# Patient Record
Sex: FEMALE | Race: BLACK OR AFRICAN AMERICAN | Employment: OTHER
[De-identification: names, ages, dates, MRNs, and addresses within clinical notes are randomized per-mention and may not be internally consistent; named-entity substitution may affect disease eponyms.]

---

## 2017-01-09 RX ORDER — AMLODIPINE BESYLATE 10 MG/1
10 TABLET ORAL DAILY
Qty: 30 TABLET | Refills: 3 | OUTPATIENT
Start: 2017-01-09

## 2017-01-17 ENCOUNTER — TELEPHONE (OUTPATIENT)
Dept: PRIMARY CARE CLINIC | Facility: CLINIC | Age: 67
End: 2017-01-17

## 2017-01-17 ENCOUNTER — OFFICE VISIT (OUTPATIENT)
Dept: PRIMARY CARE CLINIC | Facility: CLINIC | Age: 67
End: 2017-01-17

## 2017-01-17 VITALS
SYSTOLIC BLOOD PRESSURE: 126 MMHG | HEART RATE: 64 BPM | DIASTOLIC BLOOD PRESSURE: 79 MMHG | BODY MASS INDEX: 31.72 KG/M2 | TEMPERATURE: 98.3 F | RESPIRATION RATE: 20 BRPM | WEIGHT: 173.4 LBS

## 2017-01-17 DIAGNOSIS — E78.2 MIXED HYPERLIPIDEMIA: ICD-10-CM

## 2017-01-17 DIAGNOSIS — E87.6 HYPOKALEMIA: ICD-10-CM

## 2017-01-17 DIAGNOSIS — L91.0 KELOID SCAR: ICD-10-CM

## 2017-01-17 DIAGNOSIS — E11.9 TYPE 2 DIABETES MELLITUS WITHOUT COMPLICATION, WITHOUT LONG-TERM CURRENT USE OF INSULIN (HCC): Primary | ICD-10-CM

## 2017-01-17 DIAGNOSIS — Z12.11 COLON CANCER SCREENING: Primary | ICD-10-CM

## 2017-01-17 DIAGNOSIS — M85.80 OSTEOPENIA DETERMINED BY X-RAY: ICD-10-CM

## 2017-01-17 DIAGNOSIS — Z23 NEED FOR SHINGLES VACCINE: ICD-10-CM

## 2017-01-17 DIAGNOSIS — Z12.39 BREAST CANCER SCREENING: ICD-10-CM

## 2017-01-17 DIAGNOSIS — I10 ESSENTIAL HYPERTENSION: ICD-10-CM

## 2017-01-17 DIAGNOSIS — Z12.11 COLON CANCER SCREENING: ICD-10-CM

## 2017-01-17 PROCEDURE — 99213 OFFICE O/P EST LOW 20 MIN: CPT | Performed by: NURSE PRACTITIONER

## 2017-01-17 PROCEDURE — G8427 DOCREV CUR MEDS BY ELIG CLIN: HCPCS | Performed by: NURSE PRACTITIONER

## 2017-01-17 PROCEDURE — G8484 FLU IMMUNIZE NO ADMIN: HCPCS | Performed by: NURSE PRACTITIONER

## 2017-01-17 PROCEDURE — 3014F SCREEN MAMMO DOC REV: CPT | Performed by: NURSE PRACTITIONER

## 2017-01-17 PROCEDURE — G8399 PT W/DXA RESULTS DOCUMENT: HCPCS | Performed by: NURSE PRACTITIONER

## 2017-01-17 PROCEDURE — 3017F COLORECTAL CA SCREEN DOC REV: CPT | Performed by: NURSE PRACTITIONER

## 2017-01-17 PROCEDURE — 1036F TOBACCO NON-USER: CPT | Performed by: NURSE PRACTITIONER

## 2017-01-17 PROCEDURE — 3044F HG A1C LEVEL LT 7.0%: CPT | Performed by: NURSE PRACTITIONER

## 2017-01-17 PROCEDURE — 1090F PRES/ABSN URINE INCON ASSESS: CPT | Performed by: NURSE PRACTITIONER

## 2017-01-17 PROCEDURE — G8419 CALC BMI OUT NRM PARAM NOF/U: HCPCS | Performed by: NURSE PRACTITIONER

## 2017-01-17 PROCEDURE — 1123F ACP DISCUSS/DSCN MKR DOCD: CPT | Performed by: NURSE PRACTITIONER

## 2017-01-17 PROCEDURE — 4040F PNEUMOC VAC/ADMIN/RCVD: CPT | Performed by: NURSE PRACTITIONER

## 2017-01-17 RX ORDER — LOVASTATIN 10 MG/1
10 TABLET ORAL NIGHTLY
Qty: 30 TABLET | Refills: 3 | Status: SHIPPED | OUTPATIENT
Start: 2017-01-17 | End: 2017-06-07 | Stop reason: SDUPTHER

## 2017-01-17 RX ORDER — AMLODIPINE BESYLATE 10 MG/1
10 TABLET ORAL DAILY
Qty: 30 TABLET | Refills: 3 | Status: SHIPPED | OUTPATIENT
Start: 2017-01-17 | End: 2017-06-02 | Stop reason: SDUPTHER

## 2017-01-17 RX ORDER — ASPIRIN 81 MG/1
81 TABLET ORAL DAILY
Qty: 30 TABLET | Refills: 3 | Status: SHIPPED | OUTPATIENT
Start: 2017-01-17 | End: 2017-06-07 | Stop reason: SDUPTHER

## 2017-01-17 RX ORDER — B-COMPLEX WITH VITAMIN C
1 TABLET ORAL 2 TIMES DAILY
Qty: 30 TABLET | Refills: 3 | Status: SHIPPED | OUTPATIENT
Start: 2017-01-17 | End: 2017-06-07 | Stop reason: SDUPTHER

## 2017-01-17 RX ORDER — LISINOPRIL 20 MG/1
20 TABLET ORAL DAILY
Qty: 30 TABLET | Refills: 3 | Status: SHIPPED | OUTPATIENT
Start: 2017-01-17 | End: 2017-06-07 | Stop reason: SDUPTHER

## 2017-01-17 RX ORDER — POTASSIUM CHLORIDE 750 MG/1
10 TABLET, EXTENDED RELEASE ORAL DAILY
Qty: 30 TABLET | Refills: 3 | Status: SHIPPED | OUTPATIENT
Start: 2017-01-17 | End: 2017-06-07 | Stop reason: SDUPTHER

## 2017-01-17 ASSESSMENT — ENCOUNTER SYMPTOMS
EYES NEGATIVE: 1
RESPIRATORY NEGATIVE: 1
WHEEZING: 0
CHEST TIGHTNESS: 0
COLOR CHANGE: 1
GASTROINTESTINAL NEGATIVE: 1
VISUAL CHANGE: 0
BLURRED VISION: 0
SHORTNESS OF BREATH: 0

## 2017-01-18 ENCOUNTER — TELEPHONE (OUTPATIENT)
Dept: PRIMARY CARE CLINIC | Facility: CLINIC | Age: 67
End: 2017-01-18

## 2017-02-01 ENCOUNTER — TELEPHONE (OUTPATIENT)
Dept: PRIMARY CARE CLINIC | Facility: CLINIC | Age: 67
End: 2017-02-01

## 2017-02-02 ENCOUNTER — OFFICE VISIT (OUTPATIENT)
Dept: PRIMARY CARE CLINIC | Facility: CLINIC | Age: 67
End: 2017-02-02

## 2017-02-02 VITALS
WEIGHT: 178.9 LBS | DIASTOLIC BLOOD PRESSURE: 80 MMHG | HEART RATE: 89 BPM | SYSTOLIC BLOOD PRESSURE: 151 MMHG | TEMPERATURE: 98.3 F | BODY MASS INDEX: 32.72 KG/M2 | RESPIRATION RATE: 20 BRPM

## 2017-02-02 DIAGNOSIS — M51.36 DEGENERATIVE DISC DISEASE, LUMBAR: Primary | ICD-10-CM

## 2017-02-02 DIAGNOSIS — M54.50 ACUTE MIDLINE LOW BACK PAIN WITHOUT SCIATICA: ICD-10-CM

## 2017-02-02 PROCEDURE — G8484 FLU IMMUNIZE NO ADMIN: HCPCS | Performed by: NURSE PRACTITIONER

## 2017-02-02 PROCEDURE — 1123F ACP DISCUSS/DSCN MKR DOCD: CPT | Performed by: NURSE PRACTITIONER

## 2017-02-02 PROCEDURE — 3017F COLORECTAL CA SCREEN DOC REV: CPT | Performed by: NURSE PRACTITIONER

## 2017-02-02 PROCEDURE — G8399 PT W/DXA RESULTS DOCUMENT: HCPCS | Performed by: NURSE PRACTITIONER

## 2017-02-02 PROCEDURE — 99213 OFFICE O/P EST LOW 20 MIN: CPT | Performed by: NURSE PRACTITIONER

## 2017-02-02 PROCEDURE — 4040F PNEUMOC VAC/ADMIN/RCVD: CPT | Performed by: NURSE PRACTITIONER

## 2017-02-02 PROCEDURE — G8427 DOCREV CUR MEDS BY ELIG CLIN: HCPCS | Performed by: NURSE PRACTITIONER

## 2017-02-02 PROCEDURE — G8419 CALC BMI OUT NRM PARAM NOF/U: HCPCS | Performed by: NURSE PRACTITIONER

## 2017-02-02 PROCEDURE — 3014F SCREEN MAMMO DOC REV: CPT | Performed by: NURSE PRACTITIONER

## 2017-02-02 PROCEDURE — 1090F PRES/ABSN URINE INCON ASSESS: CPT | Performed by: NURSE PRACTITIONER

## 2017-02-02 PROCEDURE — 1036F TOBACCO NON-USER: CPT | Performed by: NURSE PRACTITIONER

## 2017-02-02 ASSESSMENT — ENCOUNTER SYMPTOMS
BACK PAIN: 1
EYES NEGATIVE: 1
RESPIRATORY NEGATIVE: 1
GASTROINTESTINAL NEGATIVE: 1

## 2017-02-28 ENCOUNTER — INITIAL CONSULT (OUTPATIENT)
Dept: NEUROSURGERY | Facility: CLINIC | Age: 67
End: 2017-02-28

## 2017-02-28 VITALS
WEIGHT: 170 LBS | DIASTOLIC BLOOD PRESSURE: 84 MMHG | SYSTOLIC BLOOD PRESSURE: 156 MMHG | BODY MASS INDEX: 28.32 KG/M2 | HEIGHT: 65 IN | HEART RATE: 72 BPM

## 2017-02-28 DIAGNOSIS — M51.9 LUMBAR DISC DISEASE: Primary | ICD-10-CM

## 2017-02-28 PROCEDURE — 3014F SCREEN MAMMO DOC REV: CPT | Performed by: NEUROLOGICAL SURGERY

## 2017-02-28 PROCEDURE — 1036F TOBACCO NON-USER: CPT | Performed by: NEUROLOGICAL SURGERY

## 2017-02-28 PROCEDURE — G8420 CALC BMI NORM PARAMETERS: HCPCS | Performed by: NEUROLOGICAL SURGERY

## 2017-02-28 PROCEDURE — 3017F COLORECTAL CA SCREEN DOC REV: CPT | Performed by: NEUROLOGICAL SURGERY

## 2017-02-28 PROCEDURE — 1090F PRES/ABSN URINE INCON ASSESS: CPT | Performed by: NEUROLOGICAL SURGERY

## 2017-02-28 PROCEDURE — 4040F PNEUMOC VAC/ADMIN/RCVD: CPT | Performed by: NEUROLOGICAL SURGERY

## 2017-02-28 PROCEDURE — G8427 DOCREV CUR MEDS BY ELIG CLIN: HCPCS | Performed by: NEUROLOGICAL SURGERY

## 2017-02-28 PROCEDURE — 99203 OFFICE O/P NEW LOW 30 MIN: CPT | Performed by: NEUROLOGICAL SURGERY

## 2017-02-28 PROCEDURE — G8484 FLU IMMUNIZE NO ADMIN: HCPCS | Performed by: NEUROLOGICAL SURGERY

## 2017-03-27 ENCOUNTER — ANESTHESIA EVENT (OUTPATIENT)
Dept: OPERATING ROOM | Age: 67
End: 2017-03-27
Payer: MEDICARE

## 2017-03-27 ENCOUNTER — HOSPITAL ENCOUNTER (OUTPATIENT)
Age: 67
Setting detail: OUTPATIENT SURGERY
Discharge: HOME | End: 2017-03-27
Attending: INTERNAL MEDICINE | Admitting: INTERNAL MEDICINE
Payer: MEDICARE

## 2017-03-27 ENCOUNTER — ANESTHESIA (OUTPATIENT)
Dept: OPERATING ROOM | Age: 67
End: 2017-03-27
Payer: MEDICARE

## 2017-03-27 VITALS
TEMPERATURE: 98.4 F | RESPIRATION RATE: 16 BRPM | DIASTOLIC BLOOD PRESSURE: 73 MMHG | SYSTOLIC BLOOD PRESSURE: 161 MMHG | BODY MASS INDEX: 32.82 KG/M2 | HEART RATE: 85 BPM | HEIGHT: 65 IN | WEIGHT: 197 LBS | OXYGEN SATURATION: 97 %

## 2017-03-27 PROCEDURE — 2580000003 HC RX 258: Performed by: INTERNAL MEDICINE

## 2017-03-27 RX ORDER — SODIUM CHLORIDE, SODIUM LACTATE, POTASSIUM CHLORIDE, CALCIUM CHLORIDE 600; 310; 30; 20 MG/100ML; MG/100ML; MG/100ML; MG/100ML
INJECTION, SOLUTION INTRAVENOUS CONTINUOUS
Status: DISCONTINUED | OUTPATIENT
Start: 2017-03-27 | End: 2017-03-27 | Stop reason: HOSPADM

## 2017-03-27 RX ADMIN — SODIUM CHLORIDE, POTASSIUM CHLORIDE, SODIUM LACTATE AND CALCIUM CHLORIDE: 600; 310; 30; 20 INJECTION, SOLUTION INTRAVENOUS at 11:21

## 2017-03-27 ASSESSMENT — PAIN - FUNCTIONAL ASSESSMENT: PAIN_FUNCTIONAL_ASSESSMENT: 0-10

## 2017-03-27 ASSESSMENT — PAIN DESCRIPTION - DESCRIPTORS: DESCRIPTORS: ACHING

## 2017-05-08 ENCOUNTER — OFFICE VISIT (OUTPATIENT)
Dept: PRIMARY CARE CLINIC | Age: 67
End: 2017-05-08
Payer: MEDICARE

## 2017-05-08 VITALS
WEIGHT: 172.6 LBS | BODY MASS INDEX: 28.76 KG/M2 | DIASTOLIC BLOOD PRESSURE: 65 MMHG | RESPIRATION RATE: 20 BRPM | SYSTOLIC BLOOD PRESSURE: 141 MMHG | HEIGHT: 65 IN | HEART RATE: 71 BPM | OXYGEN SATURATION: 97 % | TEMPERATURE: 97.7 F

## 2017-05-08 DIAGNOSIS — J01.40 ACUTE NON-RECURRENT PANSINUSITIS: Primary | ICD-10-CM

## 2017-05-08 DIAGNOSIS — J20.9 ACUTE BRONCHITIS, UNSPECIFIED ORGANISM: ICD-10-CM

## 2017-05-08 PROCEDURE — 1090F PRES/ABSN URINE INCON ASSESS: CPT | Performed by: NURSE PRACTITIONER

## 2017-05-08 PROCEDURE — 99213 OFFICE O/P EST LOW 20 MIN: CPT | Performed by: NURSE PRACTITIONER

## 2017-05-08 PROCEDURE — 3014F SCREEN MAMMO DOC REV: CPT | Performed by: NURSE PRACTITIONER

## 2017-05-08 PROCEDURE — 1123F ACP DISCUSS/DSCN MKR DOCD: CPT | Performed by: NURSE PRACTITIONER

## 2017-05-08 PROCEDURE — 1036F TOBACCO NON-USER: CPT | Performed by: NURSE PRACTITIONER

## 2017-05-08 PROCEDURE — G8420 CALC BMI NORM PARAMETERS: HCPCS | Performed by: NURSE PRACTITIONER

## 2017-05-08 PROCEDURE — G8399 PT W/DXA RESULTS DOCUMENT: HCPCS | Performed by: NURSE PRACTITIONER

## 2017-05-08 PROCEDURE — 4040F PNEUMOC VAC/ADMIN/RCVD: CPT | Performed by: NURSE PRACTITIONER

## 2017-05-08 PROCEDURE — 3017F COLORECTAL CA SCREEN DOC REV: CPT | Performed by: NURSE PRACTITIONER

## 2017-05-08 PROCEDURE — G8427 DOCREV CUR MEDS BY ELIG CLIN: HCPCS | Performed by: NURSE PRACTITIONER

## 2017-05-08 RX ORDER — LEVOFLOXACIN 500 MG/1
500 TABLET, FILM COATED ORAL DAILY
Qty: 10 TABLET | Refills: 0 | Status: SHIPPED | OUTPATIENT
Start: 2017-05-08 | End: 2017-05-18

## 2017-05-08 RX ORDER — BENZONATATE 200 MG/1
200 CAPSULE ORAL 3 TIMES DAILY PRN
Qty: 20 CAPSULE | Refills: 0 | Status: SHIPPED | OUTPATIENT
Start: 2017-05-08 | End: 2017-05-15

## 2017-05-08 RX ORDER — PREDNISONE 20 MG/1
40 TABLET ORAL DAILY
Qty: 10 TABLET | Refills: 0 | Status: SHIPPED | OUTPATIENT
Start: 2017-05-08 | End: 2017-05-13

## 2017-05-08 ASSESSMENT — ENCOUNTER SYMPTOMS
TROUBLE SWALLOWING: 0
NAUSEA: 0
SHORTNESS OF BREATH: 1
VOMITING: 0
COUGH: 1
ABDOMINAL PAIN: 0
SORE THROAT: 0
RHINORRHEA: 1

## 2017-06-02 DIAGNOSIS — I10 ESSENTIAL HYPERTENSION: ICD-10-CM

## 2017-06-06 RX ORDER — AMLODIPINE BESYLATE 10 MG/1
TABLET ORAL
Qty: 30 TABLET | Refills: 0 | Status: SHIPPED | OUTPATIENT
Start: 2017-06-06 | End: 2017-06-07 | Stop reason: SDUPTHER

## 2017-06-07 ENCOUNTER — OFFICE VISIT (OUTPATIENT)
Dept: PRIMARY CARE CLINIC | Age: 67
End: 2017-06-07
Payer: MEDICARE

## 2017-06-07 VITALS
TEMPERATURE: 98.2 F | WEIGHT: 170.1 LBS | RESPIRATION RATE: 20 BRPM | DIASTOLIC BLOOD PRESSURE: 77 MMHG | HEART RATE: 67 BPM | BODY MASS INDEX: 28.31 KG/M2 | SYSTOLIC BLOOD PRESSURE: 134 MMHG

## 2017-06-07 DIAGNOSIS — E78.49 OTHER HYPERLIPIDEMIA: ICD-10-CM

## 2017-06-07 DIAGNOSIS — E11.9 TYPE 2 DIABETES MELLITUS WITHOUT COMPLICATION, WITHOUT LONG-TERM CURRENT USE OF INSULIN (HCC): ICD-10-CM

## 2017-06-07 DIAGNOSIS — I10 ESSENTIAL HYPERTENSION: ICD-10-CM

## 2017-06-07 DIAGNOSIS — Z23 NEED FOR PNEUMOCOCCAL VACCINE: Primary | ICD-10-CM

## 2017-06-07 DIAGNOSIS — M85.80 OSTEOPENIA DETERMINED BY X-RAY: ICD-10-CM

## 2017-06-07 DIAGNOSIS — M54.50 CHRONIC MIDLINE LOW BACK PAIN WITHOUT SCIATICA: ICD-10-CM

## 2017-06-07 DIAGNOSIS — E87.6 HYPOKALEMIA: ICD-10-CM

## 2017-06-07 DIAGNOSIS — G89.29 CHRONIC MIDLINE LOW BACK PAIN WITHOUT SCIATICA: ICD-10-CM

## 2017-06-07 DIAGNOSIS — E78.2 MIXED HYPERLIPIDEMIA: ICD-10-CM

## 2017-06-07 PROCEDURE — 3044F HG A1C LEVEL LT 7.0%: CPT | Performed by: NURSE PRACTITIONER

## 2017-06-07 PROCEDURE — 99214 OFFICE O/P EST MOD 30 MIN: CPT | Performed by: NURSE PRACTITIONER

## 2017-06-07 PROCEDURE — G8420 CALC BMI NORM PARAMETERS: HCPCS | Performed by: NURSE PRACTITIONER

## 2017-06-07 PROCEDURE — G0009 ADMIN PNEUMOCOCCAL VACCINE: HCPCS | Performed by: NURSE PRACTITIONER

## 2017-06-07 PROCEDURE — 90732 PPSV23 VACC 2 YRS+ SUBQ/IM: CPT | Performed by: NURSE PRACTITIONER

## 2017-06-07 PROCEDURE — 1123F ACP DISCUSS/DSCN MKR DOCD: CPT | Performed by: NURSE PRACTITIONER

## 2017-06-07 PROCEDURE — 3017F COLORECTAL CA SCREEN DOC REV: CPT | Performed by: NURSE PRACTITIONER

## 2017-06-07 PROCEDURE — G8399 PT W/DXA RESULTS DOCUMENT: HCPCS | Performed by: NURSE PRACTITIONER

## 2017-06-07 PROCEDURE — 4040F PNEUMOC VAC/ADMIN/RCVD: CPT | Performed by: NURSE PRACTITIONER

## 2017-06-07 PROCEDURE — 3014F SCREEN MAMMO DOC REV: CPT | Performed by: NURSE PRACTITIONER

## 2017-06-07 PROCEDURE — G8427 DOCREV CUR MEDS BY ELIG CLIN: HCPCS | Performed by: NURSE PRACTITIONER

## 2017-06-07 PROCEDURE — 1090F PRES/ABSN URINE INCON ASSESS: CPT | Performed by: NURSE PRACTITIONER

## 2017-06-07 RX ORDER — ALBUTEROL SULFATE 90 UG/1
2 AEROSOL, METERED RESPIRATORY (INHALATION) EVERY 6 HOURS PRN
Qty: 1 INHALER | Refills: 3 | Status: SHIPPED | OUTPATIENT
Start: 2017-06-07 | End: 2018-03-06 | Stop reason: SDUPTHER

## 2017-06-07 RX ORDER — LISINOPRIL 20 MG/1
20 TABLET ORAL DAILY
Qty: 30 TABLET | Refills: 3 | Status: SHIPPED | OUTPATIENT
Start: 2017-06-07 | End: 2018-01-04 | Stop reason: SDUPTHER

## 2017-06-07 RX ORDER — B-COMPLEX WITH VITAMIN C
1 TABLET ORAL 2 TIMES DAILY
Qty: 30 TABLET | Refills: 3 | Status: SHIPPED | OUTPATIENT
Start: 2017-06-07 | End: 2018-03-06 | Stop reason: SDUPTHER

## 2017-06-07 RX ORDER — ASPIRIN 81 MG/1
81 TABLET ORAL DAILY
Qty: 30 TABLET | Refills: 3 | Status: SHIPPED | OUTPATIENT
Start: 2017-06-07 | End: 2018-03-06 | Stop reason: SDUPTHER

## 2017-06-07 RX ORDER — AMLODIPINE BESYLATE 10 MG/1
10 TABLET ORAL DAILY
Qty: 30 TABLET | Refills: 3 | Status: SHIPPED | OUTPATIENT
Start: 2017-06-07 | End: 2017-10-26 | Stop reason: SDUPTHER

## 2017-06-07 RX ORDER — LOVASTATIN 10 MG/1
10 TABLET ORAL NIGHTLY
Qty: 30 TABLET | Refills: 3 | Status: SHIPPED | OUTPATIENT
Start: 2017-06-07 | End: 2018-03-06 | Stop reason: SDUPTHER

## 2017-06-07 RX ORDER — POTASSIUM CHLORIDE 750 MG/1
10 TABLET, EXTENDED RELEASE ORAL DAILY
Qty: 30 TABLET | Refills: 3 | Status: SHIPPED | OUTPATIENT
Start: 2017-06-07 | End: 2018-03-06 | Stop reason: SDUPTHER

## 2017-06-07 ASSESSMENT — ENCOUNTER SYMPTOMS
GASTROINTESTINAL NEGATIVE: 1
WHEEZING: 0
VISUAL CHANGE: 0
SHORTNESS OF BREATH: 0
NAUSEA: 0
BLURRED VISION: 0
BACK PAIN: 1
COUGH: 0
RESPIRATORY NEGATIVE: 1
ABDOMINAL PAIN: 0
CHEST TIGHTNESS: 0
TROUBLE SWALLOWING: 0
EYES NEGATIVE: 1
CONSTIPATION: 0
DIARRHEA: 0

## 2017-06-08 ENCOUNTER — HOSPITAL ENCOUNTER (OUTPATIENT)
Age: 67
Discharge: HOME | End: 2017-06-08
Payer: MEDICARE

## 2017-06-08 ENCOUNTER — TELEPHONE (OUTPATIENT)
Dept: PRIMARY CARE CLINIC | Age: 67
End: 2017-06-08

## 2017-06-08 DIAGNOSIS — E11.9 TYPE 2 DIABETES MELLITUS WITHOUT COMPLICATION, WITHOUT LONG-TERM CURRENT USE OF INSULIN (HCC): ICD-10-CM

## 2017-06-08 DIAGNOSIS — I10 ESSENTIAL HYPERTENSION: ICD-10-CM

## 2017-06-08 DIAGNOSIS — E78.49 OTHER HYPERLIPIDEMIA: ICD-10-CM

## 2017-06-08 LAB
-: ABNORMAL
ABSOLUTE EOS #: 0.1 K/UL (ref 0–0.4)
ABSOLUTE LYMPH #: 2.3 K/UL (ref 1–4.8)
ABSOLUTE MONO #: 0.4 K/UL (ref 0.2–0.8)
ALBUMIN SERPL-MCNC: 3.7 G/DL (ref 3.5–5.2)
ALBUMIN/GLOBULIN RATIO: 1.1 (ref 1–2.5)
ALP BLD-CCNC: 121 U/L (ref 35–104)
ALT SERPL-CCNC: <5 U/L (ref 5–33)
AMORPHOUS: ABNORMAL
ANION GAP SERPL CALCULATED.3IONS-SCNC: 10 MMOL/L (ref 9–17)
AST SERPL-CCNC: 9 U/L
BACTERIA: ABNORMAL
BASOPHILS # BLD: 1 %
BASOPHILS ABSOLUTE: 0.1 K/UL (ref 0–0.2)
BILIRUB SERPL-MCNC: 0.35 MG/DL (ref 0.3–1.2)
BILIRUBIN URINE: NEGATIVE
BUN BLDV-MCNC: 7 MG/DL (ref 8–23)
BUN/CREAT BLD: 8 (ref 9–20)
CALCIUM SERPL-MCNC: 9.2 MG/DL (ref 8.6–10.4)
CASTS UA: ABNORMAL /LPF
CHLORIDE BLD-SCNC: 105 MMOL/L (ref 98–107)
CHOLESTEROL/HDL RATIO: 2.9
CHOLESTEROL: 185 MG/DL
CO2: 28 MMOL/L (ref 20–31)
COLOR: YELLOW
COMMENT UA: ABNORMAL
CREAT SERPL-MCNC: 0.9 MG/DL (ref 0.5–0.9)
CREATININE URINE: 56 MG/DL (ref 28–217)
CRYSTALS, UA: ABNORMAL /HPF
DIFFERENTIAL TYPE: NORMAL
EOSINOPHILS RELATIVE PERCENT: 1 %
EPITHELIAL CELLS UA: ABNORMAL /HPF (ref 0–25)
ESTIMATED AVERAGE GLUCOSE: 108 MG/DL
GFR AFRICAN AMERICAN: >60 ML/MIN
GFR NON-AFRICAN AMERICAN: >60 ML/MIN
GFR SERPL CREATININE-BSD FRML MDRD: ABNORMAL ML/MIN/{1.73_M2}
GFR SERPL CREATININE-BSD FRML MDRD: ABNORMAL ML/MIN/{1.73_M2}
GLUCOSE BLD-MCNC: 92 MG/DL (ref 70–99)
GLUCOSE URINE: NEGATIVE
HBA1C MFR BLD: 5.4 % (ref 4.8–5.9)
HCT VFR BLD CALC: 37.2 % (ref 36–46)
HDLC SERPL-MCNC: 64 MG/DL
HEMOGLOBIN: 12 G/DL (ref 12–16)
KETONES, URINE: NEGATIVE
LDL CHOLESTEROL: 106 MG/DL (ref 0–130)
LEUKOCYTE ESTERASE, URINE: ABNORMAL
LYMPHOCYTES # BLD: 39 %
MCH RBC QN AUTO: 29 PG (ref 26–34)
MCHC RBC AUTO-ENTMCNC: 32.2 G/DL (ref 31–37)
MCV RBC AUTO: 90.3 FL (ref 80–100)
MICROALBUMIN/CREAT 24H UR: 71 MG/L
MICROALBUMIN/CREAT UR-RTO: 127 MCG/MG CREAT
MONOCYTES # BLD: 6 %
MUCUS: ABNORMAL
NITRITE, URINE: NEGATIVE
OTHER OBSERVATIONS UA: ABNORMAL
PDW BLD-RTO: 12.9 % (ref 12.1–15.2)
PH UA: 7 (ref 5–9)
PLATELET # BLD: 299 K/UL (ref 140–450)
PLATELET ESTIMATE: NORMAL
PMV BLD AUTO: NORMAL FL (ref 6–12)
POTASSIUM SERPL-SCNC: 4.2 MMOL/L (ref 3.7–5.3)
PROTEIN UA: NEGATIVE
RBC # BLD: 4.12 M/UL (ref 4–5.2)
RBC # BLD: NORMAL 10*6/UL
RBC UA: ABNORMAL /HPF (ref 0–2)
RENAL EPITHELIAL, UA: ABNORMAL /HPF
SEG NEUTROPHILS: 53 %
SEGMENTED NEUTROPHILS ABSOLUTE COUNT: 3 K/UL (ref 1.8–7.7)
SODIUM BLD-SCNC: 143 MMOL/L (ref 135–144)
SPECIFIC GRAVITY UA: <1.005 (ref 1.01–1.02)
TOTAL PROTEIN: 7.1 G/DL (ref 6.4–8.3)
TRICHOMONAS: ABNORMAL
TRIGL SERPL-MCNC: 77 MG/DL
TSH SERPL DL<=0.05 MIU/L-ACNC: 2.27 MIU/L (ref 0.3–5)
TURBIDITY: CLEAR
URINE HGB: NEGATIVE
UROBILINOGEN, URINE: NORMAL
VLDLC SERPL CALC-MCNC: NORMAL MG/DL (ref 1–30)
WBC # BLD: 5.9 K/UL (ref 3.5–11)
WBC # BLD: NORMAL 10*3/UL
WBC UA: ABNORMAL /HPF (ref 0–5)
YEAST: ABNORMAL

## 2017-06-08 PROCEDURE — 83036 HEMOGLOBIN GLYCOSYLATED A1C: CPT

## 2017-06-08 PROCEDURE — 82570 ASSAY OF URINE CREATININE: CPT

## 2017-06-08 PROCEDURE — 82043 UR ALBUMIN QUANTITATIVE: CPT

## 2017-06-08 PROCEDURE — 36415 COLL VENOUS BLD VENIPUNCTURE: CPT

## 2017-06-08 PROCEDURE — 85025 COMPLETE CBC W/AUTO DIFF WBC: CPT

## 2017-06-08 PROCEDURE — 80053 COMPREHEN METABOLIC PANEL: CPT

## 2017-06-08 PROCEDURE — 81001 URINALYSIS AUTO W/SCOPE: CPT

## 2017-06-08 PROCEDURE — 80061 LIPID PANEL: CPT

## 2017-06-08 PROCEDURE — 84443 ASSAY THYROID STIM HORMONE: CPT

## 2017-06-15 DIAGNOSIS — Z12.31 VISIT FOR SCREENING MAMMOGRAM: ICD-10-CM

## 2017-06-15 DIAGNOSIS — E11.9 TYPE 2 DIABETES MELLITUS WITHOUT COMPLICATION, UNSPECIFIED LONG TERM INSULIN USE STATUS: Primary | ICD-10-CM

## 2017-06-19 ENCOUNTER — HOSPITAL ENCOUNTER (EMERGENCY)
Age: 67
Discharge: HOME | End: 2017-06-19
Attending: EMERGENCY MEDICINE
Payer: MEDICARE

## 2017-06-19 ENCOUNTER — APPOINTMENT (OUTPATIENT)
Dept: CT IMAGING | Age: 67
End: 2017-06-19
Payer: MEDICARE

## 2017-06-19 VITALS
BODY MASS INDEX: 28.49 KG/M2 | OXYGEN SATURATION: 92 % | RESPIRATION RATE: 18 BRPM | HEIGHT: 65 IN | HEART RATE: 81 BPM | SYSTOLIC BLOOD PRESSURE: 126 MMHG | WEIGHT: 171 LBS | DIASTOLIC BLOOD PRESSURE: 85 MMHG | TEMPERATURE: 97.9 F

## 2017-06-19 DIAGNOSIS — R10.9 ABDOMINAL WALL PAIN: Primary | ICD-10-CM

## 2017-06-19 LAB
-: ABNORMAL
ABSOLUTE EOS #: 0.1 K/UL (ref 0–0.4)
ABSOLUTE LYMPH #: 2.8 K/UL (ref 1–4.8)
ABSOLUTE MONO #: 0.3 K/UL (ref 0.2–0.8)
AMORPHOUS: ABNORMAL
ANION GAP SERPL CALCULATED.3IONS-SCNC: 14 MMOL/L (ref 9–17)
BACTERIA: ABNORMAL
BASOPHILS # BLD: 1 %
BASOPHILS ABSOLUTE: 0.1 K/UL (ref 0–0.2)
BILIRUBIN URINE: NEGATIVE
BUN BLDV-MCNC: 9 MG/DL (ref 8–23)
BUN/CREAT BLD: 12 (ref 9–20)
CALCIUM SERPL-MCNC: 9.7 MG/DL (ref 8.6–10.4)
CASTS UA: ABNORMAL /LPF
CHLORIDE BLD-SCNC: 102 MMOL/L (ref 98–107)
CO2: 27 MMOL/L (ref 20–31)
COLOR: YELLOW
COMMENT UA: ABNORMAL
CREAT SERPL-MCNC: 0.75 MG/DL (ref 0.5–0.9)
CRYSTALS, UA: ABNORMAL /HPF
DIFFERENTIAL TYPE: NORMAL
EOSINOPHILS RELATIVE PERCENT: 1 %
EPITHELIAL CELLS UA: ABNORMAL /HPF (ref 0–25)
GFR AFRICAN AMERICAN: >60 ML/MIN
GFR NON-AFRICAN AMERICAN: >60 ML/MIN
GFR SERPL CREATININE-BSD FRML MDRD: ABNORMAL ML/MIN/{1.73_M2}
GFR SERPL CREATININE-BSD FRML MDRD: ABNORMAL ML/MIN/{1.73_M2}
GLUCOSE BLD-MCNC: 127 MG/DL (ref 70–99)
GLUCOSE URINE: NEGATIVE
HCT VFR BLD CALC: 40.2 % (ref 36–46)
HEMOGLOBIN: 13 G/DL (ref 12–16)
KETONES, URINE: NEGATIVE
LEUKOCYTE ESTERASE, URINE: ABNORMAL
LYMPHOCYTES # BLD: 27 %
MCH RBC QN AUTO: 29 PG (ref 26–34)
MCHC RBC AUTO-ENTMCNC: 32.3 G/DL (ref 31–37)
MCV RBC AUTO: 90 FL (ref 80–100)
MONOCYTES # BLD: 3 %
MUCUS: ABNORMAL
NITRITE, URINE: NEGATIVE
OTHER OBSERVATIONS UA: ABNORMAL
PDW BLD-RTO: 13.2 % (ref 12.1–15.2)
PH UA: 7 (ref 5–9)
PLATELET # BLD: 387 K/UL (ref 140–450)
PLATELET ESTIMATE: NORMAL
PMV BLD AUTO: NORMAL FL (ref 6–12)
POTASSIUM SERPL-SCNC: 3.4 MMOL/L (ref 3.7–5.3)
PROTEIN UA: NEGATIVE
RBC # BLD: 4.47 M/UL (ref 4–5.2)
RBC # BLD: NORMAL 10*6/UL
RBC UA: ABNORMAL /HPF (ref 0–2)
RENAL EPITHELIAL, UA: ABNORMAL /HPF
SEG NEUTROPHILS: 68 %
SEGMENTED NEUTROPHILS ABSOLUTE COUNT: 6.9 K/UL (ref 1.8–7.7)
SODIUM BLD-SCNC: 143 MMOL/L (ref 135–144)
SPECIFIC GRAVITY UA: <1.005 (ref 1.01–1.02)
TRICHOMONAS: ABNORMAL
TURBIDITY: CLEAR
URINE HGB: NEGATIVE
UROBILINOGEN, URINE: NORMAL
WBC # BLD: 10.2 K/UL (ref 3.5–11)
WBC # BLD: NORMAL 10*3/UL
WBC UA: ABNORMAL /HPF (ref 0–5)
YEAST: ABNORMAL

## 2017-06-19 PROCEDURE — 36415 COLL VENOUS BLD VENIPUNCTURE: CPT

## 2017-06-19 PROCEDURE — 80048 BASIC METABOLIC PNL TOTAL CA: CPT

## 2017-06-19 PROCEDURE — 96375 TX/PRO/DX INJ NEW DRUG ADDON: CPT

## 2017-06-19 PROCEDURE — 81001 URINALYSIS AUTO W/SCOPE: CPT

## 2017-06-19 PROCEDURE — 99284 EMERGENCY DEPT VISIT MOD MDM: CPT

## 2017-06-19 PROCEDURE — 85025 COMPLETE CBC W/AUTO DIFF WBC: CPT

## 2017-06-19 PROCEDURE — 6360000002 HC RX W HCPCS: Performed by: EMERGENCY MEDICINE

## 2017-06-19 PROCEDURE — 2580000003 HC RX 258: Performed by: EMERGENCY MEDICINE

## 2017-06-19 PROCEDURE — 74176 CT ABD & PELVIS W/O CONTRAST: CPT

## 2017-06-19 PROCEDURE — 96374 THER/PROPH/DIAG INJ IV PUSH: CPT

## 2017-06-19 RX ORDER — ONDANSETRON 2 MG/ML
4 INJECTION INTRAMUSCULAR; INTRAVENOUS ONCE
Status: COMPLETED | OUTPATIENT
Start: 2017-06-19 | End: 2017-06-19

## 2017-06-19 RX ORDER — TRAMADOL HYDROCHLORIDE 50 MG/1
50 TABLET ORAL EVERY 6 HOURS PRN
Qty: 12 TABLET | Refills: 0 | Status: SHIPPED | OUTPATIENT
Start: 2017-06-19 | End: 2017-06-29

## 2017-06-19 RX ORDER — HYDROMORPHONE HCL 110MG/55ML
2 PATIENT CONTROLLED ANALGESIA SYRINGE INTRAVENOUS ONCE
Status: COMPLETED | OUTPATIENT
Start: 2017-06-19 | End: 2017-06-19

## 2017-06-19 RX ADMIN — ONDANSETRON 4 MG: 2 INJECTION INTRAMUSCULAR; INTRAVENOUS at 06:46

## 2017-06-19 RX ADMIN — SODIUM CHLORIDE 500 ML: 9 INJECTION, SOLUTION INTRAVENOUS at 06:39

## 2017-06-19 RX ADMIN — HYDROMORPHONE HYDROCHLORIDE 2 MG: 2 INJECTION, SOLUTION INTRAMUSCULAR; INTRAVENOUS; SUBCUTANEOUS at 06:46

## 2017-06-19 ASSESSMENT — PAIN SCALES - GENERAL
PAINLEVEL_OUTOF10: 10
PAINLEVEL_OUTOF10: 10
PAINLEVEL_OUTOF10: 1

## 2017-06-19 ASSESSMENT — PAIN DESCRIPTION - ORIENTATION: ORIENTATION: RIGHT;LEFT;LOWER

## 2017-06-19 ASSESSMENT — PAIN DESCRIPTION - DESCRIPTORS: DESCRIPTORS: ACHING

## 2017-06-19 ASSESSMENT — PAIN DESCRIPTION - PAIN TYPE: TYPE: ACUTE PAIN;CHRONIC PAIN

## 2017-06-19 ASSESSMENT — PAIN DESCRIPTION - LOCATION: LOCATION: BACK;ABDOMEN

## 2017-06-20 ENCOUNTER — CARE COORDINATION (OUTPATIENT)
Dept: PRIMARY CARE CLINIC | Age: 67
End: 2017-06-20

## 2017-06-21 ENCOUNTER — OFFICE VISIT (OUTPATIENT)
Dept: PRIMARY CARE CLINIC | Age: 67
End: 2017-06-21
Payer: MEDICARE

## 2017-06-21 VITALS
TEMPERATURE: 97.8 F | DIASTOLIC BLOOD PRESSURE: 74 MMHG | WEIGHT: 171.7 LBS | BODY MASS INDEX: 28.57 KG/M2 | RESPIRATION RATE: 20 BRPM | SYSTOLIC BLOOD PRESSURE: 131 MMHG | HEART RATE: 87 BPM

## 2017-06-21 DIAGNOSIS — R10.9 ABDOMINAL WALL PAIN: Primary | ICD-10-CM

## 2017-06-21 PROCEDURE — 1036F TOBACCO NON-USER: CPT | Performed by: NURSE PRACTITIONER

## 2017-06-21 PROCEDURE — 3017F COLORECTAL CA SCREEN DOC REV: CPT | Performed by: NURSE PRACTITIONER

## 2017-06-21 PROCEDURE — 99213 OFFICE O/P EST LOW 20 MIN: CPT | Performed by: NURSE PRACTITIONER

## 2017-06-21 PROCEDURE — 3014F SCREEN MAMMO DOC REV: CPT | Performed by: NURSE PRACTITIONER

## 2017-06-21 PROCEDURE — 1090F PRES/ABSN URINE INCON ASSESS: CPT | Performed by: NURSE PRACTITIONER

## 2017-06-21 PROCEDURE — G8427 DOCREV CUR MEDS BY ELIG CLIN: HCPCS | Performed by: NURSE PRACTITIONER

## 2017-06-21 PROCEDURE — 1123F ACP DISCUSS/DSCN MKR DOCD: CPT | Performed by: NURSE PRACTITIONER

## 2017-06-21 PROCEDURE — 4040F PNEUMOC VAC/ADMIN/RCVD: CPT | Performed by: NURSE PRACTITIONER

## 2017-06-21 PROCEDURE — G8419 CALC BMI OUT NRM PARAM NOF/U: HCPCS | Performed by: NURSE PRACTITIONER

## 2017-06-21 PROCEDURE — G8399 PT W/DXA RESULTS DOCUMENT: HCPCS | Performed by: NURSE PRACTITIONER

## 2017-06-21 ASSESSMENT — ENCOUNTER SYMPTOMS
BACK PAIN: 1
CONSTIPATION: 0
TROUBLE SWALLOWING: 0
BLOOD IN STOOL: 0
WHEEZING: 0
ANAL BLEEDING: 0
VOMITING: 0
RECTAL PAIN: 0
CHEST TIGHTNESS: 0
NAUSEA: 0
ABDOMINAL DISTENTION: 0
RESPIRATORY NEGATIVE: 1
ABDOMINAL PAIN: 1
DIARRHEA: 0
SHORTNESS OF BREATH: 0
EYES NEGATIVE: 1

## 2017-06-21 ASSESSMENT — PATIENT HEALTH QUESTIONNAIRE - PHQ9
2. FEELING DOWN, DEPRESSED OR HOPELESS: 0
SUM OF ALL RESPONSES TO PHQ9 QUESTIONS 1 & 2: 0
1. LITTLE INTEREST OR PLEASURE IN DOING THINGS: 0
SUM OF ALL RESPONSES TO PHQ QUESTIONS 1-9: 0

## 2017-07-18 ENCOUNTER — HOSPITAL ENCOUNTER (OUTPATIENT)
Dept: WOMENS IMAGING | Age: 67
Discharge: HOME | End: 2017-07-18
Payer: MEDICARE

## 2017-07-18 ENCOUNTER — TELEPHONE (OUTPATIENT)
Dept: PRIMARY CARE CLINIC | Age: 67
End: 2017-07-18

## 2017-07-18 DIAGNOSIS — Z12.31 VISIT FOR SCREENING MAMMOGRAM: ICD-10-CM

## 2017-07-18 PROCEDURE — G0202 SCR MAMMO BI INCL CAD: HCPCS

## 2017-10-26 DIAGNOSIS — I10 ESSENTIAL HYPERTENSION: ICD-10-CM

## 2017-10-26 RX ORDER — AMLODIPINE BESYLATE 10 MG/1
TABLET ORAL
Qty: 30 TABLET | Refills: 3 | Status: SHIPPED | OUTPATIENT
Start: 2017-10-26 | End: 2018-03-06 | Stop reason: SDUPTHER

## 2017-10-26 NOTE — TELEPHONE ENCOUNTER
Next Visit Date:  No future appointments. Health Maintenance   Topic Date Due    Hepatitis C screen  1950    DTaP/Tdap/Td vaccine (1 - Tdap) 01/09/1969    Zostavax vaccine  01/09/2010    Flu vaccine (1) 09/01/2017    Diabetic foot exam  06/07/2018    Diabetic hemoglobin A1C test  06/08/2018    Diabetic microalbuminuria test  06/08/2018    Lipid screen  06/08/2018    Diabetic retinal exam  06/15/2018    Breast cancer screen  07/18/2019    Colon cancer screen colonoscopy  03/23/2025    DEXA (modify frequency per FRAX score)  Completed    Pneumococcal low/med risk  Completed       Hemoglobin A1C (%)   Date Value   06/08/2017 5.4   10/12/2016 5.3   02/08/2016 6.2 (H)             ( goal A1C is < 7)   Microalb/Crt.  Ratio (mcg/mg creat)   Date Value   06/08/2017 127 (H)     LDL Cholesterol (mg/dL)   Date Value   06/08/2017 106       (goal LDL is <100)   AST (U/L)   Date Value   06/08/2017 9     ALT (U/L)   Date Value   06/08/2017 <5 (L)     BUN (mg/dL)   Date Value   06/19/2017 9     BP Readings from Last 3 Encounters:   06/21/17 131/74   06/19/17 126/85   06/07/17 134/77          (goal 120/80)    All Future Testing planned in [facility]  Lab Frequency Next Occurrence   Hemoglobin A1C Once 07/23/2017   CBC Auto Differential Once 04/17/2017   Comprehensive Metabolic Panel Once 77/61/6214   Urinalysis Once 04/17/2017   Microalbumin, Ur Once 04/17/2017   Lipid Panel Once 07/23/2017   VICENTE Digital Screen Bilateral Once 01/17/2018   COLONOSCOPY W/ OR W/O BIOPSY Once 01/18/2017   Lipid Panel Once 09/20/2017   Comprehensive Metabolic Panel Once 30/55/9542   CBC Auto Differential Once 09/20/2017   Urinalysis Once 09/20/2017   Microalbumin, Ur Once 09/20/2017   Hemoglobin A1C Once 09/20/2017               Patient Active Problem List:     Colon cancer screening     Hypokalemia     Type 2 diabetes mellitus without complication (HCC)     Essential hypertension     Hyperlipidemia     Bilateral leg edema     Non compliance w medication regimen     Gastroesophageal reflux disease     Mild intermittent asthma without complication     Postmenopausal     Osteopenia determined by x-ray     Chronic midline low back pain without sciatica

## 2017-12-12 RX ORDER — FLUTICASONE PROPIONATE 110 UG/1
1 AEROSOL, METERED RESPIRATORY (INHALATION) 2 TIMES DAILY
Qty: 1 INHALER | Refills: 3 | Status: SHIPPED | OUTPATIENT
Start: 2017-12-12 | End: 2018-03-06 | Stop reason: SDUPTHER

## 2017-12-12 RX ORDER — FLUTICASONE PROPIONATE 50 MCG
SPRAY, SUSPENSION (ML) NASAL
Qty: 1 BOTTLE | Refills: 5 | Status: SHIPPED | OUTPATIENT
Start: 2017-12-12 | End: 2018-07-31 | Stop reason: SDUPTHER

## 2017-12-12 NOTE — TELEPHONE ENCOUNTER
Health Maintenance   Topic Date Due    Hepatitis C screen  1950    DTaP/Tdap/Td vaccine (1 - Tdap) 01/09/1969    Zostavax vaccine  01/09/2010    Diabetic foot exam  06/07/2018    Diabetic hemoglobin A1C test  06/08/2018    Diabetic microalbuminuria test  06/08/2018    Lipid screen  06/08/2018    Diabetic retinal exam  06/15/2018    Breast cancer screen  07/18/2019    Colon cancer screen colonoscopy  03/23/2025    DEXA (modify frequency per FRAX score)  Completed    Flu vaccine  Completed    Pneumococcal low/med risk  Completed             (applicable per patient's age: Cancer Screenings, Depression Screening, Fall Risk Screening, Immunizations)    Hemoglobin A1C (%)   Date Value   06/08/2017 5.4   10/12/2016 5.3   02/08/2016 6.2 (H)     Microalb/Crt. Ratio (mcg/mg creat)   Date Value   06/08/2017 127 (H)     LDL Cholesterol (mg/dL)   Date Value   06/08/2017 106     AST (U/L)   Date Value   06/08/2017 9     ALT (U/L)   Date Value   06/08/2017 <5 (L)     BUN (mg/dL)   Date Value   06/19/2017 9      (goal A1C is < 7)   (goal LDL is <100) need 30-50% reduction from baseline     BP Readings from Last 3 Encounters:   06/21/17 131/74   06/19/17 126/85   06/07/17 134/77    (goal /80)      All Future Testing planned in [facility]:  Lab Frequency Next Occurrence   Hemoglobin A1C Once 07/23/2017   CBC Auto Differential Once 04/17/2017   Comprehensive Metabolic Panel Once 30/72/4433   Urinalysis Once 04/17/2017   Microalbumin, Ur Once 04/17/2017   Lipid Panel Once 07/23/2017   VICENTE Digital Screen Bilateral Once 01/17/2018   COLONOSCOPY W/ OR W/O BIOPSY Once 01/18/2017   Lipid Panel Once 09/20/2017   Comprehensive Metabolic Panel Once 89/47/9166   CBC Auto Differential Once 09/20/2017   Urinalysis Once 09/20/2017   Microalbumin, Ur Once 09/20/2017   Hemoglobin A1C Once 09/20/2017       Next Visit Date:  No future appointments.          Patient Active Problem List:     Colon cancer screening     Hypokalemia Type 2 diabetes mellitus without complication (HCC)     Essential hypertension     Hyperlipidemia     Bilateral leg edema     Non compliance w medication regimen     Gastroesophageal reflux disease     Mild intermittent asthma without complication     Postmenopausal     Osteopenia determined by x-ray     Chronic midline low back pain without sciatica

## 2018-01-04 DIAGNOSIS — I10 ESSENTIAL HYPERTENSION: ICD-10-CM

## 2018-01-04 RX ORDER — LISINOPRIL 20 MG/1
20 TABLET ORAL DAILY
Qty: 90 TABLET | Refills: 0 | Status: SHIPPED | OUTPATIENT
Start: 2018-01-04 | End: 2018-03-06 | Stop reason: SDUPTHER

## 2018-01-04 NOTE — TELEPHONE ENCOUNTER
diabetes mellitus without complication (HCC)     Essential hypertension     Hyperlipidemia     Bilateral leg edema     Non compliance w medication regimen     Gastroesophageal reflux disease     Mild intermittent asthma without complication     Postmenopausal     Osteopenia determined by x-ray     Chronic midline low back pain without sciatica

## 2018-02-28 DIAGNOSIS — Z12.11 COLON CANCER SCREENING: Primary | ICD-10-CM

## 2018-03-06 ENCOUNTER — HOSPITAL ENCOUNTER (OUTPATIENT)
Dept: GENERAL RADIOLOGY | Age: 68
Discharge: HOME | End: 2018-03-08
Payer: MEDICARE

## 2018-03-06 ENCOUNTER — HOSPITAL ENCOUNTER (OUTPATIENT)
Dept: VASCULAR LAB | Age: 68
Discharge: HOME | End: 2018-03-08
Payer: MEDICARE

## 2018-03-06 ENCOUNTER — OFFICE VISIT (OUTPATIENT)
Dept: PRIMARY CARE CLINIC | Age: 68
End: 2018-03-06
Payer: MEDICARE

## 2018-03-06 ENCOUNTER — TELEPHONE (OUTPATIENT)
Dept: PRIMARY CARE CLINIC | Age: 68
End: 2018-03-06

## 2018-03-06 ENCOUNTER — HOSPITAL ENCOUNTER (OUTPATIENT)
Age: 68
Discharge: HOME | End: 2018-03-08
Payer: MEDICARE

## 2018-03-06 VITALS — TEMPERATURE: 98.6 F | DIASTOLIC BLOOD PRESSURE: 64 MMHG | HEART RATE: 77 BPM | SYSTOLIC BLOOD PRESSURE: 149 MMHG

## 2018-03-06 DIAGNOSIS — M25.572 ACUTE LEFT ANKLE PAIN: ICD-10-CM

## 2018-03-06 DIAGNOSIS — I10 ESSENTIAL HYPERTENSION: Primary | ICD-10-CM

## 2018-03-06 DIAGNOSIS — E87.6 HYPOKALEMIA: ICD-10-CM

## 2018-03-06 DIAGNOSIS — M79.605 LEFT LEG PAIN: ICD-10-CM

## 2018-03-06 DIAGNOSIS — E11.9 TYPE 2 DIABETES MELLITUS WITHOUT COMPLICATION, WITHOUT LONG-TERM CURRENT USE OF INSULIN (HCC): ICD-10-CM

## 2018-03-06 DIAGNOSIS — M77.32 CALCANEAL SPUR OF LEFT FOOT: Primary | ICD-10-CM

## 2018-03-06 DIAGNOSIS — E78.2 MIXED HYPERLIPIDEMIA: ICD-10-CM

## 2018-03-06 DIAGNOSIS — Z91.14 NON COMPLIANCE W MEDICATION REGIMEN: ICD-10-CM

## 2018-03-06 PROCEDURE — 1036F TOBACCO NON-USER: CPT | Performed by: NURSE PRACTITIONER

## 2018-03-06 PROCEDURE — G8399 PT W/DXA RESULTS DOCUMENT: HCPCS | Performed by: NURSE PRACTITIONER

## 2018-03-06 PROCEDURE — 3017F COLORECTAL CA SCREEN DOC REV: CPT | Performed by: NURSE PRACTITIONER

## 2018-03-06 PROCEDURE — 1123F ACP DISCUSS/DSCN MKR DOCD: CPT | Performed by: NURSE PRACTITIONER

## 2018-03-06 PROCEDURE — 1090F PRES/ABSN URINE INCON ASSESS: CPT | Performed by: NURSE PRACTITIONER

## 2018-03-06 PROCEDURE — 99214 OFFICE O/P EST MOD 30 MIN: CPT | Performed by: NURSE PRACTITIONER

## 2018-03-06 PROCEDURE — 93971 EXTREMITY STUDY: CPT

## 2018-03-06 PROCEDURE — 3014F SCREEN MAMMO DOC REV: CPT | Performed by: NURSE PRACTITIONER

## 2018-03-06 PROCEDURE — G8484 FLU IMMUNIZE NO ADMIN: HCPCS | Performed by: NURSE PRACTITIONER

## 2018-03-06 PROCEDURE — G8421 BMI NOT CALCULATED: HCPCS | Performed by: NURSE PRACTITIONER

## 2018-03-06 PROCEDURE — 4040F PNEUMOC VAC/ADMIN/RCVD: CPT | Performed by: NURSE PRACTITIONER

## 2018-03-06 PROCEDURE — G8427 DOCREV CUR MEDS BY ELIG CLIN: HCPCS | Performed by: NURSE PRACTITIONER

## 2018-03-06 PROCEDURE — 73610 X-RAY EXAM OF ANKLE: CPT

## 2018-03-06 PROCEDURE — 3046F HEMOGLOBIN A1C LEVEL >9.0%: CPT | Performed by: NURSE PRACTITIONER

## 2018-03-06 RX ORDER — ASPIRIN 81 MG/1
81 TABLET ORAL DAILY
Qty: 30 TABLET | Refills: 3 | Status: SHIPPED | OUTPATIENT
Start: 2018-03-06 | End: 2018-06-07 | Stop reason: SDUPTHER

## 2018-03-06 RX ORDER — FLUTICASONE PROPIONATE 110 UG/1
1 AEROSOL, METERED RESPIRATORY (INHALATION) 2 TIMES DAILY
Qty: 1 INHALER | Refills: 3 | Status: SHIPPED | OUTPATIENT
Start: 2018-03-06 | End: 2018-06-07 | Stop reason: SDUPTHER

## 2018-03-06 RX ORDER — LOVASTATIN 10 MG/1
10 TABLET ORAL NIGHTLY
Qty: 30 TABLET | Refills: 3 | Status: SHIPPED | OUTPATIENT
Start: 2018-03-06 | End: 2018-03-07 | Stop reason: DRUGHIGH

## 2018-03-06 RX ORDER — B-COMPLEX WITH VITAMIN C
1 TABLET ORAL 2 TIMES DAILY
Qty: 30 TABLET | Refills: 3 | Status: SHIPPED | OUTPATIENT
Start: 2018-03-06 | End: 2018-06-07 | Stop reason: SDUPTHER

## 2018-03-06 RX ORDER — ALBUTEROL SULFATE 90 UG/1
2 AEROSOL, METERED RESPIRATORY (INHALATION) EVERY 6 HOURS PRN
Qty: 1 INHALER | Refills: 3 | Status: SHIPPED | OUTPATIENT
Start: 2018-03-06 | End: 2018-06-07 | Stop reason: SDUPTHER

## 2018-03-06 RX ORDER — POTASSIUM CHLORIDE 750 MG/1
10 TABLET, EXTENDED RELEASE ORAL DAILY
Qty: 30 TABLET | Refills: 3 | Status: SHIPPED | OUTPATIENT
Start: 2018-03-06 | End: 2018-06-07 | Stop reason: SDUPTHER

## 2018-03-06 RX ORDER — AMLODIPINE BESYLATE 10 MG/1
TABLET ORAL
Qty: 30 TABLET | Refills: 3 | Status: SHIPPED | OUTPATIENT
Start: 2018-03-06 | End: 2018-06-07 | Stop reason: SDUPTHER

## 2018-03-06 RX ORDER — LISINOPRIL 20 MG/1
20 TABLET ORAL DAILY
Qty: 90 TABLET | Refills: 0 | Status: SHIPPED | OUTPATIENT
Start: 2018-03-06 | End: 2018-06-07 | Stop reason: SDUPTHER

## 2018-03-06 ASSESSMENT — ENCOUNTER SYMPTOMS
BLURRED VISION: 0
TROUBLE SWALLOWING: 0
EYES NEGATIVE: 1
COUGH: 0
SHORTNESS OF BREATH: 0
CHEST TIGHTNESS: 0
GASTROINTESTINAL NEGATIVE: 1
RESPIRATORY NEGATIVE: 1

## 2018-03-06 NOTE — TELEPHONE ENCOUNTER
----- Message from Cherrie Abernathy NP sent at 3/6/2018  5:11 PM EST -----  Results of x-ray indicate no fracture however it does show bulky calcaneal spurring. An order has been placed to podiatry. In the meantime take dose appropriate NSAID (Motrin) for discomfort. Please relate to patient/parent. Thank you.    Jf Roberson

## 2018-03-06 NOTE — PROGRESS NOTES
never participates in exercise. There is no change in her home blood glucose trend. (States blood sugar this am 122) An ACE inhibitor/angiotensin II receptor blocker is being taken. She does not see a podiatrist.Eye exam is not current. Ankle Pain    Incident onset: 2 weeks. The incident occurred at home. There was no injury mechanism. Pain location: c/o pain outer aspect of ankle left leg and heel area. The pain is at a severity of 10/10. The pain has been constant since onset. Associated symptoms comments: Trouble hurting. She reports no foreign bodies present. She has tried ice for the symptoms. The treatment provided no relief. Past Medical History:     Past Medical History:   Diagnosis Date    Arthritis     Asthma     Chronic bronchitis ([address]     Diabetes mellitus ([address]     GERD (gastroesophageal reflux disease)     H/O echocardiogram 3/9/16    EF >60%. LV wall thickness is mildly increased. Mild mitral and tricuspid regurgitation. Borderline pulmonary hypertension estimated right ventricular sysolic pressure of 54LBED. Mild (grade l) diastolic dysfunction.  History of PFTs 3/10/16     Suboptimal effort. Restrictive in nature. clionical correlations is advised.  History of stress test 16    Abnoraml. Moderate perfusion defect of mild to moderate intensity in the anterolateral and anteroapical regions during stress imaging. Intermediate risk for CAD.     Hyperlipidemia     Hypertension       Reviewed all health maintenance requirements and ordered appropriate tests  Health Maintenance Due   Topic Date Due    Hepatitis C screen  1950    DTaP/Tdap/Td vaccine (1 - Tdap) 1969    Shingles Vaccine (1 of 2 - 2 Dose Series) 2000       Past Surgical History:     Past Surgical History:   Procedure Laterality Date    BREAST SURGERY      cyst removed    CARDIAC CATHETERIZATION       SECTION      CHOLECYSTECTOMY      COLONOSCOPY  3/23/15 present. No thyromegaly present. Cardiovascular: Normal rate, regular rhythm, normal heart sounds and intact distal pulses. Exam reveals no gallop and no friction rub. No murmur heard. Pulses:       Radial pulses are 2+ on the left side. Dorsalis pedis pulses are 2+ on the right side, and 2+ on the left side. Left ankle non pitting swelling up to calf    Pulmonary/Chest: Effort normal and breath sounds normal. No accessory muscle usage. No tachypnea. No respiratory distress. She has no decreased breath sounds. She has no wheezes. She has no rhonchi. She has no rales. No cough, no wheeze, no distress  Breath sounds are clear to auscultation over posterior bilateral fields. Chest expansion is symmetrical with non-restricted air movement. Abdominal: Soft. Normal appearance and bowel sounds are normal. She exhibits no distension and no mass. There is no tenderness. There is no rigidity. Musculoskeletal: She exhibits no edema. Left ankle: She exhibits decreased range of motion and swelling. She exhibits no ecchymosis and no laceration. Tenderness. Left ankle pain and swelling, left lower extremity pain. Left calf tenderness with mild swelling, no red streaks. Patient favors left ankle with ambulation    Lymphadenopathy:     She has no cervical adenopathy. Neurological: She is alert and oriented to person, place, and time. No cranial nerve deficit. She exhibits normal muscle tone. Coordination normal.   Skin: Skin is warm and dry. No rash noted. No erythema. No pallor. Psychiatric: She has a normal mood and affect. Her speech is normal and behavior is normal. Judgment and thought content normal.   Nursing note and vitals reviewed.       Data:     Lab Results   Component Value Date     06/19/2017    K 3.4 06/19/2017     06/19/2017    CO2 27 06/19/2017    BUN 9 06/19/2017    CREATININE 0.75 06/19/2017    GLUCOSE 127 06/19/2017    GLUCOSE 110 03/26/2012    PROT 7.1 prescribed medications. Barriers to medication compliance addressed. All patient questions answered. Pt voiced understanding. 5.  Reviewed prior labs and health maintenance  6. Continue current medications, diet and exercise. Completed Refills   She is asked to follow-up if symptoms persist or worsen. Return in about 3 months (around 6/6/2018) for HTN, DM.

## 2018-03-07 ENCOUNTER — TELEPHONE (OUTPATIENT)
Dept: PRIMARY CARE CLINIC | Age: 68
End: 2018-03-07

## 2018-03-07 ENCOUNTER — HOSPITAL ENCOUNTER (OUTPATIENT)
Age: 68
Discharge: HOME | End: 2018-03-07
Payer: MEDICARE

## 2018-03-07 DIAGNOSIS — E78.2 MIXED HYPERLIPIDEMIA: ICD-10-CM

## 2018-03-07 DIAGNOSIS — D50.9 IRON DEFICIENCY ANEMIA, UNSPECIFIED IRON DEFICIENCY ANEMIA TYPE: ICD-10-CM

## 2018-03-07 DIAGNOSIS — N39.0 URINARY TRACT INFECTION WITHOUT HEMATURIA, SITE UNSPECIFIED: ICD-10-CM

## 2018-03-07 DIAGNOSIS — E11.9 TYPE 2 DIABETES MELLITUS WITHOUT COMPLICATION, WITHOUT LONG-TERM CURRENT USE OF INSULIN (HCC): ICD-10-CM

## 2018-03-07 DIAGNOSIS — E78.49 OTHER HYPERLIPIDEMIA: Primary | ICD-10-CM

## 2018-03-07 LAB
-: ABNORMAL
ABSOLUTE EOS #: 0.12 K/UL (ref 0–0.44)
ABSOLUTE IMMATURE GRANULOCYTE: <0.03 K/UL (ref 0–0.3)
ABSOLUTE LYMPH #: 2.65 K/UL (ref 1.1–3.7)
ABSOLUTE MONO #: 0.41 K/UL (ref 0.1–1.2)
ALBUMIN SERPL-MCNC: 3.6 G/DL (ref 3.5–5.2)
ALBUMIN/GLOBULIN RATIO: 1 (ref 1–2.5)
ALP BLD-CCNC: 115 U/L (ref 35–104)
ALT SERPL-CCNC: <5 U/L (ref 5–33)
AMORPHOUS: ABNORMAL
ANION GAP SERPL CALCULATED.3IONS-SCNC: 10 MMOL/L (ref 9–17)
AST SERPL-CCNC: 9 U/L
BACTERIA: ABNORMAL
BASOPHILS # BLD: 1 % (ref 0–2)
BASOPHILS ABSOLUTE: 0.05 K/UL (ref 0–0.2)
BILIRUB SERPL-MCNC: 0.25 MG/DL (ref 0.3–1.2)
BILIRUBIN URINE: NEGATIVE
BUN BLDV-MCNC: 11 MG/DL (ref 8–23)
BUN/CREAT BLD: 13 (ref 9–20)
CALCIUM SERPL-MCNC: 9.6 MG/DL (ref 8.6–10.4)
CASTS UA: ABNORMAL /LPF
CHLORIDE BLD-SCNC: 100 MMOL/L (ref 98–107)
CHOLESTEROL/HDL RATIO: 3
CHOLESTEROL: 191 MG/DL
CO2: 28 MMOL/L (ref 20–31)
COLOR: YELLOW
COMMENT UA: ABNORMAL
CREAT SERPL-MCNC: 0.85 MG/DL (ref 0.5–0.9)
CREATININE URINE: 152.7 MG/DL (ref 28–217)
CRYSTALS, UA: ABNORMAL /HPF
DIFFERENTIAL TYPE: ABNORMAL
EOSINOPHILS RELATIVE PERCENT: 2 % (ref 1–4)
EPITHELIAL CELLS UA: ABNORMAL /HPF (ref 0–25)
ESTIMATED AVERAGE GLUCOSE: 108 MG/DL
GFR AFRICAN AMERICAN: >60 ML/MIN
GFR NON-AFRICAN AMERICAN: >60 ML/MIN
GFR SERPL CREATININE-BSD FRML MDRD: ABNORMAL ML/MIN/{1.73_M2}
GFR SERPL CREATININE-BSD FRML MDRD: ABNORMAL ML/MIN/{1.73_M2}
GLUCOSE BLD-MCNC: 99 MG/DL (ref 70–99)
GLUCOSE URINE: NEGATIVE
HBA1C MFR BLD: 5.4 % (ref 4.8–5.9)
HCT VFR BLD CALC: 37.1 % (ref 36.3–47.1)
HDLC SERPL-MCNC: 63 MG/DL
HEMOGLOBIN: 11.7 G/DL (ref 11.9–15.1)
IMMATURE GRANULOCYTES: 0 %
KETONES, URINE: NEGATIVE
LDL CHOLESTEROL: 113 MG/DL (ref 0–130)
LEUKOCYTE ESTERASE, URINE: ABNORMAL
LYMPHOCYTES # BLD: 45 % (ref 24–43)
MCH RBC QN AUTO: 29.5 PG (ref 25.2–33.5)
MCHC RBC AUTO-ENTMCNC: 31.5 G/DL (ref 28.4–34.8)
MCV RBC AUTO: 93.5 FL (ref 82.6–102.9)
MICROALBUMIN/CREAT 24H UR: 78 MG/L
MICROALBUMIN/CREAT UR-RTO: 51 MCG/MG CREAT
MONOCYTES # BLD: 7 % (ref 3–12)
MUCUS: ABNORMAL
NITRITE, URINE: POSITIVE
NRBC AUTOMATED: 0 PER 100 WBC
OTHER OBSERVATIONS UA: ABNORMAL
PDW BLD-RTO: 13.1 % (ref 11.8–14.4)
PH UA: 6 (ref 5–9)
PLATELET # BLD: 397 K/UL (ref 138–453)
PLATELET ESTIMATE: ABNORMAL
PMV BLD AUTO: 9.3 FL (ref 8.1–13.5)
POTASSIUM SERPL-SCNC: 3.8 MMOL/L (ref 3.7–5.3)
PROTEIN UA: NEGATIVE
RBC # BLD: 3.97 M/UL (ref 3.95–5.11)
RBC # BLD: ABNORMAL 10*6/UL
RBC UA: ABNORMAL /HPF (ref 0–2)
RENAL EPITHELIAL, UA: ABNORMAL /HPF
SEG NEUTROPHILS: 45 % (ref 36–65)
SEGMENTED NEUTROPHILS ABSOLUTE COUNT: 2.69 K/UL (ref 1.5–8.1)
SODIUM BLD-SCNC: 138 MMOL/L (ref 135–144)
SPECIFIC GRAVITY UA: 1.01 (ref 1.01–1.02)
TOTAL PROTEIN: 7.3 G/DL (ref 6.4–8.3)
TRICHOMONAS: ABNORMAL
TRIGL SERPL-MCNC: 76 MG/DL
TURBIDITY: CLEAR
URINE HGB: NEGATIVE
UROBILINOGEN, URINE: NORMAL
VLDLC SERPL CALC-MCNC: NORMAL MG/DL (ref 1–30)
WBC # BLD: 5.9 K/UL (ref 3.5–11.3)
WBC # BLD: ABNORMAL 10*3/UL
WBC UA: ABNORMAL /HPF (ref 0–5)
YEAST: ABNORMAL

## 2018-03-07 PROCEDURE — 36415 COLL VENOUS BLD VENIPUNCTURE: CPT

## 2018-03-07 PROCEDURE — 83036 HEMOGLOBIN GLYCOSYLATED A1C: CPT

## 2018-03-07 PROCEDURE — 82570 ASSAY OF URINE CREATININE: CPT

## 2018-03-07 PROCEDURE — 85025 COMPLETE CBC W/AUTO DIFF WBC: CPT

## 2018-03-07 PROCEDURE — 80053 COMPREHEN METABOLIC PANEL: CPT

## 2018-03-07 PROCEDURE — 82043 UR ALBUMIN QUANTITATIVE: CPT

## 2018-03-07 PROCEDURE — 81001 URINALYSIS AUTO W/SCOPE: CPT

## 2018-03-07 PROCEDURE — 80061 LIPID PANEL: CPT

## 2018-03-07 RX ORDER — LOVASTATIN 20 MG/1
20 TABLET ORAL DAILY
Qty: 30 TABLET | Refills: 3 | Status: SHIPPED | OUTPATIENT
Start: 2018-03-07 | End: 2018-06-07 | Stop reason: SDUPTHER

## 2018-03-07 RX ORDER — SULFAMETHOXAZOLE AND TRIMETHOPRIM 800; 160 MG/1; MG/1
1 TABLET ORAL 2 TIMES DAILY
Qty: 14 TABLET | Refills: 0 | Status: SHIPPED | OUTPATIENT
Start: 2018-03-07 | End: 2018-03-14

## 2018-03-07 RX ORDER — M-VIT,TX,IRON,MINS/CALC/FOLIC 27MG-0.4MG
1 TABLET ORAL DAILY
Qty: 30 TABLET | Refills: 11 | Status: SHIPPED | OUTPATIENT
Start: 2018-03-07 | End: 2020-08-06 | Stop reason: SDUPTHER

## 2018-03-07 NOTE — TELEPHONE ENCOUNTER
----- Message from Cynthia Martin NP sent at 3/6/2018  5:11 PM EST -----  Results of x-ray indicate no fracture however it does show bulky calcaneal spurring. An order has been placed to podiatry. In the meantime take dose appropriate NSAID (Motrin) for discomfort. Please relate to patient/parent. Thank you.    Candis Rahman

## 2018-03-07 NOTE — TELEPHONE ENCOUNTER
Called and spoke with  and informed him of referral appt with Dr Екатерина Velasco for Tuesday March 13th @ 1:45pm. Informed of office location and phone number and told to call office if appt time does not work for her. Verbalizes understanding.

## 2018-03-08 ENCOUNTER — TELEPHONE (OUTPATIENT)
Dept: PRIMARY CARE CLINIC | Age: 68
End: 2018-03-08

## 2018-03-08 NOTE — TELEPHONE ENCOUNTER
Patients phone just rings unable to leave a message for pt to make an appt next week for recheck of her leg.

## 2018-03-16 DIAGNOSIS — Z12.39 SCREENING FOR BREAST CANCER: Primary | ICD-10-CM

## 2018-06-05 ENCOUNTER — TELEPHONE (OUTPATIENT)
Dept: PRIMARY CARE CLINIC | Age: 68
End: 2018-06-05

## 2018-06-05 ENCOUNTER — HOSPITAL ENCOUNTER (OUTPATIENT)
Age: 68
Discharge: HOME | End: 2018-06-05
Payer: MEDICARE

## 2018-06-05 DIAGNOSIS — N39.0 URINARY TRACT INFECTION WITH HEMATURIA, SITE UNSPECIFIED: Primary | ICD-10-CM

## 2018-06-05 DIAGNOSIS — N39.0 RECURRENT UTI: ICD-10-CM

## 2018-06-05 DIAGNOSIS — R31.9 URINARY TRACT INFECTION WITH HEMATURIA, SITE UNSPECIFIED: Primary | ICD-10-CM

## 2018-06-05 DIAGNOSIS — E11.9 TYPE 2 DIABETES MELLITUS WITHOUT COMPLICATION, WITHOUT LONG-TERM CURRENT USE OF INSULIN (HCC): ICD-10-CM

## 2018-06-05 LAB
-: ABNORMAL
ABSOLUTE EOS #: 0.14 K/UL (ref 0–0.44)
ABSOLUTE IMMATURE GRANULOCYTE: <0.03 K/UL (ref 0–0.3)
ABSOLUTE LYMPH #: 3.01 K/UL (ref 1.1–3.7)
ABSOLUTE MONO #: 0.5 K/UL (ref 0.1–1.2)
ALBUMIN SERPL-MCNC: 3.8 G/DL (ref 3.5–5.2)
ALBUMIN/GLOBULIN RATIO: 1.1 (ref 1–2.5)
ALP BLD-CCNC: 130 U/L (ref 35–104)
ALT SERPL-CCNC: 6 U/L (ref 5–33)
AMORPHOUS: ABNORMAL
ANION GAP SERPL CALCULATED.3IONS-SCNC: 14 MMOL/L
AST SERPL-CCNC: 9 U/L
BACTERIA: ABNORMAL
BASOPHILS # BLD: 1 % (ref 0–2)
BASOPHILS ABSOLUTE: 0.04 K/UL (ref 0–0.2)
BILIRUB SERPL-MCNC: 0.37 MG/DL (ref 0.3–1.2)
BILIRUBIN URINE: NEGATIVE
BUN BLDV-MCNC: 9 MG/DL (ref 8–23)
BUN/CREAT BLD: 11 (ref 9–20)
CALCIUM SERPL-MCNC: 9.5 MG/DL (ref 8.6–10.4)
CASTS UA: ABNORMAL /LPF
CHLORIDE BLD-SCNC: 105 MMOL/L (ref 98–107)
CO2: 26 MMOL/L (ref 20–31)
COLOR: YELLOW
COMMENT UA: ABNORMAL
CREAT SERPL-MCNC: 0.82 MG/DL (ref 0.5–0.9)
CRYSTALS, UA: ABNORMAL /HPF
DIFFERENTIAL TYPE: ABNORMAL
EOSINOPHILS RELATIVE PERCENT: 2 % (ref 1–4)
EPITHELIAL CELLS UA: ABNORMAL /HPF (ref 0–25)
ESTIMATED AVERAGE GLUCOSE: 120 MG/DL
GFR AFRICAN AMERICAN: >60 ML/MIN
GFR NON-AFRICAN AMERICAN: >60 ML/MIN
GFR SERPL CREATININE-BSD FRML MDRD: ABNORMAL ML/MIN/{1.73_M2}
GFR SERPL CREATININE-BSD FRML MDRD: ABNORMAL ML/MIN/{1.73_M2}
GLUCOSE BLD-MCNC: 86 MG/DL (ref 70–99)
GLUCOSE URINE: NEGATIVE
HBA1C MFR BLD: 5.8 % (ref 4.8–5.9)
HCT VFR BLD CALC: 36.7 % (ref 36.3–47.1)
HEMOGLOBIN: 12 G/DL (ref 11.9–15.1)
IMMATURE GRANULOCYTES: 0 %
KETONES, URINE: NEGATIVE
LEUKOCYTE ESTERASE, URINE: ABNORMAL
LYMPHOCYTES # BLD: 44 % (ref 24–43)
MCH RBC QN AUTO: 30 PG (ref 25.2–33.5)
MCHC RBC AUTO-ENTMCNC: 32.7 G/DL (ref 28.4–34.8)
MCV RBC AUTO: 91.8 FL (ref 82.6–102.9)
MONOCYTES # BLD: 8 % (ref 3–12)
MUCUS: ABNORMAL
NITRITE, URINE: POSITIVE
NRBC AUTOMATED: 0 PER 100 WBC
OTHER OBSERVATIONS UA: ABNORMAL
PDW BLD-RTO: 13 % (ref 11.8–14.4)
PH UA: 6.5 (ref 5–9)
PLATELET # BLD: 343 K/UL (ref 138–453)
PLATELET ESTIMATE: ABNORMAL
PMV BLD AUTO: 9.3 FL (ref 8.1–13.5)
POTASSIUM SERPL-SCNC: 3.8 MMOL/L (ref 3.7–5.3)
PROTEIN UA: NEGATIVE
RBC # BLD: 4 M/UL (ref 3.95–5.11)
RBC # BLD: ABNORMAL 10*6/UL
RBC UA: ABNORMAL /HPF (ref 0–2)
RENAL EPITHELIAL, UA: ABNORMAL /HPF
SEG NEUTROPHILS: 45 % (ref 36–65)
SEGMENTED NEUTROPHILS ABSOLUTE COUNT: 3 K/UL (ref 1.5–8.1)
SODIUM BLD-SCNC: 145 MMOL/L (ref 135–144)
SPECIFIC GRAVITY UA: 1.01 (ref 1.01–1.02)
TOTAL PROTEIN: 7.2 G/DL (ref 6.4–8.3)
TRICHOMONAS: ABNORMAL
TURBIDITY: ABNORMAL
URINE HGB: ABNORMAL
UROBILINOGEN, URINE: NORMAL
WBC # BLD: 6.7 K/UL (ref 3.5–11.3)
WBC # BLD: ABNORMAL 10*3/UL
WBC UA: ABNORMAL /HPF (ref 0–5)
YEAST: ABNORMAL

## 2018-06-05 PROCEDURE — 85025 COMPLETE CBC W/AUTO DIFF WBC: CPT

## 2018-06-05 PROCEDURE — 83036 HEMOGLOBIN GLYCOSYLATED A1C: CPT

## 2018-06-05 PROCEDURE — 36415 COLL VENOUS BLD VENIPUNCTURE: CPT

## 2018-06-05 PROCEDURE — 80053 COMPREHEN METABOLIC PANEL: CPT

## 2018-06-05 PROCEDURE — 81001 URINALYSIS AUTO W/SCOPE: CPT

## 2018-06-05 RX ORDER — CIPROFLOXACIN 500 MG/1
500 TABLET, FILM COATED ORAL 2 TIMES DAILY
Qty: 14 TABLET | Refills: 0 | Status: SHIPPED | OUTPATIENT
Start: 2018-06-05 | End: 2018-06-12

## 2018-06-07 ENCOUNTER — OFFICE VISIT (OUTPATIENT)
Dept: PRIMARY CARE CLINIC | Age: 68
End: 2018-06-07
Payer: MEDICARE

## 2018-06-07 VITALS
SYSTOLIC BLOOD PRESSURE: 136 MMHG | RESPIRATION RATE: 18 BRPM | HEIGHT: 65 IN | DIASTOLIC BLOOD PRESSURE: 75 MMHG | BODY MASS INDEX: 29.79 KG/M2 | WEIGHT: 178.8 LBS | HEART RATE: 72 BPM

## 2018-06-07 DIAGNOSIS — I10 ESSENTIAL HYPERTENSION: ICD-10-CM

## 2018-06-07 DIAGNOSIS — R40.0 DAYTIME SOMNOLENCE: ICD-10-CM

## 2018-06-07 DIAGNOSIS — Z12.39 BREAST CANCER SCREENING: ICD-10-CM

## 2018-06-07 DIAGNOSIS — R06.83 SNORES: ICD-10-CM

## 2018-06-07 DIAGNOSIS — J30.2 ACUTE SEASONAL ALLERGIC RHINITIS, UNSPECIFIED TRIGGER: ICD-10-CM

## 2018-06-07 DIAGNOSIS — E11.9 TYPE 2 DIABETES MELLITUS WITHOUT COMPLICATION, UNSPECIFIED LONG TERM INSULIN USE STATUS: Primary | ICD-10-CM

## 2018-06-07 DIAGNOSIS — E78.2 MIXED HYPERLIPIDEMIA: ICD-10-CM

## 2018-06-07 PROCEDURE — 99213 OFFICE O/P EST LOW 20 MIN: CPT | Performed by: NURSE PRACTITIONER

## 2018-06-07 PROCEDURE — G8399 PT W/DXA RESULTS DOCUMENT: HCPCS | Performed by: NURSE PRACTITIONER

## 2018-06-07 PROCEDURE — 2022F DILAT RTA XM EVC RTNOPTHY: CPT | Performed by: NURSE PRACTITIONER

## 2018-06-07 PROCEDURE — 3017F COLORECTAL CA SCREEN DOC REV: CPT | Performed by: NURSE PRACTITIONER

## 2018-06-07 PROCEDURE — 1036F TOBACCO NON-USER: CPT | Performed by: NURSE PRACTITIONER

## 2018-06-07 PROCEDURE — 4040F PNEUMOC VAC/ADMIN/RCVD: CPT | Performed by: NURSE PRACTITIONER

## 2018-06-07 PROCEDURE — 1090F PRES/ABSN URINE INCON ASSESS: CPT | Performed by: NURSE PRACTITIONER

## 2018-06-07 PROCEDURE — 1123F ACP DISCUSS/DSCN MKR DOCD: CPT | Performed by: NURSE PRACTITIONER

## 2018-06-07 PROCEDURE — 3044F HG A1C LEVEL LT 7.0%: CPT | Performed by: NURSE PRACTITIONER

## 2018-06-07 PROCEDURE — G8427 DOCREV CUR MEDS BY ELIG CLIN: HCPCS | Performed by: NURSE PRACTITIONER

## 2018-06-07 PROCEDURE — G8417 CALC BMI ABV UP PARAM F/U: HCPCS | Performed by: NURSE PRACTITIONER

## 2018-06-07 RX ORDER — B-COMPLEX WITH VITAMIN C
1 TABLET ORAL 2 TIMES DAILY
Qty: 30 TABLET | Refills: 3 | Status: SHIPPED | OUTPATIENT
Start: 2018-06-07 | End: 2018-10-15 | Stop reason: SDUPTHER

## 2018-06-07 RX ORDER — LOVASTATIN 20 MG/1
20 TABLET ORAL DAILY
Qty: 30 TABLET | Refills: 3 | Status: SHIPPED | OUTPATIENT
Start: 2018-06-07 | End: 2018-06-07 | Stop reason: ALTCHOICE

## 2018-06-07 RX ORDER — LISINOPRIL 20 MG/1
20 TABLET ORAL DAILY
Qty: 90 TABLET | Refills: 0 | Status: SHIPPED | OUTPATIENT
Start: 2018-06-07 | End: 2018-10-15 | Stop reason: SDUPTHER

## 2018-06-07 RX ORDER — POTASSIUM CHLORIDE 750 MG/1
10 TABLET, EXTENDED RELEASE ORAL DAILY
Qty: 30 TABLET | Refills: 3 | Status: SHIPPED | OUTPATIENT
Start: 2018-06-07 | End: 2018-10-15 | Stop reason: SDUPTHER

## 2018-06-07 RX ORDER — ALBUTEROL SULFATE 90 UG/1
2 AEROSOL, METERED RESPIRATORY (INHALATION) EVERY 6 HOURS PRN
Qty: 1 INHALER | Refills: 3 | Status: SHIPPED | OUTPATIENT
Start: 2018-06-07 | End: 2019-01-21

## 2018-06-07 RX ORDER — CETIRIZINE HYDROCHLORIDE 10 MG/1
10 TABLET ORAL DAILY
Qty: 30 TABLET | Refills: 3 | Status: SHIPPED | OUTPATIENT
Start: 2018-06-07 | End: 2020-12-16 | Stop reason: SDUPTHER

## 2018-06-07 RX ORDER — AMLODIPINE BESYLATE 10 MG/1
TABLET ORAL
Qty: 30 TABLET | Refills: 3 | Status: SHIPPED | OUTPATIENT
Start: 2018-06-07 | End: 2018-10-15 | Stop reason: SDUPTHER

## 2018-06-07 RX ORDER — ASPIRIN 81 MG/1
81 TABLET ORAL DAILY
Qty: 30 TABLET | Refills: 3 | Status: SHIPPED | OUTPATIENT
Start: 2018-06-07 | End: 2018-10-15 | Stop reason: SDUPTHER

## 2018-06-07 RX ORDER — FLUTICASONE PROPIONATE 110 UG/1
1 AEROSOL, METERED RESPIRATORY (INHALATION) 2 TIMES DAILY
Qty: 1 INHALER | Refills: 3 | Status: SHIPPED | OUTPATIENT
Start: 2018-06-07 | End: 2018-06-27

## 2018-06-07 RX ORDER — ATORVASTATIN CALCIUM 40 MG/1
40 TABLET, FILM COATED ORAL DAILY
Qty: 30 TABLET | Refills: 3 | Status: SHIPPED | OUTPATIENT
Start: 2018-06-07 | End: 2018-10-15 | Stop reason: SDUPTHER

## 2018-06-07 ASSESSMENT — ENCOUNTER SYMPTOMS
BLURRED VISION: 0
SHORTNESS OF BREATH: 0
CHEST TIGHTNESS: 0
SORE THROAT: 0
EYES NEGATIVE: 1
WHEEZING: 0
GASTROINTESTINAL NEGATIVE: 1
COUGH: 1
TROUBLE SWALLOWING: 0

## 2018-06-11 ENCOUNTER — TELEPHONE (OUTPATIENT)
Dept: PRIMARY CARE CLINIC | Age: 68
End: 2018-06-11

## 2018-06-14 DIAGNOSIS — G47.33 OSA (OBSTRUCTIVE SLEEP APNEA): Primary | ICD-10-CM

## 2018-06-27 ENCOUNTER — HOSPITAL ENCOUNTER (OUTPATIENT)
Age: 68
Setting detail: SPECIMEN
Discharge: HOME | End: 2018-06-27
Payer: MEDICARE

## 2018-06-27 ENCOUNTER — OFFICE VISIT (OUTPATIENT)
Dept: UROLOGY | Age: 68
End: 2018-06-27
Payer: MEDICARE

## 2018-06-27 VITALS
BODY MASS INDEX: 35.34 KG/M2 | WEIGHT: 180 LBS | HEIGHT: 60 IN | DIASTOLIC BLOOD PRESSURE: 70 MMHG | SYSTOLIC BLOOD PRESSURE: 140 MMHG

## 2018-06-27 DIAGNOSIS — N39.0 RECURRENT UTI: Primary | ICD-10-CM

## 2018-06-27 DIAGNOSIS — N39.0 RECURRENT UTI: ICD-10-CM

## 2018-06-27 LAB
-: ABNORMAL
AMORPHOUS: ABNORMAL
BACTERIA: ABNORMAL
BILIRUBIN URINE: NEGATIVE
CASTS UA: ABNORMAL /LPF
COLOR: YELLOW
COMMENT UA: ABNORMAL
CRYSTALS, UA: ABNORMAL /HPF
EPITHELIAL CELLS UA: ABNORMAL /HPF (ref 0–25)
GLUCOSE URINE: NEGATIVE
KETONES, URINE: NEGATIVE
LEUKOCYTE ESTERASE, URINE: ABNORMAL
MUCUS: ABNORMAL
NITRITE, URINE: NEGATIVE
OTHER OBSERVATIONS UA: ABNORMAL
PH UA: 6.5 (ref 5–9)
PROTEIN UA: NEGATIVE
RBC UA: ABNORMAL /HPF (ref 0–2)
RENAL EPITHELIAL, UA: ABNORMAL /HPF
SPECIFIC GRAVITY UA: 1.01 (ref 1.01–1.02)
TRICHOMONAS: ABNORMAL
TURBIDITY: CLEAR
URINE HGB: NEGATIVE
UROBILINOGEN, URINE: NORMAL
WBC UA: ABNORMAL /HPF (ref 0–5)
YEAST: ABNORMAL

## 2018-06-27 PROCEDURE — 99214 OFFICE O/P EST MOD 30 MIN: CPT | Performed by: NURSE PRACTITIONER

## 2018-06-27 PROCEDURE — G8399 PT W/DXA RESULTS DOCUMENT: HCPCS | Performed by: NURSE PRACTITIONER

## 2018-06-27 PROCEDURE — 1090F PRES/ABSN URINE INCON ASSESS: CPT | Performed by: NURSE PRACTITIONER

## 2018-06-27 PROCEDURE — 81001 URINALYSIS AUTO W/SCOPE: CPT

## 2018-06-27 PROCEDURE — 87086 URINE CULTURE/COLONY COUNT: CPT

## 2018-06-27 PROCEDURE — 1123F ACP DISCUSS/DSCN MKR DOCD: CPT | Performed by: NURSE PRACTITIONER

## 2018-06-27 PROCEDURE — 1036F TOBACCO NON-USER: CPT | Performed by: NURSE PRACTITIONER

## 2018-06-27 PROCEDURE — 4040F PNEUMOC VAC/ADMIN/RCVD: CPT | Performed by: NURSE PRACTITIONER

## 2018-06-27 PROCEDURE — 51798 US URINE CAPACITY MEASURE: CPT | Performed by: NURSE PRACTITIONER

## 2018-06-27 PROCEDURE — G8417 CALC BMI ABV UP PARAM F/U: HCPCS | Performed by: NURSE PRACTITIONER

## 2018-06-27 PROCEDURE — G8427 DOCREV CUR MEDS BY ELIG CLIN: HCPCS | Performed by: NURSE PRACTITIONER

## 2018-06-27 PROCEDURE — 3017F COLORECTAL CA SCREEN DOC REV: CPT | Performed by: NURSE PRACTITIONER

## 2018-06-27 ASSESSMENT — ENCOUNTER SYMPTOMS
EYE PAIN: 0
ABDOMINAL PAIN: 0
VOMITING: 0
SHORTNESS OF BREATH: 0
CONSTIPATION: 0
COLOR CHANGE: 0
NAUSEA: 0
WHEEZING: 0
COUGH: 0
EYE REDNESS: 0
BACK PAIN: 0

## 2018-06-28 LAB
CULTURE: NO GROWTH
Lab: NORMAL
SPECIMEN DESCRIPTION: NORMAL
STATUS: NORMAL

## 2018-07-30 ENCOUNTER — HOSPITAL ENCOUNTER (OUTPATIENT)
Dept: SLEEP CENTER | Age: 68
Discharge: HOME | End: 2018-07-30
Payer: MEDICARE

## 2018-07-30 DIAGNOSIS — G47.33 OSA (OBSTRUCTIVE SLEEP APNEA): ICD-10-CM

## 2018-07-30 PROCEDURE — 95810 POLYSOM 6/> YRS 4/> PARAM: CPT

## 2018-07-30 ASSESSMENT — SLEEP AND FATIGUE QUESTIONNAIRES
HOW LIKELY ARE YOU TO NOD OFF OR FALL ASLEEP IN A CAR, WHILE STOPPED FOR A FEW MINUTES IN TRAFFIC: 0
HOW LIKELY ARE YOU TO NOD OFF OR FALL ASLEEP WHILE SITTING QUIETLY AFTER LUNCH WITHOUT ALCOHOL: 0
HOW LIKELY ARE YOU TO NOD OFF OR FALL ASLEEP WHILE LYING DOWN TO REST IN THE AFTERNOON WHEN CIRCUMSTANCES PERMIT: 0
ESS TOTAL SCORE: 3
HOW LIKELY ARE YOU TO NOD OFF OR FALL ASLEEP WHILE SITTING AND READING: 0
HOW LIKELY ARE YOU TO NOD OFF OR FALL ASLEEP WHILE WATCHING TV: 3
HOW LIKELY ARE YOU TO NOD OFF OR FALL ASLEEP WHEN YOU ARE A PASSENGER IN A CAR FOR AN HOUR WITHOUT A BREAK: 0
HOW LIKELY ARE YOU TO NOD OFF OR FALL ASLEEP WHILE SITTING AND TALKING TO SOMEONE: 0
HOW LIKELY ARE YOU TO NOD OFF OR FALL ASLEEP WHILE SITTING INACTIVE IN A PUBLIC PLACE: 0

## 2018-08-01 RX ORDER — FLUTICASONE PROPIONATE 50 MCG
SPRAY, SUSPENSION (ML) NASAL
Qty: 1 BOTTLE | Refills: 5 | Status: SHIPPED | OUTPATIENT
Start: 2018-08-01 | End: 2019-01-13 | Stop reason: SDUPTHER

## 2018-08-10 NOTE — PROGRESS NOTES
[facility] [address] [place]                                    SLEEP STUDY    PATIENT NAME: Kendall Dougherty                      :        1950  MED REC NO:   [id number]                              ROOM:  ACCOUNT NO:   [de-identified]                           ADMIT DATE: 2018  PROVIDER:     [facility]    INTERPRETATION OF CLINICAL POLYSOMNOGRAPHY    DATE OF STUDY:  2018    REFERRING PHYSICIAN:  Eron Daily NP    CLINICAL IMPRESSION:  1. Obstructive sleep apnea syndrome. 2.  Hypertension. 3.  Diabetes. PROCEDURE STANDARD:  It was a 1-night study. PROCEDURE:  The sleep/wake evaluation consisted of an intensive intake  interview, one night of clinical polysomnography, a nocturnal respiratory  battery, left and right leg anterior tibialis surface electromyography. The standard montage for clinical polysomnography included the  electroencephalogram, the electro-oculogram, mentalis surface  electromyography, and modified Lead II cardiography. The respiratory  battery consisted of measurements of oral/nasal airflow by heat thermistor,  nasal pressure transducer, thoracic and abdominal effort by Respiratory  Inductance Plethysmography. Nocturnal oxyhemoglobin saturations were  obtained by finger oximetry. Polysomnography revealed that the patient spent 454 minutes in bed with a  total sleep time of 307 minutes. Sleep efficiency was reduced to 65%. Latency of sleep onset was prolonged to 18 minutes. There were 158 sleep  stage changes. There were 34 awakenings during the night. Sleep architecture was essentially normal with 17% stage I, 62% stage II,  7% delta, and 14% REM. Latency of various sleep stages were normal other than prolonged latency of  REM, which was 214 minutes. Polysomnography did not reveal any other abnormality.     Electrocardiogram did not reveal any arrhythmia. Respiratory evaluation revealed the patient had a total of 34 respiratory  events out of which 23 were complete, 11 were partial obstruction. The  apnea-hypopnea index was 7 per hour. The lowest oxygen saturation during  the night being 86%. Movement disorder evaluation did not reveal any periodic leg movements. IMPRESSION:  1. Obstructive sleep apnea syndrome. 2.  Hypertension. 3.  Diabetes. 4.  Esophageal reflux disease. RECOMMENDATIONS:  1. The patient has mild degree of sleep apnea syndrome. It is recommended  that the patient should return to the Sleep Center for treatment with nasal  CPAP or BiPAP thereby relieving the apnea should improve her daytime  symptoms and also protect the patient from the morbidity associated with  the apnea. 2.  The patient should be encouraged to lose weight. 3.  The patient should be instructed on proper sleep hygiene techniques. 4.  Treatment of apnea will improve the outcome of hypertension, diabetes,  and esophageal reflux disease. 5.  If the patient is overweight, she should be encouraged to lose weight.         Katty Brito    D:08/09/2018 14:04:28               T: 08/09/2018 20:18:24     [email or web address]  Job#: [id number]     Doc#: [id number]    CC:  Elam Bloch

## 2018-09-13 ENCOUNTER — HOSPITAL ENCOUNTER (OUTPATIENT)
Age: 68
Discharge: HOME | End: 2018-09-13
Payer: MEDICARE

## 2018-09-13 DIAGNOSIS — E78.2 MIXED HYPERLIPIDEMIA: ICD-10-CM

## 2018-09-13 DIAGNOSIS — E11.9 TYPE 2 DIABETES MELLITUS WITHOUT COMPLICATION (HCC): ICD-10-CM

## 2018-09-13 LAB
ABSOLUTE EOS #: 0.13 K/UL (ref 0–0.44)
ABSOLUTE IMMATURE GRANULOCYTE: <0.03 K/UL (ref 0–0.3)
ABSOLUTE LYMPH #: 2.8 K/UL (ref 1.1–3.7)
ABSOLUTE MONO #: 0.53 K/UL (ref 0.1–1.2)
ALBUMIN SERPL-MCNC: 3.6 G/DL (ref 3.5–5.2)
ALBUMIN/GLOBULIN RATIO: 1 (ref 1–2.5)
ALP BLD-CCNC: 129 U/L (ref 35–104)
ALT SERPL-CCNC: <5 U/L (ref 5–33)
ANION GAP SERPL CALCULATED.3IONS-SCNC: 9 MMOL/L (ref 9–17)
AST SERPL-CCNC: 9 U/L
BASOPHILS # BLD: 1 % (ref 0–2)
BASOPHILS ABSOLUTE: 0.03 K/UL (ref 0–0.2)
BILIRUB SERPL-MCNC: 0.39 MG/DL (ref 0.3–1.2)
BUN BLDV-MCNC: 5 MG/DL (ref 8–23)
BUN/CREAT BLD: 7 (ref 9–20)
CALCIUM SERPL-MCNC: 9 MG/DL (ref 8.6–10.4)
CHLORIDE BLD-SCNC: 105 MMOL/L (ref 98–107)
CHOLESTEROL/HDL RATIO: 3.1
CHOLESTEROL: 147 MG/DL
CO2: 30 MMOL/L (ref 20–31)
CREAT SERPL-MCNC: 0.73 MG/DL (ref 0.5–0.9)
DIFFERENTIAL TYPE: ABNORMAL
EOSINOPHILS RELATIVE PERCENT: 2 % (ref 1–4)
ESTIMATED AVERAGE GLUCOSE: 120 MG/DL
GFR AFRICAN AMERICAN: >60 ML/MIN
GFR NON-AFRICAN AMERICAN: >60 ML/MIN
GFR SERPL CREATININE-BSD FRML MDRD: ABNORMAL ML/MIN/{1.73_M2}
GFR SERPL CREATININE-BSD FRML MDRD: ABNORMAL ML/MIN/{1.73_M2}
GLUCOSE BLD-MCNC: 98 MG/DL (ref 70–99)
HBA1C MFR BLD: 5.8 % (ref 4.8–5.9)
HCT VFR BLD CALC: 37.1 % (ref 36.3–47.1)
HDLC SERPL-MCNC: 48 MG/DL
HEMOGLOBIN: 12 G/DL (ref 11.9–15.1)
IMMATURE GRANULOCYTES: 0 %
LDL CHOLESTEROL: 64 MG/DL (ref 0–130)
LYMPHOCYTES # BLD: 44 % (ref 24–43)
MCH RBC QN AUTO: 30.7 PG (ref 25.2–33.5)
MCHC RBC AUTO-ENTMCNC: 32.3 G/DL (ref 28.4–34.8)
MCV RBC AUTO: 94.9 FL (ref 82.6–102.9)
MONOCYTES # BLD: 8 % (ref 3–12)
NRBC AUTOMATED: 0 PER 100 WBC
PDW BLD-RTO: 13.1 % (ref 11.8–14.4)
PLATELET # BLD: 356 K/UL (ref 138–453)
PLATELET ESTIMATE: ABNORMAL
PMV BLD AUTO: 9.1 FL (ref 8.1–13.5)
POTASSIUM SERPL-SCNC: 3.9 MMOL/L (ref 3.7–5.3)
RBC # BLD: 3.91 M/UL (ref 3.95–5.11)
RBC # BLD: ABNORMAL 10*6/UL
SEG NEUTROPHILS: 45 % (ref 36–65)
SEGMENTED NEUTROPHILS ABSOLUTE COUNT: 2.93 K/UL (ref 1.5–8.1)
SODIUM BLD-SCNC: 144 MMOL/L (ref 135–144)
TOTAL PROTEIN: 7.1 G/DL (ref 6.4–8.3)
TRIGL SERPL-MCNC: 177 MG/DL
VLDLC SERPL CALC-MCNC: ABNORMAL MG/DL (ref 1–30)
WBC # BLD: 6.4 K/UL (ref 3.5–11.3)
WBC # BLD: ABNORMAL 10*3/UL

## 2018-09-13 PROCEDURE — 83036 HEMOGLOBIN GLYCOSYLATED A1C: CPT

## 2018-09-13 PROCEDURE — 80061 LIPID PANEL: CPT

## 2018-09-13 PROCEDURE — 36415 COLL VENOUS BLD VENIPUNCTURE: CPT

## 2018-09-13 PROCEDURE — 80053 COMPREHEN METABOLIC PANEL: CPT

## 2018-09-13 PROCEDURE — 85025 COMPLETE CBC W/AUTO DIFF WBC: CPT

## 2018-10-12 ENCOUNTER — HOSPITAL ENCOUNTER (EMERGENCY)
Age: 68
Discharge: HOME | End: 2018-10-12
Attending: EMERGENCY MEDICINE
Payer: MEDICARE

## 2018-10-12 ENCOUNTER — APPOINTMENT (OUTPATIENT)
Dept: GENERAL RADIOLOGY | Age: 68
End: 2018-10-12
Payer: MEDICARE

## 2018-10-12 VITALS
DIASTOLIC BLOOD PRESSURE: 88 MMHG | OXYGEN SATURATION: 96 % | SYSTOLIC BLOOD PRESSURE: 169 MMHG | HEART RATE: 98 BPM | TEMPERATURE: 98.2 F | RESPIRATION RATE: 16 BRPM

## 2018-10-12 DIAGNOSIS — M19.032 OSTEOARTHRITIS OF LEFT WRIST, UNSPECIFIED OSTEOARTHRITIS TYPE: Primary | ICD-10-CM

## 2018-10-12 LAB
ABSOLUTE EOS #: 0.13 K/UL (ref 0–0.44)
ABSOLUTE IMMATURE GRANULOCYTE: 0.04 K/UL (ref 0–0.3)
ABSOLUTE LYMPH #: 2.77 K/UL (ref 1.1–3.7)
ABSOLUTE MONO #: 0.64 K/UL (ref 0.1–1.2)
ANION GAP SERPL CALCULATED.3IONS-SCNC: 12 MMOL/L (ref 9–17)
BASOPHILS # BLD: 0 % (ref 0–2)
BASOPHILS ABSOLUTE: 0.04 K/UL (ref 0–0.2)
BUN BLDV-MCNC: 5 MG/DL (ref 8–23)
BUN/CREAT BLD: 6 (ref 9–20)
CALCIUM SERPL-MCNC: 8.8 MG/DL (ref 8.6–10.4)
CHLORIDE BLD-SCNC: 101 MMOL/L (ref 98–107)
CO2: 25 MMOL/L (ref 20–31)
CREAT SERPL-MCNC: 0.82 MG/DL (ref 0.5–0.9)
DIFFERENTIAL TYPE: ABNORMAL
EOSINOPHILS RELATIVE PERCENT: 1 % (ref 1–4)
GFR AFRICAN AMERICAN: >60 ML/MIN
GFR NON-AFRICAN AMERICAN: >60 ML/MIN
GFR SERPL CREATININE-BSD FRML MDRD: ABNORMAL ML/MIN/{1.73_M2}
GFR SERPL CREATININE-BSD FRML MDRD: ABNORMAL ML/MIN/{1.73_M2}
GLUCOSE BLD-MCNC: 132 MG/DL (ref 70–99)
HCT VFR BLD CALC: 36.9 % (ref 36.3–47.1)
HEMOGLOBIN: 12.3 G/DL (ref 11.9–15.1)
IMMATURE GRANULOCYTES: 0 %
LYMPHOCYTES # BLD: 31 % (ref 24–43)
MCH RBC QN AUTO: 31.3 PG (ref 25.2–33.5)
MCHC RBC AUTO-ENTMCNC: 33.3 G/DL (ref 28.4–34.8)
MCV RBC AUTO: 93.9 FL (ref 82.6–102.9)
MONOCYTES # BLD: 7 % (ref 3–12)
NRBC AUTOMATED: 0 PER 100 WBC
PDW BLD-RTO: 13.4 % (ref 11.8–14.4)
PLATELET # BLD: 389 K/UL (ref 138–453)
PLATELET ESTIMATE: ABNORMAL
PMV BLD AUTO: 8.8 FL (ref 8.1–13.5)
POTASSIUM SERPL-SCNC: 3.9 MMOL/L (ref 3.7–5.3)
RBC # BLD: 3.93 M/UL (ref 3.95–5.11)
RBC # BLD: ABNORMAL 10*6/UL
SEG NEUTROPHILS: 61 % (ref 36–65)
SEGMENTED NEUTROPHILS ABSOLUTE COUNT: 5.43 K/UL (ref 1.5–8.1)
SODIUM BLD-SCNC: 138 MMOL/L (ref 135–144)
URIC ACID: 4.2 MG/DL (ref 2.4–5.7)
WBC # BLD: 9.1 K/UL (ref 3.5–11.3)
WBC # BLD: ABNORMAL 10*3/UL

## 2018-10-12 PROCEDURE — 6370000000 HC RX 637 (ALT 250 FOR IP): Performed by: EMERGENCY MEDICINE

## 2018-10-12 PROCEDURE — 73110 X-RAY EXAM OF WRIST: CPT

## 2018-10-12 PROCEDURE — 80048 BASIC METABOLIC PNL TOTAL CA: CPT

## 2018-10-12 PROCEDURE — 84550 ASSAY OF BLOOD/URIC ACID: CPT

## 2018-10-12 PROCEDURE — 85025 COMPLETE CBC W/AUTO DIFF WBC: CPT

## 2018-10-12 PROCEDURE — 73130 X-RAY EXAM OF HAND: CPT

## 2018-10-12 PROCEDURE — 99283 EMERGENCY DEPT VISIT LOW MDM: CPT

## 2018-10-12 RX ORDER — HYDROCODONE BITARTRATE AND ACETAMINOPHEN 5; 325 MG/1; MG/1
1 TABLET ORAL 2 TIMES DAILY PRN
Qty: 10 TABLET | Refills: 0 | Status: SHIPPED | OUTPATIENT
Start: 2018-10-12 | End: 2018-10-17

## 2018-10-12 RX ORDER — IBUPROFEN 600 MG/1
600 TABLET ORAL 2 TIMES DAILY PRN
Qty: 15 TABLET | Refills: 0 | Status: SHIPPED | OUTPATIENT
Start: 2018-10-12 | End: 2018-10-15 | Stop reason: ALTCHOICE

## 2018-10-12 RX ORDER — HYDROCODONE BITARTRATE AND ACETAMINOPHEN 5; 325 MG/1; MG/1
1 TABLET ORAL ONCE
Status: COMPLETED | OUTPATIENT
Start: 2018-10-12 | End: 2018-10-12

## 2018-10-12 RX ORDER — IBUPROFEN 600 MG/1
600 TABLET ORAL ONCE
Status: COMPLETED | OUTPATIENT
Start: 2018-10-12 | End: 2018-10-12

## 2018-10-12 RX ADMIN — HYDROCODONE BITARTRATE AND ACETAMINOPHEN 1 TABLET: 5; 325 TABLET ORAL at 16:43

## 2018-10-12 RX ADMIN — IBUPROFEN 600 MG: 600 TABLET ORAL at 16:43

## 2018-10-12 ASSESSMENT — PAIN DESCRIPTION - LOCATION: LOCATION: ARM

## 2018-10-12 ASSESSMENT — PAIN SCALES - GENERAL: PAINLEVEL_OUTOF10: 10

## 2018-10-12 ASSESSMENT — PAIN DESCRIPTION - PAIN TYPE: TYPE: ACUTE PAIN

## 2018-10-12 ASSESSMENT — PAIN DESCRIPTION - ORIENTATION: ORIENTATION: LEFT

## 2018-10-15 ENCOUNTER — OFFICE VISIT (OUTPATIENT)
Dept: PRIMARY CARE CLINIC | Age: 68
End: 2018-10-15
Payer: MEDICARE

## 2018-10-15 VITALS
HEART RATE: 86 BPM | TEMPERATURE: 98.3 F | WEIGHT: 181 LBS | SYSTOLIC BLOOD PRESSURE: 124 MMHG | DIASTOLIC BLOOD PRESSURE: 75 MMHG | BODY MASS INDEX: 35.35 KG/M2 | RESPIRATION RATE: 20 BRPM

## 2018-10-15 DIAGNOSIS — Z23 NEED FOR SHINGLES VACCINE: ICD-10-CM

## 2018-10-15 DIAGNOSIS — E78.2 MIXED HYPERLIPIDEMIA: ICD-10-CM

## 2018-10-15 DIAGNOSIS — E11.9 TYPE 2 DIABETES MELLITUS WITHOUT COMPLICATION, WITHOUT LONG-TERM CURRENT USE OF INSULIN (HCC): ICD-10-CM

## 2018-10-15 DIAGNOSIS — Z23 NEED FOR INFLUENZA VACCINATION: ICD-10-CM

## 2018-10-15 DIAGNOSIS — I10 ESSENTIAL HYPERTENSION: Primary | ICD-10-CM

## 2018-10-15 DIAGNOSIS — Z12.39 BREAST CANCER SCREENING: ICD-10-CM

## 2018-10-15 PROCEDURE — G8482 FLU IMMUNIZE ORDER/ADMIN: HCPCS | Performed by: NURSE PRACTITIONER

## 2018-10-15 PROCEDURE — G8399 PT W/DXA RESULTS DOCUMENT: HCPCS | Performed by: NURSE PRACTITIONER

## 2018-10-15 PROCEDURE — 2022F DILAT RTA XM EVC RTNOPTHY: CPT | Performed by: NURSE PRACTITIONER

## 2018-10-15 PROCEDURE — 1090F PRES/ABSN URINE INCON ASSESS: CPT | Performed by: NURSE PRACTITIONER

## 2018-10-15 PROCEDURE — 4040F PNEUMOC VAC/ADMIN/RCVD: CPT | Performed by: NURSE PRACTITIONER

## 2018-10-15 PROCEDURE — 1036F TOBACCO NON-USER: CPT | Performed by: NURSE PRACTITIONER

## 2018-10-15 PROCEDURE — 99214 OFFICE O/P EST MOD 30 MIN: CPT | Performed by: NURSE PRACTITIONER

## 2018-10-15 PROCEDURE — G8417 CALC BMI ABV UP PARAM F/U: HCPCS | Performed by: NURSE PRACTITIONER

## 2018-10-15 PROCEDURE — 90686 IIV4 VACC NO PRSV 0.5 ML IM: CPT | Performed by: NURSE PRACTITIONER

## 2018-10-15 PROCEDURE — 1101F PT FALLS ASSESS-DOCD LE1/YR: CPT | Performed by: NURSE PRACTITIONER

## 2018-10-15 PROCEDURE — 3017F COLORECTAL CA SCREEN DOC REV: CPT | Performed by: NURSE PRACTITIONER

## 2018-10-15 PROCEDURE — 1123F ACP DISCUSS/DSCN MKR DOCD: CPT | Performed by: NURSE PRACTITIONER

## 2018-10-15 PROCEDURE — G0008 ADMIN INFLUENZA VIRUS VAC: HCPCS | Performed by: NURSE PRACTITIONER

## 2018-10-15 PROCEDURE — G8427 DOCREV CUR MEDS BY ELIG CLIN: HCPCS | Performed by: NURSE PRACTITIONER

## 2018-10-15 PROCEDURE — 3044F HG A1C LEVEL LT 7.0%: CPT | Performed by: NURSE PRACTITIONER

## 2018-10-15 RX ORDER — ATORVASTATIN CALCIUM 40 MG/1
40 TABLET, FILM COATED ORAL DAILY
Qty: 30 TABLET | Refills: 3 | Status: SHIPPED | OUTPATIENT
Start: 2018-10-15 | End: 2019-01-21 | Stop reason: SDUPTHER

## 2018-10-15 RX ORDER — B-COMPLEX WITH VITAMIN C
1 TABLET ORAL 2 TIMES DAILY
Qty: 30 TABLET | Refills: 3 | Status: SHIPPED | OUTPATIENT
Start: 2018-10-15 | End: 2019-01-21 | Stop reason: SDUPTHER

## 2018-10-15 RX ORDER — LISINOPRIL 20 MG/1
20 TABLET ORAL DAILY
Qty: 90 TABLET | Refills: 0 | Status: SHIPPED | OUTPATIENT
Start: 2018-10-15 | End: 2019-01-21 | Stop reason: SDUPTHER

## 2018-10-15 RX ORDER — AMLODIPINE BESYLATE 10 MG/1
TABLET ORAL
Qty: 30 TABLET | Refills: 3 | Status: SHIPPED | OUTPATIENT
Start: 2018-10-15 | End: 2019-01-21 | Stop reason: SDUPTHER

## 2018-10-15 RX ORDER — POTASSIUM CHLORIDE 750 MG/1
10 TABLET, EXTENDED RELEASE ORAL DAILY
Qty: 30 TABLET | Refills: 3 | Status: SHIPPED | OUTPATIENT
Start: 2018-10-15 | End: 2019-01-21 | Stop reason: SDUPTHER

## 2018-10-15 RX ORDER — LOVASTATIN 20 MG/1
TABLET ORAL
COMMUNITY
Start: 2018-09-24 | End: 2018-10-15

## 2018-10-15 RX ORDER — ASPIRIN 81 MG/1
81 TABLET ORAL DAILY
Qty: 30 TABLET | Refills: 3 | Status: SHIPPED | OUTPATIENT
Start: 2018-10-15 | End: 2019-01-21 | Stop reason: SDUPTHER

## 2018-10-15 ASSESSMENT — PATIENT HEALTH QUESTIONNAIRE - PHQ9
SUM OF ALL RESPONSES TO PHQ QUESTIONS 1-9: 0
1. LITTLE INTEREST OR PLEASURE IN DOING THINGS: 0
SUM OF ALL RESPONSES TO PHQ9 QUESTIONS 1 & 2: 0
2. FEELING DOWN, DEPRESSED OR HOPELESS: 0
SUM OF ALL RESPONSES TO PHQ QUESTIONS 1-9: 0

## 2018-10-15 ASSESSMENT — ENCOUNTER SYMPTOMS
EYES NEGATIVE: 1
GASTROINTESTINAL NEGATIVE: 1
TROUBLE SWALLOWING: 0
RESPIRATORY NEGATIVE: 1
BLURRED VISION: 0

## 2018-10-15 NOTE — PROGRESS NOTES
When asked about meal planning, she reported none. She never participates in exercise. There is no change in her home blood glucose trend. (States blood sugars have been running high  States yesterday was 137  ) An ACE inhibitor/angiotensin II receptor blocker is being taken. She does not see a podiatrist.Eye exam is not current. Hypertension   This is a chronic problem. The current episode started more than 1 year ago. The problem is controlled. Pertinent negatives include no blurred vision, chest pain, headaches or palpitations. Risk factors for coronary artery disease include diabetes mellitus, dyslipidemia, obesity and post-menopausal state. Past treatments include calcium channel blockers. There are no compliance problems. Past Medical History:     Past Medical History:   Diagnosis Date    Arthritis     Asthma     Chronic bronchitis ([address]     Diabetes mellitus ([address]     GERD (gastroesophageal reflux disease)     H/O echocardiogram 3/9/16    EF >60%. LV wall thickness is mildly increased. Mild mitral and tricuspid regurgitation. Borderline pulmonary hypertension estimated right ventricular sysolic pressure of 80KQRK. Mild (grade l) diastolic dysfunction.  History of PFTs 3/10/16     Suboptimal effort. Restrictive in nature. clionical correlations is advised.  History of stress test 16    Abnoraml. Moderate perfusion defect of mild to moderate intensity in the anterolateral and anteroapical regions during stress imaging. Intermediate risk for CAD.     Hyperlipidemia     Hypertension       Reviewed all health maintenance requirements and ordered appropriate tests  Health Maintenance Due   Topic Date Due    Shingles Vaccine (1 of 2 - 2 Dose Series) 2000       Past Surgical History:     Past Surgical History:   Procedure Laterality Date    BREAST SURGERY      cyst removed    CARDIAC CATHETERIZATION       SECTION      CHOLECYSTECTOMY      COLONOSCOPY  3/23/15 present 2+  Nails:  normal appearing nails bilaterally  Monofilament testing:  present  Hair:  {PRESENT/PARTIALLY   Date:     Lab Results   Component Value Date     10/12/2018    K 3.9 10/12/2018     10/12/2018    CO2 25 10/12/2018    BUN 5 10/12/2018    CREATININE 0.82 10/12/2018    GLUCOSE 132 10/12/2018    GLUCOSE 110 03/26/2012    PROT 7.1 09/13/2018    LABALBU 3.6 09/13/2018    BILITOT 0.39 09/13/2018    ALKPHOS 129 09/13/2018    AST 9 09/13/2018    ALT <5 09/13/2018     Lab Results   Component Value Date    WBC 9.1 10/12/2018    RBC 3.93 10/12/2018    RBC 3.85 03/26/2012    HGB 12.3 10/12/2018    HCT 36.9 10/12/2018    MCV 93.9 10/12/2018    MCH 31.3 10/12/2018    MCHC 33.3 10/12/2018    RDW 13.4 10/12/2018     10/12/2018     03/26/2012    MPV 8.8 10/12/2018     Lab Results   Component Value Date    TSH 2.27 06/08/2017     Lab Results   Component Value Date    CHOL 147 09/13/2018    HDL 48 09/13/2018    LABA1C 5.8 09/13/2018     Narrative   EXAMINATION:   3 XRAY VIEWS OF THE LEFT HAND; 3 XRAY VIEWS OF THE LEFT WRIST       10/12/2018 3:17 pm       COMPARISON:   None.       HISTORY:   ORDERING SYSTEM PROVIDED HISTORY: awoke with pain   TECHNOLOGIST PROVIDED HISTORY:   awoke with pain       FINDINGS:   Hand:       Degenerative changes of the DIP joints and thumb CMC joint are evident.  No   acute osseous abnormality.  Soft tissue swelling of the 2nd and 3rd digits.       Wrist:       There degenerative changes of the triscaphe and thumb CMC joint.  The distal   radius and ulna appear intact.  Mild dorsal soft tissue swelling is present.           Impression   1. No acute osseous abnormality. 2. Degenerative changes most significant within the thumb CMC joint.      Narrative   EXAMINATION:   3 XRAY VIEWS OF THE LEFT HAND; 3 XRAY VIEWS OF THE LEFT WRIST       10/12/2018 3:17 pm       COMPARISON:   None.       HISTORY:   ORDERING SYSTEM PROVIDED HISTORY: awoke with pain   TECHNOLOGIST

## 2018-11-28 ENCOUNTER — TELEPHONE (OUTPATIENT)
Dept: PRIMARY CARE CLINIC | Age: 68
End: 2018-11-28

## 2018-11-28 ENCOUNTER — HOSPITAL ENCOUNTER (OUTPATIENT)
Dept: WOMENS IMAGING | Age: 68
Discharge: HOME | End: 2018-11-30
Payer: MEDICARE

## 2018-11-28 DIAGNOSIS — Z12.39 BREAST CANCER SCREENING: ICD-10-CM

## 2018-11-28 PROCEDURE — 77067 SCR MAMMO BI INCL CAD: CPT

## 2018-11-28 NOTE — TELEPHONE ENCOUNTER
Attempted to call patient, a man answered and writer asked if Margo Cuello was available-she was not available so writer left name and number to have patient call the office back to let her know results.

## 2019-01-14 RX ORDER — FLUTICASONE PROPIONATE 50 MCG
SPRAY, SUSPENSION (ML) NASAL
Qty: 1 BOTTLE | Refills: 5 | Status: SHIPPED | OUTPATIENT
Start: 2019-01-14 | End: 2019-09-23 | Stop reason: SDUPTHER

## 2019-01-21 ENCOUNTER — HOSPITAL ENCOUNTER (OUTPATIENT)
Age: 69
Discharge: HOME | End: 2019-01-21
Payer: MEDICARE

## 2019-01-21 ENCOUNTER — OFFICE VISIT (OUTPATIENT)
Dept: PRIMARY CARE CLINIC | Age: 69
End: 2019-01-21
Payer: MEDICARE

## 2019-01-21 VITALS
BODY MASS INDEX: 35.26 KG/M2 | HEIGHT: 60 IN | HEART RATE: 75 BPM | TEMPERATURE: 97.9 F | RESPIRATION RATE: 16 BRPM | SYSTOLIC BLOOD PRESSURE: 143 MMHG | DIASTOLIC BLOOD PRESSURE: 75 MMHG | WEIGHT: 179.6 LBS

## 2019-01-21 DIAGNOSIS — E11.21 TYPE 2 DIABETES MELLITUS WITH DIABETIC NEPHROPATHY, WITHOUT LONG-TERM CURRENT USE OF INSULIN (HCC): Primary | ICD-10-CM

## 2019-01-21 DIAGNOSIS — E78.2 MIXED HYPERLIPIDEMIA: ICD-10-CM

## 2019-01-21 DIAGNOSIS — E11.9 TYPE 2 DIABETES MELLITUS WITHOUT COMPLICATION, WITHOUT LONG-TERM CURRENT USE OF INSULIN (HCC): ICD-10-CM

## 2019-01-21 DIAGNOSIS — I10 ESSENTIAL HYPERTENSION: ICD-10-CM

## 2019-01-21 DIAGNOSIS — S80.02XA CONTUSION OF LEFT KNEE, INITIAL ENCOUNTER: ICD-10-CM

## 2019-01-21 LAB
ABSOLUTE EOS #: 0.08 K/UL (ref 0–0.44)
ABSOLUTE IMMATURE GRANULOCYTE: 0.03 K/UL (ref 0–0.3)
ABSOLUTE LYMPH #: 3.67 K/UL (ref 1.1–3.7)
ABSOLUTE MONO #: 0.68 K/UL (ref 0.1–1.2)
ALBUMIN SERPL-MCNC: 3.7 G/DL (ref 3.5–5.2)
ALBUMIN/GLOBULIN RATIO: 1.2 (ref 1–2.5)
ALP BLD-CCNC: 120 U/L (ref 35–104)
ALT SERPL-CCNC: <5 U/L (ref 5–33)
ANION GAP SERPL CALCULATED.3IONS-SCNC: 11 MMOL/L (ref 9–17)
AST SERPL-CCNC: 7 U/L
BASOPHILS # BLD: 0 % (ref 0–2)
BASOPHILS ABSOLUTE: 0.03 K/UL (ref 0–0.2)
BILIRUB SERPL-MCNC: 0.2 MG/DL (ref 0.3–1.2)
BUN BLDV-MCNC: 10 MG/DL (ref 8–23)
BUN/CREAT BLD: 11 (ref 9–20)
CALCIUM SERPL-MCNC: 9.5 MG/DL (ref 8.6–10.4)
CHLORIDE BLD-SCNC: 100 MMOL/L (ref 98–107)
CO2: 28 MMOL/L (ref 20–31)
CREAT SERPL-MCNC: 0.9 MG/DL (ref 0.5–0.9)
DIFFERENTIAL TYPE: ABNORMAL
EOSINOPHILS RELATIVE PERCENT: 1 % (ref 1–4)
ESTIMATED AVERAGE GLUCOSE: 117 MG/DL
GFR AFRICAN AMERICAN: >60 ML/MIN
GFR NON-AFRICAN AMERICAN: >60 ML/MIN
GFR SERPL CREATININE-BSD FRML MDRD: ABNORMAL ML/MIN/{1.73_M2}
GFR SERPL CREATININE-BSD FRML MDRD: ABNORMAL ML/MIN/{1.73_M2}
GLUCOSE BLD-MCNC: 99 MG/DL (ref 70–99)
HBA1C MFR BLD: 5.7 % (ref 4.8–5.9)
HCT VFR BLD CALC: 35 % (ref 36.3–47.1)
HEMOGLOBIN: 11.4 G/DL (ref 11.9–15.1)
IMMATURE GRANULOCYTES: 0 %
LYMPHOCYTES # BLD: 44 % (ref 24–43)
MCH RBC QN AUTO: 30.4 PG (ref 25.2–33.5)
MCHC RBC AUTO-ENTMCNC: 32.6 G/DL (ref 28.4–34.8)
MCV RBC AUTO: 93.3 FL (ref 82.6–102.9)
MONOCYTES # BLD: 8 % (ref 3–12)
NRBC AUTOMATED: 0 PER 100 WBC
PDW BLD-RTO: 12.6 % (ref 11.8–14.4)
PLATELET # BLD: 382 K/UL (ref 138–453)
PLATELET ESTIMATE: ABNORMAL
PMV BLD AUTO: 9.3 FL (ref 8.1–13.5)
POTASSIUM SERPL-SCNC: 3.4 MMOL/L (ref 3.7–5.3)
RBC # BLD: 3.75 M/UL (ref 3.95–5.11)
RBC # BLD: ABNORMAL 10*6/UL
SEG NEUTROPHILS: 47 % (ref 36–65)
SEGMENTED NEUTROPHILS ABSOLUTE COUNT: 3.88 K/UL (ref 1.5–8.1)
SODIUM BLD-SCNC: 139 MMOL/L (ref 135–144)
TOTAL PROTEIN: 6.9 G/DL (ref 6.4–8.3)
WBC # BLD: 8.4 K/UL (ref 3.5–11.3)
WBC # BLD: ABNORMAL 10*3/UL

## 2019-01-21 PROCEDURE — 3044F HG A1C LEVEL LT 7.0%: CPT | Performed by: NURSE PRACTITIONER

## 2019-01-21 PROCEDURE — 1090F PRES/ABSN URINE INCON ASSESS: CPT | Performed by: NURSE PRACTITIONER

## 2019-01-21 PROCEDURE — 80053 COMPREHEN METABOLIC PANEL: CPT

## 2019-01-21 PROCEDURE — 1123F ACP DISCUSS/DSCN MKR DOCD: CPT | Performed by: NURSE PRACTITIONER

## 2019-01-21 PROCEDURE — G8427 DOCREV CUR MEDS BY ELIG CLIN: HCPCS | Performed by: NURSE PRACTITIONER

## 2019-01-21 PROCEDURE — 36415 COLL VENOUS BLD VENIPUNCTURE: CPT

## 2019-01-21 PROCEDURE — 4040F PNEUMOC VAC/ADMIN/RCVD: CPT | Performed by: NURSE PRACTITIONER

## 2019-01-21 PROCEDURE — G8399 PT W/DXA RESULTS DOCUMENT: HCPCS | Performed by: NURSE PRACTITIONER

## 2019-01-21 PROCEDURE — 3017F COLORECTAL CA SCREEN DOC REV: CPT | Performed by: NURSE PRACTITIONER

## 2019-01-21 PROCEDURE — 1036F TOBACCO NON-USER: CPT | Performed by: NURSE PRACTITIONER

## 2019-01-21 PROCEDURE — 85025 COMPLETE CBC W/AUTO DIFF WBC: CPT

## 2019-01-21 PROCEDURE — 1101F PT FALLS ASSESS-DOCD LE1/YR: CPT | Performed by: NURSE PRACTITIONER

## 2019-01-21 PROCEDURE — 83036 HEMOGLOBIN GLYCOSYLATED A1C: CPT

## 2019-01-21 PROCEDURE — 99214 OFFICE O/P EST MOD 30 MIN: CPT | Performed by: NURSE PRACTITIONER

## 2019-01-21 PROCEDURE — 2022F DILAT RTA XM EVC RTNOPTHY: CPT | Performed by: NURSE PRACTITIONER

## 2019-01-21 PROCEDURE — G8482 FLU IMMUNIZE ORDER/ADMIN: HCPCS | Performed by: NURSE PRACTITIONER

## 2019-01-21 PROCEDURE — G8417 CALC BMI ABV UP PARAM F/U: HCPCS | Performed by: NURSE PRACTITIONER

## 2019-01-21 RX ORDER — LISINOPRIL 20 MG/1
20 TABLET ORAL DAILY
Qty: 90 TABLET | Refills: 1 | Status: SHIPPED | OUTPATIENT
Start: 2019-01-21 | End: 2019-07-08 | Stop reason: SDUPTHER

## 2019-01-21 RX ORDER — AMLODIPINE BESYLATE 10 MG/1
TABLET ORAL
Qty: 90 TABLET | Refills: 1 | Status: SHIPPED | OUTPATIENT
Start: 2019-01-21 | End: 2019-07-08 | Stop reason: SDUPTHER

## 2019-01-21 RX ORDER — B-COMPLEX WITH VITAMIN C
1 TABLET ORAL 2 TIMES DAILY
Qty: 180 TABLET | Refills: 1 | Status: SHIPPED | OUTPATIENT
Start: 2019-01-21 | End: 2019-09-23 | Stop reason: SDUPTHER

## 2019-01-21 RX ORDER — POTASSIUM CHLORIDE 750 MG/1
10 TABLET, EXTENDED RELEASE ORAL DAILY
Qty: 90 TABLET | Refills: 1 | Status: SHIPPED | OUTPATIENT
Start: 2019-01-21 | End: 2019-09-23 | Stop reason: SDUPTHER

## 2019-01-21 RX ORDER — ATORVASTATIN CALCIUM 40 MG/1
40 TABLET, FILM COATED ORAL DAILY
Qty: 90 TABLET | Refills: 1 | Status: SHIPPED | OUTPATIENT
Start: 2019-01-21 | End: 2019-09-23 | Stop reason: SDUPTHER

## 2019-01-21 RX ORDER — ASPIRIN 81 MG/1
81 TABLET ORAL DAILY
Qty: 90 TABLET | Refills: 1 | Status: SHIPPED | OUTPATIENT
Start: 2019-01-21 | End: 2019-09-23 | Stop reason: SDUPTHER

## 2019-01-21 ASSESSMENT — ENCOUNTER SYMPTOMS
CONSTIPATION: 0
DIARRHEA: 0
RHINORRHEA: 0
ABDOMINAL PAIN: 0
SORE THROAT: 0
VOMITING: 0
WHEEZING: 0
BLURRED VISION: 0
SHORTNESS OF BREATH: 0
COUGH: 0
NAUSEA: 0

## 2019-01-31 ENCOUNTER — TELEPHONE (OUTPATIENT)
Dept: PRIMARY CARE CLINIC | Age: 69
End: 2019-01-31

## 2019-02-04 ENCOUNTER — HOSPITAL ENCOUNTER (OUTPATIENT)
Age: 69
Discharge: HOME | End: 2019-02-06
Payer: MEDICARE

## 2019-02-04 ENCOUNTER — TELEPHONE (OUTPATIENT)
Dept: PRIMARY CARE CLINIC | Age: 69
End: 2019-02-04

## 2019-02-04 ENCOUNTER — HOSPITAL ENCOUNTER (OUTPATIENT)
Dept: GENERAL RADIOLOGY | Age: 69
Discharge: HOME | End: 2019-02-06
Payer: MEDICARE

## 2019-02-04 DIAGNOSIS — S80.02XA CONTUSION OF LEFT KNEE, INITIAL ENCOUNTER: ICD-10-CM

## 2019-02-04 PROCEDURE — 73560 X-RAY EXAM OF KNEE 1 OR 2: CPT

## 2019-03-13 DIAGNOSIS — E11.9 TYPE 2 DIABETES MELLITUS WITHOUT COMPLICATION, WITHOUT LONG-TERM CURRENT USE OF INSULIN (HCC): ICD-10-CM

## 2019-04-09 ENCOUNTER — HOSPITAL ENCOUNTER (EMERGENCY)
Age: 69
Discharge: HOME | End: 2019-04-09
Attending: EMERGENCY MEDICINE
Payer: MEDICARE

## 2019-04-09 ENCOUNTER — APPOINTMENT (OUTPATIENT)
Dept: GENERAL RADIOLOGY | Age: 69
End: 2019-04-09
Payer: MEDICARE

## 2019-04-09 ENCOUNTER — TELEPHONE (OUTPATIENT)
Dept: PRIMARY CARE CLINIC | Age: 69
End: 2019-04-09

## 2019-04-09 VITALS
RESPIRATION RATE: 16 BRPM | HEART RATE: 84 BPM | BODY MASS INDEX: 33.59 KG/M2 | WEIGHT: 172 LBS | SYSTOLIC BLOOD PRESSURE: 143 MMHG | DIASTOLIC BLOOD PRESSURE: 78 MMHG | TEMPERATURE: 98.2 F | OXYGEN SATURATION: 94 %

## 2019-04-09 DIAGNOSIS — M25.462 KNEE EFFUSION, LEFT: ICD-10-CM

## 2019-04-09 DIAGNOSIS — M25.562 CHRONIC PAIN OF LEFT KNEE: Primary | ICD-10-CM

## 2019-04-09 DIAGNOSIS — G89.29 CHRONIC PAIN OF LEFT KNEE: Primary | ICD-10-CM

## 2019-04-09 PROCEDURE — 73562 X-RAY EXAM OF KNEE 3: CPT

## 2019-04-09 PROCEDURE — 99283 EMERGENCY DEPT VISIT LOW MDM: CPT

## 2019-04-09 PROCEDURE — 6370000000 HC RX 637 (ALT 250 FOR IP): Performed by: EMERGENCY MEDICINE

## 2019-04-09 RX ORDER — ACETAMINOPHEN 500 MG
1000 TABLET ORAL ONCE
Status: COMPLETED | OUTPATIENT
Start: 2019-04-09 | End: 2019-04-09

## 2019-04-09 RX ORDER — ACETAMINOPHEN 500 MG
500 TABLET ORAL EVERY 6 HOURS PRN
Qty: 30 TABLET | Refills: 0 | Status: SHIPPED | OUTPATIENT
Start: 2019-04-09 | End: 2019-09-23

## 2019-04-09 RX ADMIN — ACETAMINOPHEN 1000 MG: 500 TABLET, FILM COATED ORAL at 06:43

## 2019-04-09 ASSESSMENT — ENCOUNTER SYMPTOMS
SHORTNESS OF BREATH: 0
DIARRHEA: 0
WHEEZING: 0
CONSTIPATION: 0
VOMITING: 0
ABDOMINAL DISTENTION: 0
NAUSEA: 0
SORE THROAT: 0
COUGH: 0
RHINORRHEA: 0

## 2019-04-09 ASSESSMENT — PAIN DESCRIPTION - LOCATION: LOCATION: KNEE

## 2019-04-09 ASSESSMENT — PAIN DESCRIPTION - PAIN TYPE: TYPE: ACUTE PAIN

## 2019-04-09 ASSESSMENT — PAIN DESCRIPTION - DESCRIPTORS: DESCRIPTORS: ACHING

## 2019-04-09 ASSESSMENT — PAIN DESCRIPTION - ORIENTATION: ORIENTATION: LEFT

## 2019-04-09 ASSESSMENT — PAIN SCALES - GENERAL
PAINLEVEL_OUTOF10: 8
PAINLEVEL_OUTOF10: 1
PAINLEVEL_OUTOF10: 8

## 2019-04-09 ASSESSMENT — PAIN - FUNCTIONAL ASSESSMENT: PAIN_FUNCTIONAL_ASSESSMENT: 0-10

## 2019-04-09 NOTE — ED PROVIDER NOTES
[facility] [place] ED  Emergency Department        Pt Name: Jose Rod  MRN: [id number]  [place] 1950  Date of evaluation: 19    CHIEF COMPLAINT       Chief Complaint   Patient presents with    Knee Pain     left knee , ongoing for months. Pt states she has arthritis and has been in therapy for the pain       HISTORY OF PRESENT ILLNESS  (Location/Symptom, Timing/Onset, Context/Setting, Quality, Duration, ModifyingFactors, Severity.)      Jose Rod is a 71 y.o. female who presents with Left knee pain, patient has h/o chronic knee pain, and follows with [facility] who want to do a knee replacement, she does not take anything for pain control, and reports pain has been getting worse and then overnight she could not sleep. She denies any new trauma to the area, no redness, but there is swelling, she thinks it is her arthritis. No fevers or chills, pain is worse with bending and weight bearing. No numbness or tingling. PAST MEDICAL / SURGICAL / SOCIAL / FAMILY HISTORY      has a past medical history of Arthritis, Asthma, Chronic bronchitis ([address] Diabetes mellitus ([address] GERD (gastroesophageal reflux disease), H/O echocardiogram, History of PFTs, History of stress test, Hyperlipidemia, and Hypertension. has a past surgical history that includes Hysterectomy; Breast surgery; Foot surgery;  section; Cardiac catheterization (); Colonoscopy (3/23/15); and Cholecystectomy.        Social History     Socioeconomic History    Marital status:      Spouse name: Not on file    Number of children: Not on file    Years of education: Not on file    Highest education level: Not on file   Occupational History    Not on file   Social Needs    Financial resource strain: Not on file    Food insecurity:     Worry: Not on file     Inability: Not on file    Transportation needs:     Medical: Not on file     Non-medical: Not on file   Tobacco Use    Smoking status: Never Smoker    Smokeless tablet by mouth 2 times daily (with meals) 6/7/18  Yes TARA Correa CNP   cetirizine (ZYRTEC ALLERGY) 10 MG tablet Take 1 tablet by mouth daily 6/7/18  Yes TARA Rnagel CNP   blood glucose test strips (ONE TOUCH ULTRA TEST) strip USE 1 STRIP TO CHECK GLUCOSE DAILY 3/13/19   Piyush TARA Hernandez CNP   ONE TOUCH LANCETS MISC Lancets- One Touch Jose Cruz Lima for testing daily and as needed. DX: Diabetes type  E11.9 1/31/19 Jan TARA Hernandez CNP   fluticasone (FLONASE) 50 MCG/ACT nasal spray USE 2 SPRAY(S) IN EACH NOSTRIL ONCE DAILY 1/14/19   Elke Harvey MD   Multiple Vitamins-Minerals (THERAPEUTIC MULTIVITAMIN-MINERALS) tablet Take 1 tablet by mouth daily 3/7/18 3/7/19  TARA Correa CNP   Blood Glucose Monitoring Suppl GENNY 1 Units by Does not apply route 3 times daily What insurance will cover 10/11/16   TARA Correa CNP       REVIEW OF SYSTEMS    (2-9 systems for level 4, 10 or more for level 5)      Review of Systems   Constitutional: Negative for activity change, appetite change, fatigue and fever. HENT: Negative for congestion, rhinorrhea and sore throat. Respiratory: Negative for cough, shortness of breath and wheezing. Cardiovascular: Negative for chest pain, palpitations and leg swelling. Gastrointestinal: Negative for abdominal distention, constipation, diarrhea, nausea and vomiting. Genitourinary: Negative for decreased urine volume and dysuria. Musculoskeletal: Positive for arthralgias, gait problem, joint swelling and myalgias. Skin: Negative for rash. Neurological: Negative for dizziness, weakness, light-headedness, numbness and headaches. PHYSICAL EXAM   (up to 7 for level 4, 8 or more for level 5)     INITIAL VITALS:   BP (!) 156/83   Pulse 84   Temp 98.2 °F (36.8 °C) (Tympanic)   Resp 16   Wt 172 lb (78 kg)   SpO2 94%   BMI 33.59 kg/m²     Physical Exam   Constitutional: She is oriented to person, place, and time. acute fracture or dislocation. Large patellofemoral joint effusion. EMERGENCY DEPARTMENT COURSE:  Discuss finding of effusion, and arthritis. Tylenol for pain, ice, elevation and follow-up with orthopedic surgeon. FINAL IMPRESSION      1. Chronic pain of left knee    2.  Knee effusion, left          DISPOSITION / PLAN     DISPOSITION Decision To Discharge 04/09/2019 06:57:33 AM      PATIENT REFERRED TO:  Phuong Samaniego  [address] [place] [address]  [phone]  In 1 day        DISCHARGE MEDICATIONS:  New Prescriptions    No medications on file       Kelvin Marshall  7:09 AM    Attending Emergency Physician  [address] [place] [address] ED    (Please note that portions of this note were completed with a voice recognition program.  Effortswere made to edit the dictations but occasionally words are mis-transcribed.)              Jon Brunner DO  04/09/19 0710

## 2019-04-09 NOTE — TELEPHONE ENCOUNTER
[facility] Trans[facility] Initial Follow Up Call    Outreach made within 2 business days of discharge: Yes    Patient: Harsh Miranda Patient : 1950   MRN: [id number]  Reason for Admission: There are no discharge diagnoses documented for the most recent discharge. Discharge Date: 19       Spoke with: Paul Mariscal    Discharge department/facility: [facility] Patient Contact:  Was patient able to fill all prescriptions: Extra Strength Tylenol, yes filled and taking  Was patient instructed to bring all medications to the follow-up visit: n/a  Is patient taking all medications as directed in the discharge summary?  yes  Does patient understand their discharge instructions: yes, is calling today to make an appt @ [address] [place] [address]  Does patient have questions or concerns that need addressed prior to 7-14 day follow up office visit: no    Scheduled appointment with PCP within 7-14 days    Follow Up  Future Appointments   Date Time Provider Tara Mtz   2019  2:00 PM Jessica Bullock, [address] [facility]       Mireya Mayorga

## 2019-04-24 ENCOUNTER — TELEPHONE (OUTPATIENT)
Dept: PRIMARY CARE CLINIC | Age: 69
End: 2019-04-24

## 2019-04-24 NOTE — TELEPHONE ENCOUNTER
Called patient and informed her that Isreal Rocha would like her to have BP check due to previous elevated BP.  BP check scheduled for 4/25/19

## 2019-04-25 ENCOUNTER — NURSE ONLY (OUTPATIENT)
Dept: PRIMARY CARE CLINIC | Age: 69
End: 2019-04-25

## 2019-04-25 VITALS
HEART RATE: 74 BPM | BODY MASS INDEX: 35.04 KG/M2 | SYSTOLIC BLOOD PRESSURE: 131 MMHG | RESPIRATION RATE: 20 BRPM | TEMPERATURE: 98.1 F | DIASTOLIC BLOOD PRESSURE: 64 MMHG | WEIGHT: 179.4 LBS

## 2019-04-25 DIAGNOSIS — I10 ESSENTIAL HYPERTENSION: Primary | ICD-10-CM

## 2019-04-25 ASSESSMENT — PATIENT HEALTH QUESTIONNAIRE - PHQ9
2. FEELING DOWN, DEPRESSED OR HOPELESS: 0
1. LITTLE INTEREST OR PLEASURE IN DOING THINGS: 0
SUM OF ALL RESPONSES TO PHQ QUESTIONS 1-9: 0
SUM OF ALL RESPONSES TO PHQ9 QUESTIONS 1 & 2: 0
SUM OF ALL RESPONSES TO PHQ QUESTIONS 1-9: 0

## 2019-04-25 NOTE — PATIENT INSTRUCTIONS
coronary artery disease and heart attack, heart failure, stroke, kidney failure, and eye damage. How can you prevent high blood pressure? · Stay at a healthy weight. · Try to limit how much sodium you eat to less than 2,300 milligrams (mg) a day. If you limit your sodium to 1,500 mg a day, you can lower your blood pressure even more. ? Buy foods that are labeled \"unsalted,\" \"sodium-free,\" or \"low-sodium. \" Foods labeled \"reduced-sodium\" and \"light sodium\" may still have too much sodium. ? Flavor your food with garlic, lemon juice, onion, vinegar, herbs, and spices instead of salt. Do not use soy sauce, steak sauce, onion salt, garlic salt, mustard, or ketchup on your food. ? Use less salt (or none) when recipes call for it. You can often use half the salt a recipe calls for without losing flavor. · Be physically active. Get at least 30 minutes of exercise on most days of the week. Walking is a good choice. You also may want to do other activities, such as running, swimming, cycling, or playing tennis or team sports. · Limit alcohol to 2 drinks a day for men and 1 drink a day for women. · Eat plenty of fruits, vegetables, and low-fat dairy products. Eat less saturated and total fats. How is high blood pressure treated? · Your doctor will suggest making lifestyle changes. For example, your doctor may ask you to eat healthy foods, quit smoking, lose extra weight, and be more active. · If lifestyle changes don't help enough, your doctor may recommend that you take medicine. · When blood pressure is very high, medicines are needed to lower it. Follow-up care is a key part of your treatment and safety. Be sure to make and go to all appointments, and call your doctor if you are having problems. It's also a good idea to know your test results and keep a list of the medicines you take. Where can you learn more? Go to [email or web address].[email or web address] and sign in to your [employer] account.  Enter P501 in the Search Health Information box to learn more about \"Learning About High Blood Pressure. \"     If you do not have an account, please click on the \"Sign Up Now\" link. Current as of: July 22, 2018  Content Version: 11.9  © 4797-7776 [employer]. Care instructions adapted under license by [facility] ([facility]). If you have questions about a medical condition or this instruction, always ask your healthcare professional. [address] any warranty or liability for your use of this information.

## 2019-04-30 ENCOUNTER — APPOINTMENT (OUTPATIENT)
Dept: GENERAL RADIOLOGY | Age: 69
DRG: 287 | End: 2019-04-30
Payer: MEDICARE

## 2019-04-30 ENCOUNTER — TELEPHONE (OUTPATIENT)
Dept: PRIMARY CARE CLINIC | Age: 69
End: 2019-04-30

## 2019-04-30 ENCOUNTER — HOSPITAL ENCOUNTER (INPATIENT)
Age: 69
LOS: 1 days | Discharge: HOME | DRG: 287 | End: 2019-05-01
Attending: INTERNAL MEDICINE | Admitting: INTERNAL MEDICINE
Payer: MEDICARE

## 2019-04-30 DIAGNOSIS — R07.9 CHEST PAIN, UNSPECIFIED TYPE: Primary | ICD-10-CM

## 2019-04-30 DIAGNOSIS — N39.0 URINARY TRACT INFECTION WITHOUT HEMATURIA, SITE UNSPECIFIED: ICD-10-CM

## 2019-04-30 DIAGNOSIS — E83.42 HYPOMAGNESEMIA: ICD-10-CM

## 2019-04-30 DIAGNOSIS — E87.6 HYPOKALEMIA: ICD-10-CM

## 2019-04-30 PROBLEM — I24.9 ACS (ACUTE CORONARY SYNDROME) (HCC): Status: ACTIVE | Noted: 2019-04-30

## 2019-04-30 LAB
-: ABNORMAL
ALBUMIN SERPL-MCNC: 3.5 G/DL (ref 3.5–5.2)
ALBUMIN/GLOBULIN RATIO: 1 (ref 1–2.5)
ALP BLD-CCNC: 114 U/L (ref 35–104)
ALT SERPL-CCNC: <5 U/L (ref 5–33)
AMORPHOUS: ABNORMAL
ANION GAP SERPL CALCULATED.3IONS-SCNC: 10 MMOL/L (ref 9–17)
AST SERPL-CCNC: 8 U/L
BACTERIA: ABNORMAL
BILIRUB SERPL-MCNC: 0.31 MG/DL (ref 0.3–1.2)
BILIRUBIN URINE: NEGATIVE
BNP INTERPRETATION: NORMAL
BUN BLDV-MCNC: 7 MG/DL (ref 8–23)
BUN/CREAT BLD: 7 (ref 9–20)
CALCIUM SERPL-MCNC: 9.2 MG/DL (ref 8.6–10.4)
CASTS UA: ABNORMAL /LPF
CHLORIDE BLD-SCNC: 101 MMOL/L (ref 98–107)
CO2: 30 MMOL/L (ref 20–31)
COLOR: YELLOW
COMMENT UA: ABNORMAL
CREAT SERPL-MCNC: 0.97 MG/DL (ref 0.5–0.9)
CRYSTALS, UA: ABNORMAL /HPF
EKG ATRIAL RATE: 66 BPM
EKG ATRIAL RATE: 78 BPM
EKG P AXIS: 13 DEGREES
EKG P AXIS: 39 DEGREES
EKG P-R INTERVAL: 166 MS
EKG P-R INTERVAL: 182 MS
EKG Q-T INTERVAL: 410 MS
EKG Q-T INTERVAL: 412 MS
EKG QRS DURATION: 82 MS
EKG QRS DURATION: 84 MS
EKG QTC CALCULATION (BAZETT): 429 MS
EKG QTC CALCULATION (BAZETT): 451 MS
EKG R AXIS: 1 DEGREES
EKG R AXIS: 9 DEGREES
EKG T AXIS: 38 DEGREES
EKG T AXIS: 45 DEGREES
EKG VENTRICULAR RATE: 66 BPM
EKG VENTRICULAR RATE: 72 BPM
EPITHELIAL CELLS UA: ABNORMAL /HPF (ref 0–25)
ESTIMATED AVERAGE GLUCOSE: 128 MG/DL
GFR AFRICAN AMERICAN: >60 ML/MIN
GFR NON-AFRICAN AMERICAN: 57 ML/MIN
GFR SERPL CREATININE-BSD FRML MDRD: ABNORMAL ML/MIN/{1.73_M2}
GFR SERPL CREATININE-BSD FRML MDRD: ABNORMAL ML/MIN/{1.73_M2}
GLUCOSE BLD-MCNC: 117 MG/DL (ref 74–100)
GLUCOSE BLD-MCNC: 139 MG/DL (ref 70–99)
GLUCOSE BLD-MCNC: 92 MG/DL (ref 74–100)
GLUCOSE URINE: NEGATIVE
HBA1C MFR BLD: 6.1 % (ref 4.8–5.9)
HCT VFR BLD CALC: 34.8 % (ref 36.3–47.1)
HEMOGLOBIN: 11.3 G/DL (ref 11.9–15.1)
KETONES, URINE: NEGATIVE
LEUKOCYTE ESTERASE, URINE: ABNORMAL
MAGNESIUM: 1.3 MG/DL (ref 1.6–2.6)
MCH RBC QN AUTO: 29.4 PG (ref 25.2–33.5)
MCHC RBC AUTO-ENTMCNC: 32.5 G/DL (ref 28.4–34.8)
MCV RBC AUTO: 90.6 FL (ref 82.6–102.9)
MUCUS: ABNORMAL
NITRITE, URINE: NEGATIVE
NRBC AUTOMATED: 0 PER 100 WBC
OTHER OBSERVATIONS UA: ABNORMAL
PDW BLD-RTO: 13.6 % (ref 11.8–14.4)
PH UA: 7 (ref 5–9)
PLATELET # BLD: 341 K/UL (ref 138–453)
PMV BLD AUTO: 9.3 FL (ref 8.1–13.5)
POTASSIUM SERPL-SCNC: 3.2 MMOL/L (ref 3.7–5.3)
PRO-BNP: 64 PG/ML
PROTEIN UA: NEGATIVE
RBC # BLD: 3.84 M/UL (ref 3.95–5.11)
RBC UA: ABNORMAL /HPF (ref 0–2)
RENAL EPITHELIAL, UA: ABNORMAL /HPF
SODIUM BLD-SCNC: 141 MMOL/L (ref 135–144)
SPECIFIC GRAVITY UA: <1.005 (ref 1.01–1.02)
TOTAL PROTEIN: 6.9 G/DL (ref 6.4–8.3)
TRICHOMONAS: ABNORMAL
TROPONIN INTERP: NORMAL
TROPONIN T: <0.03 NG/ML
TROPONIN, HIGH SENSITIVITY: NORMAL NG/L (ref 0–14)
TURBIDITY: CLEAR
URINE HGB: NEGATIVE
UROBILINOGEN, URINE: NORMAL
WBC # BLD: 6.5 K/UL (ref 3.5–11.3)
WBC UA: ABNORMAL /HPF (ref 0–5)
YEAST: ABNORMAL

## 2019-04-30 PROCEDURE — 84484 ASSAY OF TROPONIN QUANT: CPT

## 2019-04-30 PROCEDURE — 87186 SC STD MICRODIL/AGAR DIL: CPT

## 2019-04-30 PROCEDURE — 2580000003 HC RX 258: Performed by: INTERNAL MEDICINE

## 2019-04-30 PROCEDURE — 96368 THER/DIAG CONCURRENT INF: CPT

## 2019-04-30 PROCEDURE — 6370000000 HC RX 637 (ALT 250 FOR IP): Performed by: INTERNAL MEDICINE

## 2019-04-30 PROCEDURE — 83735 ASSAY OF MAGNESIUM: CPT

## 2019-04-30 PROCEDURE — 93005 ELECTROCARDIOGRAM TRACING: CPT

## 2019-04-30 PROCEDURE — 71101 X-RAY EXAM UNILAT RIBS/CHEST: CPT

## 2019-04-30 PROCEDURE — 6360000002 HC RX W HCPCS: Performed by: PHYSICIAN ASSISTANT

## 2019-04-30 PROCEDURE — 85027 COMPLETE CBC AUTOMATED: CPT

## 2019-04-30 PROCEDURE — 99222 1ST HOSP IP/OBS MODERATE 55: CPT | Performed by: FAMILY MEDICINE

## 2019-04-30 PROCEDURE — 36415 COLL VENOUS BLD VENIPUNCTURE: CPT

## 2019-04-30 PROCEDURE — 99285 EMERGENCY DEPT VISIT HI MDM: CPT

## 2019-04-30 PROCEDURE — 96366 THER/PROPH/DIAG IV INF ADDON: CPT

## 2019-04-30 PROCEDURE — 80053 COMPREHEN METABOLIC PANEL: CPT

## 2019-04-30 PROCEDURE — 87086 URINE CULTURE/COLONY COUNT: CPT

## 2019-04-30 PROCEDURE — 6360000002 HC RX W HCPCS: Performed by: INTERNAL MEDICINE

## 2019-04-30 PROCEDURE — 83880 ASSAY OF NATRIURETIC PEPTIDE: CPT

## 2019-04-30 PROCEDURE — 6370000000 HC RX 637 (ALT 250 FOR IP): Performed by: PHYSICIAN ASSISTANT

## 2019-04-30 PROCEDURE — 1200000000 HC SEMI PRIVATE

## 2019-04-30 PROCEDURE — 96365 THER/PROPH/DIAG IV INF INIT: CPT

## 2019-04-30 PROCEDURE — 87077 CULTURE AEROBIC IDENTIFY: CPT

## 2019-04-30 PROCEDURE — 82947 ASSAY GLUCOSE BLOOD QUANT: CPT

## 2019-04-30 PROCEDURE — 83036 HEMOGLOBIN GLYCOSYLATED A1C: CPT

## 2019-04-30 PROCEDURE — 81001 URINALYSIS AUTO W/SCOPE: CPT

## 2019-04-30 PROCEDURE — 2580000003 HC RX 258: Performed by: PHYSICIAN ASSISTANT

## 2019-04-30 RX ORDER — POTASSIUM CHLORIDE 20 MEQ/1
40 TABLET, EXTENDED RELEASE ORAL ONCE
Status: COMPLETED | OUTPATIENT
Start: 2019-04-30 | End: 2019-04-30

## 2019-04-30 RX ORDER — SODIUM CHLORIDE 9 MG/ML
INJECTION, SOLUTION INTRAVENOUS CONTINUOUS
Status: DISCONTINUED | OUTPATIENT
Start: 2019-04-30 | End: 2019-05-01 | Stop reason: HOSPADM

## 2019-04-30 RX ORDER — ACETAMINOPHEN 500 MG
500 TABLET ORAL EVERY 6 HOURS PRN
Status: DISCONTINUED | OUTPATIENT
Start: 2019-04-30 | End: 2019-05-01 | Stop reason: HOSPADM

## 2019-04-30 RX ORDER — DEXTROSE MONOHYDRATE 50 MG/ML
100 INJECTION, SOLUTION INTRAVENOUS PRN
Status: DISCONTINUED | OUTPATIENT
Start: 2019-04-30 | End: 2019-05-01 | Stop reason: HOSPADM

## 2019-04-30 RX ORDER — ATORVASTATIN CALCIUM 40 MG/1
40 TABLET, FILM COATED ORAL NIGHTLY
Status: DISCONTINUED | OUTPATIENT
Start: 2019-04-30 | End: 2019-05-01 | Stop reason: HOSPADM

## 2019-04-30 RX ORDER — AMLODIPINE BESYLATE 10 MG/1
10 TABLET ORAL DAILY
Status: DISCONTINUED | OUTPATIENT
Start: 2019-05-01 | End: 2019-05-01 | Stop reason: HOSPADM

## 2019-04-30 RX ORDER — ASPIRIN 81 MG/1
81 TABLET, CHEWABLE ORAL DAILY
Status: DISCONTINUED | OUTPATIENT
Start: 2019-05-01 | End: 2019-05-01 | Stop reason: HOSPADM

## 2019-04-30 RX ORDER — MAGNESIUM SULFATE IN WATER 40 MG/ML
2 INJECTION, SOLUTION INTRAVENOUS ONCE
Status: COMPLETED | OUTPATIENT
Start: 2019-04-30 | End: 2019-04-30

## 2019-04-30 RX ORDER — LISINOPRIL 20 MG/1
20 TABLET ORAL DAILY
Status: DISCONTINUED | OUTPATIENT
Start: 2019-05-01 | End: 2019-05-01 | Stop reason: HOSPADM

## 2019-04-30 RX ORDER — ASPIRIN 81 MG/1
162 TABLET, CHEWABLE ORAL ONCE
Status: COMPLETED | OUTPATIENT
Start: 2019-04-30 | End: 2019-04-30

## 2019-04-30 RX ORDER — NITROGLYCERIN 0.4 MG/1
0.4 TABLET SUBLINGUAL EVERY 5 MIN PRN
Status: DISCONTINUED | OUTPATIENT
Start: 2019-04-30 | End: 2019-05-01 | Stop reason: HOSPADM

## 2019-04-30 RX ORDER — SODIUM CHLORIDE 0.9 % (FLUSH) 0.9 %
10 SYRINGE (ML) INJECTION EVERY 12 HOURS SCHEDULED
Status: DISCONTINUED | OUTPATIENT
Start: 2019-04-30 | End: 2019-05-01 | Stop reason: HOSPADM

## 2019-04-30 RX ORDER — POTASSIUM CHLORIDE 7.45 MG/ML
10 INJECTION INTRAVENOUS ONCE
Status: COMPLETED | OUTPATIENT
Start: 2019-04-30 | End: 2019-04-30

## 2019-04-30 RX ORDER — LISINOPRIL 5 MG/1
5 TABLET ORAL DAILY
Status: DISCONTINUED | OUTPATIENT
Start: 2019-04-30 | End: 2019-04-30

## 2019-04-30 RX ORDER — DEXTROSE MONOHYDRATE 25 G/50ML
12.5 INJECTION, SOLUTION INTRAVENOUS PRN
Status: DISCONTINUED | OUTPATIENT
Start: 2019-04-30 | End: 2019-05-01 | Stop reason: HOSPADM

## 2019-04-30 RX ORDER — ATORVASTATIN CALCIUM 40 MG/1
40 TABLET, FILM COATED ORAL NIGHTLY
Status: DISCONTINUED | OUTPATIENT
Start: 2019-04-30 | End: 2019-04-30 | Stop reason: SDUPTHER

## 2019-04-30 RX ORDER — ONDANSETRON 2 MG/ML
4 INJECTION INTRAMUSCULAR; INTRAVENOUS EVERY 6 HOURS PRN
Status: DISCONTINUED | OUTPATIENT
Start: 2019-04-30 | End: 2019-05-01 | Stop reason: HOSPADM

## 2019-04-30 RX ORDER — FLUTICASONE PROPIONATE 50 MCG
1 SPRAY, SUSPENSION (ML) NASAL DAILY PRN
Status: DISCONTINUED | OUTPATIENT
Start: 2019-04-30 | End: 2019-05-01 | Stop reason: HOSPADM

## 2019-04-30 RX ORDER — NICOTINE POLACRILEX 4 MG
15 LOZENGE BUCCAL PRN
Status: DISCONTINUED | OUTPATIENT
Start: 2019-04-30 | End: 2019-05-01 | Stop reason: HOSPADM

## 2019-04-30 RX ORDER — SODIUM CHLORIDE 0.9 % (FLUSH) 0.9 %
10 SYRINGE (ML) INJECTION PRN
Status: DISCONTINUED | OUTPATIENT
Start: 2019-04-30 | End: 2019-05-01 | Stop reason: HOSPADM

## 2019-04-30 RX ORDER — ASPIRIN 81 MG/1
81 TABLET ORAL DAILY
Status: DISCONTINUED | OUTPATIENT
Start: 2019-05-01 | End: 2019-04-30 | Stop reason: SDUPTHER

## 2019-04-30 RX ORDER — FAMOTIDINE 20 MG/1
20 TABLET, FILM COATED ORAL 2 TIMES DAILY
Status: DISCONTINUED | OUTPATIENT
Start: 2019-04-30 | End: 2019-05-01 | Stop reason: HOSPADM

## 2019-04-30 RX ORDER — POTASSIUM CHLORIDE 750 MG/1
10 TABLET, EXTENDED RELEASE ORAL 2 TIMES DAILY
Status: DISCONTINUED | OUTPATIENT
Start: 2019-04-30 | End: 2019-05-01 | Stop reason: HOSPADM

## 2019-04-30 RX ADMIN — CEFTRIAXONE 1 G: 1 INJECTION, POWDER, FOR SOLUTION INTRAMUSCULAR; INTRAVENOUS at 16:08

## 2019-04-30 RX ADMIN — ATORVASTATIN CALCIUM 40 MG: 40 TABLET, FILM COATED ORAL at 20:31

## 2019-04-30 RX ADMIN — POTASSIUM CHLORIDE 10 MEQ: 10 TABLET, EXTENDED RELEASE ORAL at 20:31

## 2019-04-30 RX ADMIN — ENOXAPARIN SODIUM 40 MG: 40 INJECTION SUBCUTANEOUS at 16:54

## 2019-04-30 RX ADMIN — NITROGLYCERIN 0.4 MG: 0.4 TABLET, ORALLY DISINTEGRATING SUBLINGUAL at 14:36

## 2019-04-30 RX ADMIN — SODIUM CHLORIDE: 9 INJECTION, SOLUTION INTRAVENOUS at 16:09

## 2019-04-30 RX ADMIN — METFORMIN HYDROCHLORIDE 500 MG: 500 TABLET ORAL at 16:54

## 2019-04-30 RX ADMIN — NITROGLYCERIN 0.4 MG: 0.4 TABLET, ORALLY DISINTEGRATING SUBLINGUAL at 14:29

## 2019-04-30 RX ADMIN — ASPIRIN 81 MG 162 MG: 81 TABLET ORAL at 12:32

## 2019-04-30 RX ADMIN — POTASSIUM CHLORIDE 40 MEQ: 20 TABLET, EXTENDED RELEASE ORAL at 12:32

## 2019-04-30 RX ADMIN — POTASSIUM CHLORIDE 10 MEQ: 10 INJECTION, SOLUTION INTRAVENOUS at 12:45

## 2019-04-30 RX ADMIN — FAMOTIDINE 20 MG: 20 TABLET ORAL at 20:31

## 2019-04-30 RX ADMIN — MAGNESIUM SULFATE HEPTAHYDRATE 2 G: 40 INJECTION, SOLUTION INTRAVENOUS at 12:32

## 2019-04-30 RX ADMIN — NITROGLYCERIN 0.4 MG: 0.4 TABLET, ORALLY DISINTEGRATING SUBLINGUAL at 14:41

## 2019-04-30 RX ADMIN — METOPROLOL TARTRATE 25 MG: 25 TABLET ORAL at 20:31

## 2019-04-30 ASSESSMENT — ENCOUNTER SYMPTOMS
ABDOMINAL PAIN: 0
CHEST TIGHTNESS: 0
EYE REDNESS: 0
VOMITING: 0
BACK PAIN: 0
BLOOD IN STOOL: 0
DIARRHEA: 0
COUGH: 1
RHINORRHEA: 0
CONSTIPATION: 0
SORE THROAT: 0
SHORTNESS OF BREATH: 0
NAUSEA: 0
WHEEZING: 0
EYE DISCHARGE: 0

## 2019-04-30 ASSESSMENT — PAIN DESCRIPTION - DESCRIPTORS: DESCRIPTORS: SHARP

## 2019-04-30 ASSESSMENT — PAIN SCALES - GENERAL
PAINLEVEL_OUTOF10: 6
PAINLEVEL_OUTOF10: 1
PAINLEVEL_OUTOF10: 0
PAINLEVEL_OUTOF10: 0

## 2019-04-30 ASSESSMENT — PAIN DESCRIPTION - ORIENTATION: ORIENTATION: LEFT

## 2019-04-30 ASSESSMENT — PAIN DESCRIPTION - DIRECTION: RADIATING_TOWARDS: LEFT UPPER BACK

## 2019-04-30 ASSESSMENT — PAIN DESCRIPTION - PAIN TYPE
TYPE: ACUTE PAIN
TYPE: ACUTE PAIN

## 2019-04-30 ASSESSMENT — PAIN DESCRIPTION - FREQUENCY: FREQUENCY: INTERMITTENT

## 2019-04-30 ASSESSMENT — PAIN DESCRIPTION - LOCATION: LOCATION: CHEST

## 2019-04-30 NOTE — ED NOTES
Per [employer] Magnesium Sulfate and Potassium Chloride are compatible-      Kee Jauregui RN  04/30/19 4661

## 2019-04-30 NOTE — ED NOTES
Pt complained of pain from the Potassium infusion--Infusion slowed to 50ml/hr--Ana PAC made aware     Blanca Medina, CHLOE  04/30/19 6013

## 2019-04-30 NOTE — TELEPHONE ENCOUNTER
pts  called and stated that this morning pt had left chest area pain under her arm and going to her back. Writer advised  that pt should go to the ER for evaluation if the chest pain is that severe.  verbalized understanding.

## 2019-04-30 NOTE — ED PROVIDER NOTES
per week: None     Minutes per session: None    Stress: None   Relationships    Social connections:     Talks on phone: None     Gets together: None     Attends Gnosticism service: None     Active member of club or organization: None     Attends meetings of clubs or organizations: None     Relationship status: None    Intimate partner violence:     Fear of current or ex partner: None     Emotionally abused: None     Physically abused: None     Forced sexual activity: None   Other Topics Concern    None   Social History Narrative    None       SCREENINGS    Anthony Coma Scale  Eye Opening: Spontaneous  Best Verbal Response: Oriented  Best Motor Response: Obeys commands  [place] Coma Scale Score: 15 @FLOW([id number])@      PHYSICAL EXAM    (up to 7 for level 4, 8 or more for level 5)     ED Triage Vitals   BP Temp Temp src Pulse Resp SpO2 Height Weight   -- -- -- -- -- -- -- --       Physical Exam   Constitutional: She is oriented to person, place, and time. She appears well-developed and well-nourished. Non-toxic appearance. She does not have a sickly appearance. She does not appear ill. No distress. She is not intubated. HENT:   Head: Normocephalic and atraumatic. Right Ear: External ear normal.   Left Ear: External ear normal.   Mouth/Throat: Oropharynx is clear and moist. No oropharyngeal exudate. Eyes: Pupils are equal, round, and reactive to light. Conjunctivae and EOM are normal. Right eye exhibits no discharge. Left eye exhibits no discharge. No scleral icterus. Neck: Normal range of motion. Neck supple. No tracheal deviation present. Cardiovascular: Normal rate, regular rhythm and intact distal pulses. Exam reveals no gallop and no friction rub. No murmur heard. Pulmonary/Chest: Effort normal and breath sounds normal. No accessory muscle usage or stridor. No apnea, no tachypnea and no bradypnea. She is not intubated. No respiratory distress. She has no decreased breath sounds.  She has no wheezes. She has no rhonchi. She has no rales. She exhibits tenderness. She exhibits no crepitus, no edema and no swelling. Abdominal: Soft. Bowel sounds are normal. She exhibits no distension and no mass. There is no tenderness. There is no rebound and no guarding. Musculoskeletal: Normal range of motion. She exhibits no edema, tenderness or deformity. Neurological: She is alert and oriented to person, place, and time. She has normal reflexes. She displays normal reflexes. No cranial nerve deficit. She exhibits normal muscle tone. Skin: Skin is warm and dry. No rash noted. She is not diaphoretic. No erythema. Psychiatric: She has a normal mood and affect. Her behavior is normal.   Nursing note and vitals reviewed. DIAGNOSTIC RESULTS     EKG: All EKG's are interpreted by the Emergency Department Physician who either signs orCo-signs this chart in the absence of a cardiologist.      RADIOLOGY:   Non-plainfilm images such as CT, Ultrasound and MRI are read by the radiologist. Plain radiographic images are visualized and preliminarily interpreted by the emergency physician with the below findings:      Interpretationper the Radiologist below, if available at the time of this note:    XR RIBS LEFT INCLUDE CHEST (MIN 3 VIEWS)   Final Result   No acute airspace disease or rib fracture identified.                ED BEDSIDE ULTRASOUND:   Performed by ED Physician - none    LABS:  Labs Reviewed   URINE RT REFLEX TO CULTURE - Abnormal; Notable for the following components:       Result Value    Specific Gravity, UA <1.005 (*)     Leukocyte Esterase, Urine MODERATE (*)     All other components within normal limits   CBC - Abnormal; Notable for the following components:    RBC 3.84 (*)     Hemoglobin 11.3 (*)     Hematocrit 34.8 (*)     All other components within normal limits   COMPREHENSIVE METABOLIC PANEL - Abnormal; Notable for the following components:    Glucose 139 (*)     BUN 7 (*)     CREATININE 0.97

## 2019-04-30 NOTE — CONSULTS
tablet 3    Blood Glucose Monitoring Suppl GENYN 1 Units by Does not apply route 3 times daily What insurance will cover 1 Device 0         nitroGLYCERIN (NITROSTAT) SL tablet 0.4 mg Q5 Min PRN   acetaminophen (TYLENOL) tablet 500 mg Q6H PRN   [START ON 5/1/2019] amLODIPine (NORVASC) tablet 10 mg Daily   atorvastatin (LIPITOR) tablet 40 mg Nightly   fluticasone (FLONASE) 50 MCG/ACT nasal spray 1 spray Daily PRN   [START ON 5/1/2019] lisinopril (PRINIVIL;ZESTRIL) tablet 20 mg Daily   metFORMIN (GLUCOPHAGE) tablet 500 mg BID WC   potassium chloride (KLOR-CON M) extended release tablet 10 mEq BID   0.9 % sodium chloride infusion Continuous   sodium chloride flush 0.9 % injection 10 mL 2 times per day   sodium chloride flush 0.9 % injection 10 mL PRN   magnesium hydroxide (MILK OF MAGNESIA) 400 MG/5ML suspension 30 mL Daily PRN   ondansetron (ZOFRAN) injection 4 mg Q6H PRN   famotidine (PEPCID) tablet 20 mg BID   [START ON 5/1/2019] aspirin chewable tablet 81 mg Daily   metoprolol tartrate (LOPRESSOR) tablet 25 mg BID   enoxaparin (LOVENOX) injection 40 mg Daily   glucose (GLUTOSE) 40 % oral gel 15 g PRN   dextrose 50 % solution 12.5 g PRN   glucagon (rDNA) injection 1 mg PRN   dextrose 5 % solution PRN   insulin lispro (HUMALOG) injection pen 0-12 Units TID WC   insulin lispro (HUMALOG) injection pen 0-6 Units Nightly       FAMILY HISTORY: family history includes Diabetes in her brother and sister; High Blood Pressure in her sister; Stroke in her brother, father, and sister. PHYSICAL EXAM:   BP (!) 166/85   Pulse 66   Temp 98 °F (36.7 °C) (Temporal)   Resp 20   Ht 5' 4\" (1.626 m)   Wt 168 lb 8 oz (76.4 kg)   SpO2 99%   BMI 28.92 kg/m²  Body mass index is 28.92 kg/m². Constitutional: She is oriented to person, place, and time. She appears well-developed and well-nourished. In no acute distress. HEENT: Normocephalic and atraumatic. No JVD present. Carotid bruit is not present.  No mass and no thyromegaly

## 2019-05-01 ENCOUNTER — APPOINTMENT (OUTPATIENT)
Dept: NON INVASIVE DIAGNOSTICS | Age: 69
DRG: 287 | End: 2019-05-01
Payer: MEDICARE

## 2019-05-01 VITALS
TEMPERATURE: 98.8 F | OXYGEN SATURATION: 98 % | HEIGHT: 64 IN | DIASTOLIC BLOOD PRESSURE: 75 MMHG | SYSTOLIC BLOOD PRESSURE: 143 MMHG | WEIGHT: 170.4 LBS | HEART RATE: 62 BPM | RESPIRATION RATE: 16 BRPM | BODY MASS INDEX: 29.09 KG/M2

## 2019-05-01 PROBLEM — N39.0 UTI (URINARY TRACT INFECTION): Status: ACTIVE | Noted: 2019-05-01

## 2019-05-01 LAB
ANION GAP SERPL CALCULATED.3IONS-SCNC: 7 MMOL/L (ref 9–17)
BUN BLDV-MCNC: 6 MG/DL (ref 8–23)
BUN/CREAT BLD: 8 (ref 9–20)
CALCIUM SERPL-MCNC: 8.5 MG/DL (ref 8.6–10.4)
CHLORIDE BLD-SCNC: 109 MMOL/L (ref 98–107)
CHOLESTEROL/HDL RATIO: 3
CHOLESTEROL: 152 MG/DL
CO2: 27 MMOL/L (ref 20–31)
CREAT SERPL-MCNC: 0.72 MG/DL (ref 0.5–0.9)
CULTURE: ABNORMAL
EKG ATRIAL RATE: 60 BPM
EKG P AXIS: 34 DEGREES
EKG P-R INTERVAL: 184 MS
EKG Q-T INTERVAL: 450 MS
EKG QRS DURATION: 86 MS
EKG QTC CALCULATION (BAZETT): 450 MS
EKG R AXIS: 12 DEGREES
EKG T AXIS: 56 DEGREES
EKG VENTRICULAR RATE: 60 BPM
GFR AFRICAN AMERICAN: >60 ML/MIN
GFR NON-AFRICAN AMERICAN: >60 ML/MIN
GFR SERPL CREATININE-BSD FRML MDRD: ABNORMAL ML/MIN/{1.73_M2}
GFR SERPL CREATININE-BSD FRML MDRD: ABNORMAL ML/MIN/{1.73_M2}
GLUCOSE BLD-MCNC: 103 MG/DL (ref 74–100)
GLUCOSE BLD-MCNC: 80 MG/DL (ref 74–100)
GLUCOSE BLD-MCNC: 97 MG/DL (ref 70–99)
HCT VFR BLD CALC: 33.5 % (ref 36.3–47.1)
HDLC SERPL-MCNC: 51 MG/DL
HEMOGLOBIN: 10.6 G/DL (ref 11.9–15.1)
LDL CHOLESTEROL: 81 MG/DL (ref 0–130)
Lab: ABNORMAL
MCH RBC QN AUTO: 29.1 PG (ref 25.2–33.5)
MCHC RBC AUTO-ENTMCNC: 31.6 G/DL (ref 28.4–34.8)
MCV RBC AUTO: 92 FL (ref 82.6–102.9)
NRBC AUTOMATED: 0 PER 100 WBC
PDW BLD-RTO: 14.1 % (ref 11.8–14.4)
PLATELET # BLD: 339 K/UL (ref 138–453)
PMV BLD AUTO: 9.3 FL (ref 8.1–13.5)
POTASSIUM SERPL-SCNC: 3.9 MMOL/L (ref 3.7–5.3)
RBC # BLD: 3.64 M/UL (ref 3.95–5.11)
SODIUM BLD-SCNC: 143 MMOL/L (ref 135–144)
SPECIMEN DESCRIPTION: ABNORMAL
TRIGL SERPL-MCNC: 102 MG/DL
VLDLC SERPL CALC-MCNC: NORMAL MG/DL (ref 1–30)
WBC # BLD: 5.5 K/UL (ref 3.5–11.3)

## 2019-05-01 PROCEDURE — C1894 INTRO/SHEATH, NON-LASER: HCPCS

## 2019-05-01 PROCEDURE — 2580000003 HC RX 258: Performed by: INTERNAL MEDICINE

## 2019-05-01 PROCEDURE — 93005 ELECTROCARDIOGRAM TRACING: CPT

## 2019-05-01 PROCEDURE — 80061 LIPID PANEL: CPT

## 2019-05-01 PROCEDURE — 6360000002 HC RX W HCPCS: Performed by: FAMILY MEDICINE

## 2019-05-01 PROCEDURE — 85027 COMPLETE CBC AUTOMATED: CPT

## 2019-05-01 PROCEDURE — 93017 CV STRESS TEST TRACING ONLY: CPT

## 2019-05-01 PROCEDURE — C1887 CATHETER, GUIDING: HCPCS

## 2019-05-01 PROCEDURE — 93459 L HRT ART/GRFT ANGIO: CPT | Performed by: FAMILY MEDICINE

## 2019-05-01 PROCEDURE — B2111ZZ FLUOROSCOPY OF MULTIPLE CORONARY ARTERIES USING LOW OSMOLAR CONTRAST: ICD-10-PCS | Performed by: FAMILY MEDICINE

## 2019-05-01 PROCEDURE — 36415 COLL VENOUS BLD VENIPUNCTURE: CPT

## 2019-05-01 PROCEDURE — 80048 BASIC METABOLIC PNL TOTAL CA: CPT

## 2019-05-01 PROCEDURE — 6370000000 HC RX 637 (ALT 250 FOR IP): Performed by: FAMILY MEDICINE

## 2019-05-01 PROCEDURE — 93458 L HRT ARTERY/VENTRICLE ANGIO: CPT | Performed by: FAMILY MEDICINE

## 2019-05-01 PROCEDURE — A9500 TC99M SESTAMIBI: HCPCS | Performed by: INTERNAL MEDICINE

## 2019-05-01 PROCEDURE — 3430000000 HC RX DIAGNOSTIC RADIOPHARMACEUTICAL: Performed by: INTERNAL MEDICINE

## 2019-05-01 PROCEDURE — 2709999900 HC NON-CHARGEABLE SUPPLY

## 2019-05-01 PROCEDURE — B2151ZZ FLUOROSCOPY OF LEFT HEART USING LOW OSMOLAR CONTRAST: ICD-10-PCS | Performed by: FAMILY MEDICINE

## 2019-05-01 PROCEDURE — 4A023N7 MEASUREMENT OF CARDIAC SAMPLING AND PRESSURE, LEFT HEART, PERCUTANEOUS APPROACH: ICD-10-PCS | Performed by: FAMILY MEDICINE

## 2019-05-01 PROCEDURE — C1769 GUIDE WIRE: HCPCS

## 2019-05-01 PROCEDURE — 82947 ASSAY GLUCOSE BLOOD QUANT: CPT

## 2019-05-01 PROCEDURE — 78452 HT MUSCLE IMAGE SPECT MULT: CPT

## 2019-05-01 RX ORDER — SULFAMETHOXAZOLE AND TRIMETHOPRIM 800; 160 MG/1; MG/1
1 TABLET ORAL EVERY 12 HOURS SCHEDULED
Status: DISCONTINUED | OUTPATIENT
Start: 2019-05-01 | End: 2019-05-01 | Stop reason: HOSPADM

## 2019-05-01 RX ORDER — METHYLPREDNISOLONE SODIUM SUCCINATE 125 MG/2ML
125 INJECTION, POWDER, LYOPHILIZED, FOR SOLUTION INTRAMUSCULAR; INTRAVENOUS ONCE
Status: COMPLETED | OUTPATIENT
Start: 2019-05-01 | End: 2019-05-01

## 2019-05-01 RX ORDER — DIPHENHYDRAMINE HCL 25 MG
50 CAPSULE ORAL ONCE
Status: COMPLETED | OUTPATIENT
Start: 2019-05-01 | End: 2019-05-01

## 2019-05-01 RX ORDER — PANTOPRAZOLE SODIUM 40 MG/1
40 TABLET, DELAYED RELEASE ORAL
Qty: 30 TABLET | Refills: 0 | Status: SHIPPED | OUTPATIENT
Start: 2019-05-01 | End: 2019-09-23 | Stop reason: ALTCHOICE

## 2019-05-01 RX ORDER — SULFAMETHOXAZOLE AND TRIMETHOPRIM 800; 160 MG/1; MG/1
1 TABLET ORAL EVERY 12 HOURS SCHEDULED
Qty: 14 TABLET | Refills: 0 | Status: SHIPPED | OUTPATIENT
Start: 2019-05-01 | End: 2019-05-08

## 2019-05-01 RX ORDER — FAMOTIDINE 20 MG/1
40 TABLET, FILM COATED ORAL ONCE
Status: COMPLETED | OUTPATIENT
Start: 2019-05-01 | End: 2019-05-01

## 2019-05-01 RX ORDER — BENZONATATE 100 MG/1
200 CAPSULE ORAL 3 TIMES DAILY PRN
Qty: 25 CAPSULE | Refills: 0 | Status: SHIPPED | OUTPATIENT
Start: 2019-05-01 | End: 2019-09-23 | Stop reason: ALTCHOICE

## 2019-05-01 RX ADMIN — DIPHENHYDRAMINE HYDROCHLORIDE 50 MG: 25 CAPSULE ORAL at 12:29

## 2019-05-01 RX ADMIN — REGADENOSON 0.4 MG: 0.08 INJECTION, SOLUTION INTRAVENOUS at 10:20

## 2019-05-01 RX ADMIN — FAMOTIDINE 40 MG: 20 TABLET ORAL at 12:29

## 2019-05-01 RX ADMIN — SODIUM CHLORIDE: 9 INJECTION, SOLUTION INTRAVENOUS at 02:00

## 2019-05-01 RX ADMIN — METHYLPREDNISOLONE SODIUM SUCCINATE 125 MG: 125 INJECTION, POWDER, FOR SOLUTION INTRAMUSCULAR; INTRAVENOUS at 12:29

## 2019-05-01 RX ADMIN — Medication 30 MILLICURIE: at 10:18

## 2019-05-01 RX ADMIN — Medication 10 MILLICURIE: at 08:38

## 2019-05-01 ASSESSMENT — PAIN SCALES - GENERAL
PAINLEVEL_OUTOF10: 0

## 2019-05-01 NOTE — DISCHARGE SUMMARY
Discharge Summary    Charity Payne  :  1950  MRN:  [id number]    Admit date:  2019      Discharge date: 2019     Admitting Physician:  Venkatesh Rios MD    Consultants:  Dr. Viviana Lizarraga, cardiology    Procedures: heart cath: Brionna Palacios Complications: none    Discharge Condition: stable    Hospital Course:   Charity Payne is a 71 y.o. female admitted with intermittent cough and left sided chest pain. She reported that it hurt her chest to cough and her chest was tender to the touch. She was referred to ER by her PCP. Patient has DM, HTN, GERD, HLP. She is not a smoker. NO FH of CAD, but multiple family members with CVAs. Troponins negative. Rib xray showed no rib fracture. She was admitted for possible ACS. She was seen by cardiology. Patient had an abnormal stress test. She underwent cardiac cath which showed: Mild to moderate single vessel disease involving the circumflex coronary artery. NO further intervention needed. Dr. Viviana Lizarraga feels that her symptoms are more likely related to GERD and he recommends a PPI. She also has a UTI and is growing lactose fermenting GNR. She will be discharged on Bactrim. Patient still has a cough. Will send script for tessalon pearls. She will f/u with cardiology as needed. Patient will be discharged to home. Dr. Bharathi Washburn agrees with plan. Urine Culture [[id number]] (Abnormal) Collected: 19 1320   Updated: 19 1229    Specimen Source: Urine     Specimen Description . URINE    Special Requests NOT REPORTED    Culture LACTOSE FERMENTING GRAM NEGATIVE RODS >[id number] CFU/MLAbnormal          Exam:  GEN:  alert and oriented to person, place and time, well-developed and well-nourished, in no acute distress  EYES: PERRL  NECK: normal  PULM: clear to auscultation bilaterally- no wheezes, rales or rhonchi, normal air movement, no respiratory distress  COR: regular rate & rhythm and no murmurs  ABD:  soft, non-tender, non-distended, normal bowel sounds, no masses or organomegaly  EXT:

## 2019-05-01 NOTE — PROGRESS NOTES
Dr. Dung Peterson at bedside.
Met with Patient and her  this a.m. to discuss discharge plan. Patient is a 71year old , black female, admitted with Acute Coronary Syndrome. Patient is alert and oriented, polite and cooperative with this assessment.  is at the bedside, participating in the conversation, as appropriate. Patient resides in [facility] with her . She uses no DME and no outside community resources or services at this time. Patient does not drive and  provides for all of her transportation needs. PCP is Vee Bullock CNP. Patient is not currently having difficulty affording her medications but both she and her  voice this as a problem that they have faced in the past.  Education provided re:  programs available through the hospital.  Encouraged them to contact  in the future if they are prescribed a medication that they cannot afford. Patient wishes to return home with her  upon discharge. Patient has no healthcare directives on file. Offered for Patient to complete documents and provided education re:  same. Patient states that she is \"not interested\" in completing at this time. No unmet discharge planning needs identified by Patient or . LSW to follow and monitor/assist as appropriate.     Jazzy Martino, [facility]  5/1/2019
 Cardiac cath showed: Mild to moderate single vessel disease involving the circumflex coronary   artery. Dr. Julisa Mcdaniel feels that her symptoms are more likely related to GERD and he recommends a PPI. Patient will be discharged to home. Dr. Chuck Gandhi agrees with plan.     Elsa David PA-C  5/1/2019, 1:40 PM
Problems:    * No resolved hospital problems.  *      All Problems:  Patient Active Problem List   Diagnosis    Hypokalemia    Type 2 diabetes mellitus without complication ([facility])    Essential hypertension    Hyperlipidemia    Bilateral leg edema    Non compliance w medication regimen    Gastroesophageal reflux disease    Mild intermittent asthma without complication    Postmenopausal    Osteopenia determined by x-ray    Chronic midline low back pain without sciatica    Calcaneal spur of left foot    Iron deficiency anemia    Recurrent UTI    ACS (acute coronary syndrome) ([facility])       PLAN:  · Stress test    HIGH RISK MEDS: none    Tyron Fofana M.D.
history  · Ideal Body Wt: 120 lb (54.4 kg), % Ideal Body 142%  · BMI Classification: BMI 25.0 - 29.9 Overweight    Lab Results   Component Value Date     05/01/2019    K 3.9 05/01/2019     (H) 05/01/2019    CO2 27 05/01/2019    BUN 6 (L) 05/01/2019    CREATININE 0.72 05/01/2019    GLUCOSE 97 05/01/2019    CALCIUM 8.5 (L) 05/01/2019    PROT 6.9 04/30/2019    LABALBU 3.5 04/30/2019    BILITOT 0.31 04/30/2019    ALKPHOS 114 (H) 04/30/2019    AST 8 04/30/2019    ALT <5 (L) 04/30/2019    LABGLOM >60 05/01/2019    GFRAA >60 05/01/2019    GLOB NOT REPORTED 07/17/2015     Lab Results   Component Value Date    LABA1C 6.1 (H) 04/30/2019     Lab Results   Component Value Date     04/30/2019     Nutrition Interventions:   Start oral diet(post heart cath)  Continued Inpatient Monitoring, Education Initiated, Coordination of Care    Nutrition Evaluation:   · Evaluation: Goals set   · Goals: PO>75% meals post heart cath     · Monitoring: Nutrition Progression, Weight, Pertinent Labs, Patient/Family Education, I&O    Electronically signed by Sarah Patiño RD, LD on 5/1/19 at 7:32 AM    Contact Number: [phone]
DS) 800-160 MG per tablet 1 tablet 2 times per day   nitroGLYCERIN (NITROSTAT) SL tablet 0.4 mg Q5 Min PRN   acetaminophen (TYLENOL) tablet 500 mg Q6H PRN   amLODIPine (NORVASC) tablet 10 mg Daily   atorvastatin (LIPITOR) tablet 40 mg Nightly   fluticasone (FLONASE) 50 MCG/ACT nasal spray 1 spray Daily PRN   lisinopril (PRINIVIL;ZESTRIL) tablet 20 mg Daily   metFORMIN (GLUCOPHAGE) tablet 500 mg BID WC   potassium chloride (KLOR-CON M) extended release tablet 10 mEq BID   0.9 % sodium chloride infusion Continuous   sodium chloride flush 0.9 % injection 10 mL 2 times per day   sodium chloride flush 0.9 % injection 10 mL PRN   magnesium hydroxide (MILK OF MAGNESIA) 400 MG/5ML suspension 30 mL Daily PRN   ondansetron (ZOFRAN) injection 4 mg Q6H PRN   famotidine (PEPCID) tablet 20 mg BID   aspirin chewable tablet 81 mg Daily   metoprolol tartrate (LOPRESSOR) tablet 25 mg BID   enoxaparin (LOVENOX) injection 40 mg Daily   glucose (GLUTOSE) 40 % oral gel 15 g PRN   dextrose 50 % solution 12.5 g PRN   glucagon (rDNA) injection 1 mg PRN   dextrose 5 % solution PRN   insulin lispro (HUMALOG) injection pen 0-12 Units TID WC   insulin lispro (HUMALOG) injection pen 0-6 Units Nightly       FAMILY HISTORY: family history includes Diabetes in her brother and sister; High Blood Pressure in her sister; Stroke in her brother, father, and sister. PHYSICAL EXAM:   BP (!) 141/72   Pulse 62   Temp 98.9 °F (37.2 °C) (Temporal)   Resp 16   Ht 5' 4\" (1.626 m)   Wt 170 lb 6.4 oz (77.3 kg)   SpO2 98%   BMI 29.25 kg/m²  Body mass index is 29.25 kg/m². Constitutional: She is oriented to person, place, and time. She appears well-developed and well-nourished. In no acute distress. HEENT: Normocephalic and atraumatic. No JVD present. Carotid bruit is not present. No mass and no thyromegaly present. No lymphadenopathy present. Neurological: She is alert and oriented to person, place, and time.  No evidence of gross cranial nerve

## 2019-05-01 NOTE — PROCEDURES
being on a calcium channel blocker. Mickey Decker    D: 05/01/2019 13:06:05       T: 05/01/2019 13:33:28     INGRID/LUCIAN_EDIT  Job#: [id number]     Doc#: Unknown    CC:  Rosa Gee.  Johnathon Alarcon

## 2019-05-01 NOTE — DISCHARGE SUMMARY
Jay Davies. Yanet Hamilton  Physician Assistant Attestation Note For Discharge 5/1/19    I personally evaluated and examined the patient face-to-face in conjunction with the PA and agree with the management and dispostition of the patient. Please see PA's discharge summary note for full details. My key findings are:     SUBJECTIVE:    Patient admitted for ACS. OBJECTIVE:    Vitals:   Temp: 98.8 °F (37.1 °C)  BP: (!) 148/73  Resp: 16  Pulse: 61  SpO2: 97 %    24HR INTAKE/OUTPUT:      Intake/Output Summary (Last 24 hours) at 5/1/2019 1515  Last data filed at 5/1/2019 1513  Gross per 24 hour   Intake 1535.1 ml   Output --   Net 1535.1 ml     -----------------------------------------------------------------  Exam:  GEN:    Awake, alert and oriented x 3. no acute distress  EYES:  EOMI, pupils equal   NECK: Supple. No lymphadenopathy. No carotid bruit  CVS:    RRR, no murmur, rub or gallop  PULM: CTA, no wheezes, rales or rhonchi  ABD:    Bowels sounds normal.  Abdomen is soft. No distention. No tenderness. EXT:   no edema bilaterally . No calf tenderness. NEURO: Motor and sensory are intact  SKIN:  No rashes. No skin lesions.       Diagnostic Data:    · All available data reviewed  Lab Results   Component Value Date    WBC 5.5 05/01/2019    HGB 10.6 (L) 05/01/2019    MCV 92.0 05/01/2019     05/01/2019      Lab Results   Component Value Date    GLUCOSE 97 05/01/2019    BUN 6 (L) 05/01/2019    CREATININE 0.72 05/01/2019     05/01/2019    K 3.9 05/01/2019    CALCIUM 8.5 (L) 05/01/2019     (H) 05/01/2019    CO2 27 05/01/2019       ASSESSMENT:    · Chest pain  · Possible GERD    PLAN:  · I agree with the plan as outlined in the PA's note        Francisco Mcdonnell M.D.  5/1/2019  3:15 PM

## 2019-05-02 ENCOUNTER — CARE COORDINATION (OUTPATIENT)
Dept: CASE MANAGEMENT | Age: 69
End: 2019-05-02

## 2019-05-02 NOTE — CARE COORDINATION
[facility] Transitions Initial Follow Up Call    Call within 2 business days of discharge: Yes    Patient: Bre Yu Patient : 1950   MRN: <[id number]>  Reason for Admission: CP  Discharge Date: 19 RARS: Readmission Risk Score: 19      Last Discharge [facility]       Complaint Diagnosis Description Type Department Provider    19 Chest Pain Chest pain, unspecified type . .. ED to Hosp-Admission (Discharged) (ADMITTED) Bunny Mares MD               Facility: formerly [facility] 24 Hour Call    Care Transitions Interventions       Attempted to contact Pt for initial transitions call. \"Not accepting calls at this time\" recording. Will continue to attempt to reach.     Bart Dietz RN BSN   Care Transitions Coordinator  [phone]     Follow Up  Future Appointments   Date Time Provider Tara Mtz   2019  2:00 PM Gay Bullock, APRN - CNP [facility]       Bart Dietz RN

## 2019-05-03 NOTE — CARE COORDINATION
[facility] Transitions Initial Follow Up Call    Call within 2 business days of discharge: Yes    Patient: Arnol Shaw Patient : 1950   MRN: [id number]  Reason for Admission: Chest pain  Discharge Date: 19 RARS: Readmission Risk Score: 19      Last Discharge [facility]       Complaint Diagnosis Description Type Department Provider    19 Chest Pain Chest pain, unspecified type . .. ED to Hosp-Admission (Discharged) ([place]) Makayla Farris MD           Spoke with: Second attempt to contact patient for transition call. Unable to reach, no answer, no VM unable to leave a message. Rec'd error message \"person you are calling is not accepting calls at this time\". Will attempt outreach at a later date.     Facility: [place]    Care Transitions 24 Hour Call    Care Transitions Interventions         Follow Up  Future Appointments   Date Time Provider Tara Mtz   2019  2:00 PM Becca Bullock APRN - CNP [facility]       Argenis Keene, RN BSN  Care Transitions Coordinator

## 2019-05-06 NOTE — CARE COORDINATION
[facility] Transitions Initial Follow Up Call    Call within 2 business days of discharge: Yes    Patient: Arnol Shaw Patient : 1950   MRN: [id number]  Reason for Admission: Chest Pain  Discharge Date: 19 RARS: Readmission Risk Score: 19      Last Discharge [facility]       Complaint Diagnosis Description Type Department Provider    19 Chest Pain Chest pain, unspecified type . .. ED to Hosp-Admission (Discharged) ([facility]) Makayla Farris MD           Spoke with: Third attempt to contact patient for transition call. Unable to reach, no answer, no voicemail to leave message. Recording error message \"person you are calling is not accepting calls at this time\".       Facility: [facility] Transitions 24 Hour Call    Care Transitions Interventions         Follow Up  Future Appointments   Date Time Provider Tara Mtz   2019  2:00 PM Becca Bullock, APRN - CNP [facility]       Jayne Conklin RN

## 2019-05-31 PROBLEM — N39.0 UTI (URINARY TRACT INFECTION): Status: RESOLVED | Noted: 2019-05-01 | Resolved: 2019-05-31

## 2019-07-08 DIAGNOSIS — E11.9 TYPE 2 DIABETES MELLITUS WITHOUT COMPLICATION (HCC): ICD-10-CM

## 2019-07-08 DIAGNOSIS — I10 ESSENTIAL HYPERTENSION: ICD-10-CM

## 2019-07-08 RX ORDER — LISINOPRIL 20 MG/1
20 TABLET ORAL DAILY
Qty: 90 TABLET | Refills: 3 | Status: SHIPPED | OUTPATIENT
Start: 2019-07-08 | End: 2019-11-18

## 2019-07-08 RX ORDER — AMLODIPINE BESYLATE 10 MG/1
TABLET ORAL
Qty: 90 TABLET | Refills: 3 | Status: SHIPPED | OUTPATIENT
Start: 2019-07-08 | End: 2020-05-08 | Stop reason: SDUPTHER

## 2019-07-15 ENCOUNTER — TELEPHONE (OUTPATIENT)
Dept: PRIMARY CARE CLINIC | Age: 69
End: 2019-07-15

## 2019-07-18 ENCOUNTER — TELEPHONE (OUTPATIENT)
Dept: PRIMARY CARE CLINIC | Age: 69
End: 2019-07-18

## 2019-07-29 ENCOUNTER — TELEPHONE (OUTPATIENT)
Dept: PRIMARY CARE CLINIC | Age: 69
End: 2019-07-29

## 2019-08-13 ENCOUNTER — TELEPHONE (OUTPATIENT)
Dept: PRIMARY CARE CLINIC | Age: 69
End: 2019-08-13

## 2019-09-04 ENCOUNTER — HOSPITAL ENCOUNTER (EMERGENCY)
Age: 69
Discharge: HOME | End: 2019-09-04
Attending: EMERGENCY MEDICINE
Payer: MEDICARE

## 2019-09-04 VITALS
HEART RATE: 77 BPM | OXYGEN SATURATION: 96 % | DIASTOLIC BLOOD PRESSURE: 82 MMHG | SYSTOLIC BLOOD PRESSURE: 149 MMHG | WEIGHT: 197 LBS | BODY MASS INDEX: 33.81 KG/M2 | RESPIRATION RATE: 16 BRPM | TEMPERATURE: 98 F

## 2019-09-04 DIAGNOSIS — M43.6 TORTICOLLIS: Primary | ICD-10-CM

## 2019-09-04 PROCEDURE — 99282 EMERGENCY DEPT VISIT SF MDM: CPT

## 2019-09-04 PROCEDURE — 6360000002 HC RX W HCPCS: Performed by: EMERGENCY MEDICINE

## 2019-09-04 PROCEDURE — 6370000000 HC RX 637 (ALT 250 FOR IP): Performed by: EMERGENCY MEDICINE

## 2019-09-04 PROCEDURE — 96372 THER/PROPH/DIAG INJ SC/IM: CPT

## 2019-09-04 RX ORDER — IBUPROFEN 600 MG/1
600 TABLET ORAL EVERY 8 HOURS PRN
Qty: 10 TABLET | Refills: 0 | Status: SHIPPED | OUTPATIENT
Start: 2019-09-04 | End: 2019-09-23 | Stop reason: ALTCHOICE

## 2019-09-04 RX ORDER — MORPHINE SULFATE 10 MG/ML
6 INJECTION, SOLUTION INTRAMUSCULAR; INTRAVENOUS ONCE
Status: COMPLETED | OUTPATIENT
Start: 2019-09-04 | End: 2019-09-04

## 2019-09-04 RX ORDER — DIAZEPAM 5 MG/1
5 TABLET ORAL ONCE
Status: COMPLETED | OUTPATIENT
Start: 2019-09-04 | End: 2019-09-04

## 2019-09-04 RX ORDER — KETOROLAC TROMETHAMINE 30 MG/ML
30 INJECTION, SOLUTION INTRAMUSCULAR; INTRAVENOUS ONCE
Status: COMPLETED | OUTPATIENT
Start: 2019-09-04 | End: 2019-09-04

## 2019-09-04 RX ORDER — HYDROCODONE BITARTRATE AND ACETAMINOPHEN 5; 325 MG/1; MG/1
1 TABLET ORAL EVERY 6 HOURS PRN
Qty: 20 TABLET | Refills: 0 | Status: SHIPPED | OUTPATIENT
Start: 2019-09-04 | End: 2019-09-09

## 2019-09-04 RX ORDER — ONDANSETRON 4 MG/1
4 TABLET, ORALLY DISINTEGRATING ORAL ONCE
Status: COMPLETED | OUTPATIENT
Start: 2019-09-04 | End: 2019-09-04

## 2019-09-04 RX ORDER — BACLOFEN 10 MG/1
TABLET ORAL
Qty: 30 TABLET | Refills: 0 | Status: SHIPPED | OUTPATIENT
Start: 2019-09-04 | End: 2020-07-27 | Stop reason: ALTCHOICE

## 2019-09-04 RX ADMIN — DIAZEPAM 5 MG: 5 TABLET ORAL at 06:53

## 2019-09-04 RX ADMIN — MORPHINE SULFATE 6 MG: 10 INJECTION INTRAVENOUS at 06:54

## 2019-09-04 RX ADMIN — ONDANSETRON 4 MG: 4 TABLET, ORALLY DISINTEGRATING ORAL at 06:54

## 2019-09-04 RX ADMIN — KETOROLAC TROMETHAMINE 30 MG: 30 INJECTION, SOLUTION INTRAMUSCULAR at 06:56

## 2019-09-04 ASSESSMENT — PAIN DESCRIPTION - LOCATION: LOCATION: NECK

## 2019-09-04 ASSESSMENT — PAIN DESCRIPTION - PAIN TYPE: TYPE: ACUTE PAIN

## 2019-09-04 ASSESSMENT — PAIN SCALES - GENERAL
PAINLEVEL_OUTOF10: 1
PAINLEVEL_OUTOF10: 8
PAINLEVEL_OUTOF10: 8

## 2019-09-04 NOTE — ED PROVIDER NOTES
HPI:  19,   Time: 6:35 AM         Eva Quinonez is a 71 y.o. female presenting to the ED for right-sided neck pain which gradually progressed after she woke up 2 days ago, beginning 2 days ago. The complaint has been constant, severe in severity, and worsened by changing position. Movement of neck or shoulder. No alleviating factors. No prior similar episodes and denies chest pain or shortness of breath and also denies fever or chills. Also denies numbness weakness or tingling of the arms or legs    ROS:   Pertinent positives and negatives are stated within HPI, all other systems reviewed and are negative.  --------------------------------------------- PAST HISTORY ---------------------------------------------  Past Medical History:  has a past medical history of Arthritis, Asthma, Chronic bronchitis ([facility] Diabetes mellitus ([facility] GERD (gastroesophageal reflux disease), H/O echocardiogram, History of PFTs, History of stress test, Hyperlipidemia, and Hypertension. Past Surgical History:  has a past surgical history that includes Hysterectomy; Breast surgery; Foot surgery;  section; Cardiac catheterization (); Colonoscopy (3/23/15); Cholecystectomy; and Cardiac catheterization ([facility], 2019). Social History:  reports that she has never smoked. She has never used smokeless tobacco. She reports that she does not drink alcohol or use drugs. Family History: family history includes Diabetes in her brother and sister; High Blood Pressure in her sister; Stroke in her brother, father, and sister. The patients home medications have been reviewed. Allergies: Iv dye [iodides]    -------------------------------------------------- RESULTS -------------------------------------------------  All laboratory and radiology results have been personally reviewed by myself   LABS:  No results found for this visit on 19.     RADIOLOGY:  Interpreted by Radiologist.  No orders to display ------------------------- NURSING NOTES AND VITALS REVIEWED ---------------------------   The nursing notes within the ED encounter and vital signs as below have been reviewed. BP (!) 184/91   Pulse 92   Temp 98 °F (36.7 °C) (Temporal)   Resp 16   Wt 197 lb (89.4 kg)   SpO2 96%   BMI 33.81 kg/m²   Oxygen Saturation Interpretation: Normal      ---------------------------------------------------PHYSICAL EXAM--------------------------------------      Constitutional/General: Alert and oriented x3, well appearing, non toxic in moderate distress secondary to pain  Head: NC/AT  Eyes: PERRL, EOMI  Mouth: Oropharynx clear, handling secretions, no trismus  Neck: Marketed tenderness to palpation over the right cervical paraspinal muscles and trapezius, no meningeal signs  Pulmonary: Lungs clear to auscultation bilaterally, no wheezes, rales, or rhonchi. Not in respiratory distress  Cardiovascular:  Regular rate and rhythm, no murmurs, gallops, or rubs. 2+ distal pulses  Abdomen: Soft, non tender, non distended,   Extremities: Moves all extremities x 4. Warm and well perfused  Skin: warm and dry without rash  Neurologic: GCS 15, cranial nerves II through XII and coordination grossly intact, motor sensation intact throughout upper and lower extremity and speech normal  Psych: Normal Affect      ------------------------------ ED COURSE/MEDICAL DECISION MAKING----------------------  Medications   ketorolac (TORADOL) injection 30 mg (has no administration in time range)   morphine injection 6 mg (has no administration in time range)   ondansetron (ZOFRAN-ODT) disintegrating tablet 4 mg (has no administration in time range)   diazepam (VALIUM) tablet 5 mg (has no administration in time range)         Medical Decision Making:    Suspected torticollis and will treat accordingly, meningitis unlikely because no fever chills or headache    Counseling:    The emergency provider has spoken with the patient and discussed

## 2019-09-16 ENCOUNTER — TELEPHONE (OUTPATIENT)
Dept: PRIMARY CARE CLINIC | Age: 69
End: 2019-09-16

## 2019-09-23 ENCOUNTER — OFFICE VISIT (OUTPATIENT)
Dept: PRIMARY CARE CLINIC | Age: 69
End: 2019-09-23
Payer: MEDICARE

## 2019-09-23 VITALS
RESPIRATION RATE: 16 BRPM | DIASTOLIC BLOOD PRESSURE: 88 MMHG | BODY MASS INDEX: 29.44 KG/M2 | SYSTOLIC BLOOD PRESSURE: 167 MMHG | HEIGHT: 65 IN | HEART RATE: 73 BPM | WEIGHT: 176.7 LBS | TEMPERATURE: 98 F

## 2019-09-23 DIAGNOSIS — I10 ESSENTIAL HYPERTENSION: ICD-10-CM

## 2019-09-23 DIAGNOSIS — E11.21 TYPE 2 DIABETES MELLITUS WITH DIABETIC NEPHROPATHY, WITHOUT LONG-TERM CURRENT USE OF INSULIN (HCC): Primary | ICD-10-CM

## 2019-09-23 DIAGNOSIS — E78.2 MIXED HYPERLIPIDEMIA: ICD-10-CM

## 2019-09-23 DIAGNOSIS — M17.0 BILATERAL PRIMARY OSTEOARTHRITIS OF KNEE: ICD-10-CM

## 2019-09-23 LAB — HBA1C MFR BLD: 5.8 %

## 2019-09-23 PROCEDURE — G8399 PT W/DXA RESULTS DOCUMENT: HCPCS | Performed by: NURSE PRACTITIONER

## 2019-09-23 PROCEDURE — 83036 HEMOGLOBIN GLYCOSYLATED A1C: CPT | Performed by: NURSE PRACTITIONER

## 2019-09-23 PROCEDURE — 3017F COLORECTAL CA SCREEN DOC REV: CPT | Performed by: NURSE PRACTITIONER

## 2019-09-23 PROCEDURE — 1036F TOBACCO NON-USER: CPT | Performed by: NURSE PRACTITIONER

## 2019-09-23 PROCEDURE — 1090F PRES/ABSN URINE INCON ASSESS: CPT | Performed by: NURSE PRACTITIONER

## 2019-09-23 PROCEDURE — 2022F DILAT RTA XM EVC RTNOPTHY: CPT | Performed by: NURSE PRACTITIONER

## 2019-09-23 PROCEDURE — 1123F ACP DISCUSS/DSCN MKR DOCD: CPT | Performed by: NURSE PRACTITIONER

## 2019-09-23 PROCEDURE — 4040F PNEUMOC VAC/ADMIN/RCVD: CPT | Performed by: NURSE PRACTITIONER

## 2019-09-23 PROCEDURE — G8417 CALC BMI ABV UP PARAM F/U: HCPCS | Performed by: NURSE PRACTITIONER

## 2019-09-23 PROCEDURE — G8427 DOCREV CUR MEDS BY ELIG CLIN: HCPCS | Performed by: NURSE PRACTITIONER

## 2019-09-23 PROCEDURE — 3044F HG A1C LEVEL LT 7.0%: CPT | Performed by: NURSE PRACTITIONER

## 2019-09-23 PROCEDURE — 99214 OFFICE O/P EST MOD 30 MIN: CPT | Performed by: NURSE PRACTITIONER

## 2019-09-23 PROCEDURE — G8598 ASA/ANTIPLAT THER USED: HCPCS | Performed by: NURSE PRACTITIONER

## 2019-09-23 RX ORDER — B-COMPLEX WITH VITAMIN C
1 TABLET ORAL 2 TIMES DAILY
Qty: 180 TABLET | Refills: 1 | Status: SHIPPED | OUTPATIENT
Start: 2019-09-23 | End: 2020-08-06 | Stop reason: SDUPTHER

## 2019-09-23 RX ORDER — ATORVASTATIN CALCIUM 40 MG/1
40 TABLET, FILM COATED ORAL DAILY
Qty: 90 TABLET | Refills: 1 | Status: SHIPPED | OUTPATIENT
Start: 2019-09-23 | End: 2020-08-06 | Stop reason: SDUPTHER

## 2019-09-23 RX ORDER — ASPIRIN 81 MG/1
81 TABLET ORAL DAILY
Qty: 90 TABLET | Refills: 1 | Status: SHIPPED | OUTPATIENT
Start: 2019-09-23 | End: 2020-10-28

## 2019-09-23 RX ORDER — MELOXICAM 15 MG/1
15 TABLET ORAL DAILY
Qty: 90 TABLET | Refills: 1 | Status: SHIPPED | OUTPATIENT
Start: 2019-09-23 | End: 2020-07-27

## 2019-09-23 RX ORDER — POTASSIUM CHLORIDE 750 MG/1
10 TABLET, EXTENDED RELEASE ORAL DAILY
Qty: 90 TABLET | Refills: 1 | Status: SHIPPED | OUTPATIENT
Start: 2019-09-23 | End: 2019-11-06 | Stop reason: SDUPTHER

## 2019-09-23 RX ORDER — FLUTICASONE PROPIONATE 50 MCG
SPRAY, SUSPENSION (ML) NASAL
Qty: 1 BOTTLE | Refills: 5 | Status: SHIPPED | OUTPATIENT
Start: 2019-09-23 | End: 2020-01-28

## 2019-09-23 ASSESSMENT — ENCOUNTER SYMPTOMS
SHORTNESS OF BREATH: 0
ABDOMINAL PAIN: 0
COUGH: 0
RHINORRHEA: 0
DIARRHEA: 0
NAUSEA: 0
WHEEZING: 0
CONSTIPATION: 0
BLURRED VISION: 0
VOMITING: 0
SORE THROAT: 0

## 2019-09-23 NOTE — PROGRESS NOTES
history, post-menopausal, stress and hypertension. Past Medical History:     Past Medical History:   Diagnosis Date    Arthritis     Asthma     [facility] ([facility]     Diabetes mellitus ([facility]     GERD (gastroesophageal reflux disease)     H/O echocardiogram 3/9/16    EF >60%. LV wall thickness is mildly increased. Mild mitral and tricuspid regurgitation. Borderline pulmonary hypertension estimated right ventricular sysolic pressure of 36VZHX. Mild (grade l) diastolic dysfunction.  History of PFTs 3/10/16     Suboptimal effort. Restrictive in nature. clionical correlations is advised.  History of stress test 16    Abnoraml. Moderate perfusion defect of mild to moderate intensity in the anterolateral and anteroapical regions during stress imaging. Intermediate risk for CAD.  Hyperlipidemia     Hypertension       Reviewed all health maintenance requirements and ordered appropriate tests  Health Maintenance Due   Topic Date Due    Diabetic microalbuminuria test  2019    Annual Wellness Visit (AWV)  2019       Past Surgical History:     Past Surgical History:   Procedure Laterality Date    BREAST SURGERY      cyst removed    CARDIAC CATHETERIZATION  2014    CARDIAC CATHETERIZATION Left 2019    [facility] Radial/[facility]/     SECTION      CHOLECYSTECTOMY      COLONOSCOPY  3/23/15    -diverticulosis,hemorrhoids    FOOT SURGERY      rt    HYSTERECTOMY          Medications:       Prior to Admission medications    Medication Sig Start Date End Date Taking?  Authorizing Provider   aspirin EC 81 MG EC tablet Take 1 tablet by mouth daily 19  Yes Yahaira Bullock APRN - CNP   atorvastatin (LIPITOR) 40 MG tablet Take 1 tablet by mouth daily 19  Yes Yahaira Bullock, APRN - CNP   Calcium Carbonate-Vitamin D (OYSTER SHELL CALCIUM/D) 500-200 MG-UNIT TABS Take 1 tablet by mouth 2 times daily 19  Yes Yahaira Bullock, APRN - SHELL

## 2019-09-24 ENCOUNTER — HOSPITAL ENCOUNTER (OUTPATIENT)
Age: 69
Discharge: HOME | End: 2019-09-24
Payer: MEDICARE

## 2019-09-24 ENCOUNTER — TELEPHONE (OUTPATIENT)
Dept: PRIMARY CARE CLINIC | Age: 69
End: 2019-09-24

## 2019-09-24 DIAGNOSIS — E11.21 TYPE 2 DIABETES MELLITUS WITH DIABETIC NEPHROPATHY, WITHOUT LONG-TERM CURRENT USE OF INSULIN (HCC): ICD-10-CM

## 2019-09-24 LAB
ABSOLUTE EOS #: 0.11 K/UL (ref 0–0.44)
ABSOLUTE IMMATURE GRANULOCYTE: <0.03 K/UL (ref 0–0.3)
ABSOLUTE LYMPH #: 2.89 K/UL (ref 1.1–3.7)
ABSOLUTE MONO #: 0.42 K/UL (ref 0.1–1.2)
ALBUMIN SERPL-MCNC: 3.9 G/DL (ref 3.5–5.2)
ALBUMIN/GLOBULIN RATIO: 1.1 (ref 1–2.5)
ALP BLD-CCNC: 124 U/L (ref 35–104)
ALT SERPL-CCNC: <5 U/L (ref 5–33)
ANION GAP SERPL CALCULATED.3IONS-SCNC: 14 MMOL/L (ref 9–17)
AST SERPL-CCNC: 8 U/L
BASOPHILS # BLD: 1 % (ref 0–2)
BASOPHILS ABSOLUTE: 0.04 K/UL (ref 0–0.2)
BILIRUB SERPL-MCNC: 0.38 MG/DL (ref 0.3–1.2)
BUN BLDV-MCNC: 8 MG/DL (ref 8–23)
BUN/CREAT BLD: 12 (ref 9–20)
CALCIUM SERPL-MCNC: 9.4 MG/DL (ref 8.6–10.4)
CHLORIDE BLD-SCNC: 103 MMOL/L (ref 98–107)
CHOLESTEROL/HDL RATIO: 3
CHOLESTEROL: 190 MG/DL
CO2: 28 MMOL/L (ref 20–31)
CREAT SERPL-MCNC: 0.68 MG/DL (ref 0.5–0.9)
CREATININE URINE: 59.6 MG/DL (ref 28–217)
DIFFERENTIAL TYPE: ABNORMAL
EOSINOPHILS RELATIVE PERCENT: 2 % (ref 1–4)
ESTIMATED AVERAGE GLUCOSE: 114 MG/DL
GFR AFRICAN AMERICAN: >60 ML/MIN
GFR NON-AFRICAN AMERICAN: >60 ML/MIN
GFR SERPL CREATININE-BSD FRML MDRD: ABNORMAL ML/MIN/{1.73_M2}
GFR SERPL CREATININE-BSD FRML MDRD: ABNORMAL ML/MIN/{1.73_M2}
GLUCOSE BLD-MCNC: 105 MG/DL (ref 70–99)
HBA1C MFR BLD: 5.6 % (ref 4.8–5.9)
HCT VFR BLD CALC: 36.5 % (ref 36.3–47.1)
HDLC SERPL-MCNC: 63 MG/DL
HEMOGLOBIN: 11.9 G/DL (ref 11.9–15.1)
IMMATURE GRANULOCYTES: 0 %
LDL CHOLESTEROL: 109 MG/DL (ref 0–130)
LYMPHOCYTES # BLD: 44 % (ref 24–43)
MCH RBC QN AUTO: 30.1 PG (ref 25.2–33.5)
MCHC RBC AUTO-ENTMCNC: 32.6 G/DL (ref 28.4–34.8)
MCV RBC AUTO: 92.4 FL (ref 82.6–102.9)
MICROALBUMIN/CREAT 24H UR: 102 MG/L
MICROALBUMIN/CREAT UR-RTO: 171 MCG/MG CREAT
MONOCYTES # BLD: 6 % (ref 3–12)
NRBC AUTOMATED: 0 PER 100 WBC
PDW BLD-RTO: 13.2 % (ref 11.8–14.4)
PLATELET # BLD: 372 K/UL (ref 138–453)
PLATELET ESTIMATE: ABNORMAL
PMV BLD AUTO: 9.1 FL (ref 8.1–13.5)
POTASSIUM SERPL-SCNC: 3.1 MMOL/L (ref 3.7–5.3)
RBC # BLD: 3.95 M/UL (ref 3.95–5.11)
RBC # BLD: ABNORMAL 10*6/UL
SEG NEUTROPHILS: 47 % (ref 36–65)
SEGMENTED NEUTROPHILS ABSOLUTE COUNT: 3.07 K/UL (ref 1.5–8.1)
SODIUM BLD-SCNC: 145 MMOL/L (ref 135–144)
TOTAL PROTEIN: 7.4 G/DL (ref 6.4–8.3)
TRIGL SERPL-MCNC: 91 MG/DL
VLDLC SERPL CALC-MCNC: NORMAL MG/DL (ref 1–30)
WBC # BLD: 6.6 K/UL (ref 3.5–11.3)
WBC # BLD: ABNORMAL 10*3/UL

## 2019-09-24 PROCEDURE — 36415 COLL VENOUS BLD VENIPUNCTURE: CPT

## 2019-09-24 PROCEDURE — 85025 COMPLETE CBC W/AUTO DIFF WBC: CPT

## 2019-09-24 PROCEDURE — 80053 COMPREHEN METABOLIC PANEL: CPT

## 2019-09-24 PROCEDURE — 83036 HEMOGLOBIN GLYCOSYLATED A1C: CPT

## 2019-09-24 PROCEDURE — 82043 UR ALBUMIN QUANTITATIVE: CPT

## 2019-09-24 PROCEDURE — 80061 LIPID PANEL: CPT

## 2019-09-24 PROCEDURE — 82570 ASSAY OF URINE CREATININE: CPT

## 2019-11-06 ENCOUNTER — OFFICE VISIT (OUTPATIENT)
Dept: PRIMARY CARE CLINIC | Age: 69
End: 2019-11-06
Payer: MEDICARE

## 2019-11-06 ENCOUNTER — TELEPHONE (OUTPATIENT)
Dept: PRIMARY CARE CLINIC | Age: 69
End: 2019-11-06

## 2019-11-06 VITALS
WEIGHT: 179.8 LBS | SYSTOLIC BLOOD PRESSURE: 158 MMHG | BODY MASS INDEX: 29.96 KG/M2 | HEIGHT: 65 IN | RESPIRATION RATE: 20 BRPM | HEART RATE: 75 BPM | TEMPERATURE: 97.7 F | DIASTOLIC BLOOD PRESSURE: 73 MMHG

## 2019-11-06 DIAGNOSIS — R94.31 ABNORMAL ECG: ICD-10-CM

## 2019-11-06 DIAGNOSIS — I10 ESSENTIAL HYPERTENSION: ICD-10-CM

## 2019-11-06 DIAGNOSIS — G89.29 CHRONIC PAIN OF RIGHT KNEE: Primary | ICD-10-CM

## 2019-11-06 DIAGNOSIS — Z01.818 PREOP EXAMINATION: ICD-10-CM

## 2019-11-06 DIAGNOSIS — M25.561 CHRONIC PAIN OF RIGHT KNEE: Primary | ICD-10-CM

## 2019-11-06 DIAGNOSIS — I51.7 LVH (LEFT VENTRICULAR HYPERTROPHY): Primary | ICD-10-CM

## 2019-11-06 DIAGNOSIS — N30.00 ACUTE CYSTITIS WITHOUT HEMATURIA: Primary | ICD-10-CM

## 2019-11-06 DIAGNOSIS — M17.11 PRIMARY OSTEOARTHRITIS OF RIGHT KNEE: ICD-10-CM

## 2019-11-06 PROCEDURE — G8484 FLU IMMUNIZE NO ADMIN: HCPCS | Performed by: NURSE PRACTITIONER

## 2019-11-06 PROCEDURE — G8427 DOCREV CUR MEDS BY ELIG CLIN: HCPCS | Performed by: NURSE PRACTITIONER

## 2019-11-06 PROCEDURE — 4040F PNEUMOC VAC/ADMIN/RCVD: CPT | Performed by: NURSE PRACTITIONER

## 2019-11-06 PROCEDURE — G8417 CALC BMI ABV UP PARAM F/U: HCPCS | Performed by: NURSE PRACTITIONER

## 2019-11-06 PROCEDURE — 1090F PRES/ABSN URINE INCON ASSESS: CPT | Performed by: NURSE PRACTITIONER

## 2019-11-06 PROCEDURE — 1123F ACP DISCUSS/DSCN MKR DOCD: CPT | Performed by: NURSE PRACTITIONER

## 2019-11-06 PROCEDURE — 1036F TOBACCO NON-USER: CPT | Performed by: NURSE PRACTITIONER

## 2019-11-06 PROCEDURE — 99214 OFFICE O/P EST MOD 30 MIN: CPT | Performed by: NURSE PRACTITIONER

## 2019-11-06 PROCEDURE — 3017F COLORECTAL CA SCREEN DOC REV: CPT | Performed by: NURSE PRACTITIONER

## 2019-11-06 PROCEDURE — G8598 ASA/ANTIPLAT THER USED: HCPCS | Performed by: NURSE PRACTITIONER

## 2019-11-06 PROCEDURE — G8399 PT W/DXA RESULTS DOCUMENT: HCPCS | Performed by: NURSE PRACTITIONER

## 2019-11-06 RX ORDER — POTASSIUM CHLORIDE 750 MG/1
20 TABLET, EXTENDED RELEASE ORAL 2 TIMES DAILY
Qty: 180 TABLET | Refills: 1 | Status: SHIPPED | OUTPATIENT
Start: 2019-11-06 | End: 2020-11-13 | Stop reason: DRUGHIGH

## 2019-11-06 RX ORDER — NITROFURANTOIN 25; 75 MG/1; MG/1
100 CAPSULE ORAL 2 TIMES DAILY
Qty: 14 CAPSULE | Refills: 0 | Status: SHIPPED | OUTPATIENT
Start: 2019-11-06 | End: 2019-11-13

## 2019-11-06 ASSESSMENT — ENCOUNTER SYMPTOMS
RHINORRHEA: 0
BLURRED VISION: 0
DIARRHEA: 0
SHORTNESS OF BREATH: 0
NAUSEA: 0
COUGH: 0
CONSTIPATION: 0
WHEEZING: 0
SORE THROAT: 0
ABDOMINAL PAIN: 0
ORTHOPNEA: 0
VOMITING: 0

## 2019-11-14 ENCOUNTER — HOSPITAL ENCOUNTER (OUTPATIENT)
Age: 69
Discharge: HOME | End: 2019-11-14
Payer: MEDICARE

## 2019-11-14 ENCOUNTER — TELEPHONE (OUTPATIENT)
Dept: PRIMARY CARE CLINIC | Age: 69
End: 2019-11-14

## 2019-11-14 DIAGNOSIS — I10 ESSENTIAL HYPERTENSION: ICD-10-CM

## 2019-11-14 LAB — POTASSIUM SERPL-SCNC: 4.8 MMOL/L (ref 3.7–5.3)

## 2019-11-14 PROCEDURE — 36415 COLL VENOUS BLD VENIPUNCTURE: CPT

## 2019-11-14 PROCEDURE — 84132 ASSAY OF SERUM POTASSIUM: CPT

## 2019-11-18 ENCOUNTER — OFFICE VISIT (OUTPATIENT)
Dept: CARDIOLOGY | Age: 69
End: 2019-11-18
Payer: MEDICARE

## 2019-11-18 VITALS
BODY MASS INDEX: 28.99 KG/M2 | RESPIRATION RATE: 16 BRPM | HEART RATE: 76 BPM | WEIGHT: 174 LBS | OXYGEN SATURATION: 96 % | DIASTOLIC BLOOD PRESSURE: 84 MMHG | SYSTOLIC BLOOD PRESSURE: 153 MMHG | HEIGHT: 65 IN

## 2019-11-18 DIAGNOSIS — I25.10 ASHD (ARTERIOSCLEROTIC HEART DISEASE): Primary | ICD-10-CM

## 2019-11-18 DIAGNOSIS — Z01.818 PREOPERATIVE CLEARANCE: ICD-10-CM

## 2019-11-18 DIAGNOSIS — I10 ESSENTIAL HYPERTENSION: ICD-10-CM

## 2019-11-18 PROCEDURE — 1090F PRES/ABSN URINE INCON ASSESS: CPT | Performed by: FAMILY MEDICINE

## 2019-11-18 PROCEDURE — G8598 ASA/ANTIPLAT THER USED: HCPCS | Performed by: FAMILY MEDICINE

## 2019-11-18 PROCEDURE — 99214 OFFICE O/P EST MOD 30 MIN: CPT | Performed by: FAMILY MEDICINE

## 2019-11-18 PROCEDURE — G8484 FLU IMMUNIZE NO ADMIN: HCPCS | Performed by: FAMILY MEDICINE

## 2019-11-18 PROCEDURE — 1123F ACP DISCUSS/DSCN MKR DOCD: CPT | Performed by: FAMILY MEDICINE

## 2019-11-18 PROCEDURE — G8417 CALC BMI ABV UP PARAM F/U: HCPCS | Performed by: FAMILY MEDICINE

## 2019-11-18 PROCEDURE — G8399 PT W/DXA RESULTS DOCUMENT: HCPCS | Performed by: FAMILY MEDICINE

## 2019-11-18 PROCEDURE — 4040F PNEUMOC VAC/ADMIN/RCVD: CPT | Performed by: FAMILY MEDICINE

## 2019-11-18 PROCEDURE — 1036F TOBACCO NON-USER: CPT | Performed by: FAMILY MEDICINE

## 2019-11-18 PROCEDURE — 3017F COLORECTAL CA SCREEN DOC REV: CPT | Performed by: FAMILY MEDICINE

## 2019-11-18 PROCEDURE — G8427 DOCREV CUR MEDS BY ELIG CLIN: HCPCS | Performed by: FAMILY MEDICINE

## 2019-11-18 RX ORDER — LATANOPROST 50 UG/ML
1 SOLUTION/ DROPS OPHTHALMIC NIGHTLY
COMMUNITY

## 2019-11-18 RX ORDER — LISINOPRIL 40 MG/1
40 TABLET ORAL DAILY
Qty: 30 TABLET | Refills: 11 | Status: SHIPPED | OUTPATIENT
Start: 2019-11-18 | End: 2020-01-09 | Stop reason: SINTOL

## 2019-11-19 ENCOUNTER — HOSPITAL ENCOUNTER (OUTPATIENT)
Dept: NON INVASIVE DIAGNOSTICS | Age: 69
Discharge: HOME | End: 2019-11-19
Payer: MEDICARE

## 2019-11-19 DIAGNOSIS — R94.31 ABNORMAL ECG: ICD-10-CM

## 2019-11-19 DIAGNOSIS — I51.7 LVH (LEFT VENTRICULAR HYPERTROPHY): ICD-10-CM

## 2019-11-19 LAB
LV EF: 60 %
LVEF MODALITY: NORMAL

## 2019-11-19 PROCEDURE — 93306 TTE W/DOPPLER COMPLETE: CPT

## 2019-12-26 ENCOUNTER — HOSPITAL ENCOUNTER (EMERGENCY)
Age: 69
Discharge: HOME | End: 2019-12-26
Attending: EMERGENCY MEDICINE
Payer: MEDICARE

## 2019-12-26 ENCOUNTER — APPOINTMENT (OUTPATIENT)
Dept: GENERAL RADIOLOGY | Age: 69
End: 2019-12-26
Payer: MEDICARE

## 2019-12-26 VITALS
HEART RATE: 86 BPM | TEMPERATURE: 98.6 F | DIASTOLIC BLOOD PRESSURE: 82 MMHG | BODY MASS INDEX: 29.62 KG/M2 | RESPIRATION RATE: 18 BRPM | OXYGEN SATURATION: 98 % | WEIGHT: 178 LBS | SYSTOLIC BLOOD PRESSURE: 163 MMHG

## 2019-12-26 DIAGNOSIS — B96.89 ACUTE BACTERIAL BRONCHITIS: Primary | ICD-10-CM

## 2019-12-26 DIAGNOSIS — J20.8 ACUTE BACTERIAL BRONCHITIS: Primary | ICD-10-CM

## 2019-12-26 PROCEDURE — 99284 EMERGENCY DEPT VISIT MOD MDM: CPT

## 2019-12-26 PROCEDURE — 6370000000 HC RX 637 (ALT 250 FOR IP): Performed by: EMERGENCY MEDICINE

## 2019-12-26 PROCEDURE — 71046 X-RAY EXAM CHEST 2 VIEWS: CPT

## 2019-12-26 RX ORDER — BENZONATATE 100 MG/1
100 CAPSULE ORAL ONCE
Status: COMPLETED | OUTPATIENT
Start: 2019-12-26 | End: 2019-12-26

## 2019-12-26 RX ORDER — BENZONATATE 100 MG/1
100 CAPSULE ORAL 3 TIMES DAILY PRN
Qty: 30 CAPSULE | Refills: 0 | Status: SHIPPED | OUTPATIENT
Start: 2019-12-26 | End: 2020-01-02

## 2019-12-26 RX ORDER — METHYLPREDNISOLONE 4 MG/1
TABLET ORAL
Qty: 1 KIT | Refills: 0 | Status: SHIPPED | OUTPATIENT
Start: 2019-12-26 | End: 2020-01-01

## 2019-12-26 RX ORDER — AZITHROMYCIN 250 MG/1
500 TABLET, FILM COATED ORAL ONCE
Status: COMPLETED | OUTPATIENT
Start: 2019-12-26 | End: 2019-12-26

## 2019-12-26 RX ORDER — AZITHROMYCIN 250 MG/1
TABLET, FILM COATED ORAL
Qty: 1 PACKET | Refills: 0 | Status: SHIPPED | OUTPATIENT
Start: 2019-12-26 | End: 2020-01-05

## 2019-12-26 RX ADMIN — BENZONATATE 100 MG: 100 CAPSULE ORAL at 07:04

## 2019-12-26 RX ADMIN — AZITHROMYCIN 500 MG: 250 TABLET, FILM COATED ORAL at 07:04

## 2019-12-26 ASSESSMENT — ENCOUNTER SYMPTOMS
RHINORRHEA: 0
SHORTNESS OF BREATH: 0
BACK PAIN: 1
WHEEZING: 0
NAUSEA: 0
COLOR CHANGE: 0
COUGH: 1
VOMITING: 0
ABDOMINAL PAIN: 0

## 2019-12-26 ASSESSMENT — PAIN SCALES - GENERAL: PAINLEVEL_OUTOF10: 5

## 2019-12-26 ASSESSMENT — PAIN DESCRIPTION - PAIN TYPE: TYPE: ACUTE PAIN

## 2019-12-26 ASSESSMENT — PAIN DESCRIPTION - LOCATION: LOCATION: BACK;CHEST

## 2020-01-09 ENCOUNTER — OFFICE VISIT (OUTPATIENT)
Dept: PRIMARY CARE CLINIC | Age: 70
End: 2020-01-09
Payer: MEDICARE

## 2020-01-09 VITALS
RESPIRATION RATE: 20 BRPM | TEMPERATURE: 97.5 F | HEART RATE: 90 BPM | HEIGHT: 64 IN | SYSTOLIC BLOOD PRESSURE: 172 MMHG | DIASTOLIC BLOOD PRESSURE: 83 MMHG | WEIGHT: 174.3 LBS | BODY MASS INDEX: 29.76 KG/M2

## 2020-01-09 PROCEDURE — 90686 IIV4 VACC NO PRSV 0.5 ML IM: CPT | Performed by: NURSE PRACTITIONER

## 2020-01-09 PROCEDURE — G0008 ADMIN INFLUENZA VIRUS VAC: HCPCS | Performed by: NURSE PRACTITIONER

## 2020-01-09 PROCEDURE — 1123F ACP DISCUSS/DSCN MKR DOCD: CPT | Performed by: NURSE PRACTITIONER

## 2020-01-09 PROCEDURE — G8399 PT W/DXA RESULTS DOCUMENT: HCPCS | Performed by: NURSE PRACTITIONER

## 2020-01-09 PROCEDURE — 3017F COLORECTAL CA SCREEN DOC REV: CPT | Performed by: NURSE PRACTITIONER

## 2020-01-09 PROCEDURE — 1036F TOBACCO NON-USER: CPT | Performed by: NURSE PRACTITIONER

## 2020-01-09 PROCEDURE — G8482 FLU IMMUNIZE ORDER/ADMIN: HCPCS | Performed by: NURSE PRACTITIONER

## 2020-01-09 PROCEDURE — 4040F PNEUMOC VAC/ADMIN/RCVD: CPT | Performed by: NURSE PRACTITIONER

## 2020-01-09 PROCEDURE — G8427 DOCREV CUR MEDS BY ELIG CLIN: HCPCS | Performed by: NURSE PRACTITIONER

## 2020-01-09 PROCEDURE — 99214 OFFICE O/P EST MOD 30 MIN: CPT | Performed by: NURSE PRACTITIONER

## 2020-01-09 PROCEDURE — 1090F PRES/ABSN URINE INCON ASSESS: CPT | Performed by: NURSE PRACTITIONER

## 2020-01-09 PROCEDURE — G8417 CALC BMI ABV UP PARAM F/U: HCPCS | Performed by: NURSE PRACTITIONER

## 2020-01-09 RX ORDER — VALSARTAN 320 MG/1
320 TABLET ORAL DAILY
Qty: 90 TABLET | Refills: 1 | Status: SHIPPED | OUTPATIENT
Start: 2020-01-09 | End: 2020-07-27 | Stop reason: SDUPTHER

## 2020-01-09 ASSESSMENT — ENCOUNTER SYMPTOMS
SHORTNESS OF BREATH: 0
HEMOPTYSIS: 0
COUGH: 1
RHINORRHEA: 0
WHEEZING: 0
SORE THROAT: 0
HEARTBURN: 0

## 2020-01-09 ASSESSMENT — PATIENT HEALTH QUESTIONNAIRE - PHQ9
1. LITTLE INTEREST OR PLEASURE IN DOING THINGS: 0
2. FEELING DOWN, DEPRESSED OR HOPELESS: 0
SUM OF ALL RESPONSES TO PHQ QUESTIONS 1-9: 0
SUM OF ALL RESPONSES TO PHQ9 QUESTIONS 1 & 2: 0
SUM OF ALL RESPONSES TO PHQ QUESTIONS 1-9: 0

## 2020-01-09 NOTE — PROGRESS NOTES
[facility] PHYSICIANS  [facility] PRIMARY CARE [place] [address] [address] [facility] [place]  Dept: [phone]  Dept Fax: [phone]    Stacie Dinh is a 79 y.o. female who presentsto the [facility] Care today for her medical conditions/complaints as noted below. Stacie Dinh is c/o of Cough (States \"I was at the hospital last month with bronchitis. \" C/o cough. )      HPI:     [place] is here today for a walk-in care visit. Cough   This is a new problem. The current episode started 1 to 4 weeks ago. The problem has been waxing and waning. Episode frequency: INTERMITTENTLY. The cough is non-productive. Pertinent negatives include no chest pain, chills, ear congestion, ear pain, fever, headaches, heartburn, hemoptysis, myalgias, nasal congestion, postnasal drip, rash, rhinorrhea, sore throat, shortness of breath, sweats, weight loss or wheezing. Exacerbated by: ACE. She has tried OTC cough suppressant for the symptoms. The treatment provided no relief. There is no history of asthma or COPD. Hypertension   This is a chronic problem. The current episode started more than 1 year ago. The problem is unchanged. The problem is controlled. Pertinent negatives include no chest pain, headaches, shortness of breath or sweats. There are no associated agents to hypertension. Risk factors for coronary artery disease include post-menopausal state, family history, dyslipidemia and stress. Past treatments include ACE inhibitors and calcium channel blockers. The current treatment provides moderate improvement. Compliance problems include exercise and diet. There is no history of kidney disease, CAD/MI or CVA. There is no history of chronic renal disease. Past Medical History:   Diagnosis Date    Arthritis     Asthma     Chronic bronchitis ([address]     Diabetes mellitus ([address]     GERD (gastroesophageal reflux disease)     H/O echocardiogram 3/9/16    EF >60%. LV wall thickness is mildly increased.  Mild mitral and tricuspid regurgitation. Borderline pulmonary hypertension estimated right ventricular sysolic pressure of 61SKPN. Mild (grade l) diastolic dysfunction.  History of PFTs 3/10/16     Suboptimal effort. Restrictive in nature. clionical correlations is advised.  History of stress test 2/18/16    Abnoraml. Moderate perfusion defect of mild to moderate intensity in the anterolateral and anteroapical regions during stress imaging. Intermediate risk for CAD.  Hyperlipidemia     Hypertension         Current Outpatient Medications   Medication Sig Dispense Refill    valsartan (DIOVAN) 320 MG tablet Take 1 tablet by mouth daily 90 tablet 1    latanoprost (XALATAN) 0.005 % ophthalmic solution 1 drop nightly      potassium chloride (KLOR-CON M) 10 MEQ extended release tablet Take 2 tablets by mouth 2 times daily 180 tablet 1    aspirin EC 81 MG EC tablet Take 1 tablet by mouth daily 90 tablet 1    atorvastatin (LIPITOR) 40 MG tablet Take 1 tablet by mouth daily 90 tablet 1    Calcium Carbonate-Vitamin D (OYSTER SHELL CALCIUM/D) 500-200 MG-UNIT TABS Take 1 tablet by mouth 2 times daily 180 tablet 1    baclofen (LIORESAL) 10 MG tablet 2 tablets 3 times a day as needed for spasm 30 tablet 0    metFORMIN (GLUCOPHAGE) 500 MG tablet Take 1 tablet by mouth 2 times daily (with meals) 180 tablet 3    amLODIPine (NORVASC) 10 MG tablet TAKE ONE TABLET BY MOUTH ONCE DAILY 90 tablet 3    blood glucose test strips (ONE TOUCH ULTRA TEST) strip USE 1 STRIP TO CHECK GLUCOSE DAILY 100 strip 3    ONE TOUCH LANCETS MISC Lancets- One Touch Delica for testing daily and as needed.  DX: Diabetes type  E11.9 100 each 3    Blood Glucose Monitoring Suppl GENNY 1 Units by Does not apply route 3 times daily What insurance will cover 1 Device 0    fluticasone (FLONASE) 50 MCG/ACT nasal spray USE 2 SPRAY(S) IN EACH NOSTRIL ONCE DAILY (Patient not taking: Reported on 11/6/2019) 1 Bottle 5    meloxicam (MOBIC) 15 MG tablet Take 1 tablet by mouth daily (Patient not taking: Reported on 1/9/2020) 90 tablet 1    cetirizine (ZYRTEC ALLERGY) 10 MG tablet Take 1 tablet by mouth daily (Patient not taking: Reported on 1/9/2020) 30 tablet 3    Multiple Vitamins-Minerals (THERAPEUTIC MULTIVITAMIN-MINERALS) tablet Take 1 tablet by mouth daily 30 tablet 11     No current facility-administered medications for this visit. Allergies   Allergen Reactions    Iv Dye [Iodides] Nausea And Vomiting       Subjective:      Review of Systems   Constitutional: Negative for chills, fever and weight loss. HENT: Negative for ear pain, postnasal drip, rhinorrhea and sore throat. Respiratory: Positive for cough. Negative for hemoptysis, shortness of breath and wheezing. Cardiovascular: Negative for chest pain. Gastrointestinal: Negative for heartburn. Musculoskeletal: Negative for myalgias. Skin: Negative for rash. Neurological: Negative for headaches. Objective:     Physical Exam  Vitals signs and nursing note reviewed. HENT:      Nose: No congestion or rhinorrhea. Mouth/Throat:      Mouth: Mucous membranes are moist.      Pharynx: No posterior oropharyngeal erythema. Eyes:      General: No scleral icterus. Conjunctiva/sclera: Conjunctivae normal.   Neck:      Musculoskeletal: Normal range of motion and neck supple. Cardiovascular:      Rate and Rhythm: Normal rate and regular rhythm. Heart sounds: No murmur. Pulmonary:      Effort: Pulmonary effort is normal.      Breath sounds: Normal breath sounds. No wheezing or rhonchi. Abdominal:      General: Bowel sounds are normal. There is no distension. Palpations: Abdomen is soft. Tenderness: There is no tenderness. Musculoskeletal:      Right lower leg: No edema. Left lower leg: No edema. Skin:     General: Skin is warm and dry. Findings: No rash. Neurological:      Mental Status: She is oriented to person, place, and time.    Psychiatric: Mood and Affect: Mood normal.         Behavior: Behavior normal.       BP (!) 172/83 (Site: Right Upper Arm, Position: Sitting, Cuff Size: Large Adult)   Pulse 90   Temp 97.5 °F (36.4 °C) (Temporal)   Resp 20   Ht 5' 4\" (1.626 m)   Wt 174 lb 4.8 oz (79.1 kg)   BMI 29.92 kg/m²     Assessment:      Diagnosis Orders   1. Cough due to ACE inhibitor     2. Essential hypertension  valsartan (DIOVAN) 320 MG tablet   3. Need for influenza vaccination  INFLUENZA, QUADV, 3 YRS AND OLDER, IM PF, PREFILL SYR OR SDV, 0.5ML (AFLURIA QUADV, PF)   4. Encounter for screening mammogram for breast cancer  VICENTE DIGITAL SCREEN W CAD BILATERAL       Plan:       I believe the cough is likely due to her recent start of lisinopril. We will stop this and start valsartan. She will call us with progress. She will return in 2 weeks for blood pressure check. Discussed exam, POCT findings, plan of care (including prescriptive and supportive as listed below) and follow-up atlength with  Patient. Reviewed all prescribed and recommended medications, administration and side effects. Encouraged to return to [facility] for noimprovement and or worsening of symptoms. To ER or call 911 if any difficulty breathing, shortness of breath, inability to swallow, hives or temp greater than 103 degrees. Questions answered. They verbalized understandingand agreement. Return if symptoms worsen or fail to improve. Orders Placed This Encounter   Medications    valsartan (DIOVAN) 320 MG tablet     Sig: Take 1 tablet by mouth daily     Dispense:  90 tablet     Refill:  1          All patient questions answered. Pt voiced understanding.       Electronically signed by TARA Dominique CNP on 1/9/2020 at 12:21 PM

## 2020-01-09 NOTE — PATIENT INSTRUCTIONS
SURVEY:    You may be receiving a survey from [employer] regarding your visit today. Please complete the survey to enable us to provide the highest quality of care to you and your family. If you cannot score us a very good on any question, please call the office to discuss how we could have made your experience a very good one. Thank you. Patient Education        Cough: Care Instructions  Your Care Instructions    A cough is your body's response to something that bothers your throat or airways. Many things can cause a cough. You might cough because of a cold or the flu, bronchitis, or asthma. Smoking, postnasal drip, allergies, and stomach acid that backs up into your throat also can cause coughs. A cough is a symptom, not a disease. Most coughs stop when the cause, such as a cold, goes away. You can take a few steps at home to cough less and feel better. Follow-up care is a key part of your treatment and safety. Be sure to make and go to all appointments, and call your doctor if you are having problems. It's also a good idea to know your test results and keep a list of the medicines you take. How can you care for yourself at home? · Drink lots of water and other fluids. This helps thin the mucus and soothes a dry or sore throat. Honey or lemon juice in hot water or tea may ease a dry cough. · Take cough medicine as directed by your doctor. · Prop up your head on pillows to help you breathe and ease a dry cough. · Try cough drops to soothe a dry or sore throat. Cough drops don't stop a cough. Medicine-flavored cough drops are no better than candy-flavored drops or hard candy. · Do not smoke. Avoid secondhand smoke. If you need help quitting, talk to your doctor about stop-smoking programs and medicines. These can increase your chances of quitting for good. When should you call for help? Call 911 anytime you think you may need emergency care.  For example, call if:    · You have severe trouble breathing.    Call your doctor now or seek immediate medical care if:    · You cough up blood.     · You have new or worse trouble breathing.     · You have a new or higher fever.     · You have a new rash.    Watch closely for changes in your health, and be sure to contact your doctor if:    · You cough more deeply or more often, especially if you notice more mucus or a change in the color of your mucus.     · You have new symptoms, such as a sore throat, an earache, or sinus pain.     · You do not get better as expected. Where can you learn more? Go to [email or web address] and sign in to your [employer] account. Enter D279 in the [employer] box to learn more about \"Cough: Care Instructions. \"     If you do not have an account, please click on the \"Sign Up Now\" link. Current as of: June 9, 2019  Content Version: 12.3  © 1351-0739 [employer]. Care instructions adapted under license by [facility] [employer] [facility] ([facility]). If you have questions about a medical condition or this instruction, always ask your healthcare professional. [place] [address] any warranty or liability for your use of this information. Patient Education        Influenza (Flu) Vaccine (Inactivated or Recombinant): What You Need to Know  Why get vaccinated? Influenza vaccine can prevent influenza (flu). Flu is a contagious disease that spreads around the [place] every year, usually between October and May. Anyone can get the flu, but it is more dangerous for some people. Infants and young children, people 72years of age and older, pregnant women, and people with certain health conditions or a weakened immune system are at greatest risk of flu complications. Pneumonia, bronchitis, sinus infections and ear infections are examples of flu-related complications. If you have a medical condition, such as heart disease, cancer or diabetes, flu can make it worse.   Flu can cause fever and chills, sore throat, muscle aches, fatigue, cough, headache, and runny or stuffy nose. Some people may have vomiting and diarrhea, though this is more common in children than adults. Each year, thousands of people in the [facility] die from flu, and many more are hospitalized. Flu vaccine prevents millions of illnesses and flu-related visits to the doctor each year. Influenza vaccine  CDC recommends everyone 10months of age and older get vaccinated every flu season. Children 6 months through 6years of age may need 2 doses during a single flu season. Everyone else needs only 1 dose each flu season. It takes about 2 weeks for protection to develop after vaccination. There are many flu viruses, and they are always changing. Each year a new flu vaccine is made to protect against three or four viruses that are likely to cause disease in the upcoming flu season. Even when the vaccine doesn't exactly match these viruses, it may still provide some protection. Influenza vaccine does not cause flu. Influenza vaccine may be given at the same time as other vaccines. Talk with your health care provider  Tell your vaccine provider if the person getting the vaccine:  · Has had an allergic reaction after a previous dose of influenza vaccine, or has any severe, life-threatening allergies. · Has ever had Guillain-Barré Syndrome (also called GBS). In some cases, your health care provider may decide to postpone influenza vaccination to a future visit. People with minor illnesses, such as a cold, may be vaccinated. People who are moderately or severely ill should usually wait until they recover before getting influenza vaccine. Your health care provider can give you more information. Risks of a vaccine reaction  · Soreness, redness, and swelling where shot is given, fever, muscle aches, and headache can happen after influenza vaccine.   · There may be a very small increased risk of Guillain-Barré Syndrome (GBS) after inactivated

## 2020-01-28 ENCOUNTER — NURSE ONLY (OUTPATIENT)
Dept: PRIMARY CARE CLINIC | Age: 70
End: 2020-01-28

## 2020-01-28 VITALS
HEART RATE: 83 BPM | DIASTOLIC BLOOD PRESSURE: 70 MMHG | WEIGHT: 178.6 LBS | RESPIRATION RATE: 18 BRPM | HEIGHT: 64 IN | TEMPERATURE: 98.4 F | SYSTOLIC BLOOD PRESSURE: 145 MMHG | BODY MASS INDEX: 30.49 KG/M2

## 2020-01-28 NOTE — PATIENT INSTRUCTIONS
SURVEY:    You may be receiving a survey from [employer] regarding your visit today. Please complete the survey to enable us to provide the highest quality of care to you and your family. If you cannot score us a very good on any question, please call the office to discuss how we could have made your experience a very good one. Thank you.

## 2020-02-11 ENCOUNTER — NURSE ONLY (OUTPATIENT)
Dept: PRIMARY CARE CLINIC | Age: 70
End: 2020-02-11

## 2020-02-11 VITALS
WEIGHT: 179.3 LBS | HEIGHT: 64 IN | TEMPERATURE: 97.2 F | BODY MASS INDEX: 30.61 KG/M2 | HEART RATE: 78 BPM | DIASTOLIC BLOOD PRESSURE: 72 MMHG | RESPIRATION RATE: 18 BRPM | SYSTOLIC BLOOD PRESSURE: 138 MMHG

## 2020-02-26 ENCOUNTER — TELEPHONE (OUTPATIENT)
Dept: PRIMARY CARE CLINIC | Age: 70
End: 2020-02-26

## 2020-02-26 ENCOUNTER — HOSPITAL ENCOUNTER (OUTPATIENT)
Dept: WOMENS IMAGING | Age: 70
Discharge: HOME | End: 2020-02-28
Payer: MEDICARE

## 2020-02-26 PROCEDURE — 77063 BREAST TOMOSYNTHESIS BI: CPT

## 2020-03-28 ENCOUNTER — APPOINTMENT (OUTPATIENT)
Dept: GENERAL RADIOLOGY | Age: 70
End: 2020-03-28
Payer: MEDICARE

## 2020-03-28 ENCOUNTER — HOSPITAL ENCOUNTER (EMERGENCY)
Age: 70
Discharge: HOME | End: 2020-03-28
Payer: MEDICARE

## 2020-03-28 VITALS
HEART RATE: 94 BPM | DIASTOLIC BLOOD PRESSURE: 78 MMHG | SYSTOLIC BLOOD PRESSURE: 137 MMHG | BODY MASS INDEX: 29.18 KG/M2 | TEMPERATURE: 98.4 F | WEIGHT: 170 LBS | OXYGEN SATURATION: 98 % | RESPIRATION RATE: 16 BRPM

## 2020-03-28 PROCEDURE — 99283 EMERGENCY DEPT VISIT LOW MDM: CPT

## 2020-03-28 PROCEDURE — 73562 X-RAY EXAM OF KNEE 3: CPT

## 2020-03-28 RX ORDER — NAPROXEN 375 MG/1
375 TABLET ORAL 2 TIMES DAILY PRN
Qty: 14 TABLET | Refills: 5 | Status: SHIPPED | OUTPATIENT
Start: 2020-03-28 | End: 2020-07-27

## 2020-03-28 ASSESSMENT — PAIN DESCRIPTION - ORIENTATION: ORIENTATION: RIGHT

## 2020-03-28 ASSESSMENT — PAIN SCALES - GENERAL: PAINLEVEL_OUTOF10: 10

## 2020-03-28 ASSESSMENT — PAIN DESCRIPTION - PROGRESSION: CLINICAL_PROGRESSION: GRADUALLY WORSENING

## 2020-03-28 ASSESSMENT — PAIN DESCRIPTION - LOCATION: LOCATION: KNEE

## 2020-03-28 ASSESSMENT — PAIN DESCRIPTION - ONSET: ONSET: GRADUAL

## 2020-03-28 ASSESSMENT — PAIN DESCRIPTION - PAIN TYPE: TYPE: ACUTE PAIN

## 2020-03-28 ASSESSMENT — PAIN DESCRIPTION - DESCRIPTORS: DESCRIPTORS: ACHING

## 2020-03-28 ASSESSMENT — PAIN DESCRIPTION - FREQUENCY: FREQUENCY: CONTINUOUS

## 2020-03-28 NOTE — ED PROVIDER NOTES
[facility] ED  EMERGENCY DEPARTMENT ENCOUNTER      Pt Name: Isi Whelan  MRN: [id number]  [place] 1950  Date of evaluation: 3/28/2020  Provider: Teri Mari II, PA-C    CHIEF COMPLAINT       Chief Complaint   Patient presents with    Knee Pain     pt states she is having pain i nher right knee, that is worse today       HISTORY OF PRESENT ILLNESS    Isi Whelan is a 79 y.o. female who presents to the emergency department from home plaints pain in her right knee is been ongoing but is worse today states she can walk without pain. She was on NSAIDs but she is out of them. She was supposed to have surgery last year on her knee but it got canceled and she is not seeing the orthopedist since. Denies any known injury denies calf pain chest pain or shortness of breath. Triage notes and Nursing notes were reviewed by myself. Any discrepancies are addressed above. PAST MEDICAL HISTORY     Past Medical History:   Diagnosis Date    Arthritis     Asthma     Chronic bronchitis ([address]     Diabetes mellitus ([address]     GERD (gastroesophageal reflux disease)     H/O echocardiogram 3/9/16    EF >60%. LV wall thickness is mildly increased. Mild mitral and tricuspid regurgitation. Borderline pulmonary hypertension estimated right ventricular sysolic pressure of 10FRZJ. Mild (grade l) diastolic dysfunction.  History of PFTs 3/10/16     Suboptimal effort. Restrictive in nature. clionical correlations is advised.  History of stress test 16    Abnoraml. Moderate perfusion defect of mild to moderate intensity in the anterolateral and anteroapical regions during stress imaging. Intermediate risk for CAD.     Hyperlipidemia     Hypertension        SURGICAL HISTORY       Past Surgical History:   Procedure Laterality Date    BREAST SURGERY      cyst removed    CARDIAC CATHETERIZATION  2014    CARDIAC CATHETERIZATION Left 2019    [facility]/     Height Weight   137/78 98.4 °F (36.9 °C) Tympanic 94 16 98 % -- 170 lb (77.1 kg)       Physical Exam  Active and oriented ×3. Nontoxic. No acute distress. Well-hydrated. Head is atraumatic, facies symmetrical.  Respirations nonlabored. Skin free of any obvious rashes or lesions. Extremities without edema. Tenderness throughout the right knee no palpable effusion no erythema noted range of motion with mild crepitus appears intact with no laxity noted. Distal neurovascular responses intact. Posterior calf nontender. Good affect. Pleasant patient. DIAGNOSTIC RESULTS       RADIOLOGY: (none if blank)   Interpretation per the Radiologistbelow, if available at the time of this note:    XR KNEE RIGHT (3 VIEWS)   Final Result        EMERGENCY DEPARTMENT COURSE andMedical Decision Making:     Vitals:    Vitals:    03/28/20 1057   BP: 137/78   Pulse: 94   Resp: 16   Temp: 98.4 °F (36.9 °C)   TempSrc: Tympanic   SpO2: 98%   Weight: 170 lb (77.1 kg)       MDM/   Will treat with NSAIDs recommend follow-up with orthopedist  Strict return precautions and follow up instructions were discussed with the patient with which the patient agrees    ED Medications administered this visit:  Medications - No data to display    CONSULTS: (None if blank)  None    Procedures: (None if blank)       CLINICAL       1.  Chronic pain of right knee          DISPOSITION/PLAN   DISPOSITION Decision To Discharge 03/28/2020 11:41:46 AM      PATIENT REFERRED TO:  Patricia Reddy MD  [address] [place] [address] [address] [place] [address],[address]  [phone]    In 1 week        DISCHARGE MEDICATIONS:  Discharge Medication List as of 3/28/2020 11:43 AM      START taking these medications    Details   naproxen (NAPROSYN) 375 MG tablet Take 1 tablet by mouth 2 times daily as needed for Pain, Disp-14 tablet, R-5Print                    (Please note that portions of this note were completed with a voice recognition program.  Efforts were made to edit the dictations but

## 2020-03-30 ENCOUNTER — TELEPHONE (OUTPATIENT)
Dept: PRIMARY CARE CLINIC | Age: 70
End: 2020-03-30

## 2020-03-30 NOTE — TELEPHONE ENCOUNTER
Called patient and reminded her to get her labs done that Shabnam Mira had ordered in September. States will try to get done next week.

## 2020-03-31 ENCOUNTER — TELEPHONE (OUTPATIENT)
Dept: PRIMARY CARE CLINIC | Age: 70
End: 2020-03-31

## 2020-03-31 ENCOUNTER — HOSPITAL ENCOUNTER (OUTPATIENT)
Age: 70
Discharge: HOME | End: 2020-03-31
Payer: MEDICARE

## 2020-03-31 LAB
ABSOLUTE EOS #: 0.16 K/UL (ref 0–0.44)
ABSOLUTE IMMATURE GRANULOCYTE: <0.03 K/UL (ref 0–0.3)
ABSOLUTE LYMPH #: 2.69 K/UL (ref 1.1–3.7)
ABSOLUTE MONO #: 0.59 K/UL (ref 0.1–1.2)
ALT SERPL-CCNC: 9 U/L (ref 5–33)
ANION GAP SERPL CALCULATED.3IONS-SCNC: 13 MMOL/L (ref 9–17)
AST SERPL-CCNC: 14 U/L
BASOPHILS # BLD: 1 % (ref 0–2)
BASOPHILS ABSOLUTE: 0.05 K/UL (ref 0–0.2)
BUN BLDV-MCNC: 9 MG/DL (ref 8–23)
BUN/CREAT BLD: 9 (ref 9–20)
CALCIUM SERPL-MCNC: 9 MG/DL (ref 8.6–10.4)
CHLORIDE BLD-SCNC: 99 MMOL/L (ref 98–107)
CO2: 26 MMOL/L (ref 20–31)
CREAT SERPL-MCNC: 0.97 MG/DL (ref 0.5–0.9)
CREATININE URINE: 255.6 MG/DL (ref 28–217)
DIFFERENTIAL TYPE: ABNORMAL
EOSINOPHILS RELATIVE PERCENT: 2 % (ref 1–4)
ESTIMATED AVERAGE GLUCOSE: 120 MG/DL
FERRITIN: 72 UG/L (ref 13–150)
GFR AFRICAN AMERICAN: >60 ML/MIN
GFR NON-AFRICAN AMERICAN: 57 ML/MIN
GFR SERPL CREATININE-BSD FRML MDRD: ABNORMAL ML/MIN/{1.73_M2}
GFR SERPL CREATININE-BSD FRML MDRD: ABNORMAL ML/MIN/{1.73_M2}
GLUCOSE BLD-MCNC: 114 MG/DL (ref 70–99)
HBA1C MFR BLD: 5.8 % (ref 4.8–5.9)
HCT VFR BLD CALC: 35.6 % (ref 36.3–47.1)
HEMOGLOBIN: 11.5 G/DL (ref 11.9–15.1)
IMMATURE GRANULOCYTES: 0 %
IRON SATURATION: 13 % (ref 20–55)
IRON: 34 UG/DL (ref 37–145)
LYMPHOCYTES # BLD: 39 % (ref 24–43)
MCH RBC QN AUTO: 31.2 PG (ref 25.2–33.5)
MCHC RBC AUTO-ENTMCNC: 32.3 G/DL (ref 28.4–34.8)
MCV RBC AUTO: 96.5 FL (ref 82.6–102.9)
MICROALBUMIN/CREAT 24H UR: 245 MG/L
MICROALBUMIN/CREAT UR-RTO: 96 MCG/MG CREAT
MONOCYTES # BLD: 9 % (ref 3–12)
NRBC AUTOMATED: 0 PER 100 WBC
PDW BLD-RTO: 13.4 % (ref 11.8–14.4)
PLATELET # BLD: 391 K/UL (ref 138–453)
PLATELET ESTIMATE: ABNORMAL
PMV BLD AUTO: 9 FL (ref 8.1–13.5)
POTASSIUM SERPL-SCNC: 3.9 MMOL/L (ref 3.7–5.3)
RBC # BLD: 3.69 M/UL (ref 3.95–5.11)
RBC # BLD: ABNORMAL 10*6/UL
SEG NEUTROPHILS: 49 % (ref 36–65)
SEGMENTED NEUTROPHILS ABSOLUTE COUNT: 3.37 K/UL (ref 1.5–8.1)
SODIUM BLD-SCNC: 138 MMOL/L (ref 135–144)
TOTAL IRON BINDING CAPACITY: 272 UG/DL (ref 250–450)
UNSATURATED IRON BINDING CAPACITY: 238 UG/DL (ref 112–347)
VITAMIN B-12: 157 PG/ML (ref 232–1245)
WBC # BLD: 6.9 K/UL (ref 3.5–11.3)
WBC # BLD: ABNORMAL 10*3/UL

## 2020-03-31 PROCEDURE — 83540 ASSAY OF IRON: CPT

## 2020-03-31 PROCEDURE — 83036 HEMOGLOBIN GLYCOSYLATED A1C: CPT

## 2020-03-31 PROCEDURE — 36415 COLL VENOUS BLD VENIPUNCTURE: CPT

## 2020-03-31 PROCEDURE — 83550 IRON BINDING TEST: CPT

## 2020-03-31 PROCEDURE — 82043 UR ALBUMIN QUANTITATIVE: CPT

## 2020-03-31 PROCEDURE — 82728 ASSAY OF FERRITIN: CPT

## 2020-03-31 PROCEDURE — 85025 COMPLETE CBC W/AUTO DIFF WBC: CPT

## 2020-03-31 PROCEDURE — 82570 ASSAY OF URINE CREATININE: CPT

## 2020-03-31 PROCEDURE — 82607 VITAMIN B-12: CPT

## 2020-03-31 PROCEDURE — 84460 ALANINE AMINO (ALT) (SGPT): CPT

## 2020-03-31 PROCEDURE — 80048 BASIC METABOLIC PNL TOTAL CA: CPT

## 2020-03-31 PROCEDURE — 84450 TRANSFERASE (AST) (SGOT): CPT

## 2020-03-31 NOTE — TELEPHONE ENCOUNTER
Called lab to see if they had enough blood for iron testing that Spence Fleischer had ordered and they have enough blood. Called patient and informed her that Spence Fleischer reviewed her labs and that her hemoglobin was a little low and that he ordered iron testing and the lab had enough blood she didn't need to be redrawn and we will call her with results. Verbalizes understanding.

## 2020-04-02 RX ORDER — ASCORBIC ACID 500 MG
500 TABLET ORAL 2 TIMES DAILY
Qty: 180 TABLET | Refills: 0 | Status: SHIPPED | OUTPATIENT
Start: 2020-04-02 | End: 2020-08-06 | Stop reason: SDUPTHER

## 2020-04-02 RX ORDER — LANOLIN ALCOHOL/MO/W.PET/CERES
325 CREAM (GRAM) TOPICAL 2 TIMES DAILY
Qty: 180 TABLET | Refills: 0 | Status: SHIPPED | OUTPATIENT
Start: 2020-04-02 | End: 2020-08-06 | Stop reason: SDUPTHER

## 2020-04-08 ENCOUNTER — TELEPHONE (OUTPATIENT)
Dept: PRIMARY CARE CLINIC | Age: 70
End: 2020-04-08

## 2020-04-30 ENCOUNTER — TELEPHONE (OUTPATIENT)
Dept: PRIMARY CARE CLINIC | Age: 70
End: 2020-04-30

## 2020-05-06 ENCOUNTER — HOSPITAL ENCOUNTER (OUTPATIENT)
Age: 70
Discharge: HOME | End: 2020-05-06
Payer: MEDICARE

## 2020-05-06 LAB
ABSOLUTE EOS #: 0.15 K/UL (ref 0–0.44)
ABSOLUTE IMMATURE GRANULOCYTE: <0.03 K/UL (ref 0–0.3)
ABSOLUTE LYMPH #: 2.5 K/UL (ref 1.1–3.7)
ABSOLUTE MONO #: 0.5 K/UL (ref 0.1–1.2)
BASOPHILS # BLD: 1 % (ref 0–2)
BASOPHILS ABSOLUTE: 0.04 K/UL (ref 0–0.2)
DIFFERENTIAL TYPE: ABNORMAL
EOSINOPHILS RELATIVE PERCENT: 3 % (ref 1–4)
FOLATE: 4.9 NG/ML
HCT VFR BLD CALC: 35.9 % (ref 36.3–47.1)
HEMOGLOBIN: 11.5 G/DL (ref 11.9–15.1)
IMMATURE GRANULOCYTES: 0 %
IRON SATURATION: 27 % (ref 20–55)
IRON: 71 UG/DL (ref 37–145)
LYMPHOCYTES # BLD: 42 % (ref 24–43)
MCH RBC QN AUTO: 30.9 PG (ref 25.2–33.5)
MCHC RBC AUTO-ENTMCNC: 32 G/DL (ref 28.4–34.8)
MCV RBC AUTO: 96.5 FL (ref 82.6–102.9)
MONOCYTES # BLD: 8 % (ref 3–12)
NRBC AUTOMATED: 0 PER 100 WBC
PDW BLD-RTO: 13.9 % (ref 11.8–14.4)
PLATELET # BLD: 371 K/UL (ref 138–453)
PLATELET ESTIMATE: ABNORMAL
PMV BLD AUTO: 9.4 FL (ref 8.1–13.5)
RBC # BLD: 3.72 M/UL (ref 3.95–5.11)
RBC # BLD: ABNORMAL 10*6/UL
SEG NEUTROPHILS: 46 % (ref 36–65)
SEGMENTED NEUTROPHILS ABSOLUTE COUNT: 2.72 K/UL (ref 1.5–8.1)
TOTAL IRON BINDING CAPACITY: 266 UG/DL (ref 250–450)
UNSATURATED IRON BINDING CAPACITY: 195 UG/DL (ref 112–347)
WBC # BLD: 5.9 K/UL (ref 3.5–11.3)
WBC # BLD: ABNORMAL 10*3/UL

## 2020-05-06 PROCEDURE — 82746 ASSAY OF FOLIC ACID SERUM: CPT

## 2020-05-06 PROCEDURE — 83540 ASSAY OF IRON: CPT

## 2020-05-06 PROCEDURE — 36415 COLL VENOUS BLD VENIPUNCTURE: CPT

## 2020-05-06 PROCEDURE — 83550 IRON BINDING TEST: CPT

## 2020-05-06 PROCEDURE — 85025 COMPLETE CBC W/AUTO DIFF WBC: CPT

## 2020-05-08 ENCOUNTER — TELEPHONE (OUTPATIENT)
Dept: PRIMARY CARE CLINIC | Age: 70
End: 2020-05-08

## 2020-05-08 RX ORDER — AMLODIPINE BESYLATE 10 MG/1
TABLET ORAL
Qty: 90 TABLET | Refills: 3 | Status: SHIPPED | OUTPATIENT
Start: 2020-05-08 | End: 2020-07-27 | Stop reason: ALTCHOICE

## 2020-05-11 NOTE — TELEPHONE ENCOUNTER
Called patient and informed her that her Amlodipine was sent to [facility]. Verbalizes understanding.
baseline     BP Readings from Last 3 Encounters:   03/28/20 137/78   02/11/20 138/72   01/28/20 (!) 145/70    (goal /80)      All Future Testing planned in [facility]:  Lab Frequency Next Occurrence       Next Visit Date:  Future Appointments   Date Time Provider Tara Mtz   11/18/2020  2:20 PM Corie Burdick MD [facility]            Patient Active Problem List:     Hypokalemia     Type 2 diabetes mellitus without complication ([address]     Essential hypertension     Hyperlipidemia     Bilateral leg edema     Non compliance w medication regimen     Gastroesophageal reflux disease     Mild intermittent asthma without complication     Postmenopausal     Osteopenia determined by x-ray     Chronic midline low back pain without sciatica     Calcaneal spur of left foot     Iron deficiency anemia     Recurrent UTI     ACS (acute coronary syndrome) (HCC)     Abnormal cardiovascular stress test

## 2020-07-22 NOTE — TELEPHONE ENCOUNTER
Health Maintenance   Topic Date Due    Diabetic foot exam  10/15/2019    Shingles Vaccine (1 of 2) 09/23/2020 (Originally 1/9/2000)    Diabetic retinal exam  01/28/2021 (Originally 6/15/2018)    DTaP/Tdap/Td vaccine (1 - Tdap) 01/28/2021 (Originally 1/9/1969)    Hepatitis C screen  01/28/2021 (Originally 1950)    Flu vaccine (1) 09/01/2020    Lipid screen  09/24/2020    A1C test (Diabetic or Prediabetic)  03/31/2021    Diabetic microalbuminuria test  03/31/2021    Potassium monitoring  03/31/2021    Creatinine monitoring  03/31/2021    Breast cancer screen  02/26/2022    Colon cancer screen colonoscopy  03/23/2025    DEXA (modify frequency per FRAX score)  Completed    Pneumococcal 65+ years Vaccine  Completed    Hepatitis A vaccine  Aged Out    Hib vaccine  Aged Out    Meningococcal (ACWY) vaccine  Aged Out             (applicable per patient's age: Cancer Screenings, Depression Screening, Fall Risk Screening, Immunizations)    Hemoglobin A1C (%)   Date Value   03/31/2020 5.8   09/24/2019 5.6   09/23/2019 5.8     Microalb/Crt.  Ratio (mcg/mg creat)   Date Value   03/31/2020 96 (H)     LDL Cholesterol (mg/dL)   Date Value   09/24/2019 109     AST (U/L)   Date Value   03/31/2020 14     ALT (U/L)   Date Value   03/31/2020 9     BUN (mg/dL)   Date Value   03/31/2020 9      (goal A1C is < 7)   (goal LDL is <100) need 30-50% reduction from baseline     BP Readings from Last 3 Encounters:   03/28/20 137/78   02/11/20 138/72   01/28/20 (!) 145/70    (goal /80)      All Future Testing planned in [facility]:  Lab Frequency Next Occurrence       Next Visit Date:  Future Appointments   Date Time Provider Tara Mtz   7/27/2020  2:40 PM TARA Felder - CNP [facility]   11/18/2020  2:20 PM Jhon Hurley MD [facility]            Patient Active Problem List:     Hypokalemia     Type 2 diabetes mellitus without complication ([address]     Essential hypertension     Hyperlipidemia Bilateral leg edema     Non compliance w medication regimen     Gastroesophageal reflux disease     Mild intermittent asthma without complication     Postmenopausal     Osteopenia determined by x-ray     Chronic midline low back pain without sciatica     Calcaneal spur of left foot     Iron deficiency anemia     Recurrent UTI     ACS (acute coronary syndrome) (HCC)     Abnormal cardiovascular stress test

## 2020-07-27 ENCOUNTER — OFFICE VISIT (OUTPATIENT)
Dept: PRIMARY CARE CLINIC | Age: 70
End: 2020-07-27
Payer: MEDICARE

## 2020-07-27 VITALS
WEIGHT: 180.3 LBS | SYSTOLIC BLOOD PRESSURE: 134 MMHG | DIASTOLIC BLOOD PRESSURE: 56 MMHG | RESPIRATION RATE: 18 BRPM | BODY MASS INDEX: 30.78 KG/M2 | HEIGHT: 64 IN | TEMPERATURE: 97.6 F | HEART RATE: 86 BPM

## 2020-07-27 PROCEDURE — 2022F DILAT RTA XM EVC RTNOPTHY: CPT | Performed by: NURSE PRACTITIONER

## 2020-07-27 PROCEDURE — 1090F PRES/ABSN URINE INCON ASSESS: CPT | Performed by: NURSE PRACTITIONER

## 2020-07-27 PROCEDURE — 1123F ACP DISCUSS/DSCN MKR DOCD: CPT | Performed by: NURSE PRACTITIONER

## 2020-07-27 PROCEDURE — 3017F COLORECTAL CA SCREEN DOC REV: CPT | Performed by: NURSE PRACTITIONER

## 2020-07-27 PROCEDURE — 1036F TOBACCO NON-USER: CPT | Performed by: NURSE PRACTITIONER

## 2020-07-27 PROCEDURE — 4040F PNEUMOC VAC/ADMIN/RCVD: CPT | Performed by: NURSE PRACTITIONER

## 2020-07-27 PROCEDURE — 3044F HG A1C LEVEL LT 7.0%: CPT | Performed by: NURSE PRACTITIONER

## 2020-07-27 PROCEDURE — G8399 PT W/DXA RESULTS DOCUMENT: HCPCS | Performed by: NURSE PRACTITIONER

## 2020-07-27 PROCEDURE — G8417 CALC BMI ABV UP PARAM F/U: HCPCS | Performed by: NURSE PRACTITIONER

## 2020-07-27 PROCEDURE — 99214 OFFICE O/P EST MOD 30 MIN: CPT | Performed by: NURSE PRACTITIONER

## 2020-07-27 PROCEDURE — G8427 DOCREV CUR MEDS BY ELIG CLIN: HCPCS | Performed by: NURSE PRACTITIONER

## 2020-07-27 RX ORDER — VALSARTAN 320 MG/1
320 TABLET ORAL DAILY
Qty: 90 TABLET | Refills: 1 | Status: SHIPPED | OUTPATIENT
Start: 2020-07-27 | End: 2020-10-26 | Stop reason: SDUPTHER

## 2020-07-27 RX ORDER — VERAPAMIL HYDROCHLORIDE 240 MG/1
240 TABLET, FILM COATED, EXTENDED RELEASE ORAL DAILY
Qty: 90 TABLET | Refills: 1 | Status: SHIPPED | OUTPATIENT
Start: 2020-07-27 | End: 2020-10-21

## 2020-07-27 ASSESSMENT — ENCOUNTER SYMPTOMS
COUGH: 0
CHEST TIGHTNESS: 0
BLURRED VISION: 0
DIFFICULTY BREATHING: 0
NAUSEA: 0
HEMOPTYSIS: 0
RHINORRHEA: 0
SORE THROAT: 0
HOARSE VOICE: 0
WHEEZING: 0
DIARRHEA: 0
SPUTUM PRODUCTION: 0
VOMITING: 0
SHORTNESS OF BREATH: 0
ABDOMINAL PAIN: 0
CONSTIPATION: 0
FREQUENT THROAT CLEARING: 0

## 2020-07-27 NOTE — PATIENT INSTRUCTIONS
Pediatric Well Child Exam: 12 Months of age    Chief Complaint   Patient presents with   • Wellness     12 month       SUBJECTIVE:  Sabina Gonzalez is a 12 month old female who presents for a well child exam.  Patient presents with Mother.    Preferred method of results communication:   Cell Phone:   Telephone Information:   Mobile [phone]     Okay to leave a message containing results? Yes    CONCERNS RAISED TODAY: none    She did have cyst on her eyelid removed recently.  Mom reports that went well and she had no concerns.  They have a follow-up visit tomorrow.    SLEEP PATTERN:  Hours / Night: 6-8hrs/night.  NAPS: Hours / Day: 30-1hour/daily.    Discussed that this is certainly on the lower end for amount of sleep at her age.  She is doing well.  Wakes happy.  No behavior issue    DIET/GI:  APPETITE: Good.  FORMULA: N/A  0   ounces/day.   MILK: whole milk 2 cups/day.  WATER SUPPLY AT HOME: [facility].  STOOLS: 3 / day    /HOMECARE:   :  []  YES     [x]  NO   none     FAMILY/HOME ENVIRONMENT:  Changes in home/work environment: No  Parents working outside the home: mother and father  Toxic Exposure:  Tobacco Use: Not Asked         DEVELOPMENT:  [x]  YES    []  NO     []  UNKNOWN    Pulls to stand?  [x]  YES    []  NO     []  UNKNOWN    Walks holding on to furniture/pushing toy?  [x]  YES    []  NO     []  UNKNOWN    Takes independent steps?  [x]  YES    []  NO     []  UNKNOWN    Accurate pincer grasp?  [x]  YES    []  NO     []  UNKNOWN    Finger feeds part of meal?  [x]  YES    []  NO     []  UNKNOWN    Uses vocalization to get attentions?  [x]  YES    []  NO     []  UNKNOWN    Shows interest in things shown by parents?  [x]  YES    []  NO     []  UNKNOWN    Repetitive consonant sounds with emotion?  [x]  YES    []  NO     []  UNKNOWN    Tries to babble back with you?  [x]  YES    []  NO     []  UNKNOWN    Plays games like \"peek-a-nevarez/pat-a-cake\"?    OBJECTIVE:  PAST HISTORIES:  Allergies,  Medications, Medical history, Surgical history, Family history reviewed and updated.    IMMUNIZATION HISTORY:  IMMUNIZATION STATUS: Not up to date.  Needed immunizations include: 1 year old vaccines and still need records of previous  IMMUNIZATION REACTIONS: None  VARICELLA STATUS: confirmed by vaccine administration    RECENT HEALTH EVENTS:  Illnesses: None.  Hospitalizations: None.  Injuries or Accidents: None.    REVIEW OF SYSTEMS:    All systems reviewed and negative except as documented in \"Concerns raised today\".    PHYSICAL EXAM:      Visit Vitals  Pulse 140   Temp 97.6 °F (36.4 °C) (Temporal)   Ht 31.5\" (80 cm)   Wt 11.1 kg   HC 48.3 cm (19\")   SpO2 96%   BMI 17.36 kg/m²       Physical Exam   Constitutional: She appears well-developed and well-nourished. She is active.   HENT:   Head: Normocephalic.   Right Ear: Tympanic membrane, external ear, pinna and canal normal.   Left Ear: Tympanic membrane, external ear, pinna and canal normal.   Nose: Nose normal.   Mouth/Throat: Mucous membranes are moist. Dentition is normal. Oropharynx is clear.   Eyes: Red reflex is present bilaterally. Visual tracking is normal. Conjunctivae are normal.   Neck: Neck supple. No neck adenopathy.   Cardiovascular: Normal rate and regular rhythm.   No murmur heard.  Pulmonary/Chest: Effort normal and breath sounds normal. No respiratory distress. She has no decreased breath sounds. She has no wheezes. She exhibits no deformity.   Abdominal: Soft. She exhibits no distension. There is no tenderness. Musculoskeletal: Normal range of motion.     Neurological: She is alert. She exhibits normal muscle tone. Coordination and gait normal.   Skin: Skin is warm. No rash noted.          Assessment:  12 month old female well child.    Plan:  1. All parental concerns and questions discussed.  2. Anticipatory guidance provided, handout(s) given   Anticipatory Guidance discussed and age appropriate handout given.  - Engage in interactive play  -  stroke. But taking aspirin isn't right for everyone, because it can cause serious bleeding. · See your doctor regularly. You may need to see the doctor more often at first or until your blood pressure comes down. · If you are taking blood pressure medicine, talk to your doctor before you take decongestants or anti-inflammatory medicine, such as ibuprofen. Some of these medicines can raise blood pressure. · Learn how to check your blood pressure at home. Lifestyle changes  · Stay at a healthy weight. This is especially important if you put on weight around the waist. Losing even 10 pounds can help you lower your blood pressure. · If your doctor recommends it, get more exercise. Walking is a good choice. Bit by bit, increase the amount you walk every day. Try for at least 30 minutes on most days of the week. You also may want to swim, bike, or do other activities. · Avoid or limit alcohol. Talk to your doctor about whether you can drink any alcohol. · Try to limit how much sodium you eat to less than 2,300 milligrams (mg) a day. Your doctor may ask you to try to eat less than 1,500 mg a day. · Eat plenty of fruits (such as bananas and oranges), vegetables, legumes, whole grains, and low-fat dairy products. · Lower the amount of saturated fat in your diet. Saturated fat is found in animal products such as milk, cheese, and meat. Limiting these foods may help you lose weight and also lower your risk for heart disease. · Do not smoke. Smoking increases your risk for heart attack and stroke. If you need help quitting, talk to your doctor about stop-smoking programs and medicines. These can increase your chances of quitting for good. When should you call for help? Call  911 anytime you think you may need emergency care. This may mean having symptoms that suggest that your blood pressure is causing a serious heart or blood vessel problem. Your blood pressure may be over 180/120.   For example, call 911 Avoid screen time  - Daily routine  - Use positive discipline as well as time-outs and distractions; praise for good behaviors  - Most children nap once a day, continue bedtime routine  - Feeding and nutrition (encourage self feeding, offer variety, 3 meals and up to 2 snacks per day)  - Dental home  - Safety (car seat, baby proofing for increase in mobility and activity, lock cleaning products, poison control number, risk of drowning and water safety)  - Sun exposure and sunscreen    3. Immunizations per orders.  Risks, benefits, and side effects discussed. Vaccine Information Statement for Immunizations given.  4. Orders for immunizations given today per the recommended schedule.    Follow up: Return in about 3 months (around 5/25/2020) for Well Child Exam.             Health. If you have questions about a medical condition or this instruction, always ask your healthcare professional. [place] [address] any warranty or liability for your use of this information.

## 2020-07-27 NOTE — PROGRESS NOTES
Name: Steve Short  : 1950         Chief Complaint:     Chief Complaint   Patient presents with    Hypertension     \"My blood pressure has been running high at home 190/90's. \"     Leg Swelling    Diabetes       History of Present Illness:      Steve Short is a 79 y.o.  female who presents with Hypertension (\"My blood pressure has been running high at home 190/90's. \" ); Leg Swelling; and Diabetes      Annaleepavna Bowen is here today for a routine office visit. Lower leg edema- intermittent, patient does state that has been worse the last few weeks. Patient does admit right leg worse than left. Patient states she does have some discomfort when standing for long periods. She does have leg aches at night. Hypertension   This is a chronic problem. The current episode started more than 1 year ago. The problem is unchanged. The problem is controlled. Associated symptoms include peripheral edema. Pertinent negatives include no anxiety, blurred vision, chest pain, headaches, neck pain, palpitations, shortness of breath or sweats. Risk factors for coronary artery disease include obesity, diabetes mellitus and post-menopausal state. Past treatments include ACE inhibitors and calcium channel blockers. The current treatment provides moderate improvement. Compliance problems include exercise. Hypertensive end-organ damage includes kidney disease. There is no history of CAD/MI, CVA or heart failure. Identifiable causes of hypertension include chronic renal disease. Diabetes   She presents for her follow-up diabetic visit. She has type 2 diabetes mellitus. Her disease course has been stable. There are no hypoglycemic associated symptoms. Pertinent negatives for hypoglycemia include no dizziness, headaches or sweats. There are no diabetic associated symptoms. Pertinent negatives for diabetes include no blurred vision, no chest pain, no fatigue, no polydipsia, no polyphagia and no polyuria.  There are no hypoglycemic complications. Diabetic complications include nephropathy (IMPROVED). Pertinent negatives for diabetic complications include no CVA, heart disease or peripheral neuropathy. Risk factors for coronary artery disease include diabetes mellitus, hypertension, post-menopausal and sedentary lifestyle. Current diabetic treatment includes oral agent (monotherapy). She is compliant with treatment all of the time. Her weight is stable. She is following a high fat/cholesterol and generally healthy diet. Meal planning includes avoidance of concentrated sweets. She has not had a previous visit with a dietitian. She rarely participates in exercise. There is no change in her home blood glucose trend. An ACE inhibitor/angiotensin II receptor blocker is being taken. She does not see a podiatrist.Eye exam is not current. Asthma   There is no chest tightness, cough, difficulty breathing, frequent throat clearing, hemoptysis, hoarse voice, shortness of breath, sputum production or wheezing. This is a chronic problem. The current episode started more than 1 year ago. The problem occurs intermittently. The problem has been unchanged. Pertinent negatives include no chest pain, fever, headaches, rhinorrhea, sore throat or sweats. Her symptoms are aggravated by URI, strenuous activity, exposure to smoke, exposure to fumes, climbing stairs and change in weather. Her symptoms are alleviated by beta-agonist and rest. She reports significant improvement on treatment. Her symptoms are not alleviated by rest. Her past medical history is significant for asthma and COPD. There is no history of bronchiectasis, bronchitis, emphysema or pneumonia. Past Medical History:     Past Medical History:   Diagnosis Date    Arthritis     Asthma     [facility] [address]     Diabetes mellitus ([facility] [address]     GERD (gastroesophageal reflux disease)     H/O echocardiogram 3/9/16    EF >60%. LV wall thickness is mildly increased.  Mild mitral and tricuspid regurgitation. Borderline pulmonary hypertension estimated right ventricular sysolic pressure of 45DDYL. Mild (grade l) diastolic dysfunction.  History of PFTs 3/10/16     Suboptimal effort. Restrictive in nature. clionical correlations is advised.  History of stress test 16    Abnoraml. Moderate perfusion defect of mild to moderate intensity in the anterolateral and anteroapical regions during stress imaging. Intermediate risk for CAD.  Hyperlipidemia     Hypertension       Reviewed all health maintenance requirements and ordered appropriate tests  Health Maintenance Due   Topic Date Due    Diabetic foot exam  10/15/2019       Past Surgical History:     Past Surgical History:   Procedure Laterality Date    BREAST SURGERY      cyst removed    CARDIAC CATHETERIZATION      CARDIAC CATHETERIZATION Left 2019    [facility]     SECTION      CHOLECYSTECTOMY      COLONOSCOPY  3/23/15    -diverticulosis,hemorrhoids    FOOT SURGERY      rt    HYSTERECTOMY          Medications:       Prior to Admission medications    Medication Sig Start Date End Date Taking?  Authorizing Provider   valsartan (DIOVAN) 320 MG tablet Take 1 tablet by mouth daily 20  Yes TARA Matute CNP   verapamil (CALAN SR) 240 MG extended release tablet Take 1 tablet by mouth daily 20  Yes TARA Matute CNP   metFORMIN (GLUCOPHAGE) 500 MG tablet Take 1 tablet by mouth 2 times daily (with meals) 20  Yes TARA Matute CNP   ferrous sulfate (FE TABS) 325 (65 Fe) MG EC tablet Take 1 tablet by mouth 2 times daily 20  Yes TARA Matute CNP   ascorbic acid (VITAMIN C) 500 MG tablet Take 1 tablet by mouth 2 times daily With iron tablet 20  Yes TARA Matute CNP   latanoprost (XALATAN) 0.005 % ophthalmic solution 1 drop nightly   Yes Historical Provider, MD   aspirin EC 81 MG EC tablet Take 1 tablet by mouth daily cough, hemoptysis, sputum production, shortness of breath and wheezing. Cardiovascular: Positive for leg swelling. Negative for chest pain and palpitations. Gastrointestinal: Negative for abdominal pain, constipation, diarrhea, nausea and vomiting. Endocrine: Negative for polydipsia, polyphagia and polyuria. Genitourinary: Negative for difficulty urinating and dysuria. Musculoskeletal: Negative for gait problem, neck pain and neck stiffness. Skin: Negative for rash. Neurological: Negative for dizziness, syncope, light-headedness and headaches. Physical Exam:   Vitals:  BP (!) 134/56 (Site: Left Upper Arm, Position: Sitting, Cuff Size: Large Adult)   Pulse 86   Temp 97.6 °F (36.4 °C) (Temporal)   Resp 18   Ht 5' 4\" (1.626 m)   Wt 180 lb 4.8 oz (81.8 kg)   BMI 30.95 kg/m²     Physical Exam  Vitals signs and nursing note reviewed. Constitutional:       General: She is not in acute distress. Appearance: Normal appearance. She is well-developed. She is obese. She is not ill-appearing. HENT:      Mouth/Throat:      Mouth: Mucous membranes are moist.   Eyes:      General: No scleral icterus. Conjunctiva/sclera: Conjunctivae normal.   Neck:      Musculoskeletal: Normal range of motion and neck supple. Cardiovascular:      Rate and Rhythm: Normal rate and regular rhythm. Heart sounds: No murmur. Pulmonary:      Effort: Pulmonary effort is normal.      Breath sounds: Normal breath sounds. No wheezing. Abdominal:      General: Bowel sounds are normal. There is no distension. Palpations: Abdomen is soft. Tenderness: There is no abdominal tenderness. Musculoskeletal:      Right lower leg: Edema (1+ pitting) present. Left lower leg: Edema (Trace to 1+ pitting) present. Skin:     General: Skin is warm and dry. Findings: No rash. Neurological:      Mental Status: She is alert and oriented to person, place, and time.    Psychiatric:         Mood and Affect: Reviewed prior labs and health maintenance  6. Continue current medications, diet and exercise. Completed Refills   Requested Prescriptions     Signed Prescriptions Disp Refills    valsartan (DIOVAN) 320 MG tablet 90 tablet 1     Sig: Take 1 tablet by mouth daily    verapamil (CALAN SR) 240 MG extended release tablet 90 tablet 1     Sig: Take 1 tablet by mouth daily         Return in about 6 months (around 1/27/2021) for Check up.

## 2020-08-04 ENCOUNTER — TELEPHONE (OUTPATIENT)
Dept: PRIMARY CARE CLINIC | Age: 70
End: 2020-08-04

## 2020-08-05 ENCOUNTER — HOSPITAL ENCOUNTER (OUTPATIENT)
Age: 70
Discharge: HOME | End: 2020-08-05
Payer: MEDICARE

## 2020-08-05 ENCOUNTER — TELEPHONE (OUTPATIENT)
Dept: PRIMARY CARE CLINIC | Age: 70
End: 2020-08-05

## 2020-08-05 LAB
ALT SERPL-CCNC: 6 U/L (ref 5–33)
ANION GAP SERPL CALCULATED.3IONS-SCNC: 13 MMOL/L (ref 9–17)
AST SERPL-CCNC: 11 U/L
BUN BLDV-MCNC: 10 MG/DL (ref 8–23)
BUN/CREAT BLD: 11 (ref 9–20)
CALCIUM SERPL-MCNC: 8.9 MG/DL (ref 8.6–10.4)
CHLORIDE BLD-SCNC: 105 MMOL/L (ref 98–107)
CHOLESTEROL/HDL RATIO: 3.2
CHOLESTEROL: 174 MG/DL
CO2: 26 MMOL/L (ref 20–31)
CREAT SERPL-MCNC: 0.9 MG/DL (ref 0.5–0.9)
CREATININE URINE: 401.1 MG/DL (ref 28–217)
ESTIMATED AVERAGE GLUCOSE: 120 MG/DL
GFR AFRICAN AMERICAN: >60 ML/MIN
GFR NON-AFRICAN AMERICAN: >60 ML/MIN
GFR SERPL CREATININE-BSD FRML MDRD: ABNORMAL ML/MIN/{1.73_M2}
GFR SERPL CREATININE-BSD FRML MDRD: ABNORMAL ML/MIN/{1.73_M2}
GLUCOSE BLD-MCNC: 126 MG/DL (ref 70–99)
HBA1C MFR BLD: 5.8 % (ref 4.8–5.9)
HDLC SERPL-MCNC: 54 MG/DL
LDL CHOLESTEROL: 97 MG/DL (ref 0–130)
MICROALBUMIN/CREAT 24H UR: 121 MG/L
MICROALBUMIN/CREAT UR-RTO: 30 MCG/MG CREAT
POTASSIUM SERPL-SCNC: 3.1 MMOL/L (ref 3.7–5.3)
SODIUM BLD-SCNC: 144 MMOL/L (ref 135–144)
TRIGL SERPL-MCNC: 113 MG/DL
VLDLC SERPL CALC-MCNC: NORMAL MG/DL (ref 1–30)

## 2020-08-05 PROCEDURE — 84450 TRANSFERASE (AST) (SGOT): CPT

## 2020-08-05 PROCEDURE — 83036 HEMOGLOBIN GLYCOSYLATED A1C: CPT

## 2020-08-05 PROCEDURE — 82043 UR ALBUMIN QUANTITATIVE: CPT

## 2020-08-05 PROCEDURE — 84460 ALANINE AMINO (ALT) (SGPT): CPT

## 2020-08-05 PROCEDURE — 82570 ASSAY OF URINE CREATININE: CPT

## 2020-08-05 PROCEDURE — 80048 BASIC METABOLIC PNL TOTAL CA: CPT

## 2020-08-05 PROCEDURE — 36415 COLL VENOUS BLD VENIPUNCTURE: CPT

## 2020-08-05 PROCEDURE — 80061 LIPID PANEL: CPT

## 2020-08-05 NOTE — TELEPHONE ENCOUNTER
----- Message from TARA Sanchez CNP sent at 8/5/2020  4:32 PM EDT -----  Please let patient know that her potassium is low. Ask her if she is taking her potassium supplement. She also has protein in her urine can you please make sure that she is taking her valsartan.   Thank you

## 2020-08-05 NOTE — TELEPHONE ENCOUNTER
Phone call to patient regarding her potasium being low and if she is taking her potassium supplement. She was also informed that she  has protein in her urine. Writer asked patient if  she is taking her valsartan. Patient asked what color the pills were. Writer informed her that I did not know. Writer asked patient if she is taking her potassium twice a day and she states that she is and that she is taking all her meds as prescribed.

## 2020-08-06 RX ORDER — ATORVASTATIN CALCIUM 40 MG/1
40 TABLET, FILM COATED ORAL DAILY
Qty: 90 TABLET | Refills: 1 | Status: SHIPPED | OUTPATIENT
Start: 2020-08-06 | End: 2020-12-16 | Stop reason: SDUPTHER

## 2020-08-06 RX ORDER — LANOLIN ALCOHOL/MO/W.PET/CERES
325 CREAM (GRAM) TOPICAL 2 TIMES DAILY
Qty: 180 TABLET | Refills: 0 | Status: SHIPPED | OUTPATIENT
Start: 2020-08-06 | End: 2020-10-17 | Stop reason: ALTCHOICE

## 2020-08-06 RX ORDER — M-VIT,TX,IRON,MINS/CALC/FOLIC 27MG-0.4MG
1 TABLET ORAL DAILY
Qty: 30 TABLET | Refills: 11 | Status: SHIPPED | OUTPATIENT
Start: 2020-08-06 | End: 2022-10-27

## 2020-08-06 RX ORDER — ASCORBIC ACID 500 MG
500 TABLET ORAL 2 TIMES DAILY
Qty: 180 TABLET | Refills: 0 | Status: SHIPPED | OUTPATIENT
Start: 2020-08-06 | End: 2020-12-16 | Stop reason: SDUPTHER

## 2020-08-06 RX ORDER — B-COMPLEX WITH VITAMIN C
1 TABLET ORAL 2 TIMES DAILY
Qty: 180 TABLET | Refills: 1 | Status: SHIPPED | OUTPATIENT
Start: 2020-08-06 | End: 2020-12-16 | Stop reason: SDUPTHER

## 2020-08-06 NOTE — TELEPHONE ENCOUNTER
Patient instructed to bring all medications to the office today. Also informed of referral to nephrologist and that his office will call to schedule an appt. Patient verbalized understanding.

## 2020-08-06 NOTE — TELEPHONE ENCOUNTER
If it is possible can you have Haily bring the medications that she is taking to the office today.   Thank you

## 2020-08-07 ENCOUNTER — CARE COORDINATION (OUTPATIENT)
Dept: CARE COORDINATION | Age: 70
End: 2020-08-07

## 2020-08-07 SDOH — ECONOMIC STABILITY: FOOD INSECURITY: WITHIN THE PAST 12 MONTHS, THE FOOD YOU BOUGHT JUST DIDN'T LAST AND YOU DIDN'T HAVE MONEY TO GET MORE.: NEVER TRUE

## 2020-08-07 SDOH — ECONOMIC STABILITY: TRANSPORTATION INSECURITY
IN THE PAST 12 MONTHS, HAS THE LACK OF TRANSPORTATION KEPT YOU FROM MEDICAL APPOINTMENTS OR FROM GETTING MEDICATIONS?: NO

## 2020-08-07 SDOH — HEALTH STABILITY: MENTAL HEALTH
STRESS IS WHEN SOMEONE FEELS TENSE, NERVOUS, ANXIOUS, OR CAN'T SLEEP AT NIGHT BECAUSE THEIR MIND IS TROUBLED. HOW STRESSED ARE YOU?: NOT AT ALL

## 2020-08-07 SDOH — ECONOMIC STABILITY: INCOME INSECURITY: HOW HARD IS IT FOR YOU TO PAY FOR THE VERY BASICS LIKE FOOD, HOUSING, MEDICAL CARE, AND HEATING?: NOT HARD AT ALL

## 2020-08-07 SDOH — ECONOMIC STABILITY: FOOD INSECURITY: WITHIN THE PAST 12 MONTHS, YOU WORRIED THAT YOUR FOOD WOULD RUN OUT BEFORE YOU GOT MONEY TO BUY MORE.: NEVER TRUE

## 2020-08-07 SDOH — ECONOMIC STABILITY: TRANSPORTATION INSECURITY
IN THE PAST 12 MONTHS, HAS LACK OF TRANSPORTATION KEPT YOU FROM MEETINGS, WORK, OR FROM GETTING THINGS NEEDED FOR DAILY LIVING?: NO

## 2020-08-07 SDOH — HEALTH STABILITY: PHYSICAL HEALTH: ON AVERAGE, HOW MANY DAYS PER WEEK DO YOU ENGAGE IN MODERATE TO STRENUOUS EXERCISE (LIKE A BRISK WALK)?: 0 DAYS

## 2020-08-07 SDOH — HEALTH STABILITY: MENTAL HEALTH: HOW OFTEN DO YOU HAVE A DRINK CONTAINING ALCOHOL?: NEVER

## 2020-08-07 SDOH — HEALTH STABILITY: PHYSICAL HEALTH: ON AVERAGE, HOW MANY MINUTES DO YOU ENGAGE IN EXERCISE AT THIS LEVEL?: 0 MIN

## 2020-08-07 NOTE — CARE COORDINATION
Ambulatory Care Coordination Note  8/7/2020  CM Risk Score: 10  Charlson 10 Year Mortality Risk Score: 98%     ACC: Lina Scott, RN    Summary Note:   Date Care Coordination Episode Started: Today, 8/7/20     Reason For Call Today:   Assess and enroll into care coordination per PCP request.     -Spoke with Albert Love today   -Agreeable to future calls     Topics Reviewed Today:  1.) Medications  -Does the patient have all of their prescribed medications filled? Yes   -Is there any barriers to medication adherence? Yes- sometimes she forgets to take   -Is there any financial issues with affording any medications? No   -Took all medications in bottles to PCP office on 8/6 and reviewed with staff   -Sets up weekly med planner     2.) Transportation  -Does the patient drive? No  -How is patient transported to appointments?  drives to all appointments    -Any additional phone numbers for transportation in the area needed? No     3.) Living/Housing  -Does the patient live alone? No,  lives there  -What type of housing does the patient live in? Private home   -Does the patient feel safe in their home? Yes  -Who does the cooking, cleaning, housework? Patient and spouse   -Any steps going into the home? Yes, denies any issues. Has 3 bedroom home, bedrooms are upstairs. 4.) DME  -What DME does patient currently have? Glucometer, BP cuff     -Any additional DME orders needed?  No     5.) Blood Pressure  -Voices that she checks her blood pressure at home daily   -Today BP was 151/73  -Scheduled with MA for BP check at PCP office on 8/10/20   Reviewed fall precautions with patient:   1)Getting up slowly when changing positions  2)Keeping pathways clear  3)Keep house well lit  4)Ambulate with assistive device  5)Keep a phone/life alert with you when ambulating    6.) Nephrology Referral   -Nephrology referral was placed on 8/6/20  -Scheduled with Dr. Sheri Powell as new patient on 8/24/20   -Aware of this appointment       Reason Patient is in Care coordination:   Diabetes, Non complaint with medications       Chronic Conditions:   1.)Diabetes   -States she monitors her blood sugar morning fasting and then \"usually after lunch\"  -Voices that this morning her reading was 94 and after lunch was 107   -Denies having any low readings recently  -Reviewed with Haily goal of blood sugar being 130 and under prior to meals, and 180 and under 2 hours after meals.  -Reviewed with Kayla Escobedo what to do if having a low blood sugar (Blood sugar 70 and under)  What to do if you think you have low blood sugar:    Check:  blood sugar right away if you have any symptoms of low blood sugar. If you think your blood sugar is low but cannot check it at that time, treat anyway. Treat: by eating or drinking 15 grams of something high in sugar; such as      [x]     4 ounces (1/2 cup) of regular fruit juice (like orange, apple, or grape juice)     [x]     4 ounces (1/2 cup) of regular soda pop (not diet)     [x]     3 or 4 glucose tablets     [x]     5 to 6 hard candies that you can chew quickly (such as mints)  Wait: 15 minutes and then check your blood sugar again. If it is still low, eat or drink something high in sugar again. Once your blood sugar returns to normal, eat a meal or snack. This can help keep low blood sugar from coming back. -Voices she avoids concentrated sweets and pork. -Admits to drinking regular pop.  States \"I have a big cup that I fill up and drink throughout the day\" She was unsure how many ounces the cup is   -Does drink a lot of water as well throughout the day   -Voices she always has breakfast and eats 3 meals per day   -She and her  do the cooking   -Taking Metformin 500 mg two times daily   -Usually see eye doctor yearly- however her appointment in May was cancelled due to pandemic- she needs to call and reschedule eye appointment   -Does not follow with podiatry- will ask PCP for referral. Management      I will take my medication as directed. I will notify my provider of any problems with medications, like adverse effects or side effects. I will notify my provider/Care Coordinator if I am unable to afford my medications. I will notify my provider for advice before I stop taking any of my medication. Barriers: overwhelmed by complexity of regimen and lack of education  Plan for overcoming my barriers: working with this ambulatory care manager   -Will set up weekly med planner for organization of medications   -Will take medications as prescribed   -If needed, will set alarms or place sticky notes around the home to remind to take medications   Confidence: 8/10  Anticipated Goal Completion Date: 12/7/2020            Education Reviewed with patient today: See Education Module. Interventions completed today:  · Patient to reach out to care coordinator at [phone] or MD office with questions. Care Coordinator Plan of Care: This nurse CC will plan to send to PCP for plan of care approval. Will also ask for podiatry referral. Will plan to follow up with Brit Godinez in 1 week per protocol- continue CC Protocol and SDOH. Follow up getting Podiatry referral.        Ambulatory Care Coordination Assessment    Care Coordination Protocol  Program Enrollment:  Complex Care  Referral from Primary Care Provider:  Yes  Week 1 - Initial Assessment     Do you have all of your prescriptions and are they filled?:  Yes  Barriers to medication adherence:  Forgets to take  Are you able to afford your medications?:  Yes  How often do you have trouble taking your medications the way you have been told to take them?:  Sometimes I take them as prescribed. Do you have Home O2 Therapy?:  No      Ability to seek help/take action for Emergent Urgent situations i.e. fire, crime, inclement weather or health crisis. :  Independent  Ability to ambulate to restroom:  Independent  Ability handle personal hygeine needs (bathing/dressing/grooming): Independent  Ability to manage Medications:  Needs Assistance  Ability to prepare Food Preparation:  Needs Assistance  Ability to maintain home (clean home, laundry):  Needs Assistance  Ability to drive and/or has transportation:  Dependent  Ability to do shopping:  Needs Assistance  Ability to manage finances:  Needs Assistance  Is patient able to live independently?:  Yes     Current Housing:  Private Residence        Per the Fall Risk Screening, did the patient have 2 or more falls or 1 fall with injury in the past year?:  No     Frequent urination at night?:  No  Do you use rails/bars?:  Yes  Do you have a non-slip tub mat?:  Yes     Are you experiencing loss of meaning?:  No  Are you experiencing loss of hope and peace?:  No     Thinking about your patient's physical health needs, are there any symptoms or problems (risk indicators) you are unsure about that require further investigation?:  No identified areas of uncertainly or problems already being investigated   Are the patients physical health problems impacting on their mental well-being?:  No identified areas of concern   Are there any problems with your patients lifestyle behaviors (alcohol, drugs, diet, exercise) that are impacting on physical or mental well-being?:  No identified areas of concern   Do you have any other concerns about your patients mental well-being?  How would you rate their severity and impact on the patient?:  No identified areas of concern   How would you rate their home environment in terms of safety and stability (including domestic violence, insecure housing, neighbor harassment)?:  Safe, stable, but with some inconsistency   How do daily activities impact on the patient's well-being? (include current or anticipated unemployment, work, caregiving, access to transportation or other):  No identified problems or perceived positive benefits   How would you rate their social network (family, Carbonate-Vitamin D (OYSTER SHELL CALCIUM/D) 500-200 MG-UNIT TABS Take 1 tablet by mouth 2 times daily 8/6/20   TARA Matute CNP   Multiple Vitamins-Minerals (THERAPEUTIC MULTIVITAMIN-MINERALS) tablet Take 1 tablet by mouth daily 8/6/20 8/6/21  TARA Matute CNP   valsartan (DIOVAN) 320 MG tablet Take 1 tablet by mouth daily 7/27/20   TARA Matute CNP   verapamil (CALAN SR) 240 MG extended release tablet Take 1 tablet by mouth daily 7/27/20   TARA Matute CNP   metFORMIN (GLUCOPHAGE) 500 MG tablet Take 1 tablet by mouth 2 times daily (with meals) 7/22/20   TARA Matute CNP   latanoprost (XALATAN) 0.005 % ophthalmic solution 1 drop nightly    Historical Provider, MD   potassium chloride (KLOR-CON M) 10 MEQ extended release tablet Take 2 tablets by mouth 2 times daily  Patient not taking: Reported on 7/27/2020 11/6/19   TARA Matute CNP   aspirin EC 81 MG EC tablet Take 1 tablet by mouth daily 9/23/19   TARA Matute CNP   blood glucose test strips (ONE TOUCH ULTRA TEST) strip USE 1 STRIP TO CHECK GLUCOSE DAILY 3/13/19   TARA Matute CNP   ONE TOUCH LANCETS MISC Lancets- One Touch [place] for testing daily and as needed.  DX: Diabetes type  E11.9 1/31/19   TARA Matute CNP   cetirizine (ZYRTEC ALLERGY) 10 MG tablet Take 1 tablet by mouth daily 6/7/18   TARA Perera CNP   Blood Glucose Monitoring Suppl GENNY 1 Units by Does not apply route 3 times daily What insurance will cover 10/11/16   TARA Hernandez CNP

## 2020-08-13 ENCOUNTER — CARE COORDINATION (OUTPATIENT)
Dept: CARE COORDINATION | Age: 70
End: 2020-08-13

## 2020-08-13 NOTE — CARE COORDINATION
Ambulatory Care Coordination Note  8/13/2020  CM Risk Score: 10  Charlson 10 Year Mortality Risk Score: 98%     ACC: Oscar Wiggins RN    Summary Note:   Date Care Coordination Episode Started:   8/7/2020    Reason For Call Today:   Follow up on home blood pressure and blood sugar readings. Follow up BP check at PCP office. Topics discussed today:   1.) BP check   -Collette Helper was scheduled for BP check at PCP office on 8/10 and was a no show   -Collette Hancock voices she \"must have forgot about this\" but then also states she was told to come at the end of the month ? -ACM instructed she call PCP office and reschedule BP check- offered to call and Collette Hancock preferred ACM make appointment for her   -Collette Helper voices that her BP this morning was 153/73 today HR 83    Call To:  -Call placed to PCP office [phone]  -Spoke with [facility] for BP check scheduled for 8/18 at 11:20     Call To:  -Call placed back to [address] [place] [address] Way of appointment date and time for BP check   -Collette Helper states she will be there.  wrote down appointment information     Chronic Conditions:   1.)Diabetes  -States she has been checking morning fasting blood sugars   -Reports today readings was 107 this morning   -Has not yet called Podiatry to make appointment  -Has not called eye doctor to reschedule her appointment she was to have in March   -Voices she has taken all medication as prescribed   Lab Results   Component Value Date    LABA1C 5.8 08/05/2020     Lab Results   Component Value Date     08/05/2020   Assessment Completed:  Diabetes Assessment    Medic Alert ID:  No  Meal Planning:  Avoidance of concentrated sweets   How often do you test your blood sugar?:  Daily, Other (Comment: as needed)   Do you have barriers with adherence to non-pharmacologic self-management interventions?  (Nutrition/Exercise/Self-Monitoring):  Yes   Have you ever had to go to the ED for symptoms of low blood sugar?:  No       No patient-reported symptoms   Do you have hyperglycemia symptoms?:  No   Do you have hypoglycemia symptoms?:  No   Last Blood Sugar Value:  107   Blood Sugar Monitoring Regimen:  Morning Fasting   Blood Sugar Trends:  No Change          Any ED visits or Hospitalizations since last Call? · No      Medications Reviewed with Chelita Richey today:  · Chelita Richey verbalizes that she has all medications. · Cheilta Richey denies any questions. · Any cost issues with medications No      Zone Management tool reviewed today is applicable:   Yes, Diabetes       Reviewed social needs/Home Needs if any:   · Does patient have enough food? Yes  · Does patient have transportation to appointment/store/pharmacy, etc?Yes,  drives her   · Does patient need any help in the home? Denies   · If yes, what type of help? n/a         Reviewed Upcoming follow up appointments with Chelita Richey:      Future Appointments   Date Time Provider Tara Mtz   8/18/2020 11:20 AM TARA Medina - CNP [facility]   8/24/2020  2:00 PM John Gamboa MD [facility]iff None   11/18/2020  2:20 PM Suly Mello MD [facility]   1/27/2021  3:20 PM TARA Medina - CNP [facility]       Goals reviewed. Patient to continue to work to improve:   Goals      Medication Management      I will take my medication as directed. I will notify my provider of any problems with medications, like adverse effects or side effects. I will notify my provider/Care Coordinator if I am unable to afford my medications. I will notify my provider for advice before I stop taking any of my medication.     Barriers: overwhelmed by complexity of regimen and lack of education  Plan for overcoming my barriers: working with this ambulatory care manager   -Will set up weekly med planner for organization of medications   -Will take medications as prescribed   -If needed, will set alarms or place sticky notes around the home to remind to take medications   Confidence: 8/10  Anticipated Goal Completion Date: 12/7/2020            Education Reviewed with patient today: See Education Module. Interventions completed today:  · Patient to reach out to care coordinator at [phone] or MD office with questions. · Reminded Farhana Selin of walk in care clinic availability/hours; and when to utilize the facility if MD office not available. Care Coordinator Plan of Care: This nurse CC will plan to reach out to Farhana Smallwood again in 1 week per protocol. Follow up BP check at PCP office from 8/18. Follow up home BP and blood sugar readings. Discuss diet. Follow up on her rescheduling eye appointment and getting new patient appointment with a podiatrist.        Care Coordination Interventions    Program Enrollment:  Complex Care  Referral from Primary Care Provider:  Yes  Suggested Interventions and Community Resources  Diabetes Education:  Declined  Fall Risk Prevention:  Completed  Medi Set or Pill Pack:  Completed  Registered Dietician:  [id number]  Specialty Services Referral:  In Process (Comment: Nephrology (sent 8/6/2020), need Podiatry)  Transportation Support:  Completed  Zone Management Tools:  Completed (Comment: DM)         Prior to Admission medications    Medication Sig Start Date End Date Taking?  Authorizing Provider   ferrous sulfate (FE TABS) 325 (65 Fe) MG EC tablet Take 1 tablet by mouth 2 times daily 8/6/20   TARA Landin CNP   ascorbic acid (VITAMIN C) 500 MG tablet Take 1 tablet by mouth 2 times daily With iron tablet 8/6/20   TARA Landin - CNP   atorvastatin (LIPITOR) 40 MG tablet Take 1 tablet by mouth daily 8/6/20   TARA Landin - CNP   Calcium Carbonate-Vitamin D (OYSTER SHELL CALCIUM/D) 500-200 MG-UNIT TABS Take 1 tablet by mouth 2 times daily 8/6/20   TARA Landin - CNP   Multiple Vitamins-Minerals (THERAPEUTIC MULTIVITAMIN-MINERALS) tablet Take 1 tablet by mouth daily 8/6/20 8/6/21  TARA Landin CNP   valsartan (DIOVAN) 320 MG tablet Take 1 tablet by mouth daily 7/27/20   Thomas MaskTARA Patel CNP   verapamil (CALAN SR) 240 MG extended release tablet Take 1 tablet by mouth daily 7/27/20   Thomas Wakefield MightTARA CNP   metFORMIN (GLUCOPHAGE) 500 MG tablet Take 1 tablet by mouth 2 times daily (with meals) 7/22/20   TARA Darling CNP   latanoprost (XALATAN) 0.005 % ophthalmic solution 1 drop nightly    Historical Provider, MD   potassium chloride (KLOR-CON M) 10 MEQ extended release tablet Take 2 tablets by mouth 2 times daily  Patient not taking: Reported on 7/27/2020 11/6/19   TARA Darling CNP   aspirin EC 81 MG EC tablet Take 1 tablet by mouth daily 9/23/19   Thomas MaskTARA Patel CNP   blood glucose test strips (ONE TOUCH ULTRA TEST) strip USE 1 STRIP TO CHECK GLUCOSE DAILY 3/13/19   Thomas MaskTARA Patel CNP   ONE TOUCH LANCETS MISC Lancets- One Touch [place] for testing daily and as needed.  DX: Diabetes type  E11.9 1/31/19   TARA Darling CNP   cetirizine (ZYRTEC ALLERGY) 10 MG tablet Take 1 tablet by mouth daily 6/7/18   TARA Mao CNP   Blood Glucose Monitoring Suppl GENNY 1 Units by Does not apply route 3 times daily What insurance will cover 10/11/16   TARA Long CNP

## 2020-08-18 ENCOUNTER — NURSE ONLY (OUTPATIENT)
Dept: PRIMARY CARE CLINIC | Age: 70
End: 2020-08-18

## 2020-08-18 VITALS
WEIGHT: 175.5 LBS | RESPIRATION RATE: 18 BRPM | TEMPERATURE: 97.8 F | DIASTOLIC BLOOD PRESSURE: 74 MMHG | BODY MASS INDEX: 30.12 KG/M2 | HEART RATE: 70 BPM | SYSTOLIC BLOOD PRESSURE: 122 MMHG

## 2020-08-18 NOTE — PATIENT INSTRUCTIONS
SURVEY:    You may be receiving a survey from [employer] regarding your visit today. Please complete the survey to enable us to provide the highest quality of care to you and your family. If you cannot score us a very good on any question, please call the office to discuss how we could have made your experience a very good one. Thank you. Patient Education        High Blood Pressure: Care Instructions  Overview     It's normal for blood pressure to go up and down throughout the day. But if it stays up, you have high blood pressure. Another name for high blood pressure is hypertension. Despite what a lot of people think, high blood pressure usually doesn't cause headaches or make you feel dizzy or lightheaded. It usually has no symptoms. But it does increase your risk of stroke, heart attack, and other problems. You and your doctor will talk about your risks of these problems based on your blood pressure. Your doctor will give you a goal for your blood pressure. Your goal will be based on your health and your age. Lifestyle changes, such as eating healthy and being active, are always important to help lower blood pressure. You might also take medicine to reach your blood pressure goal.  Follow-up care is a key part of your treatment and safety. Be sure to make and go to all appointments, and call your doctor if you are having problems. It's also a good idea to know your test results and keep a list of the medicines you take. How can you care for yourself at home? Medical treatment  · If you stop taking your medicine, your blood pressure will go back up. You may take one or more types of medicine to lower your blood pressure. Be safe with medicines. Take your medicine exactly as prescribed. Call your doctor if you think you are having a problem with your medicine. · Talk to your doctor before you start taking aspirin every day.  Aspirin can help certain people lower their risk of a heart attack or stroke. But taking aspirin isn't right for everyone, because it can cause serious bleeding. · See your doctor regularly. You may need to see the doctor more often at first or until your blood pressure comes down. · If you are taking blood pressure medicine, talk to your doctor before you take decongestants or anti-inflammatory medicine, such as ibuprofen. Some of these medicines can raise blood pressure. · Learn how to check your blood pressure at home. Lifestyle changes  · Stay at a healthy weight. This is especially important if you put on weight around the waist. Losing even 10 pounds can help you lower your blood pressure. · If your doctor recommends it, get more exercise. Walking is a good choice. Bit by bit, increase the amount you walk every day. Try for at least 30 minutes on most days of the week. You also may want to swim, bike, or do other activities. · Avoid or limit alcohol. Talk to your doctor about whether you can drink any alcohol. · Try to limit how much sodium you eat to less than 2,300 milligrams (mg) a day. Your doctor may ask you to try to eat less than 1,500 mg a day. · Eat plenty of fruits (such as bananas and oranges), vegetables, legumes, whole grains, and low-fat dairy products. · Lower the amount of saturated fat in your diet. Saturated fat is found in animal products such as milk, cheese, and meat. Limiting these foods may help you lose weight and also lower your risk for heart disease. · Do not smoke. Smoking increases your risk for heart attack and stroke. If you need help quitting, talk to your doctor about stop-smoking programs and medicines. These can increase your chances of quitting for good. When should you call for help? Call  911 anytime you think you may need emergency care. This may mean having symptoms that suggest that your blood pressure is causing a serious heart or blood vessel problem. Your blood pressure may be over 180/120.   For example, call 911 if:  · You have symptoms of a heart attack. These may include:  ? Chest pain or pressure, or a strange feeling in the chest.  ? Sweating. ? Shortness of breath. ? Nausea or vomiting. ? Pain, pressure, or a strange feeling in the back, neck, jaw, or upper belly or in one or both shoulders or arms. ? Lightheadedness or sudden weakness. ? A fast or irregular heartbeat. · You have symptoms of a stroke. These may include:  ? Sudden numbness, tingling, weakness, or loss of movement in your face, arm, or leg, especially on only one side of your body. ? Sudden vision changes. ? Sudden trouble speaking. ? Sudden confusion or trouble understanding simple statements. ? Sudden problems with walking or balance. ? A sudden, severe headache that is different from past headaches. · You have severe back or belly pain. Do not wait until your blood pressure comes down on its own. Get help right away. Call your doctor now or seek immediate care if:  · Your blood pressure is much higher than normal (such as 180/120 or higher), but you don't have symptoms. · You think high blood pressure is causing symptoms, such as:  ? Severe headache.  ? Blurry vision. Watch closely for changes in your health, and be sure to contact your doctor if:  · Your blood pressure measures higher than your doctor recommends at least 2 times. That means the top number is higher or the bottom number is higher, or both. · You think you may be having side effects from your blood pressure medicine. Where can you learn more? Go to https://[facility]. org and sign in to your [employer] account. Enter E040 in the AchaLa box to learn more about \"High Blood Pressure: Care Instructions. \"     If you do not have an account, please click on the \"Sign Up Now\" link. Current as of: December 16, 2019               Content Version: 12.5  © 1730-7599 Healthwise, Incorporated.    Care instructions adapted under license by [facility]

## 2020-08-20 ENCOUNTER — HOSPITAL ENCOUNTER (OUTPATIENT)
Age: 70
Discharge: HOME | End: 2020-08-20
Payer: MEDICARE

## 2020-08-20 ENCOUNTER — TELEPHONE (OUTPATIENT)
Dept: PRIMARY CARE CLINIC | Age: 70
End: 2020-08-20

## 2020-08-20 LAB
ANION GAP SERPL CALCULATED.3IONS-SCNC: 10 MMOL/L (ref 9–17)
BUN BLDV-MCNC: 9 MG/DL (ref 8–23)
BUN/CREAT BLD: 13 (ref 9–20)
CALCIUM SERPL-MCNC: 9.2 MG/DL (ref 8.6–10.4)
CHLORIDE BLD-SCNC: 107 MMOL/L (ref 98–107)
CO2: 25 MMOL/L (ref 20–31)
CREAT SERPL-MCNC: 0.71 MG/DL (ref 0.5–0.9)
GFR AFRICAN AMERICAN: >60 ML/MIN
GFR NON-AFRICAN AMERICAN: >60 ML/MIN
GFR SERPL CREATININE-BSD FRML MDRD: NORMAL ML/MIN/{1.73_M2}
GFR SERPL CREATININE-BSD FRML MDRD: NORMAL ML/MIN/{1.73_M2}
GLUCOSE BLD-MCNC: 86 MG/DL (ref 70–99)
POTASSIUM SERPL-SCNC: 4 MMOL/L (ref 3.7–5.3)
SODIUM BLD-SCNC: 142 MMOL/L (ref 135–144)

## 2020-08-20 PROCEDURE — 80048 BASIC METABOLIC PNL TOTAL CA: CPT

## 2020-08-20 PROCEDURE — 36415 COLL VENOUS BLD VENIPUNCTURE: CPT

## 2020-08-20 NOTE — TELEPHONE ENCOUNTER
----- Message from TARA Ramirez CNP sent at 8/20/2020  9:24 AM EDT -----  Please notify patient that their lab results are normal.  Please have her continue her current medications.   Thank you

## 2020-08-24 PROBLEM — N18.2 CKD (CHRONIC KIDNEY DISEASE), STAGE II: Status: ACTIVE | Noted: 2020-08-24

## 2020-08-25 ENCOUNTER — HOSPITAL ENCOUNTER (OUTPATIENT)
Age: 70
Discharge: HOME | End: 2020-08-25
Payer: MEDICARE

## 2020-08-25 LAB
ANION GAP SERPL CALCULATED.3IONS-SCNC: 10 MMOL/L (ref 9–17)
BUN BLDV-MCNC: 6 MG/DL (ref 8–23)
BUN/CREAT BLD: 7 (ref 9–20)
CALCIUM SERPL-MCNC: 9.2 MG/DL (ref 8.6–10.4)
CHLORIDE BLD-SCNC: 104 MMOL/L (ref 98–107)
CO2: 26 MMOL/L (ref 20–31)
CREAT SERPL-MCNC: 0.89 MG/DL (ref 0.5–0.9)
CREATININE URINE: 310.8 MG/DL (ref 28–217)
GFR AFRICAN AMERICAN: >60 ML/MIN
GFR NON-AFRICAN AMERICAN: >60 ML/MIN
GFR SERPL CREATININE-BSD FRML MDRD: ABNORMAL ML/MIN/{1.73_M2}
GFR SERPL CREATININE-BSD FRML MDRD: ABNORMAL ML/MIN/{1.73_M2}
GLUCOSE BLD-MCNC: 147 MG/DL (ref 70–99)
POTASSIUM SERPL-SCNC: 3.6 MMOL/L (ref 3.7–5.3)
SODIUM BLD-SCNC: 140 MMOL/L (ref 135–144)
TOTAL PROTEIN, URINE: 40 MG/DL

## 2020-08-25 PROCEDURE — 82570 ASSAY OF URINE CREATININE: CPT

## 2020-08-25 PROCEDURE — 80048 BASIC METABOLIC PNL TOTAL CA: CPT

## 2020-08-25 PROCEDURE — 84156 ASSAY OF PROTEIN URINE: CPT

## 2020-08-25 PROCEDURE — 36415 COLL VENOUS BLD VENIPUNCTURE: CPT

## 2020-08-26 ENCOUNTER — NURSE ONLY (OUTPATIENT)
Dept: PRIMARY CARE CLINIC | Age: 70
End: 2020-08-26

## 2020-08-26 VITALS
TEMPERATURE: 97.4 F | BODY MASS INDEX: 29.19 KG/M2 | RESPIRATION RATE: 20 BRPM | HEART RATE: 68 BPM | SYSTOLIC BLOOD PRESSURE: 142 MMHG | WEIGHT: 175.4 LBS | DIASTOLIC BLOOD PRESSURE: 82 MMHG

## 2020-08-26 NOTE — PATIENT INSTRUCTIONS
SURVEY:    You may be receiving a survey from [employer] regarding your visit today. Please complete the survey to enable us to provide the highest quality of care to you and your family. If you cannot score us a very good on any question, please call the office to discuss how we could have made your experience a very good one. Thank you. Patient Education        Counting Carbohydrates: Care Instructions  Your Care Instructions     You don't have to eat special foods when you have diabetes. You just have to be careful to eat healthy foods. Carbohydrates (carbs) raise blood sugar higher and quicker than any other nutrient. Carbs are found in desserts, breads and cereals, and fruit. They're also in starchy vegetables. These include potatoes, corn, and grains such as rice and pasta. Carbs are also in milk and yogurt. The more carbs you eat at one time, the higher your blood sugar will rise. Spreading carbs all through the day helps keep your blood sugar levels within your target range. Counting carbs is one of the best ways to keep your blood sugar under control. If you use insulin, counting carbs helps you match the right amount of insulin to the number of grams of carbs in a meal. Then you can change your diet and insulin dose as needed. Testing your blood sugar several times a day can help you learn how carbs affect your blood sugar. A registered dietitian or certified diabetes educator can help you plan meals and snacks. Follow-up care is a key part of your treatment and safety. Be sure to make and go to all appointments, and call your doctor if you are having problems. It's also a good idea to know your test results and keep a list of the medicines you take. How can you care for yourself at home?   Know your daily amount of carbohydrates  Your daily amount depends on several things, such as your weight, how active you are, which diabetes medicines you take, and what your goals are for your blood sugar levels. A registered dietitian or certified diabetes educator can help you plan how many carbs to include in each meal and snack. For most adults, a guideline for the daily amount of carbs is:  · 45 to 60 grams at each meal. That's about the same as 3 to 4 carbohydrate servings. · 15 to 20 grams at each snack. That's about the same as 1 carbohydrate serving. Count carbs  Counting carbs lets you know how much rapid-acting insulin to take before you eat. If you use an insulin pump, you get a constant rate of insulin during the day. So the pump must be programmed at meals. This gives you extra insulin to cover the rise in blood sugar after meals. If you take insulin:  · Learn your own insulin-to-carb ratio. You and your diabetes health professional will figure out the ratio. You can do this by testing your blood sugar after meals. For example, you may need a certain amount of insulin for every 15 grams of carbs. · Add up the carb grams in a meal. Then you can figure out how many units of insulin to take based on your insulin-to-carb ratio. · Exercise lowers blood sugar. You can use less insulin than you would if you were not doing exercise. Keep in mind that timing matters. If you exercise within 1 hour after a meal, your body may need less insulin for that meal than it would if you exercised 3 hours after the meal. Test your blood sugar to find out how exercise affects your need for insulin. If you do or don't take insulin:  · Look at labels on packaged foods. This can tell you how many carbs are in a serving. You can also use guides from the American Diabetes Association. · Be aware of portions, or serving sizes. If a package has two servings and you eat the whole package, you need to double the number of grams of carbohydrate listed for one serving. · Protein, fat, and fiber do not raise blood sugar as much as carbs do.  If you eat a lot of these nutrients in a meal, your blood sugar will rise more slowly than it would otherwise. Eat from all food groups  · Eat at least three meals a day. · Plan meals to include food from all the food groups. The food groups include grains, fruits, dairy, proteins, and vegetables. · Talk to your dietitian or diabetes educator about ways to add limited amounts of sweets into your meal plan. · If you drink alcohol, talk to your doctor. It may not be recommended when you are taking certain diabetes medicines. Where can you learn more? Go to https://[email or web address] org and sign in to your [employer] account. Enter B577 in the [facility] box to learn more about \"Counting Carbohydrates: Care Instructions. \"     If you do not have an account, please click on the \"Sign Up Now\" link. Current as of: December 20, 2019               Content Version: 12.5  © 4553-9104 [employer]. Care instructions adapted under license by [facility] ([facility]). If you have questions about a medical condition or this instruction, always ask your healthcare professional. [place] [address] any warranty or liability for your use of this information. Patient Education        High Blood Pressure: Care Instructions  Overview     It's normal for blood pressure to go up and down throughout the day. But if it stays up, you have high blood pressure. Another name for high blood pressure is hypertension. Despite what a lot of people think, high blood pressure usually doesn't cause headaches or make you feel dizzy or lightheaded. It usually has no symptoms. But it does increase your risk of stroke, heart attack, and other problems. You and your doctor will talk about your risks of these problems based on your blood pressure. Your doctor will give you a goal for your blood pressure. Your goal will be based on your health and your age. Lifestyle changes, such as eating healthy and being active, are always important to help lower blood pressure.  You might also take medicine to reach your blood pressure goal.  Follow-up care is a key part of your treatment and safety. Be sure to make and go to all appointments, and call your doctor if you are having problems. It's also a good idea to know your test results and keep a list of the medicines you take. How can you care for yourself at home? Medical treatment  · If you stop taking your medicine, your blood pressure will go back up. You may take one or more types of medicine to lower your blood pressure. Be safe with medicines. Take your medicine exactly as prescribed. Call your doctor if you think you are having a problem with your medicine. · Talk to your doctor before you start taking aspirin every day. Aspirin can help certain people lower their risk of a heart attack or stroke. But taking aspirin isn't right for everyone, because it can cause serious bleeding. · See your doctor regularly. You may need to see the doctor more often at first or until your blood pressure comes down. · If you are taking blood pressure medicine, talk to your doctor before you take decongestants or anti-inflammatory medicine, such as ibuprofen. Some of these medicines can raise blood pressure. · Learn how to check your blood pressure at home. Lifestyle changes  · Stay at a healthy weight. This is especially important if you put on weight around the waist. Losing even 10 pounds can help you lower your blood pressure. · If your doctor recommends it, get more exercise. Walking is a good choice. Bit by bit, increase the amount you walk every day. Try for at least 30 minutes on most days of the week. You also may want to swim, bike, or do other activities. · Avoid or limit alcohol. Talk to your doctor about whether you can drink any alcohol. · Try to limit how much sodium you eat to less than 2,300 milligrams (mg) a day. Your doctor may ask you to try to eat less than 1,500 mg a day.   · Eat plenty of fruits (such as bananas and oranges), vegetables, legumes, whole grains, and low-fat dairy products. · Lower the amount of saturated fat in your diet. Saturated fat is found in animal products such as milk, cheese, and meat. Limiting these foods may help you lose weight and also lower your risk for heart disease. · Do not smoke. Smoking increases your risk for heart attack and stroke. If you need help quitting, talk to your doctor about stop-smoking programs and medicines. These can increase your chances of quitting for good. When should you call for help? Call  911 anytime you think you may need emergency care. This may mean having symptoms that suggest that your blood pressure is causing a serious heart or blood vessel problem. Your blood pressure may be over 180/120. For example, call 911 if:  · You have symptoms of a heart attack. These may include:  ? Chest pain or pressure, or a strange feeling in the chest.  ? Sweating. ? Shortness of breath. ? Nausea or vomiting. ? Pain, pressure, or a strange feeling in the back, neck, jaw, or upper belly or in one or both shoulders or arms. ? Lightheadedness or sudden weakness. ? A fast or irregular heartbeat. · You have symptoms of a stroke. These may include:  ? Sudden numbness, tingling, weakness, or loss of movement in your face, arm, or leg, especially on only one side of your body. ? Sudden vision changes. ? Sudden trouble speaking. ? Sudden confusion or trouble understanding simple statements. ? Sudden problems with walking or balance. ? A sudden, severe headache that is different from past headaches. · You have severe back or belly pain. Do not wait until your blood pressure comes down on its own. Get help right away. Call your doctor now or seek immediate care if:  · Your blood pressure is much higher than normal (such as 180/120 or higher), but you don't have symptoms.   · You think high blood pressure is causing symptoms, such as:  ? Severe headache.  ? Blurry vision. Watch closely for changes in your health, and be sure to contact your doctor if:  · Your blood pressure measures higher than your doctor recommends at least 2 times. That means the top number is higher or the bottom number is higher, or both. · You think you may be having side effects from your blood pressure medicine. Where can you learn more? Go to https://[email or web address] Spinepepiceweb.[employer]. org and sign in to your [employer] account. Enter U784 in the [employer] box to learn more about \"High Blood Pressure: Care Instructions. \"     If you do not have an account, please click on the \"Sign Up Now\" link. Current as of: December 16, 2019               Content Version: 12.5  © 3685-9985 [employer]. Care instructions adapted under license by [facility] ([facility] If you have questions about a medical condition or this instruction, always ask your healthcare professional. [address] any warranty or liability for your use of this information.

## 2020-09-02 ENCOUNTER — CARE COORDINATION (OUTPATIENT)
Dept: CARE COORDINATION | Age: 70
End: 2020-09-02

## 2020-09-02 NOTE — CARE COORDINATION
Ambulatory Care Coordination Note  9/2/2020  CM Risk Score: 10  Charlson 10 Year Mortality Risk Score: 100%     ACC: Villa Arriaga, RN    Summary Note:     Date Care Coordination Episode Started:August 2020    Reason for Call Today:     -Follow up Diabetes, Blood pressure, medications    Reason patient is in Care Coordination:     -PCP Referred, medication education, HTN, Diabetes    Topics Discussed Today:     1)blood pressures requested:  -States, they are varying quite a bit at home from 160/82, HR 84, to yesterday's blood pressure 132/66. 2)Recent office Blood pressure check 08/26/20:   /82   -Did discuss with patient having home care come into home to help monitor blood pressures as these seem to be varying quite a bit still; also to review medications. -patient denies any questions regarding medications, but there was some confusion regarding these a couple weeks ago. Would like home care to assess. -patient is now agreeable to home care referral.    Will pend on separate \"orders only encounter\" for pcp to review. Urmila Murillo, Nephrology appointment reviewed:   ASSESSMENT       Diagnosis Orders   1. CKD (chronic kidney disease), stage II  Basic Metabolic Panel     Creatinine, Random Urine     Protein, urine, random     US RENAL COMPLETE      2. Microalbuminuria  3. Type 2 diabetes  4. Essential hypertension  5. Obstructive sleep apnea     RECOMMENDATIONS      1. Continue current hypertensive regimen. Discussed with her to keep a log of blood pressure and call me with results. 2. CBC/BMP to be repeated again  3. Urine routine analysis/Random Urine protein/sodium/creatinine/albumin  4. USS Kidneys for size,echogenicity and exclude obstruction  5.  Emphasized the significance of diabetic and BP control to the individual.  6.  Review in 3 months  Reviewed with patient Dr. Corin Cam office visit notes:   -reviewed importance again of monitoring blood pressures at home to update  Completed  Home Health Services:  Completed (Comment: Refer to [facility] care to monitor BP, Blood sugars, and assess medications)  Medi Set or Pill Pack:  Completed  Registered Dietician:  Completed (Comment: pt now agreeable to dietician referral, refer to [facility] dietician)  Specialty Services Referral:  In Process (Comment: Nephrology (sent 8/6/2020), need Podiatry)  Transportation Support:  Completed  Zone Management Tools:  Completed (Comment: DM)     Education Reviewed Today: See Module for details. Goals Addressed                 This Visit's Progress       Care Coordination     Medication Management   Improving     I will take my medication as directed. I will notify my provider of any problems with medications, like adverse effects or side effects. I will notify my provider/Care Coordinator if I am unable to afford my medications. I will notify my provider for advice before I stop taking any of my medication. Barriers: overwhelmed by complexity of regimen and lack of education  Plan for overcoming my barriers: working with this ambulatory care manager   -Will set up weekly med planner for organization of medications   -Will take medications as prescribed   -If needed, will set alarms or place sticky notes around the home to remind to take medications   Confidence: 8/10  Anticipated Goal Completion Date: 12/7/2020            Prior to Admission medications    Medication Sig Start Date End Date Taking?  Authorizing Provider   ferrous sulfate (FE TABS) 325 (65 Fe) MG EC tablet Take 1 tablet by mouth 2 times daily 8/6/20   Frances Bullock APRN - CNP   ascorbic acid (VITAMIN C) 500 MG tablet Take 1 tablet by mouth 2 times daily With iron tablet 8/6/20   Frances Bullock APRN - CNP   atorvastatin (LIPITOR) 40 MG tablet Take 1 tablet by mouth daily 8/6/20   Frances Bullock, APRN - CNP   Calcium Carbonate-Vitamin D (OYSTER SHELL CALCIUM/D) 500-200 MG-UNIT TABS Take 1 tablet by mouth 2 times daily 8/6/20   Keisha Franklin W Might, APRN - CNP   Multiple Vitamins-Minerals (THERAPEUTIC MULTIVITAMIN-MINERALS) tablet Take 1 tablet by mouth daily 8/6/20 8/6/21  Maryjo Nicole Might, APRN - CNP   valsartan (DIOVAN) 320 MG tablet Take 1 tablet by mouth daily 7/27/20   Maryjo Nicole Might, TARA - CNP   verapamil (CALAN SR) 240 MG extended release tablet Take 1 tablet by mouth daily 7/27/20   Maryjo Nicole Might, TARA - CNP   metFORMIN (GLUCOPHAGE) 500 MG tablet Take 1 tablet by mouth 2 times daily (with meals) 7/22/20   Maryjo Nicole Might, TARA - CNP   latanoprost (XALATAN) 0.005 % ophthalmic solution Place 1 drop into both eyes nightly     Historical Provider, MD   potassium chloride (KLOR-CON M) 10 MEQ extended release tablet Take 2 tablets by mouth 2 times daily 11/6/19   Maryjo Bullock, TARA Lares CNP   aspirin EC 81 MG EC tablet Take 1 tablet by mouth daily 9/23/19   TARA Alonso CNP   blood glucose test strips (ONE TOUCH ULTRA TEST) strip USE 1 STRIP TO CHECK GLUCOSE DAILY  Patient not taking: Reported on 8/26/2020 3/13/19   TARA Alonso CNP   ONE TOUCH LANCETS MISC Lancets- One Touch Marty Kenny for testing daily and as needed.  DX: Diabetes type  E11.9 1/31/19   TARA Alonso CNP   cetirizine (ZYRTEC ALLERGY) 10 MG tablet Take 1 tablet by mouth daily  Patient not taking: Reported on 8/26/2020 6/7/18   TARA Randall CNP   Blood Glucose Monitoring Suppl GENNY 1 Units by Does not apply route 3 times daily What insurance will cover  Patient not taking: Reported on 8/26/2020 10/11/16   TARA Mayfield CNP       Future Appointments   Date Time Provider Tara Mtz   11/18/2020  2:20 PM Alexi Martínez MD [facility]   12/14/2020 12:00 PM Lorna Walker MD [facility] Tiff None   1/27/2021  3:20 PM TARA Alonso CNP Tiff Prim Ca [facility]

## 2020-09-03 ENCOUNTER — CARE COORDINATION (OUTPATIENT)
Dept: CARE COORDINATION | Age: 70
End: 2020-09-03

## 2020-09-03 NOTE — CARE COORDINATION
Registered Dietitian Initial Assessment for 62 [address]  September 3, 2020    Initial Referral Reason: Hypertension    Patient Care Team:  [facility], APRN - CNP as PCP - General (Family Nurse Practitioner)  [facility], APRN - CNP as PCP [facility]  Rubio Dinh RN as [facility] [place], RD, LD as Dietitian    Patient Active Problem List   Diagnosis    Hypokalemia    Type 2 diabetes mellitus with diabetic nephropathy, without long-term current use of insulin ([facility] [address]    Essential hypertension    Hyperlipidemia    Bilateral leg edema    Non compliance w medication regimen    Gastroesophageal reflux disease    Mild intermittent asthma without complication    Postmenopausal    Osteopenia determined by x-ray    Chronic midline low back pain without sciatica    Calcaneal spur of left foot    Iron deficiency anemia    Recurrent UTI    ACS (acute coronary syndrome) ([facility]    Abnormal cardiovascular stress test    CKD (chronic kidney disease), stage II       Current Outpatient Medications   Medication Sig Dispense Refill    ferrous sulfate (FE TABS) 325 (65 Fe) MG EC tablet Take 1 tablet by mouth 2 times daily 180 tablet 0    ascorbic acid (VITAMIN C) 500 MG tablet Take 1 tablet by mouth 2 times daily With iron tablet 180 tablet 0    atorvastatin (LIPITOR) 40 MG tablet Take 1 tablet by mouth daily 90 tablet 1    Calcium Carbonate-Vitamin D (OYSTER SHELL CALCIUM/D) 500-200 MG-UNIT TABS Take 1 tablet by mouth 2 times daily 180 tablet 1    Multiple Vitamins-Minerals (THERAPEUTIC MULTIVITAMIN-MINERALS) tablet Take 1 tablet by mouth daily 30 tablet 11    valsartan (DIOVAN) 320 MG tablet Take 1 tablet by mouth daily 90 tablet 1    verapamil (CALAN SR) 240 MG extended release tablet Take 1 tablet by mouth daily 90 tablet 1    metFORMIN (GLUCOPHAGE) 500 MG tablet Take 1 tablet by mouth 2 times daily (with meals) 180 tablet 3    latanoprost (XALATAN) 0.005 % ophthalmic solution Place 1 drop into both eyes nightly       potassium chloride (KLOR-CON M) 10 MEQ extended release tablet Take 2 tablets by mouth 2 times daily 180 tablet 1    aspirin EC 81 MG EC tablet Take 1 tablet by mouth daily 90 tablet 1    blood glucose test strips (ONE TOUCH ULTRA TEST) strip USE 1 STRIP TO CHECK GLUCOSE DAILY (Patient not taking: Reported on 8/26/2020) 100 strip 3    ONE TOUCH LANCETS MISC Lancets- One Touch Delica for testing daily and as needed. DX: Diabetes type  E11.9 100 each 3    cetirizine (ZYRTEC ALLERGY) 10 MG tablet Take 1 tablet by mouth daily (Patient not taking: Reported on 8/26/2020) 30 tablet 3    Blood Glucose Monitoring Suppl GENNY 1 Units by Does not apply route 3 times daily What insurance will cover (Patient not taking: Reported on 8/26/2020) 1 Device 0     No current facility-administered medications for this visit.           Visit for:  Obesity/Weight loss  Diabetes: X  Hypertension: X  Hyperlipidemia:  Other:     Anthropometric Measurements:  HT: 5'4\"  Weight: 175  IBW: 120 + or - 10%  BMI: 29    Biochemical Data, Medical Tests and Procedures:    Lab Results   Component Value Date    LABA1C 5.8 08/05/2020     Lab Results   Component Value Date     08/05/2020       Lab Results   Component Value Date    CHOL 174 08/05/2020    CHOL 190 09/24/2019    CHOL 152 05/01/2019     Lab Results   Component Value Date    TRIG 113 08/05/2020    TRIG 91 09/24/2019    TRIG 102 05/01/2019     Lab Results   Component Value Date    HDL 54 08/05/2020    HDL 63 09/24/2019    HDL 51 05/01/2019     Lab Results   Component Value Date    LDLCHOLESTEROL 97 08/05/2020    LDLCHOLESTEROL 109 09/24/2019    LDLCHOLESTEROL 81 05/01/2019     Lab Results   Component Value Date    VLDL NOT REPORTED 08/05/2020    VLDL NOT REPORTED 09/24/2019    VLDL NOT REPORTED 05/01/2019     Lab Results   Component Value Date    CHOLHDLRATIO 3.2 08/05/2020    CHOLHDLRATIO 3.0 09/24/2019    CHOLHDLRATIO 3.0 05/01/2019       Lab Results   Component Value Date    WBC 5.9 05/06/2020    HGB 11.5 (L) 05/06/2020    HCT 35.9 (L) 05/06/2020    MCV 96.5 05/06/2020     05/06/2020       Lab Results   Component Value Date    CREATININE 0.89 08/25/2020    BUN 6 (L) 08/25/2020     08/25/2020    K 3.6 (L) 08/25/2020     08/25/2020    CO2 26 08/25/2020         NUTRITION DIAGNOSIS    #1 Problem  Food and nutrition-related knowledge deficit       Etiology  related to hypertension       Signs/Symptoms  as evidenced by patient unaware of foods to avoid high in sodium    NUTRITION INTERVENTION  Nutrition Prescription: used IBW    diabetic diet providing 9936-5092 kcals/day     Estimated daily CHO Needs: 45 gms/meal, 15gm/snack  Protein needs: 50gms/d  Fluid needs: 1800cc/day      Patient Goals:  1. Discussed what foods contain carbohydrate, what a serving size is of carbs and how to measure servings out to limit carbohydrates at meals and snacks.  Goal is 45gms of carb/meal, 15gms/snack. Low carb snacking discussed- list of snacks will be mailed to patient's home. 2. Patient will eat 3 meals daily spaced every 4-5 hours. Discussed importance of not going too long between meals and having a snack if going too long. Goal is 3 meals daily spaced every 4-5 hours. 3. Discussed using plate method at meals and working on increasing non-starchy vegetables. We discussed what non-starchy vegetables are and encouraged patient to make 1/2 her plate non-starchy vegetables, 1/4 lean protein, 1/4 carbs at meals. Foods from each category reviewed along with serving size. 4. Patient will avoid/limit canned and prepackaged/processed foods.  Discussed seasonings and sauces to avoid high in sodium.  Goal is <2gms of sodium/day. 5. Encouraged patient to shop the outer rim of the grocery store where fresh food are found. Patient will be sent a list of high sodium foods to avoid.     Nutrition Intervention Need:  Initial/Brief:  Comprehensive Nutrition Education: X  Coordination of other care during nutrition care:    Monitoring and Evaluation:  Ability to plan meals/snacks:  Ability to select healthy foods/meals: X  Food and Nutrition Knowledge: X  Self Monitoring:  Physical Activity:  Energy intake:  Fluid/beverage intake:  Fat/chol intake:  Carb intake: X  Other: sodium intake:X    Plan:  1. Will continue to educate patient on reading food labels, portion control, plate method, carb counting, low carb snacking, importance of meal pattern, diabetic friendly meal ideas and low sodium guidelines. Patient will be provided/mailed nutrition information on all the above discussed. 2. Will follow up with patient in 2-3 weeks to review nutrition information and answer questions.     JENNIFER Womack

## 2020-09-10 ENCOUNTER — CARE COORDINATION (OUTPATIENT)
Dept: CARE COORDINATION | Age: 70
End: 2020-09-10

## 2020-09-14 ENCOUNTER — TELEPHONE (OUTPATIENT)
Dept: PRIMARY CARE CLINIC | Age: 70
End: 2020-09-14

## 2020-09-14 ENCOUNTER — CARE COORDINATION (OUTPATIENT)
Dept: CARE COORDINATION | Age: 70
End: 2020-09-14

## 2020-09-14 NOTE — CARE COORDINATION
Ambulatory Care Coordination Note  9/14/2020  CM Risk Score: 10  Charlson 10 Year Mortality Risk Score: 100%     ACC: Armando Reddy, RN    Summary Note:     Date Care Coordination Episode Started:August 2020    Reason for Call Today:     -follow up Blood pressure, Follow up Diabetes    Reason patient is in Care Coordination:     -Varying blood pressures, Diabetes  -Spoke to patient and  both on the phone:     Topics Discussed Today:     1)Elevated Blood pressure:   -Informs, \"my  has them wrote down, requested these:    Blood pressures reviewed with patient and spouse:   09//91, HR 68, pm 182/88, HR 66  09/05: am 172/88, HR 64, pm 131/74, HR 69  09/06: am 159/74, HR 83, pm 171/82  09/07: am 191/69, HR 79, pm 180/87, HR 68  09/08: pm 174/88, no HR  09/09: pm 176/86, no HR  09/10: am 227/102 (requested patient when blood pressure this high, needs to relax, feet flat on the floor, should sit and relax, and recheck blood pressure again in 15 minutes, if continues to be that high, should report to Emergency room) patient denies any symptoms when blood pressure this high  09/11: am 181/80 , pm 180/127  09/12: no readings  09/13 151/83, HR 73; pm 176/62  09/14: 173/89, HR 74  -Requested Heart Rates to be recorded with each blood pressure check.   -Will forward blood pressure update to PCP    Reviewed Blood Pressure Medications as follows;   1)Diovan and 2)Calan  -Patient denies any missed doses of these medications    Diabetes Follow up:   -No new concerns with Diabetes. -  Lab Results   Component Value Date    LABA1C 5.8 08/05/2020     Lab Results   Component Value Date     08/05/2020     -Patient continues to monitor:   Diet reviewed; [facility] [place] dietician working with patient for Diabetes and Low Sodium diet education:   -Reviewed Dietician recommendations:      Patient Goals:  1.  Discussed what foods contain carbohydrate, what a serving size is of carbs and how to measure servings out to limit carbohydrates at meals and snacks.  Goal is 45gms of carb/meal, 15gms/snack. Low carb snacking discussed- list of snacks will be mailed to patient's home. 2. Patient will eat 3 meals daily spaced every 4-5 hours. Discussed importance of not going too long between meals and having a snack if going too long. Goal is 3 meals daily spaced every 4-5 hours. 3. Discussed using plate method at meals and working on increasing non-starchy vegetables. We discussed what non-starchy vegetables are and encouraged patient to make 1/2 her plate non-starchy vegetables, 1/4 lean protein, 1/4 carbs at meals. Foods from each category reviewed along with serving size. 4. Patient will avoid/limit canned and prepackaged/processed foods.  Discussed seasonings and sauces to avoid high in sodium.  Goal is <2gms of sodium/day. 5. Encouraged patient to shop the outer rim of the grocery store where fresh food are found. Patient will be sent a list of high sodium foods to avoid.     Interventions completed today:  Requested PCP appointment to follow up on Blood pressures (continuing to vary greatly)  -forwarded update to PCP    Assessments completed today:     -  Diabetes Assessment    Medic Alert ID:  No  Meal Planning:  Avoidance of concentrated sweets   How often do you test your blood sugar?:  Daily, Other (Comment: as needed)   Do you have barriers with adherence to non-pharmacologic self-management interventions?  (Nutrition/Exercise/Self-Monitoring):  Yes   Have you ever had to go to the ED for symptoms of low blood sugar?:  No       No patient-reported symptoms   Do you have hyperglycemia symptoms?:  No   Do you have hypoglycemia symptoms?:  No   Blood Sugar Monitoring Regimen:  Before Meals   Blood Sugar Trends:  No Change       and   General Assessment    Do you have any symptoms that are causing concern?:  No           Care Coordinator plan of care:   -will await call back from PCP office to schedule patient follow up on blood pressure.  -Will then follow up next week;   -How are blood pressures?   -Follow up medications: Are you putting medications in medplanner?  -How are blood pressures? Heart rates?   -How are blood sugars? Following [facility] dietician recommendations? Care Coordination Interventions    Program Enrollment:  Complex Care  Referral from Primary Care Provider:  Yes  Suggested Interventions and Community Resources  Diabetes Education:  Declined  Fall Risk Prevention:  Completed  Home Health Services:  Completed (Comment: Refer to [facility] to monitor BP, Blood sugars, and assess medications)  Medi Set or Pill Pack:  Completed  Registered Dietician:  Completed (Comment: pt now agreeable to dietician referral, refer to [facility] dietician)  Specialty Services Referral:  In Process (Comment: Nephrology (sent 8/6/2020), need Podiatry)  Transportation Support:  Completed  Zone Management Tools:  Completed (Comment: DM)       Education Reviewed Today: See Module for details. Goals Addressed                 This Visit's Progress       Care Coordination     Medication Management   Improving     I will take my medication as directed. I will notify my provider of any problems with medications, like adverse effects or side effects. I will notify my provider/Care Coordinator if I am unable to afford my medications. I will notify my provider for advice before I stop taking any of my medication. Barriers: overwhelmed by complexity of regimen and lack of education  Plan for overcoming my barriers: working with this ambulatory care manager   -Will set up weekly med planner for organization of medications   -Will take medications as prescribed   -If needed, will set alarms or place sticky notes around the home to remind to take medications   Confidence: 8/10  Anticipated Goal Completion Date: 12/7/2020            Prior to Admission medications    Medication Sig Start Date End Date Taking?  Authorizing Provider   ferrous sulfate (FE TABS) 325 (65 Fe) MG EC tablet Take 1 tablet by mouth 2 times daily 8/6/20   Cheron Curd Might, APRN - CNP   ascorbic acid (VITAMIN C) 500 MG tablet Take 1 tablet by mouth 2 times daily With iron tablet 8/6/20   Cheron Curd Might, APRN - CNP   atorvastatin (LIPITOR) 40 MG tablet Take 1 tablet by mouth daily 8/6/20   Cheron Curd Might, APRN - CNP   Calcium Carbonate-Vitamin D (OYSTER SHELL CALCIUM/D) 500-200 MG-UNIT TABS Take 1 tablet by mouth 2 times daily 8/6/20   Cheron Curd Might, APRN - CNP   Multiple Vitamins-Minerals (THERAPEUTIC MULTIVITAMIN-MINERALS) tablet Take 1 tablet by mouth daily 8/6/20 8/6/21  Cheron Curd Might, TARA - CNP   valsartan (DIOVAN) 320 MG tablet Take 1 tablet by mouth daily 7/27/20   Cheron Curd Might, APRN - CNP   verapamil (CALAN SR) 240 MG extended release tablet Take 1 tablet by mouth daily 7/27/20   Cheron Curd Might, APRN - CNP   metFORMIN (GLUCOPHAGE) 500 MG tablet Take 1 tablet by mouth 2 times daily (with meals) 7/22/20   Cheron Curd Might, APRN - CNP   latanoprost (XALATAN) 0.005 % ophthalmic solution Place 1 drop into both eyes nightly     Historical Provider, MD   potassium chloride (KLOR-CON M) 10 MEQ extended release tablet Take 2 tablets by mouth 2 times daily 11/6/19   Cheron Curd Might, TARA - CNP   aspirin EC 81 MG EC tablet Take 1 tablet by mouth daily 9/23/19   Cheron Curd Might, TARA Lares CNP   blood glucose test strips (ONE TOUCH ULTRA TEST) strip USE 1 STRIP TO CHECK GLUCOSE DAILY  Patient not taking: Reported on 8/26/2020 3/13/19   Ryannon Tasha Might, APRN - CNP   ONE TOUCH LANCETS MISC Lancets- One Touch Janeane Nurse for testing daily and as needed.  DX: Diabetes type  E11.9 1/31/19 Cheron Curcameron Might, APRN - CNP   cetirizine (ZYRTEC ALLERGY) 10 MG tablet Take 1 tablet by mouth daily  Patient not taking: Reported on 8/26/2020 6/7/18   Reddy Nunnery Rosipko, APRN - CNP   Blood Glucose Monitoring Suppl GENNY 1 Units by Does not apply route 3 times daily What insurance will cover  Patient not taking: Reported on 8/26/2020 10/11/16   TARA Rodriguez CNP       Future Appointments   Date Time Provider Tara Mtz   11/18/2020  2:20 PM Roberta De León MD [facility]   12/14/2020 12:00 PM Amrita Davis MD [facility] Tiff None   1/27/2021  3:20 PM TARA Jim CNP Tiff Prim Ca [facility]

## 2020-09-14 NOTE — TELEPHONE ENCOUNTER
Blood pressures reviewed with patient and spouse:   09//91, HR 68, pm 182/88, HR 66  09/05: am 172/88, HR 64, pm 131/74, HR 69  09/06: am 159/74, HR 83, pm 171/82  09/07: am 191/69, HR 79, pm 180/87, HR 68  09/08: pm 174/88, no HR  09/09: pm 176/86, no HR  09/10: am 227/102 (requested patient when blood pressure this high, needs to relax, feet flat on the floor, should sit and relax, and recheck blood pressure again in 15 minutes, if continues to be that high, should report to Emergency room) patient denies any symptoms when blood pressure this high  09/11: am 181/80 , pm 180/127  09/12: no readings  09/13 151/83, HR 73; pm 176/62  09/14: 173/89, HR 74. [facility], requesting BP check, would you like regular appointment with patient.

## 2020-09-15 NOTE — CARE COORDINATION
Phone call to patient today to inform she is scheduled with Blane Bullock Thursday at 940am:   -patient verbalizes with good understanding.  -Requested patient to please bring blood pressure cuff with her to appointment. Future Appointments   Date Time Provider Tara Mtz   9/17/2020  9:40 AM TARA Perry CNPf Prim Ca [facility]   11/18/2020  2:20 PM Rich Short MD [facility]   12/14/2020 12:00 PM Malka Prasad MD [facility] Tiff None   1/27/2021  3:20 PM TARA Perry CNP Tiff Prim Ca [facility]     Care Coordination Plan of Care: This nurse Care Coordinator will follow up with patient next week  -How was PCP Appointment?   -How are blood pressures?   -Any medication Changes?

## 2020-09-17 ENCOUNTER — CARE COORDINATION (OUTPATIENT)
Dept: CARE COORDINATION | Age: 70
End: 2020-09-17

## 2020-09-17 ENCOUNTER — NURSE ONLY (OUTPATIENT)
Dept: PRIMARY CARE CLINIC | Age: 70
End: 2020-09-17

## 2020-09-17 VITALS
SYSTOLIC BLOOD PRESSURE: 164 MMHG | WEIGHT: 175.6 LBS | DIASTOLIC BLOOD PRESSURE: 73 MMHG | RESPIRATION RATE: 20 BRPM | HEIGHT: 65 IN | TEMPERATURE: 97.5 F | HEART RATE: 75 BPM | BODY MASS INDEX: 29.26 KG/M2

## 2020-09-17 RX ORDER — ATENOLOL 50 MG/1
50 TABLET ORAL DAILY
Qty: 90 TABLET | Refills: 0 | Status: SHIPPED | OUTPATIENT
Start: 2020-09-17 | End: 2020-10-17 | Stop reason: SDUPTHER

## 2020-09-17 NOTE — CARE COORDINATION
Nutrition Care Coordinator Follow-Up visit:    Food Recall: eating 2-3 meals/d    Activity Level:  Sedentary: X  Lightly Active: Moderately Active:  Very Active:    Adult BMI:  Underweight (below 18.5)  Normal Weight (18.5-24.9)  Overweight (25-29. 9) X  Obese (30-39. 9)  Morbidly Obese (>40)    Weight Change: no change    Plan:  Plan was established with patient:  Increase dietary fiber by consuming whole grains, fruits and vegetables: X  Limit dietary cholesterol to >200mg/day: Increase water intake:  Avoid added sugar: X  Avoid sweetened beverages: X  Choose lean meats:  <2gms of sodium/d: X      Monitoring: Will monitor weight:  Will monitor adherence to meal plan: X  Will monitor adherence to exercise plan: Will monitor HGA1c:    Handouts Provided :  Low Carb snacking: X  Carb counting /individual meal plan:  Portion Control: x  Food Labels: X  Physical Activity:  Low Fat/Cholesterol:  Hypo/Hyperglycemia:  Calorie Controlled Meal Plan:  Low sodium guidelines: X    Goals: Increase water consumption to 8oz. 6-8 times daily:  Manage blood sugars by consuming 3 meals spaced every 4-5 hours with 2-3 snacks daily: discussed  Increase fiber and decrease fat intake by consuming 1-2 fruit servings and 2-3 vegetable servings per day. Increase physical activity by:  Consume less than 2,000mg of sodium/day: discussed  Avoid consumption of sweetened beverages and added sugar by reading food labels: discussed  Monitor blood sugars by using meter to check blood glucose before morning meal and 2 hours after a meal daily: BS stable A1c- 5.8  Decrease risk of coronary heart disease by consuming fish that contains omega-3 fatty acids at least twice a week, avoiding partially hydrogenated oil/trans fats and limiting saturated fat intake by reading food labels: discussed    Patient goals set:  1.  Reviewed what foods contain carbohydrate, what a serving size is of carbs and how to measure servings out to limit carbohydrates at meals and snacks.  Goal is 45gms of carb/meal, 15gms/snack. Low carb snacking reviewed. 2. Reviewed importance of not going too long between meals and having a snack if going too long, patient admits she is not eating lunch- discussed at least having a snack. Goal is 3 meals daily spaced every 4-5 hours. 3. Reviewed using plate method at meals and working on increasing non-starchy vegetables. We discussed what non-starchy vegetables are and encouraged patient to make 1/2 her plate non-starchy vegetables, 1/4 lean protein, 1/4 carbs at meals. Foods from each category reviewed along with serving size. 4. Patient will avoid/limit canned and prepackaged/processed foods.  Reviewed seasonings and sauces to avoid high in sodium.  Goal is <2gms of sodium/day. 5. Reviewed shopping the outer rim of the grocery store where fresh food are found. Patient will be sent a list of high sodium foods to avoid. Patient relays doing ok, working on limiting her sodium intake. We discussed how to read food labels to identify high sodium foods. Patient had no questions at this time. Will follow up in 3-4 weeks to review and answer questions.        Heath Broussard

## 2020-09-19 ENCOUNTER — HOSPITAL ENCOUNTER (EMERGENCY)
Age: 70
Discharge: HOME | End: 2020-09-19
Payer: MEDICARE

## 2020-09-19 VITALS
HEART RATE: 78 BPM | HEIGHT: 65 IN | WEIGHT: 176 LBS | BODY MASS INDEX: 29.32 KG/M2 | SYSTOLIC BLOOD PRESSURE: 171 MMHG | OXYGEN SATURATION: 95 % | TEMPERATURE: 98.2 F | DIASTOLIC BLOOD PRESSURE: 68 MMHG | RESPIRATION RATE: 22 BRPM

## 2020-09-19 PROCEDURE — 99282 EMERGENCY DEPT VISIT SF MDM: CPT

## 2020-09-19 PROCEDURE — 2500000003 HC RX 250 WO HCPCS: Performed by: NURSE PRACTITIONER

## 2020-09-19 PROCEDURE — 96374 THER/PROPH/DIAG INJ IV PUSH: CPT

## 2020-09-19 RX ORDER — LABETALOL HYDROCHLORIDE 5 MG/ML
10 INJECTION, SOLUTION INTRAVENOUS ONCE
Status: COMPLETED | OUTPATIENT
Start: 2020-09-19 | End: 2020-09-19

## 2020-09-19 RX ADMIN — LABETALOL HYDROCHLORIDE 10 MG: 5 INJECTION INTRAVENOUS at 17:46

## 2020-09-19 NOTE — ED PROVIDER NOTES
[facility]  EMERGENCY DEPARTMENT ENCOUNTER      Pt Name: Smita Nielsen  MRN: [id number]  [place] 1950  Date of evaluation: 9/19/2020  Provider: TARA Moeller [id number]       Chief Complaint   Patient presents with    Hypertension     Pt concerned about her blood pressure. HISTORY OF PRESENT ILLNESS   (Location/Symptom, Timing/Onset, Context/Setting, Quality, Duration, Modifying Factors, Severity)  Note limiting factors. Smita Nielsen is a 79 y.o. female who presents to the emergency department for a complaint of high blood pressure. Patient denies any chest pain or shortness of breath or headaches. She denies any other complaints. She states she has been trying to get her blood pressure managed with her primary care provider and is on 2 blood pressure medications      Nursing Notes were reviewed. REVIEW OF SYSTEMS    (2-9 systems for level 4, 10 or more for level 5)   Constitutional: Negative for fever, chills  Skin: Negative for rash, itching  HENT: Negative for sore throat, rhinorrhea and sinus pain. Eyes: Negative for eye discharge, eye redness  Cardiovascular: Negative for chest pain, dyspnea on exertion  Respiratory: Negative for cough, shortness of breath, wheezing or stridor. Gastrointestinal: Negative for nausea, vomiting, abdominal pain, diarrhea  Genitourinary: Negative for bladder incontinence, dysuria, frequency. Musculoskeletal: Negative for myalgias, back pain, falls. Neurological: Negative for tingling, headaches. Except as noted above the remainder of the review of systems was reviewed and negative. PAST MEDICAL HISTORY     Past Medical History:   Diagnosis Date    Arthritis     Asthma     Chronic bronchitis ([address]     Diabetes mellitus ([address]     GERD (gastroesophageal reflux disease)     H/O echocardiogram 3/9/16    EF >60%. LV wall thickness is mildly increased. Mild mitral and tricuspid regurgitation.   Borderline pulmonary hypertension estimated right ventricular sysolic pressure of 61HJPW. Mild (grade l) diastolic dysfunction.  History of PFTs 3/10/16     Suboptimal effort. Restrictive in nature. clionical correlations is advised.  History of stress test 16    Abnoraml. Moderate perfusion defect of mild to moderate intensity in the anterolateral and anteroapical regions during stress imaging. Intermediate risk for CAD.     Hyperlipidemia     Hypertension          SURGICAL HISTORY       Past Surgical History:   Procedure Laterality Date    BREAST SURGERY      cyst removed    CARDIAC CATHETERIZATION      CARDIAC CATHETERIZATION Left 2019    Right Radial/[facility]/     SECTION      CHOLECYSTECTOMY      COLONOSCOPY  3/23/15    -diverticulosis,hemorrhoids    FOOT SURGERY      rt    HYSTERECTOMY           CURRENT MEDICATIONS       Current Discharge Medication List      CONTINUE these medications which have NOT CHANGED    Details   atenolol (TENORMIN) 50 MG tablet Take 1 tablet by mouth daily  Qty: 90 tablet, Refills: 0    Associated Diagnoses: Essential hypertension      ferrous sulfate (FE TABS) 325 (65 Fe) MG EC tablet Take 1 tablet by mouth 2 times daily  Qty: 180 tablet, Refills: 0    Associated Diagnoses: Iron deficiency anemia, unspecified iron deficiency anemia type      ascorbic acid (VITAMIN C) 500 MG tablet Take 1 tablet by mouth 2 times daily With iron tablet  Qty: 180 tablet, Refills: 0    Associated Diagnoses: Iron deficiency anemia, unspecified iron deficiency anemia type      atorvastatin (LIPITOR) 40 MG tablet Take 1 tablet by mouth daily  Qty: 90 tablet, Refills: 1    Associated Diagnoses: Mixed hyperlipidemia      Calcium Carbonate-Vitamin D (OYSTER SHELL CALCIUM/D) 500-200 MG-UNIT TABS Take 1 tablet by mouth 2 times daily  Qty: 180 tablet, Refills: 1      Multiple Vitamins-Minerals (THERAPEUTIC MULTIVITAMIN-MINERALS) tablet Take 1 tablet by mouth daily  Qty: 30 tablet, Refills: 11    Associated Diagnoses: Iron deficiency anemia, unspecified iron deficiency anemia type      valsartan (DIOVAN) 320 MG tablet Take 1 tablet by mouth daily  Qty: 90 tablet, Refills: 1    Associated Diagnoses: Essential hypertension      verapamil (CALAN SR) 240 MG extended release tablet Take 1 tablet by mouth daily  Qty: 90 tablet, Refills: 1    Associated Diagnoses: Essential hypertension      metFORMIN (GLUCOPHAGE) 500 MG tablet Take 1 tablet by mouth 2 times daily (with meals)  Qty: 180 tablet, Refills: 3    Associated Diagnoses: Type 2 diabetes mellitus without complication ([facility])      latanoprost (XALATAN) 0.005 % ophthalmic solution Place 1 drop into both eyes nightly       potassium chloride (KLOR-CON M) 10 MEQ extended release tablet Take 2 tablets by mouth 2 times daily  Qty: 180 tablet, Refills: 1    Associated Diagnoses: Essential hypertension      aspirin EC 81 MG EC tablet Take 1 tablet by mouth daily  Qty: 90 tablet, Refills: 1    Associated Diagnoses: Type 2 diabetes mellitus with diabetic nephropathy, without long-term current use of insulin ([facility])      blood glucose test strips (ONE TOUCH ULTRA TEST) strip USE 1 STRIP TO CHECK GLUCOSE DAILY  Qty: 100 strip, Refills: 3    Comments: Please consider 90 day supplies to promote better adherence  Associated Diagnoses: Type 2 diabetes mellitus without complication, without long-term current use of insulin ([facility])      ONE TOUCH LANCETS MISC Lancets- One Touch Delica for testing daily and as needed.  DX: Diabetes type  E11.9  Qty: 100 each, Refills: 3      cetirizine (ZYRTEC ALLERGY) 10 MG tablet Take 1 tablet by mouth daily  Qty: 30 tablet, Refills: 3    Associated Diagnoses: Acute seasonal allergic rhinitis, unspecified trigger      Blood Glucose Monitoring Suppl GENNY 1 Units by Does not apply route 3 times daily What insurance will cover  Qty: 1 Device, Refills: 0    Associated Diagnoses: Type 2 diabetes mellitus without complication, without long-term current use of insulin ([facility])             ALLERGIES     Iv dye [iodides]    FAMILY HISTORY       Family History   Problem Relation Age of Onset   Sophia Latin Stroke Father    Sophia Latin Stroke Sister     Diabetes Sister         x6 sisters    High Blood Pressure Sister     Stroke Brother     Diabetes Brother           SOCIAL HISTORY       Social History     Socioeconomic History    Marital status:      Spouse name: None    Number of children: None    Years of education: None    Highest education level: None   Occupational History    None   Social Needs    Financial resource strain: Not hard at all   [place] insecurity     Worry: Never true     Inability: Never true    Transportation needs     Medical: No     Non-medical: No   Tobacco Use    Smoking status: Never Smoker    Smokeless tobacco: Never Used   Substance and Sexual Activity    Alcohol use: No     Alcohol/week: 0.0 standard drinks     Frequency: Never     Binge frequency: Never    Drug use: No    Sexual activity: Yes     Partners: Male     Birth control/protection: Post-menopausal   Lifestyle    Physical activity     Days per week: 0 days     Minutes per session: 0 min    Stress: Not at all   Relationships    Social connections     Talks on phone: None     Gets together: None     Attends Denominational service: None     Active member of club or organization: None     Attends meetings of clubs or organizations: None     Relationship status: None    Intimate partner violence     Fear of current or ex partner: None     Emotionally abused: None     Physically abused: None     Forced sexual activity: None   Other Topics Concern    None   Social History Narrative    None       PHYSICAL EXAM    (up to 7 for level 4, 8 or more for level 5)     ED Triage Vitals   BP Temp Temp Source Pulse Resp SpO2 Height Weight   09/19/20 1722 09/19/20 1720 09/19/20 1720 09/19/20 1720 09/19/20 1722 09/19/20 1720 09/19/20 1720 09/19/20 1720   (!) 244/107 98.2 °F (36.8 °C) Oral 75 16 98 % 5' 5\" (1.651 m) 176 lb (79.8 kg)     Constitutional: Oriented and well-developed, well-nourished, and in no distress. Non-toxic appearance. Head: Normocephalic and atraumatic. Eyes: Extra ocular muscles intact. Pupils are equal, round, and reactive to light. No discharge. No scleral icterus. Neck: Normal range of motion and phonation normal. Neck supple. No JVD present. No spinous process tenderness and no muscular tenderness present. No cervical adenopathy. Cardiovascular: Regular rate and rhythm, normal heart sounds and intact distal pulses. No murmur heard. Pulmonary/Chest: Effort normal and breath sounds normal. No accessory muscle usage or stridor. Not tachypneic. No respiratory distress. No tenderness and no retraction. Abdominal: Soft and non-distended. Bowel sounds are normal. No masses or organomegaly. No significant tenderness. No rebound, no guarding and no CVA tenderness. No appreciable hernia. Musculoskeletal: Normal range of motion. No edema and no tenderness. Neurological: Alert and oriented. Skin: Skin is warm and dry. No rash noted. No cyanosis or erythema. No pallor. Nails show no clubbing. Psychiatric: Mood, memory, affect and judgment normal.  Exhibits ordered thought content. Affect appears normal.    DIAGNOSTIC RESULTS     EKG: All EKG's are interpreted by the Emergency Department Physician who either signs or Co-signs this chart in the absence of a cardiologist.      RADIOLOGY:   Non-plain film images such as CT, Ultrasound and MRI are read by the radiologist. Plain radiographic images are visualized and preliminarily interpreted by the emergency physician with the below findings:    Interpretation per the Radiologist below, if available at the time of this note:    No orders to display         ED BEDSIDE ULTRASOUND:   Performed by ED Physician - none    LABS:  No results found for this visit on 09/19/20.   No orders of the defined

## 2020-09-21 ENCOUNTER — CARE COORDINATION (OUTPATIENT)
Dept: CARE COORDINATION | Age: 70
End: 2020-09-21

## 2020-09-21 NOTE — CARE COORDINATION
Ambulatory Care Coordination  ED Follow up Call    Reason for ED visit:  HTN: Reviewed Blood pressures from ER:         Vitals 9/19/2020 9/19/2020 9/19/2020 9/19/2020 0/22/7702   SYSTOLIC 214 055 [phone]   DIASTOLIC 68 76 [phone]   Site        Position        Cuff Size        Pulse 78 60 61 67    Temp        Resp 22 14 12 15    SpO2 95 96 96 96 95   Weight        Height        BMI (wt*703/ht~2)        Pain Level          Status:     Improved--Blood pressure today, \"144/75, HR 68\"    ER follow up/Reviewed Discharge Instructons: FINAL IMPRESSION       1. Hypertension, unspecified type Stable          DISPOSITION/PLAN   DISPOSITION Decision To Discharge 09/19/2020 06:27:22 PM        PATIENT REFERRED TO:  TARA Blackmon CNP  [facility]  [phone]     Schedule an appointment as soon as possible for a visit in 2 days          DISCHARGE MEDICATIONS:    Did you call your PCP prior to going to the ED? No      Did you receive a discharge instructions from the Emergency Room? Yes  Review of Instructions:     Understands what to report/when to return?:  Yes   Understands discharge instructions?:  Yes   Following discharge instructions?:  Yes   If not why? n/a    Are there any new complaints of pain? No  New Pain Meds? No    Constipation prophylaxis needed? N/A    If you have a wound is the dressing clean, dry, and intact? N/A  Understands wound care regimen? N/A    Are there any other complaints/concerns that you wish to tell your provider? No, denies.      FU appts/Provider:    Future Appointments   Date Time Provider Tara Mtz   9/22/2020  9:00 AM TARA Garcia CNP [facility]   10/1/2020  2:40 PM TARA Garcia CNP [facility]   11/18/2020  2:20 PM Kellee Huntley MD [facility]   12/14/2020 12:00 PM Castro Pagan MD [facility] None   1/27/2021  3:20 PM TARA Garcia CNP [facility]     -Did Contact PCP office today, and request ED follow up for patient.  -Scheduled for 09/22/20  -patient's spouse, 'will take her to appointment. Requested patient to bring along blood pressure cuff so blood pressures can get monitored closely      New Medications?:   No      Medication Reconciliation by phone - Yes  Understands Medications? Yes  Taking Medications? Yes  Can you swallow your pills? Yes  Reviewed Blood Pressure Medications with patient:   1)Diovan: Daily  2)Atenolol: Dialy  3)Verapamil: Daily  Any further needs in the home i.e. Equipment? No    Link to services in community?:  N/A   Which services:  N/a; denies. Care Coordination Plan of Care: This nurse Care Coordinator will   -follow up later this week, follow up on blood pressures, medications, any new issues  -Follow pu Diabetes? These have been controlled (what are blood pressures?

## 2020-09-22 ENCOUNTER — OFFICE VISIT (OUTPATIENT)
Dept: PRIMARY CARE CLINIC | Age: 70
End: 2020-09-22
Payer: MEDICARE

## 2020-09-22 VITALS
BODY MASS INDEX: 29.06 KG/M2 | DIASTOLIC BLOOD PRESSURE: 61 MMHG | HEART RATE: 67 BPM | HEIGHT: 65 IN | SYSTOLIC BLOOD PRESSURE: 141 MMHG | RESPIRATION RATE: 16 BRPM | WEIGHT: 174.4 LBS | TEMPERATURE: 96.7 F

## 2020-09-22 PROCEDURE — 1123F ACP DISCUSS/DSCN MKR DOCD: CPT | Performed by: NURSE PRACTITIONER

## 2020-09-22 PROCEDURE — G8399 PT W/DXA RESULTS DOCUMENT: HCPCS | Performed by: NURSE PRACTITIONER

## 2020-09-22 PROCEDURE — 3017F COLORECTAL CA SCREEN DOC REV: CPT | Performed by: NURSE PRACTITIONER

## 2020-09-22 PROCEDURE — 4040F PNEUMOC VAC/ADMIN/RCVD: CPT | Performed by: NURSE PRACTITIONER

## 2020-09-22 PROCEDURE — G8427 DOCREV CUR MEDS BY ELIG CLIN: HCPCS | Performed by: NURSE PRACTITIONER

## 2020-09-22 PROCEDURE — 1090F PRES/ABSN URINE INCON ASSESS: CPT | Performed by: NURSE PRACTITIONER

## 2020-09-22 PROCEDURE — 99214 OFFICE O/P EST MOD 30 MIN: CPT | Performed by: NURSE PRACTITIONER

## 2020-09-22 PROCEDURE — 1036F TOBACCO NON-USER: CPT | Performed by: NURSE PRACTITIONER

## 2020-09-22 PROCEDURE — G8417 CALC BMI ABV UP PARAM F/U: HCPCS | Performed by: NURSE PRACTITIONER

## 2020-09-22 RX ORDER — DORZOLAMIDE HYDROCHLORIDE AND TIMOLOL MALEATE 20; 5 MG/ML; MG/ML
1 SOLUTION/ DROPS OPHTHALMIC 2 TIMES DAILY
COMMUNITY
Start: 2020-09-20

## 2020-09-22 ASSESSMENT — ENCOUNTER SYMPTOMS
DIARRHEA: 0
VOMITING: 0
BLURRED VISION: 0
WHEEZING: 0
CONSTIPATION: 0
RHINORRHEA: 0
COUGH: 0
NAUSEA: 0
SORE THROAT: 0
SHORTNESS OF BREATH: 0
ORTHOPNEA: 0
ABDOMINAL PAIN: 0

## 2020-09-22 NOTE — PATIENT INSTRUCTIONS
SURVEY:    You may be receiving a survey from [email or web address] regarding your visit today. Please complete the survey to enable us to provide the highest quality of care to you and your family. If you cannot score us a very good on any question, please call the office to discuss how we could have made your experience a very good one. Thank you. Patient Education        High Blood Pressure: Care Instructions  Overview     It's normal for blood pressure to go up and down throughout the day. But if it stays up, you have high blood pressure. Another name for high blood pressure is hypertension. Despite what a lot of people think, high blood pressure usually doesn't cause headaches or make you feel dizzy or lightheaded. It usually has no symptoms. But it does increase your risk of stroke, heart attack, and other problems. You and your doctor will talk about your risks of these problems based on your blood pressure. Your doctor will give you a goal for your blood pressure. Your goal will be based on your health and your age. Lifestyle changes, such as eating healthy and being active, are always important to help lower blood pressure. You might also take medicine to reach your blood pressure goal.  Follow-up care is a key part of your treatment and safety. Be sure to make and go to all appointments, and call your doctor if you are having problems. It's also a good idea to know your test results and keep a list of the medicines you take. How can you care for yourself at home? Medical treatment  · If you stop taking your medicine, your blood pressure will go back up. You may take one or more types of medicine to lower your blood pressure. Be safe with medicines. Take your medicine exactly as prescribed. Call your doctor if you think you are having a problem with your medicine. · Talk to your doctor before you start taking aspirin every day.  Aspirin can help certain people lower their risk of a heart attack or stroke. But taking aspirin isn't right for everyone, because it can cause serious bleeding. · See your doctor regularly. You may need to see the doctor more often at first or until your blood pressure comes down. · If you are taking blood pressure medicine, talk to your doctor before you take decongestants or anti-inflammatory medicine, such as ibuprofen. Some of these medicines can raise blood pressure. · Learn how to check your blood pressure at home. Lifestyle changes  · Stay at a healthy weight. This is especially important if you put on weight around the waist. Losing even 10 pounds can help you lower your blood pressure. · If your doctor recommends it, get more exercise. Walking is a good choice. Bit by bit, increase the amount you walk every day. Try for at least 30 minutes on most days of the week. You also may want to swim, bike, or do other activities. · Avoid or limit alcohol. Talk to your doctor about whether you can drink any alcohol. · Try to limit how much sodium you eat to less than 2,300 milligrams (mg) a day. Your doctor may ask you to try to eat less than 1,500 mg a day. · Eat plenty of fruits (such as bananas and oranges), vegetables, legumes, whole grains, and low-fat dairy products. · Lower the amount of saturated fat in your diet. Saturated fat is found in animal products such as milk, cheese, and meat. Limiting these foods may help you lose weight and also lower your risk for heart disease. · Do not smoke. Smoking increases your risk for heart attack and stroke. If you need help quitting, talk to your doctor about stop-smoking programs and medicines. These can increase your chances of quitting for good. When should you call for help? Call  911 anytime you think you may need emergency care. This may mean having symptoms that suggest that your blood pressure is causing a serious heart or blood vessel problem. Your blood pressure may be over 180/120.   For example, call 911 if:  · You have symptoms of a heart attack. These may include:  ? Chest pain or pressure, or a strange feeling in the chest.  ? Sweating. ? Shortness of breath. ? Nausea or vomiting. ? Pain, pressure, or a strange feeling in the back, neck, jaw, or upper belly or in one or both shoulders or arms. ? Lightheadedness or sudden weakness. ? A fast or irregular heartbeat. · You have symptoms of a stroke. These may include:  ? Sudden numbness, tingling, weakness, or loss of movement in your face, arm, or leg, especially on only one side of your body. ? Sudden vision changes. ? Sudden trouble speaking. ? Sudden confusion or trouble understanding simple statements. ? Sudden problems with walking or balance. ? A sudden, severe headache that is different from past headaches. · You have severe back or belly pain. Do not wait until your blood pressure comes down on its own. Get help right away. Call your doctor now or seek immediate care if:  · Your blood pressure is much higher than normal (such as 180/120 or higher), but you don't have symptoms. · You think high blood pressure is causing symptoms, such as:  ? Severe headache.  ? Blurry vision. Watch closely for changes in your health, and be sure to contact your doctor if:  · Your blood pressure measures higher than your doctor recommends at least 2 times. That means the top number is higher or the bottom number is higher, or both. · You think you may be having side effects from your blood pressure medicine. Where can you learn more? Go to [email or web address]://[email or web address]. org and sign in to your [employer] account. Enter J487 in the Simplebooklet box to learn more about \"High Blood Pressure: Care Instructions. \"     If you do not have an account, please click on the \"Sign Up Now\" link. Current as of: December 16, 2019               Content Version: 12.5  © 7348-4364 Healthwise, Incorporated.    Care instructions adapted under license by [facility]. If you have questions about a medical condition or this instruction, always ask your healthcare professional. [place] [address] any warranty or liability for your use of this information.

## 2020-09-22 NOTE — PROGRESS NOTES
Name: Jovita Saenz  : 1950         Chief Complaint:     Chief Complaint   Patient presents with    ED Follow-up     \"I went to the ER becasue my blood pressure was 243/192. \"     Hypertension       History of Present Illness:      Jovita Saenz is a 79 y.o.  female who presents with ED Follow-up (\"I went to the ER becasue my blood pressure was 243/192. \" ) and Hypertension      Gracia Mcginnis is here today for an ER follow-up visit. She reported to the emergency department after she took her blood pressure reading at home that was reported to be 243/192. Patient states she did not feel well at the time. She was given labetalol in the emergency department and her blood pressure did come down. See ER report for actual numbers. She is compliant with her medications although she has not picked up atenolol that was added last visit. Her blood pressure in the office today is 141/60. Hypertension   This is a chronic problem. The current episode started more than 1 year ago. The problem has been gradually improving since onset. The problem is controlled. Associated symptoms include peripheral edema (Intermittent). Pertinent negatives include no anxiety, blurred vision, chest pain, headaches, malaise/fatigue, neck pain, orthopnea, palpitations, PND, shortness of breath or sweats. There are no associated agents to hypertension. Risk factors for coronary artery disease include diabetes mellitus, dyslipidemia, family history, stress, sedentary lifestyle and post-menopausal state. Past treatments include angiotensin blockers, calcium channel blockers and beta blockers. The current treatment provides moderate improvement. Compliance problems include exercise. There is no history of kidney disease, CAD/MI, CVA or heart failure. There is no history of chronic renal disease.          Past Medical History:     Past Medical History:   Diagnosis Date    Arthritis     Asthma     [facility]itis [facility] [address]     Diabetes mellitus ([facility] [address]     GERD (gastroesophageal reflux disease)     H/O echocardiogram 3/9/16    EF >60%. LV wall thickness is mildly increased. Mild mitral and tricuspid regurgitation. Borderline pulmonary hypertension estimated right ventricular sysolic pressure of 31YQDY. Mild (grade l) diastolic dysfunction.  History of PFTs 3/10/16     Suboptimal effort. Restrictive in nature. clionical correlations is advised.  History of stress test 16    Abnoraml. Moderate perfusion defect of mild to moderate intensity in the anterolateral and anteroapical regions during stress imaging. Intermediate risk for CAD.  Hyperlipidemia     Hypertension       Reviewed all health maintenance requirements and ordered appropriate tests  Health Maintenance Due   Topic Date Due    Flu vaccine (1) 2020       Past Surgical History:     Past Surgical History:   Procedure Laterality Date    BREAST SURGERY      cyst removed    CARDIAC CATHETERIZATION      CARDIAC CATHETERIZATION Left 2019    Right Radial/[employer] [facility]/     SECTION      CHOLECYSTECTOMY      COLONOSCOPY  3/23/15    -diverticulosis,hemorrhoids    FOOT SURGERY      rt    HYSTERECTOMY          Medications:       Prior to Admission medications    Medication Sig Start Date End Date Taking?  Authorizing Provider   ferrous sulfate (FE TABS) 325 (65 Fe) MG EC tablet Take 1 tablet by mouth 2 times daily 20  Yes Tomma Arianna Might, APRN - CNP   ascorbic acid (VITAMIN C) 500 MG tablet Take 1 tablet by mouth 2 times daily With iron tablet 20  Yes Tomma Arianna Might, APRN - CNP   atorvastatin (LIPITOR) 40 MG tablet Take 1 tablet by mouth daily 20  Yes Tomma Arianna Might, APRN - CNP   Calcium Carbonate-Vitamin D (OYSTER SHELL CALCIUM/D) 500-200 MG-UNIT TABS Take 1 tablet by mouth 2 times daily 20  Yes Tomma Arianna Might, APRN - CNP   Multiple Vitamins-Minerals (THERAPEUTIC MULTIVITAMIN-MINERALS) tablet Take 1 tablet by mouth  Diabetes Brother        Review of Systems:     Positive and Negative as described in HPI    Review of Systems   Constitutional: Negative for chills, fatigue, fever and malaise/fatigue. HENT: Negative for congestion, rhinorrhea and sore throat. Eyes: Negative for blurred vision and visual disturbance. Respiratory: Negative for cough, shortness of breath and wheezing. Cardiovascular: Negative for chest pain, palpitations, orthopnea and PND. Gastrointestinal: Negative for abdominal pain, constipation, diarrhea, nausea and vomiting. Genitourinary: Negative for difficulty urinating and dysuria. Musculoskeletal: Negative for gait problem, neck pain and neck stiffness. Skin: Negative for rash. Neurological: Negative for dizziness, syncope, light-headedness and headaches. Physical Exam:   Vitals:  BP (!) 141/61 (Site: Right Upper Arm, Position: Sitting, Cuff Size: Medium Adult)   Pulse 67   Temp 96.7 °F (35.9 °C) (Temporal)   Resp 16   Ht 5' 5\" (1.651 m)   Wt 174 lb 6.4 oz (79.1 kg)   BMI 29.02 kg/m²     Physical Exam  Vitals signs and nursing note reviewed. Constitutional:       General: She is not in acute distress. Appearance: Normal appearance. She is well-developed. She is not ill-appearing. HENT:      Mouth/Throat:      Mouth: Mucous membranes are moist.   Eyes:      General: No scleral icterus. Conjunctiva/sclera: Conjunctivae normal.   Neck:      Musculoskeletal: Normal range of motion and neck supple. Cardiovascular:      Rate and Rhythm: Normal rate and regular rhythm. Heart sounds: No murmur. Pulmonary:      Effort: Pulmonary effort is normal.      Breath sounds: Normal breath sounds. No wheezing. Abdominal:      General: Bowel sounds are normal. There is no distension. Palpations: Abdomen is soft. Tenderness: There is no abdominal tenderness. Musculoskeletal:      Right lower leg: Edema (trace) present.       Left lower leg: Edema (trace) present. Skin:     General: Skin is warm and dry. Findings: No rash. Neurological:      Mental Status: She is alert and oriented to person, place, and time. Psychiatric:         Mood and Affect: Mood normal.         Behavior: Behavior normal.         Data:     Lab Results   Component Value Date     08/25/2020    K 3.6 08/25/2020     08/25/2020    CO2 26 08/25/2020    BUN 6 08/25/2020    CREATININE 0.89 08/25/2020    GLUCOSE 147 08/25/2020    GLUCOSE 110 03/26/2012    PROT 7.4 09/24/2019    LABALBU 3.9 09/24/2019    BILITOT 0.38 09/24/2019    ALKPHOS 124 09/24/2019    AST 11 08/05/2020    ALT 6 08/05/2020     Lab Results   Component Value Date    WBC 5.9 05/06/2020    RBC 3.72 05/06/2020    RBC 3.85 03/26/2012    HGB 11.5 05/06/2020    HCT 35.9 05/06/2020    MCV 96.5 05/06/2020    MCH 30.9 05/06/2020    MCHC 32.0 05/06/2020    RDW 13.9 05/06/2020     05/06/2020     03/26/2012    MPV 9.4 05/06/2020     Lab Results   Component Value Date    TSH 2.27 06/08/2017     Lab Results   Component Value Date    CHOL 174 08/05/2020    HDL 54 08/05/2020    LABA1C 5.8 08/05/2020       Assessment/Plan:      Diagnosis Orders   1. Essential hypertension       We will continue her current medications and she will  the atenolol which is ready at the pharmacy today. She will keep readings at home and call the office. If she has apparent readings I would like her to bring her machine into the office to be checked. 1.  Farhana Smallwood received counseling on the following healthy behaviors: nutrition, exercise and medication adherence  2. Patient given educational materials - see patient instructions  3. Was a self-tracking handout given in paper form or via [employer]? No  If yes, see orders or list here. 4.  Discussed use, benefit, and side effects of prescribed medications. Barriers to medication compliance addressed. All patient questions answered. Pt voiced understanding.    5.  Reviewed prior

## 2020-09-24 ENCOUNTER — OFFICE VISIT (OUTPATIENT)
Dept: PRIMARY CARE CLINIC | Age: 70
End: 2020-09-24

## 2020-09-24 ENCOUNTER — TELEPHONE (OUTPATIENT)
Dept: PRIMARY CARE CLINIC | Age: 70
End: 2020-09-24

## 2020-09-24 ENCOUNTER — CARE COORDINATION (OUTPATIENT)
Dept: CARE COORDINATION | Age: 70
End: 2020-09-24

## 2020-09-24 VITALS — DIASTOLIC BLOOD PRESSURE: 92 MMHG | SYSTOLIC BLOOD PRESSURE: 178 MMHG | HEART RATE: 64 BPM

## 2020-09-24 PROCEDURE — 2000F BLOOD PRESSURE MEASURE: CPT | Performed by: NURSE PRACTITIONER

## 2020-09-24 PROCEDURE — 99999 PR OFFICE/OUTPT VISIT,PROCEDURE ONLY: CPT | Performed by: NURSE PRACTITIONER

## 2020-09-24 RX ORDER — HYDRALAZINE HYDROCHLORIDE 25 MG/1
25 TABLET, FILM COATED ORAL 3 TIMES DAILY
Qty: 90 TABLET | Refills: 0 | Status: SHIPPED | OUTPATIENT
Start: 2020-09-24 | End: 2020-10-16 | Stop reason: ALTCHOICE

## 2020-09-24 NOTE — TELEPHONE ENCOUNTER
Attempted to notify patient regarding new blood pressure starting today. Voicemail not set up. Medication ordered:  Apresoline 25 mg 3 times day.

## 2020-09-24 NOTE — TELEPHONE ENCOUNTER
Cheyanne Guillaume called in for patient stating that her current bp is 200/93, HR 59. He also states earlier readings this mornin/80, 180/187, and 194/101. For those readings he was unable to give me HR and times of those. He states, \"Something needs to be figured out\". Patient was in office on  and has apt on 10/01.

## 2020-09-24 NOTE — CARE COORDINATION
-Pt states she is just leaving her appt with PCP for BP check. she states her BP is still elevated. She does not have home BP machine with her at the. She does not remember any of the BP readings right now. Pt states that her medication was not changed. Pt states she is taking atenolol 50 mg 1 tab daily. Pt states today she was dizzy this morning. Pt denies chest pain, and SOB. -Writer advised pt to continue taking her medications as prescribed and bring in her BP machine to her next appt on 10-.     -pt mention briefly that her diabetes is going good.  Today she is more concerned with dizziness and elevated BP.    -writer will update RN ACM       FU appts/Provider:    Future Appointments   Date Time Provider Tara Mtz   10/1/2020  2:40 PM TARA Ferrer CNP [facility]   11/18/2020  2:20 PM MD SUKUMAR RuizF CARD [facility]   12/14/2020 12:00 PM Elizabeth Sanchez MD [facility] Tiff None   1/27/2021  3:20 PM TARA Ferrer CNP Prim [facility]

## 2020-09-25 ENCOUNTER — APPOINTMENT (OUTPATIENT)
Dept: CT IMAGING | Age: 70
End: 2020-09-25
Payer: MEDICARE

## 2020-09-25 ENCOUNTER — TELEPHONE (OUTPATIENT)
Dept: PRIMARY CARE CLINIC | Age: 70
End: 2020-09-25

## 2020-09-25 ENCOUNTER — HOSPITAL ENCOUNTER (EMERGENCY)
Age: 70
Discharge: HOME | End: 2020-09-25
Attending: FAMILY MEDICINE
Payer: MEDICARE

## 2020-09-25 VITALS
OXYGEN SATURATION: 96 % | BODY MASS INDEX: 28.82 KG/M2 | HEIGHT: 65 IN | SYSTOLIC BLOOD PRESSURE: 192 MMHG | WEIGHT: 173 LBS | RESPIRATION RATE: 14 BRPM | HEART RATE: 76 BPM | TEMPERATURE: 97.4 F | DIASTOLIC BLOOD PRESSURE: 69 MMHG

## 2020-09-25 LAB
ABSOLUTE EOS #: 0.1 K/UL (ref 0–0.44)
ABSOLUTE IMMATURE GRANULOCYTE: <0.03 K/UL (ref 0–0.3)
ABSOLUTE LYMPH #: 3.32 K/UL (ref 1.1–3.7)
ABSOLUTE MONO #: 0.54 K/UL (ref 0.1–1.2)
ALBUMIN SERPL-MCNC: 3.5 G/DL (ref 3.5–5.2)
ALBUMIN/GLOBULIN RATIO: 1.1 (ref 1–2.5)
ALP BLD-CCNC: 125 U/L (ref 35–104)
ALT SERPL-CCNC: 8 U/L (ref 5–33)
ANION GAP SERPL CALCULATED.3IONS-SCNC: 9 MMOL/L (ref 9–17)
AST SERPL-CCNC: 12 U/L
BASOPHILS # BLD: 0 % (ref 0–2)
BASOPHILS ABSOLUTE: <0.03 K/UL (ref 0–0.2)
BILIRUB SERPL-MCNC: 0.23 MG/DL (ref 0.3–1.2)
BNP INTERPRETATION: ABNORMAL
BUN BLDV-MCNC: 7 MG/DL (ref 8–23)
BUN/CREAT BLD: 10 (ref 9–20)
CALCIUM SERPL-MCNC: 8.8 MG/DL (ref 8.6–10.4)
CHLORIDE BLD-SCNC: 105 MMOL/L (ref 98–107)
CO2: 26 MMOL/L (ref 20–31)
CREAT SERPL-MCNC: 0.71 MG/DL (ref 0.5–0.9)
DIFFERENTIAL TYPE: ABNORMAL
EKG ATRIAL RATE: 61 BPM
EKG P AXIS: 23 DEGREES
EKG P-R INTERVAL: 178 MS
EKG Q-T INTERVAL: 416 MS
EKG QRS DURATION: 82 MS
EKG QTC CALCULATION (BAZETT): 418 MS
EKG R AXIS: 10 DEGREES
EKG T AXIS: -29 DEGREES
EKG VENTRICULAR RATE: 61 BPM
EOSINOPHILS RELATIVE PERCENT: 2 % (ref 1–4)
GFR AFRICAN AMERICAN: >60 ML/MIN
GFR NON-AFRICAN AMERICAN: >60 ML/MIN
GFR SERPL CREATININE-BSD FRML MDRD: ABNORMAL ML/MIN/{1.73_M2}
GFR SERPL CREATININE-BSD FRML MDRD: ABNORMAL ML/MIN/{1.73_M2}
GLUCOSE BLD-MCNC: 107 MG/DL (ref 70–99)
HCT VFR BLD CALC: 36.1 % (ref 36.3–47.1)
HEMOGLOBIN: 11.6 G/DL (ref 11.9–15.1)
IMMATURE GRANULOCYTES: 0 %
LYMPHOCYTES # BLD: 49 % (ref 24–43)
MCH RBC QN AUTO: 31 PG (ref 25.2–33.5)
MCHC RBC AUTO-ENTMCNC: 32.1 G/DL (ref 28.4–34.8)
MCV RBC AUTO: 96.5 FL (ref 82.6–102.9)
MONOCYTES # BLD: 8 % (ref 3–12)
NRBC AUTOMATED: 0 PER 100 WBC
PDW BLD-RTO: 13 % (ref 11.8–14.4)
PLATELET # BLD: 332 K/UL (ref 138–453)
PLATELET ESTIMATE: ABNORMAL
PMV BLD AUTO: 9.3 FL (ref 8.1–13.5)
POTASSIUM SERPL-SCNC: 3.7 MMOL/L (ref 3.7–5.3)
PRO-BNP: 367 PG/ML
RBC # BLD: 3.74 M/UL (ref 3.95–5.11)
RBC # BLD: ABNORMAL 10*6/UL
SEG NEUTROPHILS: 41 % (ref 36–65)
SEGMENTED NEUTROPHILS ABSOLUTE COUNT: 2.75 K/UL (ref 1.5–8.1)
SODIUM BLD-SCNC: 140 MMOL/L (ref 135–144)
TOTAL PROTEIN: 6.7 G/DL (ref 6.4–8.3)
TROPONIN INTERP: NORMAL
TROPONIN T: NORMAL NG/ML
TROPONIN, HIGH SENSITIVITY: 8 NG/L (ref 0–14)
WBC # BLD: 6.7 K/UL (ref 3.5–11.3)
WBC # BLD: ABNORMAL 10*3/UL

## 2020-09-25 PROCEDURE — 93005 ELECTROCARDIOGRAM TRACING: CPT | Performed by: FAMILY MEDICINE

## 2020-09-25 PROCEDURE — 99284 EMERGENCY DEPT VISIT MOD MDM: CPT

## 2020-09-25 PROCEDURE — 84484 ASSAY OF TROPONIN QUANT: CPT

## 2020-09-25 PROCEDURE — 96374 THER/PROPH/DIAG INJ IV PUSH: CPT

## 2020-09-25 PROCEDURE — 85025 COMPLETE CBC W/AUTO DIFF WBC: CPT

## 2020-09-25 PROCEDURE — 83880 ASSAY OF NATRIURETIC PEPTIDE: CPT

## 2020-09-25 PROCEDURE — 80053 COMPREHEN METABOLIC PANEL: CPT

## 2020-09-25 PROCEDURE — 70450 CT HEAD/BRAIN W/O DYE: CPT

## 2020-09-25 PROCEDURE — 99283 EMERGENCY DEPT VISIT LOW MDM: CPT

## 2020-09-25 PROCEDURE — 93010 ELECTROCARDIOGRAM REPORT: CPT | Performed by: INTERNAL MEDICINE

## 2020-09-25 PROCEDURE — 6360000002 HC RX W HCPCS: Performed by: FAMILY MEDICINE

## 2020-09-25 PROCEDURE — 36415 COLL VENOUS BLD VENIPUNCTURE: CPT

## 2020-09-25 RX ORDER — HYDRALAZINE HYDROCHLORIDE 20 MG/ML
10 INJECTION INTRAMUSCULAR; INTRAVENOUS ONCE
Status: COMPLETED | OUTPATIENT
Start: 2020-09-25 | End: 2020-09-25

## 2020-09-25 RX ADMIN — HYDRALAZINE HYDROCHLORIDE 10 MG: 20 INJECTION INTRAMUSCULAR; INTRAVENOUS at 12:37

## 2020-09-25 ASSESSMENT — ENCOUNTER SYMPTOMS: RESPIRATORY NEGATIVE: 1

## 2020-09-25 NOTE — TELEPHONE ENCOUNTER
Patients  called and stated that her blood pressure today was 187/151 and then recently 220/100. Writer advised pt to go to the ER for evaluation.  verbalized understanding.

## 2020-09-25 NOTE — ED PROVIDER NOTES
Suboptimal effort. Restrictive in nature. clionical correlations is advised.  History of stress test 16    Abnoraml. Moderate perfusion defect of mild to moderate intensity in the anterolateral and anteroapical regions during stress imaging. Intermediate risk for CAD.     Hyperlipidemia     Hypertension          SURGICALHISTORY       Past Surgical History:   Procedure Laterality Date    BREAST SURGERY      cyst removed    CARDIAC CATHETERIZATION      CARDIAC CATHETERIZATION Left 2019    Right Radial/[facility]     SECTION      CHOLECYSTECTOMY      COLONOSCOPY  3/23/15    -diverticulosis,hemorrhoids    FOOT SURGERY      rt    HYSTERECTOMY           CURRENT MEDICATIONS       Previous Medications    ASCORBIC ACID (VITAMIN C) 500 MG TABLET    Take 1 tablet by mouth 2 times daily With iron tablet    ASPIRIN EC 81 MG EC TABLET    Take 1 tablet by mouth daily    ATENOLOL (TENORMIN) 50 MG TABLET    Take 1 tablet by mouth daily    ATORVASTATIN (LIPITOR) 40 MG TABLET    Take 1 tablet by mouth daily    BLOOD GLUCOSE MONITORING SUPPL GENNY    1 Units by Does not apply route 3 times daily What insurance will cover    BLOOD GLUCOSE TEST STRIPS (ONE TOUCH ULTRA TEST) STRIP    USE 1 STRIP TO CHECK GLUCOSE DAILY    CALCIUM CARBONATE-VITAMIN D (OYSTER SHELL CALCIUM/D) 500-200 MG-UNIT TABS    Take 1 tablet by mouth 2 times daily    CETIRIZINE (ZYRTEC ALLERGY) 10 MG TABLET    Take 1 tablet by mouth daily    DORZOLAMIDE-TIMOLOL (COSOPT) 22.3-6.8 MG/ML OPHTHALMIC SOLUTION        FERROUS SULFATE (FE TABS) 325 (65 FE) MG EC TABLET    Take 1 tablet by mouth 2 times daily    HYDRALAZINE (APRESOLINE) 25 MG TABLET    Take 1 tablet by mouth 3 times daily    LATANOPROST (XALATAN) 0.005 % OPHTHALMIC SOLUTION    Place 1 drop into both eyes nightly     METFORMIN (GLUCOPHAGE) 500 MG TABLET    Take 1 tablet by mouth 2 times daily (with meals)    MULTIPLE VITAMINS-MINERALS (THERAPEUTIC MULTIVITAMIN-MINERALS) TABLET    Take 1 tablet by mouth daily    ONE TOUCH LANCETS MISC    Lancets- One Touch Delica for testing daily and as needed.  DX: Diabetes type  E11.9    POTASSIUM CHLORIDE (KLOR-CON M) 10 MEQ EXTENDED RELEASE TABLET    Take 2 tablets by mouth 2 times daily    VALSARTAN (DIOVAN) 320 MG TABLET    Take 1 tablet by mouth daily    VERAPAMIL (CALAN SR) 240 MG EXTENDED RELEASE TABLET    Take 1 tablet by mouth daily            Iv dye [iodides]    FAMILY HISTORY       Family History   Problem Relation Age of Onset    Stroke Father    [facility] Stroke Sister     Diabetes Sister         x6 sisters    High Blood Pressure Sister     Stroke Brother     Diabetes Brother           SOCIAL HISTORY       Social History     Socioeconomic History    Marital status:      Spouse name: None    Number of children: None    Years of education: None    Highest education level: None   Occupational History    None   Social Needs    Financial resource strain: Not hard at all   [place] insecurity     Worry: Never true     Inability: Never true    Transportation needs     Medical: No     Non-medical: No   Tobacco Use    Smoking status: Never Smoker    Smokeless tobacco: Never Used   Substance and Sexual Activity    Alcohol use: No     Alcohol/week: 0.0 standard drinks     Frequency: Never     Binge frequency: Never    Drug use: No    Sexual activity: Yes     Partners: Male     Birth control/protection: Post-menopausal   Lifestyle    Physical activity     Days per week: 0 days     Minutes per session: 0 min    Stress: Not at all   Relationships    Social connections     Talks on phone: None     Gets together: None     Attends Advent service: None     Active member of club or organization: None     Attends meetings of clubs or organizations: None     Relationship status: None    Intimate partner violence     Fear of current or ex partner: None     Emotionally abused: None     Physically abused: None microvascular ischemic changes. ED BEDSIDE ULTRASOUND:   Performed by ED Physician - none    LABS:  Labs Reviewed   CBC WITH AUTO DIFFERENTIAL - Abnormal; Notable for the following components:       Result Value    RBC 3.74 (*)     Hemoglobin 11.6 (*)     Hematocrit 36.1 (*)     Lymphocytes 49 (*)     All other components within normal limits   COMPREHENSIVE METABOLIC PANEL - Abnormal; Notable for the following components:    Glucose 107 (*)     BUN 7 (*)     Alkaline Phosphatase 125 (*)     Total Bilirubin 0.23 (*)     All other components within normal limits   TROPONIN   BRAIN NATRIURETIC PEPTIDE       All other labs were within normal range ornot returned as of this dictation. EMERGENCY DEPARTMENT COURSE and DIFFERENTIAL DIAGNOSIS/MDM:   Vitals:    Vitals:    09/25/20 1235 09/25/20 1237 09/25/20 1302 09/25/20 1420   BP: (!) 220/61 (!) 220/61 (!) 177/55 (!) 159/72   Pulse: 58  70 77   Resp: 15  16 12   Temp:       TempSrc:       SpO2: 98%  92% 97%   Weight:       Height:               MDM  Number of Diagnoses or Management Options  Diagnosis management comments: Presents to the ER complaining of increased blood pressure and dizziness ongoing for a few days. Apparently was seen by her doctor and nephrologist yesterday and she was placed on hydralazine 25 mg p.o. 3 times daily. In our ER she felt a lot better after receiving hydralazine IV her blood pressure normalized. A long conversation with her about the need to give time hydralazine to work properly. Want her to follow-up with her PCP tomorrow release call and see if they want her hydralazine dosage increased to 50 tid. While in our department she walked by herself to the restroom without any problems. CRITICAL CARE TIME   Total CriticalCare time was  minutes, excluding separately reportable procedures.   There was a high probability of clinically significant/life threatening deterioration in the patient's condition which required my urgent intervention. CONSULTS:  None    PROCEDURES:  Unlessotherwise noted below, none     Procedures    FINAL IMPRESSION      1. Dizziness    2. Essential hypertension          DISPOSITION/PLAN   DISPOSITION        PATIENT REFERRED TO:  No follow-up provider specified.     DISCHARGE MEDICATIONS:  New Prescriptions    No medications on file              (Please note that portions of this note were completed with a voice recognition program.  Efforts were made to edit the dictations but occasionally words are mis-transcribed.)      Acosta Patiño MD (electronically signed)  Attending Emergency Physician          Acosta Patiño MD  09/26/20 0074

## 2020-09-29 ENCOUNTER — CARE COORDINATION (OUTPATIENT)
Dept: CARE COORDINATION | Age: 70
End: 2020-09-29

## 2020-09-29 NOTE — CARE COORDINATION
Ambulatory Care Coordination  ED Follow up Call    Reason for ED visit:  Dizziness  ED summary reviewed:      Procedures     FINAL IMPRESSION       1. Dizziness    2. Essential hypertension           DISPOSITION/PLAN   DISPOSITION          PATIENT REFERRED TO:  No follow-up provider specified.     DISCHARGE MEDICATIONS:      New Prescriptions     No medications on file     Reviewed ED instructions: Patient denies questions. Requested update on blood pressures:   Reports:   Today: 148/72, HR 64  Yesterday: 184/93, HR 76,   Sunday: 184/93, HR 69    This nurse Care manager requested ED follow up for patient from MD staff for this week: awaiting update/appointment.  -will have CCSS follow up later this week with patient regarding blood pressure/remind of appointment with Stanley Mckeon on 10/08/20    Status:     improved    Did you call your PCP prior to going to the ED? Yes  -Reviewed PCP office instructions, advising that patient should report to the Emergency Room on 09/25/20. Did you receive a discharge instructions from the Emergency Room? Yes  Review of Instructions:     Understands what to report/when to return?:  Yes   Understands discharge instructions?:  Yes   Following discharge instructions?:  Yes   If not why? n/a    Are there any new complaints of pain? No  New Pain Meds? N/A    Constipation prophylaxis needed? N/A    If you have a wound is the dressing clean, dry, and intact? N/A  Understands wound care regimen? N/A    Are there any other complaints/concerns that you wish to tell your provider?    No, denies    FU appts/Provider:    Future Appointments   Date Time Provider Tara Mtz   10/8/2020 10:00 AM TARA Moon - CNP [facility]   11/18/2020  2:20 PM Je Kang MD [facility]   12/14/2020 12:00 PM Concepcion Jackson MD [facility] None   1/27/2021  3:20 PM John Bullock APRN - CNP [facility]           New Medications?:   No      Medication Reconciliation by phone - Yes  Understands Medications? Yes  Taking Medications? Yes  Can you swallow your pills? Yes    Any further needs in the home i.e. Equipment? No    Link to services in community?:  N/A-denies needing help.  -Ordered home care, and patient, \"not homebound, so not admitted to home care services\"     Care Coordination Plan of Care: This nurse Care Coordinator will   -forward to PCP.    -requested ED follow up appointment for patient from MD staff   -MD will see patient as previously scheduled with recent medication changes.   -Patient confirms she is taking medications properly.   -will have CCSS call later this week to follow up

## 2020-10-02 ENCOUNTER — CARE COORDINATION (OUTPATIENT)
Dept: CARE COORDINATION | Age: 70
End: 2020-10-02

## 2020-10-02 NOTE — CARE COORDINATION
Phone call back to patient to review PCP Instructions:   -patient to continue to monitor and make sure to be taking Apresoline 3 times daily. Patient verbalizes understanding. REviewed all BP  Medications (Patient verifies that she is taking these correctly): 1)Apresoline: 25m 3 times daily  2)Diovan: 320mg 1 tablet by mouth daily  3)Atenolol: 50mg Tablet-1 Tablet daily  4)Verapamil: 240mg Tablet-    -Patient denies any chest pain or dizziness. Reviewed with patient Sodium intake:   -Educated patient on keeping salt intake low in her diet. -patient feels she does, and denies adding any additional salt to her food. Care Coordination Plan of Care:    This nurse Care Coordinator will plan to follow up with patient following PCP appointment next week      Future Appointments   Date Time Provider Tara Mtz   10/8/2020 10:00 AM TARA Mcnulty [facility]NP[facility]   11/18/2020  2:20 PM Won Arauz MD [facility]   12/14/2020 12:00 PM Abdulaziz Izquierdo MD [facility] None   1/27/2021  3:20 PM TARA Mcnulty CNP [facility]

## 2020-10-02 NOTE — CARE COORDINATION
-Writer spoke briefly to pt regarding her BP. She states her BP has been running high. She states that she does not have her BP log on her. Her family member is at a doctors appointment , pt will have her call back with BP readings. Pt did  Check her BP an pulse today and states her BP- 166/79,  and P- 74. Pt states she is taking all BP meds as prescribed and will have the family call back with her medication list.    -Writer suggest that pt calls RN ACM or PCP office directly to give BP results, if she is unable to reach writer. Pt is aware of upcoming appt.     FU appts/Provider:    Future Appointments   Date Time Provider Tara Mtz   10/8/2020 10:00 AM TARA Clifford CNP [facility]   11/18/2020  2:20 PM Iris Ham MD [facility]   12/14/2020 12:00 PM Brad Suarez MD [facility] None   1/27/2021  3:20 PM TARA Clifford CNP [facility]

## 2020-10-08 ENCOUNTER — NURSE ONLY (OUTPATIENT)
Dept: PRIMARY CARE CLINIC | Age: 70
End: 2020-10-08

## 2020-10-08 VITALS
SYSTOLIC BLOOD PRESSURE: 131 MMHG | HEART RATE: 67 BPM | DIASTOLIC BLOOD PRESSURE: 59 MMHG | HEIGHT: 65 IN | RESPIRATION RATE: 18 BRPM | TEMPERATURE: 97.5 F | WEIGHT: 175.8 LBS | BODY MASS INDEX: 29.29 KG/M2

## 2020-10-09 ENCOUNTER — CARE COORDINATION (OUTPATIENT)
Dept: CARE COORDINATION | Age: 70
End: 2020-10-09

## 2020-10-09 NOTE — CARE COORDINATION
-Pt states her BS are fine, but mentions her BS today was 172. Pt states she  is taking metformin 500 mg 2 times a day.    -pt states she does not have her BP log with her, and says her BP is always high. Writer reviewed her BP meds ,pt states she is taking these below as prescribed. -    1)Apresoline: 25m 3 times daily  2)Diovan: 320mg 1 tablet by mouth daily  3)Atenolol: 50mg Tablet-1 Tablet daily  4)Verapamil: 240mg Tablet    BP from nurse visit yesterday 10/8/2020 was 131/59.    -pt denies any dizziness, pain or other issues, she is only concerned about there BP.   -Writer advised pt to continue taking all meds as prescribed and continue checking her BP and blood sugars. Writer will update RN ACM.

## 2020-10-12 ENCOUNTER — CARE COORDINATION (OUTPATIENT)
Dept: CARE COORDINATION | Age: 70
End: 2020-10-12

## 2020-10-12 NOTE — CARE COORDINATION
Nutrition Care Coordinator Follow-Up visit:    Food Recall: eating 2-3 meals/d    Activity Level:  Sedentary:  Lightly Active: X  Moderately Active:  Very Active:    Adult BMI:  Underweight (below 18.5)  Normal Weight (18.5-24.9)  Overweight (25-29. 9) X  Obese (30-39. 9)  Morbidly Obese (>40)    Weight Change: no change    Plan:  Plan was established with patient:  Increase dietary fiber by consuming whole grains, fruits and vegetables: X  Limit dietary cholesterol to >200mg/day: Increase water intake:  Avoid added sugar: X  Avoid sweetened beverages: X  Choose lean meats: X      Monitoring: Will monitor weight:  Will monitor adherence to meal plan: X  Will monitor adherence to exercise plan: Will monitor HGA1c:     Handouts Provided :  Low Carb snacking:  Carb counting /individual meal plan:  Portion Control:  Food Labels:  Physical Activity:  Low Fat/Cholesterol:  Hypo/Hyperglycemia:  Calorie Controlled Meal Plan:  Low sodium guidelines: X    Goals: Increase water consumption to 8oz. 6-8 times daily:  Manage blood sugars by consuming 3 meals spaced every 4-5 hours with 2-3 snacks daily: reviewed  Increase fiber and decrease fat intake by consuming 1-2 fruit servings and 2-3 vegetable servings per day. Increase physical activity by:  Consume less than 2,000mg of sodium/day:reviewed  Avoid consumption of sweetened beverages and added sugar by reading food labels: reviewed  Monitor blood sugars by using meter to check blood glucose before morning meal and 2 hours after a meal daily:BS stable per patient all <170  Decrease risk of coronary heart disease by consuming fish that contains omega-3 fatty acids at least twice a week, avoiding partially hydrogenated oil/trans fats and limiting saturated fat intake by reading food labels:reviewed    Patient goals set:  1.   Reviewed what foods contain carbohydrate, what a serving size is of carbs and how to measure servings out to limit carbohydrates at meals and

## 2020-10-15 ENCOUNTER — CARE COORDINATION (OUTPATIENT)
Dept: CARE COORDINATION | Age: 70
End: 2020-10-15

## 2020-10-15 NOTE — CARE COORDINATION
8/6/20   TARA Rondon CNP   ascorbic acid (VITAMIN C) 500 MG tablet Take 1 tablet by mouth 2 times daily With iron tablet 8/6/20   TARA Rondon CNP   atorvastatin (LIPITOR) 40 MG tablet Take 1 tablet by mouth daily 8/6/20   TARA Rondon CNP   Calcium Carbonate-Vitamin D (OYSTER SHELL CALCIUM/D) 500-200 MG-UNIT TABS Take 1 tablet by mouth 2 times daily 8/6/20   TARA Rondon CNP   Multiple Vitamins-Minerals (THERAPEUTIC MULTIVITAMIN-MINERALS) tablet Take 1 tablet by mouth daily 8/6/20 8/6/21  TARA Rondon CNP   valsartan (DIOVAN) 320 MG tablet Take 1 tablet by mouth daily 7/27/20   TARA Rondon CNP   verapamil (CALAN SR) 240 MG extended release tablet Take 1 tablet by mouth daily 7/27/20   TARA Rnodon CNP   metFORMIN (GLUCOPHAGE) 500 MG tablet Take 1 tablet by mouth 2 times daily (with meals) 7/22/20   TARA Rondon CNP   latanoprost (XALATAN) 0.005 % ophthalmic solution Place 1 drop into both eyes nightly     Historical Provider, MD   potassium chloride (KLOR-CON M) 10 MEQ extended release tablet Take 2 tablets by mouth 2 times daily 11/6/19   TARA Rondon CNP   aspirin EC 81 MG EC tablet Take 1 tablet by mouth daily 9/23/19   TARA Rondon CNP   blood glucose test strips (ONE TOUCH ULTRA TEST) strip USE 1 STRIP TO CHECK GLUCOSE DAILY 3/13/19   TARA Rondon CNP   ONE TOUCH LANCETS MISC Lancets- One Touch Dana Shalom for testing daily and as needed.  DX: Diabetes type  E11.9 1/31/19   TARA Rondon CNP   cetirizine (ZYRTEC ALLERGY) 10 MG tablet Take 1 tablet by mouth daily 6/7/18   TARA De Leon CNP   Blood Glucose Monitoring Suppl GENNY 1 Units by Does not apply route 3 times daily What insurance will cover 10/11/16   [place] Flash, APRN - CNP       Future Appointments   Date Time Provider Tara Mtz   11/18/2020  2:20 PM Chelita Mccracken MD [facility]   12/14/2020 12:00 PM MD Delfin Cerna [facility] None   1/27/2021  3:20 PM Zainab Bullock, TARA - CNP [facility]

## 2020-10-16 ENCOUNTER — TELEPHONE (OUTPATIENT)
Dept: PRIMARY CARE CLINIC | Age: 70
End: 2020-10-16

## 2020-10-16 RX ORDER — HYDRALAZINE HYDROCHLORIDE 50 MG/1
50 TABLET, FILM COATED ORAL 3 TIMES DAILY
Qty: 90 TABLET | Refills: 3 | Status: SHIPPED | OUTPATIENT
Start: 2020-10-16 | End: 2020-12-16 | Stop reason: SDUPTHER

## 2020-10-16 NOTE — TELEPHONE ENCOUNTER
Called patient back to check on her. States she retook her BP and it was 195/95 Pulse 77. Asked patient if she has been taking her medications and she states she is taking all her medication. Junior Vaughn CNP informed and he states he will look over patients medications and call her.

## 2020-10-16 NOTE — TELEPHONE ENCOUNTER
Called and spoke with pt and pts  and informed them that Arnel Martin would like her to take an extra dose of Hydralazine 25mg now and then he called a new prescription for Hydralizine 50mg to the pharmacy and that she should take 50mg tonight and the tomorrow start taking 50 mg Hydralizine 3 times per day. Informed patient and  that after taking the extra dose of 25mg Hydralizine she should take her BP in 1-2 hours, resting and if the BP top number is still running 190-200 to go to the ER. If BP improves to keep record of BP's this weekend and to call office on Monday with the readings.  verbalizes understanding.      (Patient very unsure of medication- asked \"what color is that pill\")

## 2020-10-16 NOTE — TELEPHONE ENCOUNTER
Phone call from patient stating that her BP was 208/101. Asked patient what she had been doing and she stats she had just walked to the mailbox. Asked patient if she was having any vision problems or headaches, denies both. Asked patient to sit and relax for 5 minutes and retake BP and call office with results. Verbalizes understanding.

## 2020-10-17 ENCOUNTER — HOSPITAL ENCOUNTER (EMERGENCY)
Age: 70
Discharge: HOME | End: 2020-10-17
Attending: EMERGENCY MEDICINE
Payer: MEDICARE

## 2020-10-17 VITALS
HEART RATE: 85 BPM | RESPIRATION RATE: 13 BRPM | WEIGHT: 174 LBS | TEMPERATURE: 98.4 F | OXYGEN SATURATION: 97 % | SYSTOLIC BLOOD PRESSURE: 159 MMHG | BODY MASS INDEX: 28.96 KG/M2 | DIASTOLIC BLOOD PRESSURE: 68 MMHG

## 2020-10-17 LAB
ABSOLUTE EOS #: 0.08 K/UL (ref 0–0.44)
ABSOLUTE IMMATURE GRANULOCYTE: 0.03 K/UL (ref 0–0.3)
ABSOLUTE LYMPH #: 3.59 K/UL (ref 1.1–3.7)
ABSOLUTE MONO #: 0.64 K/UL (ref 0.1–1.2)
ANION GAP SERPL CALCULATED.3IONS-SCNC: 10 MMOL/L (ref 9–17)
BASOPHILS # BLD: 0 % (ref 0–2)
BASOPHILS ABSOLUTE: 0.03 K/UL (ref 0–0.2)
BUN BLDV-MCNC: 5 MG/DL (ref 8–23)
BUN/CREAT BLD: 6 (ref 9–20)
CALCIUM SERPL-MCNC: 9.3 MG/DL (ref 8.6–10.4)
CHLORIDE BLD-SCNC: 104 MMOL/L (ref 98–107)
CO2: 25 MMOL/L (ref 20–31)
CREAT SERPL-MCNC: 0.82 MG/DL (ref 0.5–0.9)
DIFFERENTIAL TYPE: ABNORMAL
EOSINOPHILS RELATIVE PERCENT: 1 % (ref 1–4)
GFR AFRICAN AMERICAN: >60 ML/MIN
GFR NON-AFRICAN AMERICAN: >60 ML/MIN
GFR SERPL CREATININE-BSD FRML MDRD: ABNORMAL ML/MIN/{1.73_M2}
GFR SERPL CREATININE-BSD FRML MDRD: ABNORMAL ML/MIN/{1.73_M2}
GLUCOSE BLD-MCNC: 123 MG/DL (ref 70–99)
HCT VFR BLD CALC: 37.3 % (ref 36.3–47.1)
HEMOGLOBIN: 11.7 G/DL (ref 11.9–15.1)
IMMATURE GRANULOCYTES: 0 %
LYMPHOCYTES # BLD: 43 % (ref 24–43)
MCH RBC QN AUTO: 29.9 PG (ref 25.2–33.5)
MCHC RBC AUTO-ENTMCNC: 31.4 G/DL (ref 28.4–34.8)
MCV RBC AUTO: 95.4 FL (ref 82.6–102.9)
MONOCYTES # BLD: 8 % (ref 3–12)
NRBC AUTOMATED: 0 PER 100 WBC
PDW BLD-RTO: 12.8 % (ref 11.8–14.4)
PLATELET # BLD: 360 K/UL (ref 138–453)
PLATELET ESTIMATE: ABNORMAL
PMV BLD AUTO: 9.1 FL (ref 8.1–13.5)
POTASSIUM SERPL-SCNC: 3.4 MMOL/L (ref 3.7–5.3)
RBC # BLD: 3.91 M/UL (ref 3.95–5.11)
RBC # BLD: ABNORMAL 10*6/UL
SEG NEUTROPHILS: 48 % (ref 36–65)
SEGMENTED NEUTROPHILS ABSOLUTE COUNT: 4.01 K/UL (ref 1.5–8.1)
SODIUM BLD-SCNC: 139 MMOL/L (ref 135–144)
TROPONIN INTERP: NORMAL
TROPONIN T: NORMAL NG/ML
TROPONIN, HIGH SENSITIVITY: 10 NG/L (ref 0–14)
WBC # BLD: 8.4 K/UL (ref 3.5–11.3)
WBC # BLD: ABNORMAL 10*3/UL

## 2020-10-17 PROCEDURE — 6360000002 HC RX W HCPCS: Performed by: EMERGENCY MEDICINE

## 2020-10-17 PROCEDURE — 93005 ELECTROCARDIOGRAM TRACING: CPT | Performed by: EMERGENCY MEDICINE

## 2020-10-17 PROCEDURE — 84484 ASSAY OF TROPONIN QUANT: CPT

## 2020-10-17 PROCEDURE — 96374 THER/PROPH/DIAG INJ IV PUSH: CPT

## 2020-10-17 PROCEDURE — 85025 COMPLETE CBC W/AUTO DIFF WBC: CPT

## 2020-10-17 PROCEDURE — 80048 BASIC METABOLIC PNL TOTAL CA: CPT

## 2020-10-17 PROCEDURE — 99282 EMERGENCY DEPT VISIT SF MDM: CPT

## 2020-10-17 RX ORDER — HYDRALAZINE HYDROCHLORIDE 20 MG/ML
10 INJECTION INTRAMUSCULAR; INTRAVENOUS EVERY 6 HOURS PRN
Status: DISCONTINUED | OUTPATIENT
Start: 2020-10-17 | End: 2020-10-17 | Stop reason: HOSPADM

## 2020-10-17 RX ORDER — HYDRALAZINE HYDROCHLORIDE 20 MG/ML
10 INJECTION INTRAMUSCULAR; INTRAVENOUS ONCE
Status: COMPLETED | OUTPATIENT
Start: 2020-10-17 | End: 2020-10-17

## 2020-10-17 RX ORDER — ATENOLOL 50 MG/1
100 TABLET ORAL DAILY
Qty: 90 TABLET | Refills: 0 | Status: SHIPPED | OUTPATIENT
Start: 2020-10-17 | End: 2020-12-16 | Stop reason: SDUPTHER

## 2020-10-17 RX ORDER — AMLODIPINE BESYLATE 10 MG/1
10 TABLET ORAL DAILY
COMMUNITY
End: 2020-10-26 | Stop reason: SDUPTHER

## 2020-10-17 RX ADMIN — HYDRALAZINE HYDROCHLORIDE 10 MG: 20 INJECTION INTRAMUSCULAR; INTRAVENOUS at 17:51

## 2020-10-17 RX ADMIN — HYDRALAZINE HYDROCHLORIDE 10 MG: 20 INJECTION INTRAMUSCULAR; INTRAVENOUS at 18:29

## 2020-10-17 ASSESSMENT — ENCOUNTER SYMPTOMS
SORE THROAT: 0
SINUS PRESSURE: 0
BACK PAIN: 0
CHEST TIGHTNESS: 0
TROUBLE SWALLOWING: 0
COLOR CHANGE: 0
EYE PAIN: 0
NAUSEA: 0
ABDOMINAL PAIN: 0
DIARRHEA: 0
SHORTNESS OF BREATH: 0
VOMITING: 0
CHOKING: 0
EYE REDNESS: 0

## 2020-10-17 NOTE — ED PROVIDER NOTES
[facility] ED  Emergency Department        Pt Name: Lisa Smith  MRN: [id number]  [place] 1950  Date of evaluation: 10/17/20    CHIEF COMPLAINT       Chief Complaint   Patient presents with    Hypertension     states her B/P medication was changed yesterday by Fernando Bullock. Took pressure at home and its elevated       HISTORY OF PRESENT ILLNESS  (Location/Symptom, Timing/Onset, Context/Setting, Quality, Duration, ModifyingFactors, Severity.)      Lisa Smith is a 79 y.o. female who presents with a chief complaint of elevated blood pressure. The patient states she saw her PCP yesterday and was started on seen 50 mg p.o. 3 times daily. She states every time she goes to her provider her blood pressure seems to be higher. She is on 4 drug therapy. She denies headache but says she knows that her blood pressure is up. She denies any chest pain or dyspnea or dyspnea on exertion. No arm, neck, jaw, shoulder, or back discomfort. Nothing makes her symptoms better or worse. No travel history outside [place] borders in the last 6 months. No history of travel outside the state of [place] or [address] [place] [address] in the last 6 months. No history of contact with any confirmed or suspected PUI cases of SARS-CoV-2. PAST MEDICAL / SURGICAL / SOCIAL / FAMILY HISTORY      has a past medical history of Arthritis, Asthma, Chronic bronchitis ([facility] [address] Diabetes mellitus ([facility] [address] GERD (gastroesophageal reflux disease), H/O echocardiogram, History of PFTs, History of stress test, Hyperlipidemia, and Hypertension. has a past surgical history that includes Hysterectomy; Breast surgery; Foot surgery;  section; Cardiac catheterization (); Colonoscopy (3/23/15); Cholecystectomy; and Cardiac catheterization (Left, 2019).        Social History     Socioeconomic History    Marital status:      Spouse name: Not on file    Number of children: Not on file    Years of education: Not on file    Highest education level: Not on file   Occupational History    Not on file   Social Needs    Financial resource strain: Not hard at all    Food insecurity     Worry: Never true     Inability: Never true   [employer] needs     Medical: No     Non-medical: No   Tobacco Use    Smoking status: Never Smoker    Smokeless tobacco: Never Used   Substance and Sexual Activity    Alcohol use: No     Alcohol/week: 0.0 standard drinks     Frequency: Never     Binge frequency: Never    Drug use: No    Sexual activity: Yes     Partners: Male     Birth control/protection: Post-menopausal   Lifestyle    Physical activity     Days per week: 0 days     Minutes per session: 0 min    Stress: Not at all   Relationships    Social connections     Talks on phone: Not on file     Gets together: Not on file     Attends Cheondoism service: Not on file     Active member of club or organization: Not on file     Attends meetings of clubs or organizations: Not on file     Relationship status: Not on file    Intimate partner violence     Fear of current or ex partner: Not on file     Emotionally abused: Not on file     Physically abused: Not on file     Forced sexual activity: Not on file   Other Topics Concern    Not on file   Social History Narrative    Not on file       Family History   Problem Relation Age of Onset    Stroke Father     Stroke Sister     Diabetes Sister         x6 sisters    High Blood Pressure Sister     Stroke Brother     Diabetes Brother        Allergies: Iv dye [iodides]    Home Medications:  Prior to Admission medications    Medication Sig Start Date End Date Taking?  Authorizing Provider   amLODIPine (NORVASC) 10 MG tablet Take 10 mg by mouth daily   Yes Historical Provider, MD   atenolol (TENORMIN) 50 MG tablet Take 2 tablets by mouth daily 10/17/20  Yes Harjinder Sterling III, MD   hydrALAZINE (APRESOLINE) 50 MG tablet Take 1 tablet by mouth 3 times daily 10/16/20  Yes Junior Vaughn, APRN - CNP dorzolamide-timolol (COSOPT) 22.3-6.8 MG/ML ophthalmic solution  9/20/20  Yes Historical Provider, MD   ascorbic acid (VITAMIN C) 500 MG tablet Take 1 tablet by mouth 2 times daily With iron tablet 8/6/20  Yes TARA Blas CNP   atorvastatin (LIPITOR) 40 MG tablet Take 1 tablet by mouth daily 8/6/20  Yes TARA Blas CNP   Multiple Vitamins-Minerals (THERAPEUTIC MULTIVITAMIN-MINERALS) tablet Take 1 tablet by mouth daily 8/6/20 8/6/21 Yes TARA Blas CNP   valsartan (DIOVAN) 320 MG tablet Take 1 tablet by mouth daily 7/27/20  Yes TARA Blas CNP   verapamil (CALAN SR) 240 MG extended release tablet Take 1 tablet by mouth daily 7/27/20  Yes TARA Blas CNP   metFORMIN (GLUCOPHAGE) 500 MG tablet Take 1 tablet by mouth 2 times daily (with meals) 7/22/20  Yes TARA Blas CNP   latanoprost (XALATAN) 0.005 % ophthalmic solution Place 1 drop into both eyes nightly    Yes Historical Provider, MD   potassium chloride (KLOR-CON M) 10 MEQ extended release tablet Take 2 tablets by mouth 2 times daily 11/6/19  Yes TARA Blas CNP   aspirin EC 81 MG EC tablet Take 1 tablet by mouth daily 9/23/19  Yes TARA Blas CNP   blood glucose test strips (ONE TOUCH ULTRA TEST) strip USE 1 STRIP TO CHECK GLUCOSE DAILY 3/13/19  Yes TARA Blas CNP   ONE TOUCH LANCETS [address] [place] [address] for testing daily and as needed.  DX: Diabetes type  E11.9 1/31/19  Yes TARA Blas CNP   Blood Glucose Monitoring Suppl GENNY 1 Units by Does not apply route 3 times daily What insurance will cover 10/11/16  Yes TARA Rangel CNP   Calcium Carbonate-Vitamin D (OYSTER SHELL CALCIUM/D) 500-200 MG-UNIT TABS Take 1 tablet by mouth 2 times daily 8/6/20   TARA Blas CNP   cetirizine (ZYRTEC ALLERGY) 10 MG tablet Take 1 tablet by mouth daily 6/7/18   TARA Hanson CNP     I reviewed the patient's past medical and surgical history gathered by the nurses as well as all medications and allergies. I reviewed the patient's family history gathered by the nurses. I also reviewed the social history gathered by the nurses for smoking, vaping, and alcohol use, as well as drugs of abuse. Nursing notes are reviewed by me and greatly appreciated. REVIEW OF SYSTEMS    (2-9 systems for level 4, 10 or more for level 5)      Review of Systems   Constitutional: Negative for appetite change, chills and fever. HENT: Negative for congestion, ear pain, sinus pressure, sore throat and trouble swallowing. Eyes: Negative for pain and redness. Respiratory: Negative for choking, chest tightness and shortness of breath. Cardiovascular: Negative for chest pain and palpitations. Gastrointestinal: Negative for abdominal pain, diarrhea, nausea and vomiting. Genitourinary: Negative for decreased urine volume, difficulty urinating, dyspareunia, frequency and urgency. Musculoskeletal: Negative for arthralgias, back pain, joint swelling and myalgias. Skin: Negative for color change, rash and wound. Neurological: Negative for dizziness, weakness, light-headedness and headaches. Hematological: Negative for adenopathy. Psychiatric/Behavioral: Negative for agitation, decreased concentration, dysphoric mood and hallucinations. All other systems reviewed and are negative. PHYSICAL EXAM   (up to 7 for level 4, 8 or more for level 5)     INITIAL VITALS:   BP (!) 176/82   Pulse 92   Temp 98.4 °F (36.9 °C) (Oral)   Resp 18   Wt 174 lb (78.9 kg)   SpO2 97%   BMI 28.96 kg/m²     Physical Exam  Vitals signs and nursing note reviewed. Constitutional:       General: She is in acute distress. Appearance: Normal appearance. She is normal weight. She is not ill-appearing or diaphoretic. HENT:      Head: Normocephalic and atraumatic.       Right Ear: External ear normal.      Left Ear: External ear normal.      Nose: Nose normal. No rhinorrhea. Mouth/Throat:      Mouth: Mucous membranes are moist.      Pharynx: No posterior oropharyngeal erythema. Eyes:      General: No scleral icterus. Extraocular Movements: Extraocular movements intact. Conjunctiva/sclera: Conjunctivae normal.      Pupils: Pupils are equal, round, and reactive to light. Cardiovascular:      Rate and Rhythm: Normal rate and regular rhythm. Pulses: Normal pulses. Heart sounds: Normal heart sounds. No murmur. Pulmonary:      Effort: Pulmonary effort is normal. No respiratory distress. Breath sounds: Normal breath sounds. Abdominal:      General: Bowel sounds are normal. There is no distension. Palpations: Abdomen is soft. Tenderness: There is no abdominal tenderness. There is no guarding or rebound. Musculoskeletal: Normal range of motion. General: No swelling, tenderness or signs of injury. Right lower leg: No edema. Left lower leg: No edema. Skin:     General: Skin is warm and dry. Coloration: Skin is not jaundiced. Findings: No erythema or rash. Neurological:      General: No focal deficit present. Mental Status: She is alert and oriented to person, place, and time. Psychiatric:         Mood and Affect: Mood normal.         Behavior: Behavior normal.         DIFFERENTIAL  DIAGNOSIS     Differential diagnosis includes but is not limited to: Musculoskeletal chest wall pain, Tietze's syndrome, aortic stenosis, mitral regurgitation, sequelae of rheumatic heart disease, other valvular heart disease, myocarditis, pericarditis, pulmonary embolism, pulmonary infarction, pneumonia, spontaneous pneumothorax, acute myocardial infarction, acute coronary syndrome, aortic dissection, esophagitis, gastroesophageal reflux disease, pleurisy, pleurodynia, pneumonia, Boerhaave's syndrome, bronchitis, arrhythmia, amyloidosis, SARS-CoV-2, among others.       PLAN (LABS / IMAGING / EKG):  Orders Placed This Encounter   Procedures    Basic Metabolic Panel    CBC auto differential    Troponin    EKG 12 Lead    Insert peripheral IV       MEDICATIONS ORDERED:  Orders Placed This Encounter   Medications    hydrALAZINE (APRESOLINE) injection 10 mg    hydrALAZINE (APRESOLINE) injection 10 mg    atenolol (TENORMIN) 50 MG tablet     Sig: Take 2 tablets by mouth daily     Dispense:  90 tablet     Refill:  0       DIAGNOSTIC RESULTS / EMERGENCY DEPARTMENT COURSE / MDM     LABS:  Results for orders placed or performed during the hospital encounter of 50/68/78   Basic Metabolic Panel   Result Value Ref Range    Glucose 123 (H) 70 - 99 mg/dL    BUN 5 (L) 8 - 23 mg/dL    CREATININE 0.82 0.50 - 0.90 mg/dL    Bun/Cre Ratio 6 (L) 9 - 20    Calcium 9.3 8.6 - 10.4 mg/dL    Sodium 139 135 - 144 mmol/L    Potassium 3.4 (L) 3.7 - 5.3 mmol/L    Chloride 104 98 - 107 mmol/L    CO2 25 20 - 31 mmol/L    Anion Gap 10 9 - 17 mmol/L    GFR Non-African American >60 >60 mL/min    GFR African American >60 >60 mL/min    GFR Comment          GFR Staging         CBC auto differential   Result Value Ref Range    WBC 8.4 3.5 - 11.3 k/uL    RBC 3.91 (L) 3.95 - 5.11 m/uL    Hemoglobin 11.7 (L) 11.9 - 15.1 g/dL    Hematocrit 37.3 36.3 - 47.1 %    MCV 95.4 82.6 - 102.9 fL    MCH 29.9 25.2 - 33.5 pg    MCHC 31.4 28.4 - 34.8 g/dL    RDW 12.8 11.8 - 14.4 %    Platelets 747 856 - 385 k/uL    MPV 9.1 8.1 - 13.5 fL    NRBC Automated 0.0 0.0 per 100 WBC    Differential Type NOT REPORTED     Seg Neutrophils 48 36 - 65 %    Lymphocytes 43 24 - 43 %    Monocytes 8 3 - 12 %    Eosinophils % 1 1 - 4 %    Basophils 0 0 - 2 %    Immature Granulocytes 0 0 %    Segs Absolute 4.01 1.50 - 8.10 k/uL    Absolute Lymph # 3.59 1.10 - 3.70 k/uL    Absolute Mono # 0.64 0.10 - 1.20 k/uL    Absolute Eos # 0.08 0.00 - 0.44 k/uL    Basophils Absolute 0.03 0.00 - 0.20 k/uL    Absolute Immature Granulocyte 0.03 0.00 - 0.30 k/uL    WBC Morphology NOT REPORTED     RBC Morphology NOT REPORTED     Platelet Estimate NOT REPORTED    Troponin   Result Value Ref Range    Troponin, High Sensitivity 10 0 - 14 ng/L    Troponin T NOT REPORTED <0.03 ng/mL    Troponin Interp NOT REPORTED    EKG 12 Lead   Result Value Ref Range    Ventricular Rate 84 BPM    Atrial Rate 84 BPM    P-R Interval 186 ms    QRS Duration 82 ms    Q-T Interval 396 ms    QTc Calculation (Bazett) 467 ms    P Axis 64 degrees    R Axis 1 degrees    T Axis 6 degrees       IMPRESSION:  1. Hypertensive urgency    2. Essential hypertension          RADIOLOGY:  N/A    EKG:  Electrocardiogram:  EKG # 1  Ordered, reviewed, and independently interpreted by the EP. Time Interpreted: 0990 hrs. EKG interpreted by me in the absence of the cardiologist contemporaneously with patient care shows normal sinus rhythm rate of 84 bpm, normal LA 0.19, normal QTC 0.47, normal axis +1. Frequent PVCs noted. No STEMI. No bundle branch block pattern. Nonspecific ST-T wave changes. POC ULTRASOUND:  N/A    EMERGENCY DEPARTMENT COURSE:    Time: 6:17 PM EDT  The patient was given hydralazine 10 mg IV with very little effect on blood pressure which is still 193/98. We will give a second dose of hydralazine and if needed, labetalol. Time: 6:42 PM EDT  The patient's blood pressure came down to 156/73 here with a second dose of hydralazine. She is to take her p.m. dose when she gets home orally and we will increase atenolol to 100 mg daily from 50 mg daily. Suitable for outpatient management and disposition home with close PCP follow-up as discussed. Discussed results and discharge plan with patient and/or family/friend. I emphasized the importance of follow up with their physician. Explained reasons to return to the ED. All questions and concerns were answered and addressed. The patient and/or family member/friend expressed understanding. PROCEDURES:  N/A    CONSULTS:  None    CRITICAL CARE:  N/A    FINAL IMPRESSION      1.  Hypertensive urgency    2.  Essential hypertension          DISPOSITION / PLAN     DISPOSITION        PATIENT REFERRED TO:  Lionel Bullock, TARA - [place] [id number]  [phone]    In 3 days        DISCHARGE MEDICATIONS:  Current Discharge Medication List          Jim Parker MD, FACEP  6:43 PM    Attending Emergency Physician  [address] [place] [address] ED    (Please note that portions of this note were completed with a voice recognition program.  Rico Darby made to edit the dictations but occasionally words are mis-transcribed.)             Jim Parker MD  10/17/20 2296

## 2020-10-18 LAB
EKG ATRIAL RATE: 84 BPM
EKG P AXIS: 64 DEGREES
EKG P-R INTERVAL: 186 MS
EKG Q-T INTERVAL: 396 MS
EKG QRS DURATION: 82 MS
EKG QTC CALCULATION (BAZETT): 467 MS
EKG R AXIS: 1 DEGREES
EKG T AXIS: 6 DEGREES
EKG VENTRICULAR RATE: 84 BPM

## 2020-10-18 PROCEDURE — 93010 ELECTROCARDIOGRAM REPORT: CPT | Performed by: INTERNAL MEDICINE

## 2020-10-19 ENCOUNTER — CARE COORDINATION (OUTPATIENT)
Dept: CARE COORDINATION | Age: 70
End: 2020-10-19

## 2020-10-19 NOTE — CARE COORDINATION
Ambulatory Care Coordination  ED Follow up Call    Reason for ED visit:  Hypertension    Questioned Blood presures: Today: 162/78, HR 96  Yesterday: 154/85, HR 93    -questioned patient for any symptoms, denies headaches, chest pain or dizziness. Reviewed blood pressure medicatons:   1)Hydralazine: 3 times a day  2)Diovan: 1 tablet daily  3)ATenolol: 50mg -2 tablets by mouth daily  4)Norvasc: 10mg tabet- 1 tablet daily  5)Verapamil: 1 tablet daily    -Offered Er follow up with Octaviano Salcedo Might:   -patient also follows with Cardiologist, Dr. Supriya Young.   -Requested patient to call dr. Kristy Bonilla office, and request follow up; patient was scheduled in November, but with fluctuating  Blood pressures; could benefit from sooner appointment.   -will notify Octaviano Bullock.   -If patient unable to be seen by cardiology this week; will see if Octaviano Salcedo Might would like to see patient again. ED summary reviewed:    IMPRESSION:  1. Hypertensive urgency    2. Essential hypertension            RADIOLOGY:  N/A     EKG:  Electrocardiogram:  EKG # 1  Ordered, reviewed, and independently interpreted by the EP. Time Interpreted: 2623 hrs. EKG interpreted by me in the absence of the cardiologist contemporaneously with patient care shows normal sinus rhythm rate of 84 bpm, normal NE 0.19, normal QTC 0.47, normal axis +1. Frequent PVCs noted. No STEMI. No bundle branch block pattern. Nonspecific ST-T wave changes.        POC ULTRASOUND:  N/A     EMERGENCY DEPARTMENT COURSE:     Time: 6:17 PM EDT  The patient was given hydralazine 10 mg IV with very little effect on blood pressure which is still 193/98. We will give a second dose of hydralazine and if needed, labetalol.     Time: 6:42 PM EDT  The patient's blood pressure came down to 156/73 here with a second dose of hydralazine.   She is to take her p.m. dose when she gets home orally and we will increase atenolol to 100 mg daily from 50 mg daily.     Suitable for outpatient management and disposition home with close PCP follow-up as discussed.     Discussed results and discharge plan with patient and/or family/friend. I emphasized the importance of follow up with their physician. Explained reasons to return to the ED. All questions and concerns were answered and addressed. The patient and/or family member/friend expressed understanding.       Status:     improved    Did you call your PCP prior to going to the ED? No      Did you receive a discharge instructions from the Emergency Room? Yes  Review of Instructions:     Understands what to report/when to return?:  Yes   Understands discharge instructions?:  Yes   Following discharge instructions?:  Yes   If not why? na    Are there any new complaints of pain? No  New Pain Meds? N/A    Constipation prophylaxis needed? N/A    If you have a wound is the dressing clean, dry, and intact? N/A  Understands wound care regimen? N/A    Are there any other complaints/concerns that you wish to tell your provider? no    FU appts/Provider:    Future Appointments   Date Time Provider Tara Mtz   11/18/2020  2:20 PM Chucky Bruce MD [facility]   12/14/2020 12:00 PM Thomas Nix MD [facility] None   1/27/2021  3:20 PM TARA Dobson - CNP [facility]           New Medications?:   No      Medication Reconciliation by phone - Yes  Understands Medications? Yes  Taking Medications? Yes  Can you swallow your pills? Yes    Any further needs in the home i.e. Equipment? Noo, denies    Link to services in community?:  No   Which services:  -did have home care go to home, and wanted nursing to assist in bp monitoring, medications; but unfortunately patient not homebound; and denied wanting home care    Care Coordination Plan of Care: This nurse Care Coordinator will forward update to Marianela Bullock today.   -Patient to call Cardiology office and schedule follow up. Will follow back up with patient later this week.

## 2020-10-21 ENCOUNTER — CARE COORDINATION (OUTPATIENT)
Dept: CARE COORDINATION | Age: 70
End: 2020-10-21

## 2020-10-21 ENCOUNTER — OFFICE VISIT (OUTPATIENT)
Dept: CARDIOLOGY | Age: 70
End: 2020-10-21
Payer: MEDICARE

## 2020-10-21 VITALS
OXYGEN SATURATION: 98 % | BODY MASS INDEX: 29.67 KG/M2 | HEIGHT: 64 IN | RESPIRATION RATE: 16 BRPM | HEART RATE: 84 BPM | SYSTOLIC BLOOD PRESSURE: 182 MMHG | WEIGHT: 173.8 LBS | DIASTOLIC BLOOD PRESSURE: 71 MMHG

## 2020-10-21 PROBLEM — I10 RESISTANT HYPERTENSION: Status: ACTIVE | Noted: 2020-10-21

## 2020-10-21 PROBLEM — I1A.0 RESISTANT HYPERTENSION: Status: ACTIVE | Noted: 2020-10-21

## 2020-10-21 PROCEDURE — 99214 OFFICE O/P EST MOD 30 MIN: CPT | Performed by: FAMILY MEDICINE

## 2020-10-21 PROCEDURE — 4040F PNEUMOC VAC/ADMIN/RCVD: CPT | Performed by: FAMILY MEDICINE

## 2020-10-21 PROCEDURE — G8484 FLU IMMUNIZE NO ADMIN: HCPCS | Performed by: FAMILY MEDICINE

## 2020-10-21 PROCEDURE — 3017F COLORECTAL CA SCREEN DOC REV: CPT | Performed by: FAMILY MEDICINE

## 2020-10-21 PROCEDURE — G8427 DOCREV CUR MEDS BY ELIG CLIN: HCPCS | Performed by: FAMILY MEDICINE

## 2020-10-21 PROCEDURE — 99211 OFF/OP EST MAY X REQ PHY/QHP: CPT | Performed by: FAMILY MEDICINE

## 2020-10-21 PROCEDURE — 1036F TOBACCO NON-USER: CPT | Performed by: FAMILY MEDICINE

## 2020-10-21 PROCEDURE — G8417 CALC BMI ABV UP PARAM F/U: HCPCS | Performed by: FAMILY MEDICINE

## 2020-10-21 PROCEDURE — 1090F PRES/ABSN URINE INCON ASSESS: CPT | Performed by: FAMILY MEDICINE

## 2020-10-21 PROCEDURE — 1123F ACP DISCUSS/DSCN MKR DOCD: CPT | Performed by: FAMILY MEDICINE

## 2020-10-21 PROCEDURE — G8399 PT W/DXA RESULTS DOCUMENT: HCPCS | Performed by: FAMILY MEDICINE

## 2020-10-21 RX ORDER — SPIRONOLACTONE 25 MG/1
25 TABLET ORAL DAILY
Qty: 90 TABLET | Refills: 3 | Status: SHIPPED | OUTPATIENT
Start: 2020-10-21 | End: 2020-12-16 | Stop reason: SDUPTHER

## 2020-10-21 NOTE — PATIENT INSTRUCTIONS
SURVEY:    You may be receiving a survey from [employer] regarding your visit today. Please complete the survey to enable us to provide the highest quality of care to you and your family. If you cannot score us a very good on any question, please call the office to discuss how we could have made your experience a very good one. Thank you.       Your Medical Assistant today was Chema Henson

## 2020-10-21 NOTE — PROGRESS NOTES
Zeke Brasher am scribing for and in the presence of Patricia Dong. Dorina Berry MD, MS, F.A.C.C..    Patient: Shaniqua Escobar  : 1950  Date of Visit: 2020    REASON FOR VISIT / CONSULTATION: Follow-up (Hx:CAD,HTN. ER on  for HTN/Dizziness. Issues with elevated BP. She has been feeling good. Denies: CP, SOB, Lightheaded/dizziness, Palpitations.)      History of Present Illness:        Dear Adilene Breen, APRN - CNP    I had the pleasure of seeing your patient Shaniqua Escobar as part of a pre-operative cardiac evaluation today. Heart catheterization done on 2019 that showed mild to moderate single vessel disease involving the circumflex coronary artery. Normal left ventricular end diastolic pressure. (LVEDP). Ms. Bunny Paul is here today for pretty severe and persistent high blood pressure issues. Her biggest complaint is she continues to having knee pain which she says that she thinks this has been making her blood pressure worse. She reports still having some heartburn indigestion when she eats she also drinks pop but says it is fairly mild. She denies having any current or recent chest pain, shortness of breath, lightheaded/dizziness or palpitations. She denies any increased lower extremity edema, abdominal pain, bleeding problems, problems with her medications or any other concerns at this time. PAST MEDICAL HISTORY:        Past Medical History:   Diagnosis Date    Arthritis     Asthma     Chronic bronchitis ([address]     Diabetes mellitus ([facility]     GERD (gastroesophageal reflux disease)     H/O echocardiogram 3/9/16    EF >60%. LV wall thickness is mildly increased. Mild mitral and tricuspid regurgitation. Borderline pulmonary hypertension estimated right ventricular sysolic pressure of [id number]. Mild (grade l) diastolic dysfunction.  History of PFTs 3/10/16     Suboptimal effort. Restrictive in nature. clionical correlations is advised.  History of stress test 16    Abnoraml. Moderate perfusion defect of mild to moderate intensity in the anterolateral and anteroapical regions during stress imaging. Intermediate risk for CAD.  Hyperlipidemia     Hypertension        CURRENT ALLERGIES: Iv dye [iodides] REVIEW OF SYSTEMS: 14 systems were reviewed. Pertinent positives and negatives as above, all else negative.      Past Surgical History:   Procedure Laterality Date    BREAST SURGERY      cyst removed    CARDIAC CATHETERIZATION      CARDIAC CATHETERIZATION Left 2019    Right [email or web address]/     SECTION      CHOLECYSTECTOMY      COLONOSCOPY  3/23/15    -diverticulosis,hemorrhoids    FOOT SURGERY      rt    HYSTERECTOMY      Social History:  Social History     Tobacco Use    Smoking status: Never Smoker    Smokeless tobacco: Never Used   Substance Use Topics    Alcohol use: No     Alcohol/week: 0.0 standard drinks     Frequency: Never     Binge frequency: Never    Drug use: No        CURRENT MEDICATIONS:        Outpatient Medications Marked as Taking for the 10/21/20 encounter (Office Visit) with Chris Au MD   Medication Sig Dispense Refill    amLODIPine (NORVASC) 10 MG tablet Take 10 mg by mouth daily      atenolol (TENORMIN) 50 MG tablet Take 2 tablets by mouth daily 90 tablet 0    hydrALAZINE (APRESOLINE) 50 MG tablet Take 1 tablet by mouth 3 times daily 90 tablet 3    dorzolamide-timolol (COSOPT) 22.3-6.8 MG/ML ophthalmic solution       ascorbic acid (VITAMIN C) 500 MG tablet Take 1 tablet by mouth 2 times daily With iron tablet 180 tablet 0    atorvastatin (LIPITOR) 40 MG tablet Take 1 tablet by mouth daily 90 tablet 1    Calcium Carbonate-Vitamin D (OYSTER SHELL CALCIUM/D) 500-200 MG-UNIT TABS Take 1 tablet by mouth 2 times daily 180 tablet 1    Multiple Vitamins-Minerals (THERAPEUTIC MULTIVITAMIN-MINERALS) tablet Take 1 tablet by mouth daily 30 tablet 11    valsartan (DIOVAN) 320 MG tablet Take 1 tablet by mouth liberty of ordering a TSH with reflex T4 I ordered a secondary Hypertension work up done before starting Aldactone. This includes  Aldosterone, Renin, Metnephrines plasma free. · Laboratory testing: Basic metabolic panel (BMP) for 5-7 days from now to assess his potassium and renal function. · Chronic pain/Leg  · Follow up with Florence Bullock and possible referral to orthopedics or Pain Management    I also told Ms. Israel Fernández to continue her other medications and follow up with you as previously scheduled. FOLLOW UP:   I told Ms. Israel Fernández to call my office if she had any problems, but otherwise told her to Return in about 3 weeks (around 11/11/2020). However, I would be happy to see her sooner should the need arise. Once again, thank you for allowing me to participate in this patients care. Please do not hesitate to contact me could I be of further assistance. Sincerely,  Yolis Medeiros. Rachell LAKE, MS, F.A.C.C. [facility] Cardiology Specialist     [facility] [employer], [address] [place] [address]  Phone: [phone], Fax: [phone]     I believe that the risk of significant morbidity and mortality related to the patient's current medical conditions are: Intermediate. The documentation recorded by the scribe, accurately and completely reflects the services I personally performed and the decisions made by me. Rohit Rios MD, MS, F.A.C.C.  October 21, 2020

## 2020-10-21 NOTE — CARE COORDINATION
Chart Reviewed:   -wanting to confirm that patient has been scheduled with [facility]ology. Future Appointments   Date Time Provider Tara Mtz   10/21/2020  4:00 PM Precious Avina MD TIFF CARD [facility]   12/14/2020 12:00 PM MD Luz Marina Leonardph Tiff None   1/27/2021  3:20 PM TARA Gaffney - CNP Tiff Prim Ca [facility]     -patient scheduled today. Care Coordination Plan of Care:    This nurse Care Coordinator will plan follow up to patient next week  -Assess changes with patient's medications following Cardiology appointment  -Will follow up on home blood pressures.   -Any new concerns/symptoms

## 2020-10-22 ENCOUNTER — HOSPITAL ENCOUNTER (OUTPATIENT)
Age: 70
Discharge: HOME | End: 2020-10-22
Payer: MEDICARE

## 2020-10-22 ENCOUNTER — TELEPHONE (OUTPATIENT)
Dept: CARDIOLOGY | Age: 70
End: 2020-10-22

## 2020-10-22 LAB
ANION GAP SERPL CALCULATED.3IONS-SCNC: 9 MMOL/L (ref 9–17)
BUN BLDV-MCNC: 7 MG/DL (ref 8–23)
BUN/CREAT BLD: 9 (ref 9–20)
CALCIUM SERPL-MCNC: 9.4 MG/DL (ref 8.6–10.4)
CHLORIDE BLD-SCNC: 104 MMOL/L (ref 98–107)
CO2: 28 MMOL/L (ref 20–31)
CREAT SERPL-MCNC: 0.74 MG/DL (ref 0.5–0.9)
GFR AFRICAN AMERICAN: >60 ML/MIN
GFR NON-AFRICAN AMERICAN: >60 ML/MIN
GFR SERPL CREATININE-BSD FRML MDRD: ABNORMAL ML/MIN/{1.73_M2}
GFR SERPL CREATININE-BSD FRML MDRD: ABNORMAL ML/MIN/{1.73_M2}
GLUCOSE BLD-MCNC: 90 MG/DL (ref 70–99)
POTASSIUM SERPL-SCNC: 3.8 MMOL/L (ref 3.7–5.3)
SODIUM BLD-SCNC: 141 MMOL/L (ref 135–144)
THYROXINE, FREE: 1.09 NG/DL (ref 0.93–1.7)
TSH SERPL DL<=0.05 MIU/L-ACNC: 2.25 MIU/L (ref 0.3–5)

## 2020-10-22 PROCEDURE — 82088 ASSAY OF ALDOSTERONE: CPT

## 2020-10-22 PROCEDURE — 84244 ASSAY OF RENIN: CPT

## 2020-10-22 PROCEDURE — 36415 COLL VENOUS BLD VENIPUNCTURE: CPT

## 2020-10-22 PROCEDURE — 84443 ASSAY THYROID STIM HORMONE: CPT

## 2020-10-22 PROCEDURE — 80048 BASIC METABOLIC PNL TOTAL CA: CPT

## 2020-10-22 PROCEDURE — 83835 ASSAY OF METANEPHRINES: CPT

## 2020-10-22 PROCEDURE — 84439 ASSAY OF FREE THYROXINE: CPT

## 2020-10-22 NOTE — TELEPHONE ENCOUNTER
----- Message from Darnell Shone, MD sent at 10/22/2020 12:38 PM EDT -----  Let Ms. Duran Toi know their test result was ok. Still waiting on some results but ok to start spironolactone. Thanks. Thanks.

## 2020-10-25 LAB
ALDOSTERONE COMMENT: 910
ALDOSTERONE: 14.2 NG/DL

## 2020-10-26 ENCOUNTER — APPOINTMENT (OUTPATIENT)
Dept: CT IMAGING | Age: 70
End: 2020-10-26
Payer: MEDICARE

## 2020-10-26 ENCOUNTER — HOSPITAL ENCOUNTER (EMERGENCY)
Age: 70
Discharge: HOME | End: 2020-10-26
Attending: FAMILY MEDICINE
Payer: MEDICARE

## 2020-10-26 VITALS
OXYGEN SATURATION: 96 % | HEIGHT: 65 IN | RESPIRATION RATE: 18 BRPM | SYSTOLIC BLOOD PRESSURE: 180 MMHG | DIASTOLIC BLOOD PRESSURE: 68 MMHG | BODY MASS INDEX: 28.32 KG/M2 | TEMPERATURE: 98.6 F | HEART RATE: 91 BPM | WEIGHT: 170 LBS

## 2020-10-26 LAB
-: ABNORMAL
ABSOLUTE EOS #: 0.07 K/UL (ref 0–0.44)
ABSOLUTE IMMATURE GRANULOCYTE: 0.06 K/UL (ref 0–0.3)
ABSOLUTE LYMPH #: 2.62 K/UL (ref 1.1–3.7)
ABSOLUTE MONO #: 0.73 K/UL (ref 0.1–1.2)
ALBUMIN SERPL-MCNC: 4 G/DL (ref 3.5–5.2)
ALBUMIN/GLOBULIN RATIO: 1.2 (ref 1–2.5)
ALP BLD-CCNC: 131 U/L (ref 35–104)
ALT SERPL-CCNC: 7 U/L (ref 5–33)
AMORPHOUS: ABNORMAL
ANION GAP SERPL CALCULATED.3IONS-SCNC: 14 MMOL/L (ref 9–17)
AST SERPL-CCNC: 12 U/L
BACTERIA: ABNORMAL
BASOPHILS # BLD: 0 % (ref 0–2)
BASOPHILS ABSOLUTE: 0.05 K/UL (ref 0–0.2)
BILIRUB SERPL-MCNC: 0.25 MG/DL (ref 0.3–1.2)
BILIRUBIN URINE: NEGATIVE
BUN BLDV-MCNC: 9 MG/DL (ref 8–23)
BUN/CREAT BLD: 10 (ref 9–20)
CALCIUM SERPL-MCNC: 9.2 MG/DL (ref 8.6–10.4)
CASTS UA: ABNORMAL /LPF
CHLORIDE BLD-SCNC: 103 MMOL/L (ref 98–107)
CO2: 25 MMOL/L (ref 20–31)
COLOR: YELLOW
COMMENT UA: ABNORMAL
CREAT SERPL-MCNC: 0.86 MG/DL (ref 0.5–0.9)
CRYSTALS, UA: ABNORMAL /HPF
DIFFERENTIAL TYPE: ABNORMAL
EOSINOPHILS RELATIVE PERCENT: 0 % (ref 1–4)
EPITHELIAL CELLS UA: ABNORMAL /HPF (ref 0–25)
GFR AFRICAN AMERICAN: >60 ML/MIN
GFR NON-AFRICAN AMERICAN: >60 ML/MIN
GFR SERPL CREATININE-BSD FRML MDRD: ABNORMAL ML/MIN/{1.73_M2}
GFR SERPL CREATININE-BSD FRML MDRD: ABNORMAL ML/MIN/{1.73_M2}
GLUCOSE BLD-MCNC: 146 MG/DL (ref 70–99)
GLUCOSE URINE: NEGATIVE
HCT VFR BLD CALC: 37.9 % (ref 36.3–47.1)
HEMOGLOBIN: 12.1 G/DL (ref 11.9–15.1)
IMMATURE GRANULOCYTES: 0 %
KETONES, URINE: NEGATIVE
LACTIC ACID: 1.6 MMOL/L (ref 0.5–2.2)
LEUKOCYTE ESTERASE, URINE: ABNORMAL
LIPASE: 28 U/L (ref 13–60)
LYMPHOCYTES # BLD: 17 % (ref 24–43)
MAGNESIUM: 1.4 MG/DL (ref 1.6–2.6)
MCH RBC QN AUTO: 30.5 PG (ref 25.2–33.5)
MCHC RBC AUTO-ENTMCNC: 31.9 G/DL (ref 28.4–34.8)
MCV RBC AUTO: 95.5 FL (ref 82.6–102.9)
METANEPH/PLASMA INTERP: ABNORMAL
METANEPHRINE: 0.28 NMOL/L (ref 0–0.49)
MONOCYTES # BLD: 5 % (ref 3–12)
MUCUS: ABNORMAL
NITRITE, URINE: NEGATIVE
NORMETANEPHRINE PLASMA: 1.15 NMOL/L (ref 0–0.89)
NRBC AUTOMATED: 0 PER 100 WBC
OTHER OBSERVATIONS UA: ABNORMAL
PDW BLD-RTO: 12.7 % (ref 11.8–14.4)
PH UA: 7.5 (ref 5–9)
PLATELET # BLD: 372 K/UL (ref 138–453)
PLATELET ESTIMATE: ABNORMAL
PMV BLD AUTO: 10 FL (ref 8.1–13.5)
POTASSIUM SERPL-SCNC: 3.4 MMOL/L (ref 3.7–5.3)
PROTEIN UA: NEGATIVE
RBC # BLD: 3.97 M/UL (ref 3.95–5.11)
RBC # BLD: ABNORMAL 10*6/UL
RBC UA: ABNORMAL /HPF (ref 0–2)
RENAL EPITHELIAL, UA: ABNORMAL /HPF
SEG NEUTROPHILS: 77 % (ref 36–65)
SEGMENTED NEUTROPHILS ABSOLUTE COUNT: 12.03 K/UL (ref 1.5–8.1)
SODIUM BLD-SCNC: 142 MMOL/L (ref 135–144)
SPECIFIC GRAVITY UA: 1.01 (ref 1.01–1.02)
TOTAL PROTEIN: 7.3 G/DL (ref 6.4–8.3)
TRICHOMONAS: ABNORMAL
TROPONIN INTERP: NORMAL
TROPONIN T: NORMAL NG/ML
TROPONIN, HIGH SENSITIVITY: 9 NG/L (ref 0–14)
TURBIDITY: CLEAR
URINE HGB: NEGATIVE
UROBILINOGEN, URINE: NORMAL
WBC # BLD: 15.6 K/UL (ref 3.5–11.3)
WBC # BLD: ABNORMAL 10*3/UL
WBC UA: ABNORMAL /HPF (ref 0–5)
YEAST: ABNORMAL

## 2020-10-26 PROCEDURE — 96374 THER/PROPH/DIAG INJ IV PUSH: CPT

## 2020-10-26 PROCEDURE — 6370000000 HC RX 637 (ALT 250 FOR IP): Performed by: EMERGENCY MEDICINE

## 2020-10-26 PROCEDURE — 84484 ASSAY OF TROPONIN QUANT: CPT

## 2020-10-26 PROCEDURE — 6360000002 HC RX W HCPCS: Performed by: NURSE PRACTITIONER

## 2020-10-26 PROCEDURE — 6360000004 HC RX CONTRAST MEDICATION: Performed by: NURSE PRACTITIONER

## 2020-10-26 PROCEDURE — 87077 CULTURE AEROBIC IDENTIFY: CPT

## 2020-10-26 PROCEDURE — 87186 SC STD MICRODIL/AGAR DIL: CPT

## 2020-10-26 PROCEDURE — 99284 EMERGENCY DEPT VISIT MOD MDM: CPT

## 2020-10-26 PROCEDURE — 74176 CT ABD & PELVIS W/O CONTRAST: CPT

## 2020-10-26 PROCEDURE — 80053 COMPREHEN METABOLIC PANEL: CPT

## 2020-10-26 PROCEDURE — 83735 ASSAY OF MAGNESIUM: CPT

## 2020-10-26 PROCEDURE — 81001 URINALYSIS AUTO W/SCOPE: CPT

## 2020-10-26 PROCEDURE — 36415 COLL VENOUS BLD VENIPUNCTURE: CPT

## 2020-10-26 PROCEDURE — 2580000003 HC RX 258: Performed by: NURSE PRACTITIONER

## 2020-10-26 PROCEDURE — 85025 COMPLETE CBC W/AUTO DIFF WBC: CPT

## 2020-10-26 PROCEDURE — 83690 ASSAY OF LIPASE: CPT

## 2020-10-26 PROCEDURE — 96375 TX/PRO/DX INJ NEW DRUG ADDON: CPT

## 2020-10-26 PROCEDURE — 87086 URINE CULTURE/COLONY COUNT: CPT

## 2020-10-26 PROCEDURE — 74177 CT ABD & PELVIS W/CONTRAST: CPT

## 2020-10-26 PROCEDURE — 83605 ASSAY OF LACTIC ACID: CPT

## 2020-10-26 RX ORDER — MORPHINE SULFATE 2 MG/ML
2 INJECTION, SOLUTION INTRAMUSCULAR; INTRAVENOUS ONCE
Status: COMPLETED | OUTPATIENT
Start: 2020-10-26 | End: 2020-10-26

## 2020-10-26 RX ORDER — ATENOLOL 25 MG/1
50 TABLET ORAL ONCE
Status: COMPLETED | OUTPATIENT
Start: 2020-10-26 | End: 2020-10-26

## 2020-10-26 RX ORDER — AMLODIPINE BESYLATE 5 MG/1
10 TABLET ORAL ONCE
Status: COMPLETED | OUTPATIENT
Start: 2020-10-26 | End: 2020-10-26

## 2020-10-26 RX ORDER — DIPHENHYDRAMINE HYDROCHLORIDE 50 MG/ML
25 INJECTION INTRAMUSCULAR; INTRAVENOUS ONCE
Status: COMPLETED | OUTPATIENT
Start: 2020-10-26 | End: 2020-10-26

## 2020-10-26 RX ORDER — ONDANSETRON 2 MG/ML
4 INJECTION INTRAMUSCULAR; INTRAVENOUS ONCE
Status: COMPLETED | OUTPATIENT
Start: 2020-10-26 | End: 2020-10-26

## 2020-10-26 RX ORDER — VALSARTAN 320 MG/1
320 TABLET ORAL DAILY
Qty: 30 TABLET | Refills: 0 | Status: SHIPPED | OUTPATIENT
Start: 2020-10-26 | End: 2020-10-28 | Stop reason: ALTCHOICE

## 2020-10-26 RX ORDER — ONDANSETRON 4 MG/1
4 TABLET, ORALLY DISINTEGRATING ORAL EVERY 8 HOURS PRN
Qty: 20 TABLET | Refills: 0 | Status: SHIPPED | OUTPATIENT
Start: 2020-10-26 | End: 2020-10-28 | Stop reason: ALTCHOICE

## 2020-10-26 RX ORDER — 0.9 % SODIUM CHLORIDE 0.9 %
1000 INTRAVENOUS SOLUTION INTRAVENOUS ONCE
Status: COMPLETED | OUTPATIENT
Start: 2020-10-26 | End: 2020-10-26

## 2020-10-26 RX ORDER — METHYLPREDNISOLONE SODIUM SUCCINATE 125 MG/2ML
125 INJECTION, POWDER, LYOPHILIZED, FOR SOLUTION INTRAMUSCULAR; INTRAVENOUS ONCE
Status: COMPLETED | OUTPATIENT
Start: 2020-10-26 | End: 2020-10-26

## 2020-10-26 RX ORDER — AMLODIPINE BESYLATE 10 MG/1
10 TABLET ORAL DAILY
Qty: 30 TABLET | Refills: 0 | Status: SHIPPED | OUTPATIENT
Start: 2020-10-26 | End: 2020-10-28 | Stop reason: SDUPTHER

## 2020-10-26 RX ADMIN — METHYLPREDNISOLONE SODIUM SUCCINATE 125 MG: 125 INJECTION, POWDER, FOR SOLUTION INTRAMUSCULAR; INTRAVENOUS at 20:01

## 2020-10-26 RX ADMIN — IOPAMIDOL 100 ML: 755 INJECTION, SOLUTION INTRAVENOUS at 20:27

## 2020-10-26 RX ADMIN — MORPHINE SULFATE 2 MG: 2 INJECTION, SOLUTION INTRAMUSCULAR; INTRAVENOUS at 19:27

## 2020-10-26 RX ADMIN — ATENOLOL 50 MG: 25 TABLET ORAL at 21:11

## 2020-10-26 RX ADMIN — ONDANSETRON 4 MG: 2 INJECTION INTRAMUSCULAR; INTRAVENOUS at 19:27

## 2020-10-26 RX ADMIN — SODIUM CHLORIDE 1000 ML: 9 INJECTION, SOLUTION INTRAVENOUS at 19:27

## 2020-10-26 RX ADMIN — DIPHENHYDRAMINE HYDROCHLORIDE 25 MG: 50 INJECTION, SOLUTION INTRAMUSCULAR; INTRAVENOUS at 20:02

## 2020-10-26 RX ADMIN — AMLODIPINE BESYLATE 10 MG: 5 TABLET ORAL at 21:11

## 2020-10-26 ASSESSMENT — PAIN DESCRIPTION - PROGRESSION: CLINICAL_PROGRESSION: RAPIDLY IMPROVING

## 2020-10-26 ASSESSMENT — PAIN SCALES - GENERAL
PAINLEVEL_OUTOF10: 10
PAINLEVEL_OUTOF10: 2
PAINLEVEL_OUTOF10: 10

## 2020-10-26 ASSESSMENT — PAIN DESCRIPTION - LOCATION
LOCATION: GENERALIZED
LOCATION: GENERALIZED

## 2020-10-26 ASSESSMENT — PAIN DESCRIPTION - DESCRIPTORS: DESCRIPTORS: ACHING

## 2020-10-26 ASSESSMENT — PAIN DESCRIPTION - PAIN TYPE: TYPE: ACUTE PAIN

## 2020-10-26 NOTE — ED PROVIDER NOTES
pulmonary hypertension estimated right ventricular sysolic pressure of [id number]. Mild (grade l) diastolic dysfunction.  History of PFTs 3/10/16     Suboptimal effort. Restrictive in nature. clionical correlations is advised.  History of stress test 16    Abnoraml. Moderate perfusion defect of mild to moderate intensity in the anterolateral and anteroapical regions during stress imaging. Intermediate risk for CAD.     Hyperlipidemia     Hypertension          SURGICAL HISTORY       Past Surgical History:   Procedure Laterality Date    BREAST SURGERY      cyst removed    CARDIAC CATHETERIZATION      CARDIAC CATHETERIZATION Left 2019    [facility] Radial/[facility]/     SECTION      CHOLECYSTECTOMY      COLONOSCOPY  3/23/15    -diverticulosis,hemorrhoids    FOOT SURGERY      rt    HYSTERECTOMY           CURRENT MEDICATIONS       Previous Medications    AMLODIPINE (NORVASC) 10 MG TABLET    Take 10 mg by mouth daily    ASCORBIC ACID (VITAMIN C) 500 MG TABLET    Take 1 tablet by mouth 2 times daily With iron tablet    ASPIRIN EC 81 MG EC TABLET    Take 1 tablet by mouth daily    ATENOLOL (TENORMIN) 50 MG TABLET    Take 2 tablets by mouth daily    ATORVASTATIN (LIPITOR) 40 MG TABLET    Take 1 tablet by mouth daily    BLOOD GLUCOSE MONITORING SUPPL GENNY    1 Units by Does not apply route 3 times daily What insurance will cover    BLOOD GLUCOSE TEST STRIPS (ONE TOUCH ULTRA TEST) STRIP    USE 1 STRIP TO CHECK GLUCOSE DAILY    CALCIUM CARBONATE-VITAMIN D (OYSTER SHELL CALCIUM/D) 500-200 MG-UNIT TABS    Take 1 tablet by mouth 2 times daily    CETIRIZINE (ZYRTEC ALLERGY) 10 MG TABLET    Take 1 tablet by mouth daily    DORZOLAMIDE-TIMOLOL (COSOPT) 22.3-6.8 MG/ML OPHTHALMIC SOLUTION        HYDRALAZINE (APRESOLINE) 50 MG TABLET    Take 1 tablet by mouth 3 times daily    LATANOPROST (XALATAN) 0.005 % OPHTHALMIC SOLUTION    Place 1 drop into both eyes nightly     METFORMIN (GLUCOPHAGE) 500 MG TABLET    Take 1 tablet by mouth 2 times daily (with meals)    MULTIPLE VITAMINS-MINERALS (THERAPEUTIC MULTIVITAMIN-MINERALS) TABLET    Take 1 tablet by mouth daily    ONE TOUCH LANCETS MISC    Lancets- One Touch Delica for testing daily and as needed.  DX: Diabetes type  E11.9    POTASSIUM CHLORIDE (KLOR-CON M) 10 MEQ EXTENDED RELEASE TABLET    Take 2 tablets by mouth 2 times daily    SPIRONOLACTONE (ALDACTONE) 25 MG TABLET    Take 1 tablet by mouth daily    VALSARTAN (DIOVAN) 320 MG TABLET    Take 1 tablet by mouth daily       ALLERGIES     Iv dye [iodides]    FAMILY HISTORY       Family History   Problem Relation Age of Onset    Stroke Father    [place] Favorite Stroke Sister     Diabetes Sister         x6 sisters    High Blood Pressure Sister     Stroke Brother     Diabetes Brother           SOCIAL HISTORY       Social History     Socioeconomic History    Marital status:      Spouse name: None    Number of children: None    Years of education: None    Highest education level: None   Occupational History    None   Social Needs    Financial resource strain: Not hard at all   Deshaun-Daniela insecurity     Worry: Never true     Inability: Never true    Transportation needs     Medical: No     Non-medical: No   Tobacco Use    Smoking status: Never Smoker    Smokeless tobacco: Never Used   Substance and Sexual Activity    Alcohol use: No     Alcohol/week: 0.0 standard drinks     Frequency: Never     Binge frequency: Never    Drug use: No    Sexual activity: Yes     Partners: Male     Birth control/protection: Post-menopausal   Lifestyle    Physical activity     Days per week: 0 days     Minutes per session: 0 min    Stress: Not at all   Relationships    Social connections     Talks on phone: None     Gets together: None     Attends Confucianism service: None     Active member of club or organization: None     Attends meetings of clubs or organizations: None     Relationship status: None    Intimate partner violence     Fear of current or ex partner: None     Emotionally abused: None     Physically abused: None     Forced sexual activity: None   Other Topics Concern    None   Social History Narrative    None       PHYSICAL EXAM    (up to 7 for level 4, 8 or more for level 5)     ED Triage Vitals [10/26/20 1712]   BP Temp Temp Source Pulse Resp SpO2 Height Weight   (!) 169/94 98.6 °F (37 °C) Oral 86 18 98 % 5' 5\" (1.651 m) 170 lb (77.1 kg)     Constitutional: Oriented and well-developed, well-nourished, and in no distress. Non-toxic appearance. Head: Normocephalic and atraumatic. Eyes: Extra ocular muscles intact. Pupils are equal, round, and reactive to light. No discharge. No scleral icterus. Neck: Normal range of motion and phonation normal. Neck supple. No JVD present. No spinous process tenderness and no muscular tenderness present. No cervical adenopathy. Cardiovascular: Regular rate and rhythm, normal heart sounds and intact distal pulses. No murmur heard. Pulmonary/Chest: Effort normal and breath sounds normal. No accessory muscle usage or stridor. Not tachypneic. No respiratory distress. No tenderness and no retraction. Abdominal: Soft and non-distended. Bowel sounds are normal. No masses or organomegaly. Tenderness in the abdomen with guarding. Musculoskeletal: Normal range of motion. No edema and no tenderness. Neurological: Alert and oriented. Skin: Skin is warm and dry. No rash noted. No cyanosis or erythema. No pallor. Nails show no clubbing.      DIAGNOSTIC RESULTS     EKG: All EKG's are interpreted by the Emergency Department Physician who either signs or Co-signs this chart in the absence of a cardiologist.      RADIOLOGY:   Non-plain film images such as CT, Ultrasound and MRI are read by the radiologist. Plain radiographic images are visualized and preliminarily interpreted by the emergency physician with the below findings:    Interpretation per the Radiologist Phosphatase 131 (H) 35 - 104 U/L    ALT 7 5 - 33 U/L    AST 12 <32 U/L    Total Bilirubin 0.25 (L) 0.3 - 1.2 mg/dL    Total Protein 7.3 6.4 - 8.3 g/dL    Alb 4.0 3.5 - 5.2 g/dL    Albumin/Globulin Ratio 1.2 1.0 - 2.5    GFR Non-African American >60 >60 mL/min    GFR African American >60 >60 mL/min    GFR Comment          GFR Staging         Lipase   Result Value Ref Range    Lipase 28 13 - 60 U/L   Troponin   Result Value Ref Range    Troponin, High Sensitivity 9 0 - 14 ng/L    Troponin T NOT REPORTED <0.03 ng/mL    Troponin Interp NOT REPORTED    Lactic Acid   Result Value Ref Range    Lactic Acid 1.6 0.5 - 2.2 mmol/L     Orders Placed This Encounter   Procedures    Culture, Urine    CT ABDOMEN PELVIS WO CONTRAST Additional Contrast? None    CT ABDOMEN PELVIS W IV CONTRAST Additional Contrast? None    CBC Auto Differential    Comprehensive Metabolic Panel w/ Reflex to MG    Lipase    Troponin    Urinalysis, reflex to microscopic    Lactic Acid    Magnesium       All other labs were within normal range or not returned as of this dictation. EMERGENCY DEPARTMENT COURSE and DIFFERENTIAL DIAGNOSIS/MDM:   Vitals:    Vitals:    10/26/20 1712   BP: (!) 169/94   Pulse: 86   Resp: 18   Temp: 98.6 °F (37 °C)   TempSrc: Oral   SpO2: 98%   Weight: 170 lb (77.1 kg)   Height: 5' 5\" (1.651 m)       MEDICAL DECISION MAKING:  The care of this patient was transferred to my attending, Dr. Sarabjit Darden, who will place the final disposition on this patient. The patient was evaluated during the global COVID-19 pandemic, and that diagnosis was considered upon their initial presentation. Their evaluation, treatment and testing was consistent with current guidelines for patients who present with complaints or symptoms that may be related to COVID-19. Full PPE including gloves, gown, N95 or P100 respirator, and eye protection was used during every encounter with this patient. FINAL IMPRESSION      1.  Abdominal pain,

## 2020-10-27 ENCOUNTER — CARE COORDINATION (OUTPATIENT)
Dept: CARE COORDINATION | Age: 70
End: 2020-10-27

## 2020-10-27 NOTE — CARE COORDINATION
Ambulatory Care Coordination  ED Follow up Call    Reason for ED visit:  Emesis, Abdominal pain. ED follow up Call today:   -Informs, \"that they couldn't find anything wrong\". Reviewed ED medications:   1)Zofran  2)Pepto Bismol     ED summary Reviewed:     CT Scan Completed:    1. No acute intra-abdominal abnormality. 2. Hypoattenuating hepatic lesions measuring up to 2.8 cm are new/increased in size in the interval.  Hepatic protocol MRI or CT is recommended for further characterization. followed up on the CT imaging, it showed enteritis, no hepatic abscess. I believe the leukocytosis secondary to enteritis. Otherwise normal lactic acid, no tachycardia, afebrile. Patient has been out of her blood pressure medications, will do refills for those. Will otherwise send her home with symptomatic treatment with Pepto-Bismol and Zofran.     1. Abdominal pain, unspecified abdominal location    2. Nausea and vomiting, intractability of vomiting not specified, unspecified vomiting type    3. Enteritis    4. Essential hypertension       DISPOSITION:  [x]? Discharge   []? Transfer -    []? Admission -     []? Against Medical Advice   []? Eloped   FOLLOW-UP: Regla Hernandez, APRN - [facility]  [phone]     Call in 1 day         DISCHARGE MEDICATIONS:     Discharge Medication List as of 10/26/2020  9:23 PM           START taking these medications     Details   bismuth subsalicylate (BISMATROL) 262 MG/15ML suspension Take 15 mLs by mouth every 6 hours as needed for Indigestion, Disp-118 mL,R-0Print       ondansetron (ZOFRAN ODT) 4 MG disintegrating tablet Take 1 tablet by mouth every 8 hours as needed for Nausea, Disp-20 tablet,R-0Print        Follow up Cardiology Appointment from 10/21/20:   ASSESSMENT:      1. Resistant hypertension    2. ASHD (arteriosclerotic heart disease)    3.  Essential hypertension       PLAN:         · Mild Coronary artery disease: Appears stable  · Antiplatelet Agent: Continue Aspirin 81 mg daily.   · Beta Blocker: Not indicated. Normal EF. · Anti-anginal medications: Continue amlodipine (Norvasc) 10 mg once daily. · Cholesterol Reduction Therapy: Continue Atorvastatin (Lipitor) 40 mg     · Additional counseling: I advised them to call our office or go to the emergency room if they developed worsening or persistent chest pain or increased shortness of breath as this could be life threatening.     · Resistant Hypertension, possibly secondary hypertension: Uncontrolled  · Beta Blocker: Not indicated at this time. · ACE Inibitor/ARB: Continue valsartan (Diovan) 320 mg daily. · Stop Verapamil. · Start Aldactone 25 mg daily   · Diuretics: Not indicated at this time. · Calcium Channel Blocker: Continue amlodipine (Norvasc) 10 mg once daily. · Additional Testing List: I took the liberty of ordering a TSH with reflex T4 I ordered a secondary Hypertension work up done before starting Aldactone. This includes  Aldosterone, Renin, Metnephrines plasma free. · Laboratory testing: Basic metabolic panel (BMP) for 5-7 days from now to assess his potassium and renal function.     · Chronic pain/Leg  ? Follow up with Mercedes Bullock and possible referral to orthopedics or Pain Management    Discussed [facility] Medications:   -per patient, Rupal Gomez is not sure on her medications\". -did, \"request to have her  call this nurse to review all Blood pressure medications. Follow up Pain:    -Reports, \"that she has arthritis really bad, and it has been bothering me\". -patient, Rosita Sequeira going to [facility] before to orthopedic doctor, she would like referred to local orthopedic doctor\". Status:     improved    Did you call your PCP prior to going to the ED? No      Did you receive a discharge instructions from the Emergency Room?  Yes  Review of Instructions:     Understands what to report/when to return?:  Yes   Understands discharge instructions?: Yes   Following discharge instructions?:  Yes   If not why? n/a    Are there any new complaints of pain? Yes knee/back pain  New Pain Meds? No    Constipation prophylaxis needed? N/A--Bowels are moving regularly    If you have a wound is the dressing clean, dry, and intact? N/A  Understands wound care regimen? N/A    Are there any other complaints/concerns that you wish to tell your provider? Denies, \"just pain\" What else can she take? FU appts/Provider:    Future Appointments   Date Time Provider Tara Mtz   11/4/2020  8:40 AM Won Arauz MD [facility] CARD [facility]   12/14/2020 12:00 PM Abdulaziz Izquierdo MD [facility] Tiff None   1/27/2021  3:20 PM Katharina Bullock APRN - CNP Tiff Prim Ca [facility]           New Medications?:   No      Medication Reconciliation by phone - requested spouse to call back, as he is the one administering medications to patient\". Can you swallow your pills? Yes  -discussed with patient referring her to     Any further needs in the home i.e. Equipment? No    Link to services in community?:  Yes   Which services:  HO[facility] Health: Have referred patient to [facility] care before, and patient did not want the services\". -Patient is now agreeable to the nurses coming in. Care Coordination Plan of Care: This nurse Care Coordinator will   -pend on separate orders only encounter for home health and for orthopedic referral for patient's knee.   -patient agreeable to both referrals.   -Also sent referral today to [facility] pharmacy staff to do a medication review of patient's medications, assess for compliance, and  Verify with pharmacy refill frequency.    Will await call back from spouse to review medications    -will pend home health referral on separate orders only encounter

## 2020-10-27 NOTE — PROGRESS NOTES
[address] Referral for Nursing to monitor Blood pressure, educate on medications, assist in administration, understanding of why she is taking what she is taking.   -Please have MD staff fax referral to [facility]. -ED follow up tomorrow, please make face to face for home care. Also pended External Referral to Dr. Nick Larose: could not find his direct name in referrals.     -Pended both referrals.

## 2020-10-27 NOTE — ED PROVIDER NOTES
[facility] [[email or web address]] ED  Emergency Department  Emergency Medicine Sign-out     Care of Brett Fallon was assumed from Tamara Lat and is being seen for Emesis (Onset today, 5 episodes. )  . The patient's initial evaluation and plan have been discussed with the prior provider who initially evaluated the patient.      EMERGENCY DEPARTMENT COURSE / MEDICAL DECISION MAKING:       MEDICATIONS GIVEN:  Orders Placed This Encounter   Medications    ondansetron (ZOFRAN) injection 4 mg    0.9 % sodium chloride bolus    morphine (PF) injection 2 mg    methylPREDNISolone sodium (SOLU-MEDROL) injection 125 mg    diphenhydrAMINE (BENADRYL) injection 25 mg    iopamidol (ISOVUE-370) 76 % injection 100 mL    amLODIPine (NORVASC) tablet 10 mg    atenolol (TENORMIN) tablet 50 mg    bismuth subsalicylate (BISMATROL) 262 MG/15ML suspension     Sig: Take 15 mLs by mouth every 6 hours as needed for Indigestion     Dispense:  118 mL     Refill:  0    ondansetron (ZOFRAN ODT) 4 MG disintegrating tablet     Sig: Take 1 tablet by mouth every 8 hours as needed for Nausea     Dispense:  20 tablet     Refill:  0    amLODIPine (NORVASC) 10 MG tablet     Sig: Take 1 tablet by mouth daily     Dispense:  30 tablet     Refill:  0    valsartan (DIOVAN) 320 MG tablet     Sig: Take 1 tablet by mouth daily     Dispense:  30 tablet     Refill:  0       LABS / RADIOLOGY:     Labs Reviewed   CBC WITH AUTO DIFFERENTIAL - Abnormal; Notable for the following components:       Result Value    WBC 15.6 (*)     Seg Neutrophils 77 (*)     Lymphocytes 17 (*)     Eosinophils % 0 (*)     Segs Absolute 12.03 (*)     All other components within normal limits   COMPREHENSIVE METABOLIC PANEL W/ REFLEX TO MG FOR LOW K - Abnormal; Notable for the following components:    Glucose 146 (*)     Potassium 3.4 (*)     Alkaline Phosphatase 131 (*)     Total Bilirubin 0.25 (*)     All other components within normal limits   URINALYSIS - Abnormal; Notable for the following components:    Leukocyte Esterase, Urine TRACE (*)     All other components within normal limits   MICROSCOPIC URINALYSIS - Abnormal; Notable for the following components:    Bacteria, UA 3+ (*)     All other components within normal limits   CULTURE, URINE   LIPASE   TROPONIN   LACTIC ACID   MAGNESIUM       Ct Abdomen Pelvis Wo Contrast Additional Contrast? None    Result Date: 10/26/2020  EXAMINATION: CT OF THE ABDOMEN AND PELVIS WITHOUT CONTRAST 10/26/2020 7:18 pm TECHNIQUE: CT of the abdomen and pelvis was performed without the administration of intravenous contrast. Multiplanar reformatted images are provided for review. Dose modulation, iterative reconstruction, and/or weight based adjustment of the mA/kV was utilized to reduce the radiation dose to as low as reasonably achievable. COMPARISON: 06/19/2017 HISTORY: ORDERING SYSTEM PROVIDED HISTORY: abd pain TECHNOLOGIST PROVIDED HISTORY: abd pain FINDINGS: Lower Chest: No acute abnormality within the visualized lung bases. Organs: Within the limitations of a noncontrast examination, no acute abnormality within the spleen, pancreas, or adrenal glands. There are multiple hypoattenuating hepatic lesions, measuring up to 2.8 cm in the posterior right hepatic lobe. This is increased in size from prior examination (previously 1 cm). Prior cholecystectomy. No nephrolithiasis or hydronephrosis. GI/Bowel: Small hiatal hernia. Small bowel is nondilated. Colon is normal in caliber. Pelvis: Bladder is partially distended without vesicular stone. Prior hysterectomy. Peritoneum/Retroperitoneum: No ascites or pneumoperitoneum. Atherosclerosis of the nondilated abdominal aorta. Bones/Soft Tissues: No acute osseous abnormality. Soft tissue nodule within the anterior right lower abdominal soft tissues is unchanged. 1. No acute intra-abdominal abnormality.  2. Hypoattenuating hepatic lesions measuring up to 2.8 cm are new/increased in size in the interval.  Hepatic protocol MRI or CT is recommended for further characterization. RECENT VITALS:     Temp: 98.6 °F (37 °C),  Pulse: 91, Resp: 18, BP: (!) 180/68, SpO2: 96 %(reviewed with Dr Jerilyn Darling)    This patient is a 79 y.o. Female with nausea vomiting and hypoattenuating hepatic lesions up to 2.8 cm. Pending CT abdomen pelvis with contrast for evaluation of the hepatic lesions. Otherwise nonspecific elevation of leukocytosis but normal lactic acid, no fevers, no tachycardia, normal vitals. pending CT abdomen pelvis      I followed up on the CT imaging, it showed enteritis, no hepatic abscess. I believe the leukocytosis secondary to enteritis. Otherwise normal lactic acid, no tachycardia, afebrile. Patient has been out of her blood pressure medications, will do refills for those. Will otherwise send her home with symptomatic treatment with Pepto-Bismol and Zofran. OUTSTANDING TASKS / RECOMMENDATIONS:    1. F/U CT with IV contrast     FINAL IMPRESSION:     1. Abdominal pain, unspecified abdominal location    2. Nausea and vomiting, intractability of vomiting not specified, unspecified vomiting type    3. Enteritis    4.  Essential hypertension        DISPOSITION:         DISPOSITION:  [x]  Discharge   []  Transfer -    []  Admission -     []  Against Medical Advice   []  Eloped   FOLLOW-UP: TARA Aleman - [facility]  [phone]    Call in 1 day       DISCHARGE MEDICATIONS: Discharge Medication List as of 10/26/2020  9:23 PM      START taking these medications    Details   bismuth subsalicylate (BISMATROL) 262 MG/15ML suspension Take 15 mLs by mouth every 6 hours as needed for Indigestion, Disp-118 mL,R-0Print      ondansetron (ZOFRAN ODT) 4 MG disintegrating tablet Take 1 tablet by mouth every 8 hours as needed for Nausea, Disp-20 tablet,R-0Print                 Melisa Andrade MD  Emergency Medicine Attending       Melisa Andrade MD  10/26/20 0341

## 2020-10-27 NOTE — CARE COORDINATION
-Spoke to pharmacy Staff regarding patient's medications, pharmacy staff will reach out to patient/spouse today to review medications, prior to pcp ED follow up tomorrow morning.

## 2020-10-27 NOTE — CARE COORDINATION
Phone call back from spouse to review medications:     Requested spouse to actually go get pill bottles:   1)Pepto Bismol spouse will get these picked up today (no further Emesis)  2)Zofran: spouse will get these picked up today (no further Emesis). 3)Aspirin: 81 mg daily    4)metformin: 500mg daily: 2 times daily with meals    5)potassium Chloride: 2 tablets 2 times daily (Taking)    6)Vitamin C: 2 times daily with iron Tablet    7)Lipitor: 40mg 1 tablet by mouth daily    8)Amlodipine: 10mg 1 tablet daily    9)Spironolactone: 25mg -1 tablet daily    10)Atenolol: 50mg 2 tablets by mouth daily  (patient was still only the one tablet)  Reviewed with spouse ER instructions from 10/17/20, ER doctor increased this medications:     ER Note Reviewed from 10/17 with spouse regarding increasing Atenolol:   The patient's blood pressure came down to 156/73 here with a second dose of hydralazine. She is to take her p.m. dose when she gets home orally and we will increase atenolol to 100 mg daily from 50 mg daily. 11)Valsartan (Diovan): 320mg tablet: (patient out of this, spouse will get picked up today); stressed importance of not running out    12)Hydralazine 50mg tablet: 1 tablet by mouth 3 times daily (up until yesterday, patient was only taking 2 times daily)    13)ASA 81 mg : patient not taking    -Patient is also not takin)Multivitamin: to take 1 daily.     -After reviewing with spouse, do agree patient/spouse could use home care to come in to review medications.   -Unfortunately, they did not allow home health nursing previously. -Requested ER follow up with PCP regarding Abdominal pain:   -patient scheduled tomorrow 10/28 at 920am.  -Spouse aware and agreeable. Pain in knee reviewed:   -spouse aware that referral is being placed for orthopedic doctor.    Reviewed fall precautions with spouse:   1)Getting up slowly when changing positions  2)Keeping pathways clear  3)Keep house well lit  4)Ambulate with assistive device  5)Keep a phone/life alert with you when ambulating    Care Coordination Plan of Care: This nurse Care Coordinator will   -follow up on patient starting with home care  -follow up on pharmacy reviewing medications with patient/spouse.   -will reach out to patient/spouse later this week.

## 2020-10-27 NOTE — Clinical Note
Rico Murdock referral for:   1)Home care, [facility]  2)Dr. Tenzin Shaw, for chronic right knee pain, arthritis right knee. Patient reports, \"increase in pain\".

## 2020-10-28 ENCOUNTER — CARE COORDINATION (OUTPATIENT)
Dept: CARE COORDINATION | Age: 70
End: 2020-10-28

## 2020-10-28 ENCOUNTER — TELEPHONE (OUTPATIENT)
Dept: PRIMARY CARE CLINIC | Age: 70
End: 2020-10-28

## 2020-10-28 ENCOUNTER — OFFICE VISIT (OUTPATIENT)
Dept: PRIMARY CARE CLINIC | Age: 70
End: 2020-10-28
Payer: MEDICARE

## 2020-10-28 ENCOUNTER — TELEPHONE (OUTPATIENT)
Dept: PHARMACY | Age: 70
End: 2020-10-28

## 2020-10-28 VITALS
OXYGEN SATURATION: 98 % | SYSTOLIC BLOOD PRESSURE: 126 MMHG | DIASTOLIC BLOOD PRESSURE: 72 MMHG | WEIGHT: 171 LBS | TEMPERATURE: 97.1 F | HEART RATE: 64 BPM | HEIGHT: 65 IN | RESPIRATION RATE: 18 BRPM | BODY MASS INDEX: 28.49 KG/M2

## 2020-10-28 PROCEDURE — G8427 DOCREV CUR MEDS BY ELIG CLIN: HCPCS | Performed by: NURSE PRACTITIONER

## 2020-10-28 PROCEDURE — G8417 CALC BMI ABV UP PARAM F/U: HCPCS | Performed by: NURSE PRACTITIONER

## 2020-10-28 PROCEDURE — G8484 FLU IMMUNIZE NO ADMIN: HCPCS | Performed by: NURSE PRACTITIONER

## 2020-10-28 PROCEDURE — 3017F COLORECTAL CA SCREEN DOC REV: CPT | Performed by: NURSE PRACTITIONER

## 2020-10-28 PROCEDURE — G8399 PT W/DXA RESULTS DOCUMENT: HCPCS | Performed by: NURSE PRACTITIONER

## 2020-10-28 PROCEDURE — 1123F ACP DISCUSS/DSCN MKR DOCD: CPT | Performed by: NURSE PRACTITIONER

## 2020-10-28 PROCEDURE — 1090F PRES/ABSN URINE INCON ASSESS: CPT | Performed by: NURSE PRACTITIONER

## 2020-10-28 PROCEDURE — 1036F TOBACCO NON-USER: CPT | Performed by: NURSE PRACTITIONER

## 2020-10-28 PROCEDURE — 4040F PNEUMOC VAC/ADMIN/RCVD: CPT | Performed by: NURSE PRACTITIONER

## 2020-10-28 PROCEDURE — 99214 OFFICE O/P EST MOD 30 MIN: CPT | Performed by: NURSE PRACTITIONER

## 2020-10-28 RX ORDER — VALSARTAN 320 MG/1
320 TABLET ORAL DAILY
Qty: 90 TABLET | Refills: 3 | Status: SHIPPED | OUTPATIENT
Start: 2020-10-28 | End: 2020-12-16 | Stop reason: SDUPTHER

## 2020-10-28 RX ORDER — AMLODIPINE BESYLATE 10 MG/1
10 TABLET ORAL DAILY
Qty: 90 TABLET | Refills: 3 | Status: SHIPPED | OUTPATIENT
Start: 2020-10-28 | End: 2020-12-16 | Stop reason: SDUPTHER

## 2020-10-28 ASSESSMENT — ENCOUNTER SYMPTOMS
ORTHOPNEA: 0
VOMITING: 0
DIARRHEA: 0
SORE THROAT: 0
RHINORRHEA: 0
CONSTIPATION: 0
WHEEZING: 0
ABDOMINAL PAIN: 0
BLURRED VISION: 0
SHORTNESS OF BREATH: 0
NAUSEA: 0
COUGH: 0

## 2020-10-28 NOTE — CARE COORDINATION
Phone call from pharmacy staff in reviewing medications; concerns/update pharmacy found:     1)Valsartan: patient did  from pharmacy yesterday; should have now and taking    2)Atenolol: per cardiology notes, patient not to be on Beta Blocker? This was actually increased at ER visit to 100mg daily. Call Dr. Ren Flores, should patient be taking? 3)Spironolactone: patient taking    4)Amlodipine: Patient ordered from ER 10/26 visit: (per pharmacy, this was printed script and patient did not pick up_-will question patient/spouse    REviewed ER note:  Patient has been out of her blood pressure medications, will do refills for those. Will otherwise send her home with symptomatic treatment with Pepto-Bismol and Zofran. 5)Verapamil: Patient is to have stopped per Cardiology note? No longer on medication list    6)Hydralazine: Patient to be taking 3 times daily? Patient was only taking 2 times daily; but did inform yesterday to have patient take 3 times daily per orders. Per Pharmacy:   -they do not have any \"pill pack pharmacies in [place] that could do a pill pack for her medications. Dr. Ren Flores Cardiology note reviewed from 10/21/20 with pharmacy staff, it is agreed that Dr. Ian Bobby office should be notified of medication questions; will request confirmation of BP medications from cardiology staff:      · Mild Coronary artery disease: Appears stable  · Antiplatelet Agent: Continue Aspirin 81 mg daily.   · Beta Blocker: Not indicated. Normal EF. · Anti-anginal medications: Continue amlodipine (Norvasc) 10 mg once daily.   · Cholesterol Reduction Therapy: Continue Atorvastatin (Lipitor) 40 mg     · Additional counseling: I advised them to call our office or go to the emergency room if they developed worsening or persistent chest pain or increased shortness of breath as this could be life threatening.     · Resistant Hypertension, possibly secondary hypertension: Uncontrolled  · Beta Blocker: Not indicated at Discussed with spouse: medication cost/issues:   -Informs, \"sometimes they have issues with cost\". -Reports, \"even with $6 medications\". -they, \"fun low on funds\". -Spouse informs, Beto Briceño has a lot going on now too medically\". -Will refer patient to medication assistant to review case/medications; cost saver options. Blood pressure reviewed for today: (Improving some)  -Today : 149/69; 930 151/77  -yesterday:149/69, HR 68     Informed Spouse that home care will be coming to the home, they should be hearing from them:   -will call [facility] to verify they got the referral.     Discussed medicaid vs. Passport:   =per spouse, Radha Minus bring home $3600 between the 2 of them, which would be over medicaid limit\". -will refer patient to [facility]  to discuss community resources/options for help; address Medicaid. -Referred today    Per spouse, Beto Briceño is going to start to  Pay for cancer treatment himself\"   -this is going to be a new finacncial burden for them. Phone call to [facility] [phone] to verify that they received the home health referral:   Fax# [phone]  -per staff, \"they have not received the referral yet\". -Did notify PCP staff, and request patient referral to be faxed today. -per staff, 'they were able to talk to patient's spouse, and patient will come in today to office for ED Follow up, and they will attach that visit as face to face for home care. Care Coordination Plan of Care: This nurse Care Coordinator will   -Follow up Monday next week, following cardiology apptointment on Friday.   -Is home care in the home?   -What medications is she taking?  -Has medication assistant reached out to patient?   -Has  reached out to patient?   -Is patient going to orthopedic doctor for knee pain?  -What are home Blood Pressures?        Future Appointments   Date Time Provider Tara Mtz   10/30/2020 11:00 AM Iris Ham MD [facility] 12/14/2020 12:00 PM John Flowers MD [facility] None   1/27/2021  3:20 PM TARA Hood - [facility]

## 2020-10-28 NOTE — TELEPHONE ENCOUNTER
Patient already established with [facility] for ortho.   Appointment made in [facility] for 11/2/2020 at 9:45 am.

## 2020-10-28 NOTE — TELEPHONE ENCOUNTER
Medication Management Service  [facility]  [phone]     CLINICAL PHARMACY NOTE: Comprehensive Medication Review      SUBJECTIVE/OBJECTIVE:    Denis Garcia is a 79 y.o. female who was referred to [facility] for Medication Review by care coordinator Di Tilley RN.      Patient currently prescribed the following medications:    Medication Orders    Current Outpatient Medications   Medication Sig Dispense Refill    bismuth subsalicylate (BISMATROL) 262 MG/15ML suspension Take 15 mLs by mouth every 6 hours as needed for Indigestion (Patient not taking: Reported on 10/27/2020) 118 mL 0    ondansetron (ZOFRAN ODT) 4 MG disintegrating tablet Take 1 tablet by mouth every 8 hours as needed for Nausea (Patient not taking: Reported on 10/27/2020) 20 tablet 0    amLODIPine (NORVASC) 10 MG tablet Take 1 tablet by mouth daily 30 tablet 0    valsartan (DIOVAN) 320 MG tablet Take 1 tablet by mouth daily (Patient not taking: Reported on 10/27/2020) 30 tablet 0    spironolactone (ALDACTONE) 25 MG tablet Take 1 tablet by mouth daily 90 tablet 3    atenolol (TENORMIN) 50 MG tablet Take 2 tablets by mouth daily 90 tablet 0    hydrALAZINE (APRESOLINE) 50 MG tablet Take 1 tablet by mouth 3 times daily 90 tablet 3    dorzolamide-timolol (COSOPT) 22.3-6.8 MG/ML ophthalmic solution       ascorbic acid (VITAMIN C) 500 MG tablet Take 1 tablet by mouth 2 times daily With iron tablet 180 tablet 0    atorvastatin (LIPITOR) 40 MG tablet Take 1 tablet by mouth daily 90 tablet 1    Calcium Carbonate-Vitamin D (OYSTER SHELL CALCIUM/D) 500-200 MG-UNIT TABS Take 1 tablet by mouth 2 times daily 180 tablet 1    Multiple Vitamins-Minerals (THERAPEUTIC MULTIVITAMIN-MINERALS) tablet Take 1 tablet by mouth daily 30 tablet 11    metFORMIN (GLUCOPHAGE) 500 MG tablet Take 1 tablet by mouth 2 times daily (with meals) 180 tablet 3    latanoprost (XALATAN) 0.005 % ophthalmic solution Place 1 drop into both eyes nightly       potassium chloride (KLOR-CON M) 10 MEQ extended release tablet Take 2 tablets by mouth 2 times daily 180 tablet 1    blood glucose test strips (ONE TOUCH ULTRA TEST) strip USE 1 STRIP TO CHECK GLUCOSE DAILY 100 strip 3    ONE TOUCH LANCETS MISC Lancets- One Touch Delica for testing daily and as needed. DX: Diabetes type  E11.9 100 each 3    cetirizine (ZYRTEC ALLERGY) 10 MG tablet Take 1 tablet by mouth daily 30 tablet 3    Blood Glucose Monitoring Suppl GENNY 1 Units by Does not apply route 3 times daily What insurance will cover 1 Device 0     No current facility-administered medications for this visit. ASSESSMENT/PLAN:    Medication dispense report reviewed in EHR. Phone call to [facility] to verify refill history. Following identified during that phone call. · Amlodipine 10 mg last refill was on 07/14/2020 as a 90 day supply. Noted that a new prescription for amlodipine was given to patient on 10/26/2020. This prescription has not yet been presented to the pharmacy. · Verapamil prescription still on pharmacy profile with 1 refill remaining. Last filled 07/28/2020 as a 90 day supply. During last cardiology appointment verapamil was stopped. Recommend confirming that patient has stopped verapamil. · Valsartan 320mg was picked up yesterday. · Spironolactone 25mg started by cardiology on 10/22/2020. Per pharmacy, patient also picked up spironolactone on that same day. · Patient may benefit from blister-packaged medications or pill box. Options reviewed around [facility]. No options identified within city limits. Identified medication discrepancies/issues:    Medication(s) Issue Action     Atenolol 50 mg BID Cardiology physician lists patient condition as not indicated for Betablocker usage- Normal EF    Patient was increased from QD to BID dosing by ED physician Clarify with cardiology team     Hydralazine 50 mg QD Patient continues to take hydralazine.   Hydralazine not listed as continued medications during last cardiology visit on 10/21/2020 Clarify with cardiology team      Above information shared with RN CLARI. RN MALINDAM will follow up with cardiology to clarify which medications patient should be taking.       [address]  Medication Management Service  [phone]      =================================================================  CLINICAL PHARMACY CONSULT: MED RECONCILIATION/REVIEW ADDENDUM    For Pharmacy Admin Tracking Only    PHSO: Yes  Total # of Interventions Recommended: 2  Total Interventions Accepted: 2  Time Spent (min): Alberto Mckenna, PharmD

## 2020-10-28 NOTE — PROGRESS NOTES
Name: Benjy Arana  : 1950         Chief Complaint:     Chief Complaint   Patient presents with    ED Follow-up     States \"feeling better, pain is gone. \"       History of Present Illness:      Benjy Arana is a 79 y.o.  female who presents with ED Follow-up (States \"feeling better, pain is gone. \")      John Lee is here today for an ER follow-up visit. Feeling better, was in ER for N/V, found to not have all medications for HTN. Medication noncompliance and physical deconditioning- medications missing from daily routine and not taking medications as directed on bottles, taking 2 times daily instead of 3, etc.     Physical deconditioning due to knee pain. Would benefit from home health services including nursing for medication compliance and PT for conditioning. Patient needs state planned services, and would benefit from Nursing and Home health aide ongoing help. Patient does not qualify for medicaid, but we truly feel if able; patient could use some ongoing help in the home. Hypertension   This is a chronic problem. The current episode started more than 1 year ago. The problem has been gradually improving since onset. The problem is controlled. Associated symptoms include peripheral edema (Intermittent). Pertinent negatives include no anxiety, blurred vision, chest pain, headaches, malaise/fatigue, neck pain, orthopnea, palpitations, PND, shortness of breath or sweats. There are no associated agents to hypertension. Risk factors for coronary artery disease include diabetes mellitus, dyslipidemia, family history, stress, sedentary lifestyle and post-menopausal state. Past treatments include angiotensin blockers, calcium channel blockers and beta blockers. The current treatment provides moderate improvement. Compliance problems include exercise. There is no history of kidney disease, CAD/MI, CVA or heart failure. There is no history of chronic renal disease.    Knee Pain    The incident CHOLECYSTECTOMY      COLONOSCOPY  3/23/15    -diverticulosis,hemorrhoids    FOOT SURGERY      rt    HYSTERECTOMY          Medications:       Prior to Admission medications    Medication Sig Start Date End Date Taking?  Authorizing Provider   valsartan (DIOVAN) 320 MG tablet Take 1 tablet by mouth daily 10/28/20  Yes TARA Arango CNP   amLODIPine (NORVASC) 10 MG tablet Take 1 tablet by mouth daily 10/28/20  Yes TARA Arango CNP   spironolactone (ALDACTONE) 25 MG tablet Take 1 tablet by mouth daily 10/21/20  Yes Gabriella Olson MD   atenolol (TENORMIN) 50 MG tablet Take 2 tablets by mouth daily 10/17/20  Yes Geri Romberg III, MD   hydrALAZINE (APRESOLINE) 50 MG tablet Take 1 tablet by mouth 3 times daily 10/16/20  Yes TARA Puente CNP   dorzolamide-timolol (COSOPT) 22.3-6.8 MG/ML ophthalmic solution  9/20/20  Yes Historical Provider, MD   ascorbic acid (VITAMIN C) 500 MG tablet Take 1 tablet by mouth 2 times daily With iron tablet 8/6/20  Yes TARA Arango CNP   atorvastatin (LIPITOR) 40 MG tablet Take 1 tablet by mouth daily 8/6/20  Yes TARA Arango CNP   Calcium Carbonate-Vitamin D (OYSTER SHELL CALCIUM/D) 500-200 MG-UNIT TABS Take 1 tablet by mouth 2 times daily 8/6/20  Yes TARA Arango CNP   Multiple Vitamins-Minerals (THERAPEUTIC MULTIVITAMIN-MINERALS) tablet Take 1 tablet by mouth daily 8/6/20 8/6/21 Yes TARA Arango CNP   metFORMIN (GLUCOPHAGE) 500 MG tablet Take 1 tablet by mouth 2 times daily (with meals) 7/22/20  Yes TARA Arango CNP   latanoprost (XALATAN) 0.005 % ophthalmic solution Place 1 drop into both eyes nightly    Yes Historical Provider, MD   potassium chloride (KLOR-CON M) 10 MEQ extended release tablet Take 2 tablets by mouth 2 times daily 11/6/19  Yes TARA Arango CNP   blood glucose test strips (ONE TOUCH ULTRA TEST) strip USE 1 STRIP TO CHECK GLUCOSE DAILY 3/13/19  Yes TARA Arango CNP   ONE TOUCH LANCETS 8 [facility] for testing daily and as needed. DX: Diabetes type  E11.9 1/31/19  Yes TARA Najera CNP   cetirizine (ZYRTEC ALLERGY) 10 MG tablet Take 1 tablet by mouth daily 6/7/18  Yes TARA Rangel CNP   Blood Glucose Monitoring Suppl GENNY 1 Units by Does not apply route 3 times daily What insurance will cover 10/11/16  Yes TARA Sanon CNP        Allergies: Iv dye [iodides]    Social History:     Tobacco:    reports that she has never smoked. She has never used smokeless tobacco.  Alcohol:      reports no history of alcohol use. Drug Use:  reports no history of drug use. Family History:     Family History   Problem Relation Age of Onset   [facility] Stroke Father     Stroke Sister     Diabetes Sister         x6 sisters    High Blood Pressure Sister     Stroke Brother     Diabetes Brother        Review of Systems:     Positive and Negative as described in HPI    Review of Systems   Constitutional: Negative for chills, fatigue, fever and malaise/fatigue. HENT: Negative for congestion, rhinorrhea and sore throat. Eyes: Negative for blurred vision and visual disturbance. Respiratory: Negative for cough, shortness of breath and wheezing. Cardiovascular: Negative for chest pain, palpitations, orthopnea and PND. Gastrointestinal: Negative for abdominal pain, constipation, diarrhea, nausea and vomiting. Genitourinary: Negative for difficulty urinating and dysuria. Musculoskeletal: Positive for arthralgias and gait problem. Negative for neck pain and neck stiffness. Skin: Negative for rash. Neurological: Negative for dizziness, tingling, syncope, light-headedness, numbness and headaches.        Physical Exam:   Vitals:  /72 (Site: Left Upper Arm, Position: Sitting, Cuff Size: Large Adult)   Pulse 64   Temp 97.1 °F (36.2 °C) (Temporal)   Resp 18   Ht 5' 5\" (1.651 m)   Wt 171 lb (77.6 kg)   SpO2 98%   BMI 28.46 kg/m² 10.0 10/26/2020     Lab Results   Component Value Date    TSH 2.25 10/22/2020     Lab Results   Component Value Date    CHOL 174 08/05/2020    HDL 54 08/05/2020    LABA1C 5.8 08/05/2020       Assessment/Plan:      Diagnosis Orders   1. Essential hypertension  valsartan (DIOVAN) 320 MG tablet    amLODIPine (NORVASC) 10 MG tablet   2. Chronic pain of right knee     3. Physical deconditioning     4. Medical non-compliance       Home health ordered for compliance and conditioning     Referral to ortho for knee pain      1.  Marrytelly Henson received counseling on the following healthy behaviors: nutrition, exercise and medication adherence  2. Patient given educational materials - see patient instructions  3. Was a self-tracking handout given in paper form or via [employer]? No  If yes, see orders or list here. 4.  Discussed use, benefit, and side effects of prescribed medications. Barriers to medication compliance addressed. All patient questions answered. Pt voiced understanding. 5.  Reviewed prior labs and health maintenance  6. Continue current medications, diet and exercise. Completed Refills   Requested Prescriptions     Signed Prescriptions Disp Refills    valsartan (DIOVAN) 320 MG tablet 90 tablet 3     Sig: Take 1 tablet by mouth daily    amLODIPine (NORVASC) 10 MG tablet 90 tablet 3     Sig: Take 1 tablet by mouth daily         Return if symptoms worsen or fail to improve.

## 2020-10-29 LAB
CULTURE: ABNORMAL
Lab: ABNORMAL
SPECIMEN DESCRIPTION: ABNORMAL

## 2020-10-30 ENCOUNTER — CARE COORDINATION (OUTPATIENT)
Dept: CARE COORDINATION | Age: 70
End: 2020-10-30

## 2020-10-30 ENCOUNTER — OFFICE VISIT (OUTPATIENT)
Dept: CARDIOLOGY | Age: 70
End: 2020-10-30
Payer: MEDICARE

## 2020-10-30 VITALS
HEIGHT: 65 IN | SYSTOLIC BLOOD PRESSURE: 158 MMHG | RESPIRATION RATE: 18 BRPM | BODY MASS INDEX: 28.72 KG/M2 | HEART RATE: 63 BPM | DIASTOLIC BLOOD PRESSURE: 70 MMHG | WEIGHT: 172.4 LBS

## 2020-10-30 LAB
RENIN ACTIVITY: 3.5 NG/ML/HR
RENIN COMMENT: 910

## 2020-10-30 PROCEDURE — 1036F TOBACCO NON-USER: CPT | Performed by: FAMILY MEDICINE

## 2020-10-30 PROCEDURE — 99211 OFF/OP EST MAY X REQ PHY/QHP: CPT | Performed by: FAMILY MEDICINE

## 2020-10-30 PROCEDURE — 1123F ACP DISCUSS/DSCN MKR DOCD: CPT | Performed by: FAMILY MEDICINE

## 2020-10-30 PROCEDURE — 1090F PRES/ABSN URINE INCON ASSESS: CPT | Performed by: FAMILY MEDICINE

## 2020-10-30 PROCEDURE — 99213 OFFICE O/P EST LOW 20 MIN: CPT | Performed by: FAMILY MEDICINE

## 2020-10-30 PROCEDURE — 3017F COLORECTAL CA SCREEN DOC REV: CPT | Performed by: FAMILY MEDICINE

## 2020-10-30 PROCEDURE — G8399 PT W/DXA RESULTS DOCUMENT: HCPCS | Performed by: FAMILY MEDICINE

## 2020-10-30 PROCEDURE — 4040F PNEUMOC VAC/ADMIN/RCVD: CPT | Performed by: FAMILY MEDICINE

## 2020-10-30 PROCEDURE — G8427 DOCREV CUR MEDS BY ELIG CLIN: HCPCS | Performed by: FAMILY MEDICINE

## 2020-10-30 PROCEDURE — G8417 CALC BMI ABV UP PARAM F/U: HCPCS | Performed by: FAMILY MEDICINE

## 2020-10-30 PROCEDURE — G8484 FLU IMMUNIZE NO ADMIN: HCPCS | Performed by: FAMILY MEDICINE

## 2020-10-30 NOTE — CARE COORDINATION
Phone call from  to discuss patient's case:   -With patient/spouse's income being greater than $3000/month; they would not qualify for Medicaid.   -Suggestion to have PCP write: progress note to justify reason patient needs, \"state plan services\". -This would then justify reason why patient would need, \"onging help in the home from nursing/home health aid for medication assisstance/medplanner fill. Care Coordination Plan of Care:    This nurse Care Coordinator will   -forward to pcp to request.   -will follow up next week after orhopedic appointment

## 2020-10-30 NOTE — PATIENT INSTRUCTIONS
SURVEY:    You may be receiving a survey from [employer] regarding your visit today. Please complete the survey to enable us to provide the highest quality of care to you and your family. If you cannot score us a very good on any question, please call the office to discuss how we could have made your experience a very good one. Thank you.     Your MA today was [employer]

## 2020-10-30 NOTE — CARE COORDINATION
Chart Reviewed:     -Patient did, Leandrew Ra it to her Cardiology appointment today. Blood Pressure today: 158/70 at office visit.     -Patient also scheduled with Orthopedic doctor Tuesday 11/03/20. Care Coordination Plan of Care:    This nurse Care Coordinator will plan to follow up with patient following Orthopedic doctor appointment      Future Appointments   Date Time Provider Tara Melarai   12/14/2020 12:00 PM MD ZACKARY [facility]denNeph Tiff None   1/27/2021  2:00 PM Nasim Black MD [facility]   1/27/2021  3:20 PM Helen Hammans Might, APRN - CNP [facility] Prim [facility]

## 2020-10-30 NOTE — PROGRESS NOTES
Omi Daughters am scribing for and in the presence of Bean Bulla. Conception Josue LAKE, MS, F.A.CRyanC..    Patient: Mayelin Fraction  : 1950  Date of Visit: 2020    REASON FOR VISIT / CONSULTATION: Follow-up (Hx: CAD, HTN, chr pain. Pt is here for a 2 week f/u. Pt was in the ED on 10/26 for abd pain, vomiting. SOB from a cold Denies: CP, dizziness, lightheaded, palps)      History of Present Illness:        Dear [facility] APRN - CNP    I had the pleasure of seeing your patient Mayelin Fraction as part of a pre-operative cardiac evaluation today. Heart catheterization done on 2019 that showed mild to moderate single vessel disease involving the circumflex coronary artery. Normal left ventricular end diastolic pressure. (LVEDP). Ms. Joanna Nugent is here today for follow up. She states she was in emergency room on 10/26/2020 for nausea and vomiting. She states her blood pressure has been more stable lately and her bilateral knee pain has been better as well. Says she sees an orthopedic Surgeon on 2020 to discuss possible surgery. She denies having any current or recent chest pain, shortness of breath, lightheaded/dizziness or palpitations. She denies any increased lower extremity edema, abdominal pain, bleeding problems, problems with her medications or any other concerns at this time. PAST MEDICAL HISTORY:        Past Medical History:   Diagnosis Date    Arthritis     Asthma     Chronic bronchitis ([address]     Diabetes mellitus ([address]     GERD (gastroesophageal reflux disease)     H/O echocardiogram 3/9/16    EF >60%. LV wall thickness is mildly increased. Mild mitral and tricuspid regurgitation. Borderline pulmonary hypertension estimated right ventricular sysolic pressure of 76MYDG. Mild (grade l) diastolic dysfunction.  History of PFTs 3/10/16     Suboptimal effort. Restrictive in nature. clionical correlations is advised.  History of stress test 16    Abnoraml.  Moderate perfusion defect of mild to moderate intensity in the anterolateral and anteroapical regions during stress imaging. Intermediate risk for CAD.  Hyperlipidemia     Hypertension        CURRENT ALLERGIES: Iv dye [iodides] REVIEW OF SYSTEMS: 14 systems were reviewed. Pertinent positives and negatives as above, all else negative.      Past Surgical History:   Procedure Laterality Date    BREAST SURGERY      cyst removed    CARDIAC CATHETERIZATION      CARDIAC CATHETERIZATION Left 2019    Right Radial/Buttercoin Anupam/     SECTION      CHOLECYSTECTOMY      COLONOSCOPY  3/23/15    -diverticulosis,hemorrhoids    FOOT SURGERY      rt    HYSTERECTOMY      Social History:  Social History     Tobacco Use    Smoking status: Never Smoker    Smokeless tobacco: Never Used   Substance Use Topics    Alcohol use: No     Alcohol/week: 0.0 standard drinks     Frequency: Never     Binge frequency: Never    Drug use: No        CURRENT MEDICATIONS:        Outpatient Medications Marked as Taking for the 10/30/20 encounter (Office Visit) with Jose Don MD   Medication Sig Dispense Refill    valsartan (DIOVAN) 320 MG tablet Take 1 tablet by mouth daily 90 tablet 3    amLODIPine (NORVASC) 10 MG tablet Take 1 tablet by mouth daily 90 tablet 3    spironolactone (ALDACTONE) 25 MG tablet Take 1 tablet by mouth daily 90 tablet 3    atenolol (TENORMIN) 50 MG tablet Take 2 tablets by mouth daily 90 tablet 0    hydrALAZINE (APRESOLINE) 50 MG tablet Take 1 tablet by mouth 3 times daily 90 tablet 3    dorzolamide-timolol (COSOPT) 22.3-6.8 MG/ML ophthalmic solution       ascorbic acid (VITAMIN C) 500 MG tablet Take 1 tablet by mouth 2 times daily With iron tablet 180 tablet 0    atorvastatin (LIPITOR) 40 MG tablet Take 1 tablet by mouth daily 90 tablet 1    Calcium Carbonate-Vitamin D (OYSTER SHELL CALCIUM/D) 500-200 MG-UNIT TABS Take 1 tablet by mouth 2 times daily 180 tablet 1    Multiple Vitamins-Minerals (THERAPEUTIC MULTIVITAMIN-MINERALS) tablet Take 1 tablet by mouth daily 30 tablet 11    metFORMIN (GLUCOPHAGE) 500 MG tablet Take 1 tablet by mouth 2 times daily (with meals) 180 tablet 3    latanoprost (XALATAN) 0.005 % ophthalmic solution Place 1 drop into both eyes nightly       potassium chloride (KLOR-CON M) 10 MEQ extended release tablet Take 2 tablets by mouth 2 times daily 180 tablet 1    blood glucose test strips (ONE TOUCH ULTRA TEST) strip USE 1 STRIP TO CHECK GLUCOSE DAILY 100 strip 3    ONE TOUCH LANCETS MISC Lancets- One Touch Delica for testing daily and as needed. DX: Diabetes type  E11.9 100 each 3    cetirizine (ZYRTEC ALLERGY) 10 MG tablet Take 1 tablet by mouth daily 30 tablet 3    Blood Glucose Monitoring Suppl GENNY 1 Units by Does not apply route 3 times daily What insurance will cover 1 Device 0       FAMILY HISTORY: family history includes Diabetes in her brother and sister; High Blood Pressure in her sister; Stroke in her brother, father, and sister. Physical Examination:     BP (!) 174/80 (Site: Right Upper Arm, Position: Sitting, Cuff Size: Medium Adult)   Pulse 61   Resp 18   Ht 5' 5\" (1.651 m)   Wt 172 lb 6.4 oz (78.2 kg)   BMI 28.69 kg/m²  Body mass index is 28.69 kg/m². Constitutional: She is oriented to person, place, and time. She appears well-developed and well-nourished. In no acute distress. HEENT: Normocephalic and atraumatic. No JVD present. Carotid bruit is not present. No mass and no thyromegaly present. No lymphadenopathy present. Cardiovascular: Normal rate, regular rhythm, normal heart sounds. Exam reveals no gallop and no friction rubs. 1/6 systolic murmur, 2nd intercostal space on the RIGHT just lateral to the sternum. Pulmonary/Chest: Effort normal and breath sounds normal. No respiratory distress. She has no wheezes, rhonchi or rales. Abdominal: Soft, non-tender.  Bowel sounds and aorta are normal. She exhibits no organomegaly, mass or bruit. Extremities: Trace. No cyanosis or clubbing. 2+ radial and carotid pulses. Distal extremity pulses: 2+ bilaterally. Neurological: She is alert and oriented to person, place, and time. No evidence of gross cranial nerve deficit. Coordination appeared normal.   Skin: Skin is warm and dry. There is no rash or diaphoresis. Psychiatric: She has a normal mood and affect. Her speech is normal and behavior is normal.      MOST RECENT LABS ON RECORD:   Lab Results   Component Value Date    WBC 15.6 (H) 10/26/2020    HGB 12.1 10/26/2020    HCT 37.9 10/26/2020     10/26/2020    CHOL 174 08/05/2020    TRIG 113 08/05/2020    HDL 54 08/05/2020    ALT 7 10/26/2020    AST 12 10/26/2020     10/26/2020    K 3.4 (L) 10/26/2020     10/26/2020    CREATININE 0.86 10/26/2020    BUN 9 10/26/2020    CO2 25 10/26/2020    TSH 2.25 10/22/2020    INR 0.9 04/10/2016    LABA1C 5.8 08/05/2020    LABMICR 30 (H) 08/05/2020       ASSESSMENT:     1. ASHD (arteriosclerotic heart disease)    2. Resistant hypertension       PLAN:        · Mild Coronary artery disease: Appears stable  · Antiplatelet Agent: Not indicated. · Beta Blocker: Not indicated. Normal EF. · Anti-anginal medications: Continue amlodipine (Norvasc) 10 mg once daily. · Cholesterol Reduction Therapy: Continue Atorvastatin (Lipitor) 40 mg     · Additional counseling: I advised them to call our office or go to the emergency room if they developed worsening or persistent chest pain or increased shortness of breath as this could be life threatening.     Resistant Hypertension, possibly secondary hypertension: Uncontrolled but better. Says home BP in 120's to 130's. Still waiting on Renin level results. · Beta Blocker: Continue Atenolol (Tenormin) 100 mg daily. · ACE Inibitor/ARB: Continue valsartan (Diovan) 320 mg daily. · Continue Aldactone 25 mg daily   · Diuretics: Continue Spironolactone (Aldactone) 25 mg daily.  I also discussed the potential side effects of this medication including lightheadedness and dizziness and instructed them to stop the medication of this occurs and call our office if this occurs. · Calcium Channel Blocker: Continue amlodipine (Norvasc) 10 mg once daily. · Laboratory testing: Basic metabolic panel (BMP) for 3 months to assess his potassium and renal function. · Chronic pain/Leg  · Follow up with Skyla Bullock and orthopedics as ordered. I also told Ms. Jonathon Patton to continue her other medications and follow up with you as previously scheduled. FOLLOW UP:   I told Ms. Jonathon Patton to call my office if she had any problems, but otherwise told her to Return in about 3 months (around 1/30/2021). However, I would be happy to see her sooner should the need arise. Once again, thank you for allowing me to participate in this patients care. Please do not hesitate to contact me could I be of further assistance. Sincerely,  Pat Lovett MD, MS, F.A.C.C. [facility] Specialist    [address] [place] [address], [address] [place] [address]  Phone: [phone], Fax: [phone]     I believe that the risk of significant morbidity and mortality related to the patient's current medical conditions are: low-intermediate. The documentation recorded by the scribe, accurately and completely reflects the services I personally performed and the decisions made by me. Shelia Redding MD, MS, F.A.C.C.  October 30, 2020

## 2020-11-02 ENCOUNTER — CARE COORDINATION (OUTPATIENT)
Dept: CARE COORDINATION | Age: 70
End: 2020-11-02

## 2020-11-02 ENCOUNTER — TELEPHONE (OUTPATIENT)
Dept: CARDIOLOGY | Age: 70
End: 2020-11-02

## 2020-11-02 NOTE — TELEPHONE ENCOUNTER
----- Message from Margie Markham MD sent at 10/30/2020  4:25 PM EDT -----  Let Ms. Trinity Westfall know their test result was ok. No need for CT scan. Thanks.

## 2020-11-02 NOTE — CARE COORDINATION
I spoke with Fatou Ocampo () about patient assistance on her medications. Since all of her medications are currently available in generic form there isn't any PAP program available. I did encourage him to call the insurance to see if mail order would save them some money. He is going to look into that soon.          Monica Lorenzana [facility]  [address] [facility]   Medication Assistance  RADHA MAN II[employer]    [phone])[phone])[phone]

## 2020-11-02 NOTE — CARE COORDINATION
Nutrition Care Coordinator Follow-Up visit:    Food Recall: eating 2-3 meals/d    Activity Level:  Sedentary: X  Lightly Active: Moderately Active:  Very Active:    Adult BMI:  Underweight (below 18.5)  Normal Weight (18.5-24.9)  Overweight (25-29. 9) X  Obese (30-39. 9)  Morbidly Obese (>40)    Weight Change: no change    Plan:  Plan was established with patient:  Increase dietary fiber by consuming whole grains, fruits and vegetables: X  Limit dietary cholesterol to >200mg/day: Increase water intake:  Avoid added sugar: X  Avoid sweetened beverages: X  Choose lean meats: X      Monitoring: Will monitor weight:  Will monitor adherence to meal plan: Will monitor adherence to exercise plan: Will monitor HGA1c: X    Handouts Provided :  Low Carb snacking:  Carb counting /individual meal plan:  Portion Control:  Food Labels:  Physical Activity:  Low Fat/Cholesterol:  Hypo/Hyperglycemia:  Calorie Controlled Meal Plan:  Low sodium guidelines: X    Goals: Increase water consumption to 8oz. 6-8 times daily:  Manage blood sugars by consuming 3 meals spaced every 4-5 hours with 2-3 snacks daily: reviewed  Increase fiber and decrease fat intake by consuming 1-2 fruit servings and 2-3 vegetable servings per day. Increase physical activity by:  Consume less than 2,000mg of sodium/day: reviewed  Avoid consumption of sweetened beverages and added sugar by reading food labels: reviewed  Monitor blood sugars by using meter to check blood glucose before morning meal and 2 hours after a meal daily: BS stable all <150 per patient  Decrease risk of coronary heart disease by consuming fish that contains omega-3 fatty acids at least twice a week, avoiding partially hydrogenated oil/trans fats and limiting saturated fat intake by reading food labels: reviewed    Patient goals set:  1.  Reviewed what a serving size is of carbs and how to measure servings out to limit carbohydrates at meals and snacks.  Goal is 45gms of carb/meal,

## 2020-11-03 NOTE — CARE COORDINATION
Thank you for the update. What is the difference then between [facility] planned services and regular \"[facility]\" where they needs to be a skill for patient to have assistance in the home. We just ordered this through [facility] week.

## 2020-11-04 ENCOUNTER — CARE COORDINATION (OUTPATIENT)
Dept: CARE COORDINATION | Age: 70
End: 2020-11-04

## 2020-11-04 NOTE — CARE COORDINATION
Phone call back from patient's home care nurse:   -Cody Knows, \"did set up patient's medications\". -Selvin, \"is going to try to get spouse to organize; help patient take medications consistently, as Cody Knows believes patient has been taking them inappropriately\". -Selvin, \"will see patient twice a week for a couple weeks, but will then be decreasing to weekly\". -Requested updates on blood pressures weekly. .   Informs, \"she admitted patient last week; and then went back this week; and medications were messed up\". -Per home care nurse, Salas Esquivel didn't feel that patient had been taking them properly this past week; so she did set up the medications herself in the medplanner\". -Selvin, \"will be monitoring for compliance, and educate patient/spouse on filling medplanner properly.   Rashawn Ferguson will monitor blood pressures closely and update PCP office. Blood Presure reported from today: 170/80, HR 93.   -per, \"home care nurse, they did inject her knee at the orthopedic doctor this week, patient felt blood pressure elevated from that? \". -Selvin, \"is hoping that now that medications are organized in [facility], and patient should be taking properly; blood pressures will start to improve\". Rickie Smallwood, \"will update MD office later this week if blood pressures are more elevated, or if there are new concerns with blood pressures. Care Coordination Plan of Care: This nurse Care Coordinator will plan to follow up with patient/spouse/home care nurse next week. -follow up on blood pressures, medications, home needs.      Future Appointments   Date Time Provider Tara Mtz   12/14/2020 12:00 PM MD Luz Marina Johnsonph Tiff None   1/27/2021  2:00 PM Bailee Conklin MD TIFF CARD [facility]   1/27/2021  3:20 PM TARA Jaquez - CNP Tiff Prim Ca [facility]

## 2020-11-04 NOTE — CARE COORDINATION
Ambulatory Care Coordination Note  11/4/2020  CM Risk Score: 10  Charlson 10 Year Mortality Risk Score: 100%     ACC: Romero Perez RN    Summary Note:       1)Phone call to [facility] to request patient update:   -[phone].   -Spoke to [facility]. Oj Cooley have nurse who is seeing patient call this nurse care manager back with an update\". 2)Phone call to patient's home: unable to reach:   Voicemail not set up.   -wanting to request blood pressures/heart rates:     -wanting to )Request Diabetes follow up:   -wanting to follow up on medication cost; medication assistance spoke to spouse last week, and he was supposed to check with mail-in pharmacy to see if would be cheaper. Care Coordinator plan of care:   -will attempt to reach back out to patient tomorrow.   -Will await update from [facility] Coordination Interventions    Program Enrollment:  Complex Care  Referral from Primary Care Provider:  Yes  Suggested Interventions and Community Resources  Diabetes Education:  Declined  Fall Risk Prevention:  Completed  Home Health Services:  Completed (Comment: Refer to [facility] care to monitor BP, Blood sugars, and assess medications)  Meals on Wheels:  Declined  Medi Set or Pill Pack:   In Process (Comment: home care to educate patient/spouse on medications)  Pharmacist:  Completed (Comment: Referred to [facility] pharmacy staff to review medications)  Registered Dietician:  Completed (Comment: pt now agreeable to dietician referral, refer to [facility] dietician)  Social Work:  Completed (Comment: referring for community resources/assess medicatid)  Specialty Services Referral:  In Process (Comment: Nephrology (sent 8/6/2020), need Podiatry)  Transportation Support:  Completed  Zone Management Tools:  Completed (Comment: DM)  Other Services or Interventions:  Referred patient to medication assistant for medication cost review         Goals Addressed    None         Prior to Admission medications    Medication Sig Start Date End Date Taking? Authorizing Provider   valsartan (DIOVAN) 320 MG tablet Take 1 tablet by mouth daily 10/28/20   TARA Hines CNP   amLODIPine (NORVASC) 10 MG tablet Take 1 tablet by mouth daily 10/28/20   TARA Hines CNP   spironolactone (ALDACTONE) 25 MG tablet Take 1 tablet by mouth daily 10/21/20   Courtney Wagner MD   atenolol (TENORMIN) 50 MG tablet Take 2 tablets by mouth daily 10/17/20   Juanis Matias III, MD   hydrALAZINE (APRESOLINE) 50 MG tablet Take 1 tablet by mouth 3 times daily 10/16/20   TARA Armando CNP   dorzolamide-timolol (COSOPT) 22.3-6.8 MG/ML ophthalmic solution  9/20/20   Historical Provider, MD   ascorbic acid (VITAMIN C) 500 MG tablet Take 1 tablet by mouth 2 times daily With iron tablet 8/6/20   TARA Hines CNP   atorvastatin (LIPITOR) 40 MG tablet Take 1 tablet by mouth daily 8/6/20   TARA Hines CNP   Calcium Carbonate-Vitamin D (OYSTER SHELL CALCIUM/D) 500-200 MG-UNIT TABS Take 1 tablet by mouth 2 times daily 8/6/20   TARA Hines CNP   Multiple Vitamins-Minerals (THERAPEUTIC MULTIVITAMIN-MINERALS) tablet Take 1 tablet by mouth daily 8/6/20 8/6/21  TARA Hines CNP   metFORMIN (GLUCOPHAGE) 500 MG tablet Take 1 tablet by mouth 2 times daily (with meals) 7/22/20   TARA Hines CNP   latanoprost (XALATAN) 0.005 % ophthalmic solution Place 1 drop into both eyes nightly     Historical Provider, MD   potassium chloride (KLOR-CON M) 10 MEQ extended release tablet Take 2 tablets by mouth 2 times daily 11/6/19   TARA Hines CNP   blood glucose test strips (ONE TOUCH ULTRA TEST) strip USE 1 STRIP TO CHECK GLUCOSE DAILY 3/13/19   TARA Hines CNP   ONE TOUCH LANCETS MISC Lancets- One Touch [facility] for testing daily and as needed.  DX: Diabetes type  E11.9 1/31/19   Brett Bullock, APRN - CNP   cetirizine (ZYRTEC ALLERGY) 10 MG tablet Take 1 tablet by mouth daily 6/7/18   TARA Goetz CNP   Blood Glucose Monitoring Suppl GENNY 1 Units by Does not apply route 3 times daily What insurance will cover 10/11/16   TARA Woodward CNP       Future Appointments   Date Time Provider Tara Mtz   12/14/2020 12:00 PM Evan Prieto MD [facility] Tiff None   1/27/2021  2:00 PM Cabrera De La Cruz MD TIFF CARD [facility]   1/27/2021  3:20 PM TARA Rodriguez [facility]NP [facility]

## 2020-11-06 ENCOUNTER — CARE COORDINATION (OUTPATIENT)
Dept: CARE COORDINATION | Age: 70
End: 2020-11-06

## 2020-11-06 NOTE — CARE COORDINATION
Ambulatory Care Coordination Note  2020  CM Risk Score: 10  Charlson 10 Year Mortality Risk Score: 100%     ACC: Lianet Carlson RN    Summary Note:     Phone call back from home care nurseSelvin:     Updating on patient:   Reports, \"her medications were messed up again today when she went to patient's home\". -Reports, Krystina Del Rosario had a conversation with patient/spouse, and they put the pill bottles up; so that patient does not need to get them down unless home care nurse is there to fill the medplanner\". -Home Care Nurse made sure that Elwanda Na was filled for the week again; and she will go back to see patient twice next week to follow up\". -Home Care Nurse also wanted to update Leigha Sides that, \"patient had Keflex prescription that was prescribed from the ED for UTI, and had not started to take it yet from 10/30/20\". -Home CAre Nurse put this in patient's Elwanda Na today.   -will follow up regarding.     -Home CAre Nurse denies any other concerns for patient today.   -She feels patient is safe in her home. Blood pressures requested:   -Patient's blood pressures runnin/92  169/70  -Per home care nurse, \"with patient she does not feel taking her medications properly still; she will reevaluate next week; and update at that time. Care Coordination Plan of Care: This nurse Care Coordinator will follow up with patient/home care nurse next week for further update.   -will forward update to PCP today              Care Coordination Interventions    Program Enrollment:  Complex Care  Referral from Primary Care Provider:  Yes  Suggested Interventions and Community Resources  Diabetes Education:  Declined  Fall Risk Prevention:  Completed  Home Health Services:  Completed (Comment: Refer to [facility] home care to monitor BP, Blood sugars, and assess medications)  Meals on Wheels:  Declined  Medi Set or Pill Pack:   In Process (Comment: home care to educate patient/spouse on medications)  Pharmacist: Completed (Comment: Referred to [facility] pharmacy staff to review medications)  Registered Dietician:  Completed (Comment: pt now agreeable to dietician referral, refer to [facility] dietician)  Social Work:  Completed (Comment: referring for community resources/assess medicatid)  Specialty Services Referral:  In Process (Comment: Nephrology (sent 8/6/2020), need Podiatry)  Transportation Support:  Completed  Zone Management Tools:  Completed (Comment: DM)  Other Services or Interventions:  Referred patient to medication assistant for medication cost review           Prior to Admission medications    Medication Sig Start Date End Date Taking?  Authorizing Provider   valsartan (DIOVAN) 320 MG tablet Take 1 tablet by mouth daily 10/28/20   Nasim Bullock, APRN - CNP   amLODIPine (NORVASC) 10 MG tablet Take 1 tablet by mouth daily 10/28/20   Nasim Bullock, APRN - CNP   spironolactone (ALDACTONE) 25 MG tablet Take 1 tablet by mouth daily 10/21/20   Cabrera De La Cruz MD   atenolol (TENORMIN) 50 MG tablet Take 2 tablets by mouth daily 10/17/20   Baudilio Denise III, MD   hydrALAZINE (APRESOLINE) 50 MG tablet Take 1 tablet by mouth 3 times daily 10/16/20   Dg Dodd Deats, TARA Lares CNP   dorzolamide-timolol (COSOPT) 22.3-6.8 MG/ML ophthalmic solution  9/20/20   Historical Provider, MD   ascorbic acid (VITAMIN C) 500 MG tablet Take 1 tablet by mouth 2 times daily With iron tablet 8/6/20   Nasim Bullock APRN - SHELL   atorvastatin (LIPITOR) 40 MG tablet Take 1 tablet by mouth daily 8/6/20   Nasim Bullock, APRN - CNP   Calcium Carbonate-Vitamin D (OYSTER SHELL CALCIUM/D) 500-200 MG-UNIT TABS Take 1 tablet by mouth 2 times daily 8/6/20   Nasim Bullock, APRN - CNP   Multiple Vitamins-Minerals (THERAPEUTIC MULTIVITAMIN-MINERALS) tablet Take 1 tablet by mouth daily 8/6/20 8/6/21  Nasim Bullock APRN - CNP   metFORMIN (GLUCOPHAGE) 500 MG tablet Take 1 tablet by mouth 2 times daily (with meals) 7/22/20   Nasim Bullock, APRN - CNP   latanoprost Carmel Morataya) 0.005 % ophthalmic solution Place 1 drop into both eyes nightly     Historical Provider, MD   potassium chloride (KLOR-CON M) 10 MEQ extended release tablet Take 2 tablets by mouth 2 times daily 11/6/19   TARA Mercer CNP   blood glucose test strips (ONE TOUCH ULTRA TEST) strip USE 1 STRIP TO CHECK GLUCOSE DAILY 3/13/19   TARA Mercer CNP   ONE TOUCH LANCETS MISC Lancets- One Touch Steph Labrum for testing daily and as needed.  DX: Diabetes type  E11.9 1/31/19   TARA Mercer CNP   cetirizine (ZYRTEC ALLERGY) 10 MG tablet Take 1 tablet by mouth daily 6/7/18   TARA Melo CNP   Blood Glucose Monitoring Suppl GENNY 1 Units by Does not apply route 3 times daily What insurance will cover 10/11/16   TARA Ibrahim CNP       Future Appointments   Date Time Provider Tara Mtz   12/14/2020 12:00 PM Darien Gomez MD [facility] Tiff None   1/27/2021  2:00 PM Carly Stokes MD TIFF CARD [facility]   1/27/2021  3:20 PM TARA Mercer CNP Tiff Prim Ca [facility]

## 2020-11-09 ENCOUNTER — CARE COORDINATION (OUTPATIENT)
Dept: CARE COORDINATION | Age: 70
End: 2020-11-09

## 2020-11-09 NOTE — CARE COORDINATION
called and spoke to [place]. [place] reports that she is doing well and her RN just left. [place] reports that she feels she has enough care currently. [place] is over income for [employer] services through the [address].     Plan:    will touch base with [place] in 2 weeks to discuss possible private pay services

## 2020-11-11 ENCOUNTER — CARE COORDINATION (OUTPATIENT)
Dept: CARE COORDINATION | Age: 70
End: 2020-11-11

## 2020-11-11 NOTE — CARE COORDINATION
Ambulatory Care Coordination Note  11/11/2020  CM Risk Score: 10  Charlson 10 Year Mortality Risk Score: 100%     ACC: Blayne Moya RN    Summary Note:     Date Care Coordination Episode Started:08/07/20    Reason for Call Today:     -request update from Home health nurse    Reason patient is in Care Coordination:     -PCP Referred, Elevated Blood pressures    Topics Discussed Today:     1)Home health Nurse update, Agatha: Called back  -per Dia Perdomo feels patient is clearer mentally with starting the antibiotic\". -per Dia Perdomo has about 3 days left of the antibiotic\". -As far as Agatha can tell, patient is taking her pills as ordered. -It would be a goal to see if she can find a family member other than spouse who would be willing to step in and help patient with her pills. -David Rice will work on this.   -Pharmacy did check as well in the [place] area; there were no pharmacies found that could fill a \"Pill Mckinley\" for patient. Blood pressure updates per HOme Care Nurse:   -Blood pressures now running in the Mid 120's/60's  -Much better controlled with blood pressures controlled now. Diabetes:   Lab Results   Component Value Date    LABA1C 5.8 08/05/2020    LABA1C 5.8 03/31/2020    LABA1C 5.6 09/24/2019     Lab Results   Component Value Date     08/05/2020     03/31/2020     09/24/2019     -patient denies and new concerns with blood sugars. Discussed with patient support at home, finding a family member who could learn with the home care nurse how to fill medplanner. She has, \"multiple kids and grandchildren living in the area\". -She, \"has a granddaughter that she feels could be a good fit to help with the medications.    -patient will talk with granddaughter, and try to get it set up that she can come to her house when the home care nurse is there to be able to learn how to help her with her medications    Requested HOme Blood Pressures from patient:   -Patient reports, \"121/68, HR 68'  Yesterday: 144/52, HR 68    Follow up Abdominal pain:   -denies any further abdominal pain. -denies any further vomiting/nausea  -Bowels moving regular  -denies any new concerns  -  Follow up pain in knees:   -following with orthopedic doctor in [facility].   -did, \"get injection in knee again 11/11/20  -she will be going back 11/18/20 for follow up    Interventions completed today:  Requested update from home care nurse  -requested blood pressure update  -reviewed Diabetes    Assessments completed today:     -  Diabetes Assessment    Medic Alert ID:  No  Meal Planning:  Avoidance of concentrated sweets   How often do you test your blood sugar?:  Daily, Other (Comment: as needed)   Do you have barriers with adherence to non-pharmacologic self-management interventions? (Nutrition/Exercise/Self-Monitoring):  Yes   Have you ever had to go to the ED for symptoms of low blood sugar?:  No       No patient-reported symptoms   Do you have hyperglycemia symptoms?:  No   Do you have hypoglycemia symptoms?:  No   Blood Sugar Monitoring Regimen:  Before Meals   Blood Sugar Trends:  No Change       and   General Assessment    Do you have any symptoms that are causing concern?:  No           Care Coordinator plan of care:   -will plan to follow up with patient/spouse/home care nurse in 1 week. -How are blood pressures?   -Any concerns with medications? Have they found family member who can start to help with learning to fill medplanner appropriately with home care nurse  -Follow up any further abdominal pain/vomiting/nausea  -follow up chronic knee pain, did injections help?  Following orthopedic doctor in [facility] Coordination Interventions    Program Enrollment:  Complex Care  Referral from Primary Care Provider:  Yes  Suggested Interventions and Community Resources  Diabetes Education:  Declined  Fall Risk Prevention:  Completed  Home Health Services:  Completed (Comment: Refer to [facility] to monitor BP, Blood sugars, and assess medications)  Meals on Wheels:  Declined  Medi Set or Pill Pack: In Process (Comment: home care to educate patient/spouse on medications)  Pharmacist:  Completed (Comment: Referred to [facility] pharmacy staff to review medications)  Registered Dietician:  Completed (Comment: pt now agreeable to dietician referral, refer to [facility] dietician)  Social Work:  Completed (Comment: referring for community resources/assess medicatid)  Specialty Services Referral:  In Process (Comment: Nephrology (sent 8/6/2020), need Podiatry)  Transportation Support:  Completed  Zone Management Tools:  Completed (Comment: DM)  Other Services or Interventions:  Referred patient to medication assistant for medication cost review       Education Reviewed Today: See Module for details. Goals Addressed                 This Visit's Progress       Care Coordination     Medication Management   Improving     I will take my medication as directed. I will notify my provider of any problems with medications, like adverse effects or side effects. I will notify my provider/Care Coordinator if I am unable to afford my medications. I will notify my provider for advice before I stop taking any of my medication. Barriers: overwhelmed by complexity of regimen and lack of education  Plan for overcoming my barriers: working with this ambulatory care manager   -Will set up weekly med planner for organization of medications   -Will take medications as prescribed   -If needed, will set alarms or place sticky notes around the home to remind to take medications   Confidence: 8/10  Anticipated Goal Completion Date: 12/7/2020            Prior to Admission medications    Medication Sig Start Date End Date Taking?  Authorizing Provider   valsartan (DIOVAN) 320 MG tablet Take 1 tablet by mouth daily 10/28/20   Esta Brazil Might, APRN - CNP   amLODIPine (NORVASC) 10 MG tablet Take 1 tablet by mouth daily 10/28/20   Esta Brazil Might, APRN - CNP   spironolactone (ALDACTONE) 25 MG tablet Take 1 tablet by mouth daily 10/21/20   Margie Markham MD   atenolol (TENORMIN) 50 MG tablet Take 2 tablets by mouth daily 10/17/20   Pearl Salamanca III, MD   hydrALAZINE (APRESOLINE) 50 MG tablet Take 1 tablet by mouth 3 times daily 10/16/20   TARA Aleman CNP   dorzolamide-timolol (COSOPT) 22.3-6.8 MG/ML ophthalmic solution  9/20/20   Historical Provider, MD   ascorbic acid (VITAMIN C) 500 MG tablet Take 1 tablet by mouth 2 times daily With iron tablet 8/6/20   TARA Morgan CNP   atorvastatin (LIPITOR) 40 MG tablet Take 1 tablet by mouth daily 8/6/20   TARA Morgan CNP   Calcium Carbonate-Vitamin D (OYSTER SHELL CALCIUM/D) 500-200 MG-UNIT TABS Take 1 tablet by mouth 2 times daily 8/6/20   TARA Morgan CNP   Multiple Vitamins-Minerals (THERAPEUTIC MULTIVITAMIN-MINERALS) tablet Take 1 tablet by mouth daily 8/6/20 8/6/21  TARA Morgan CNP   metFORMIN (GLUCOPHAGE) 500 MG tablet Take 1 tablet by mouth 2 times daily (with meals) 7/22/20   TARA Morgan CNP   latanoprost (XALATAN) 0.005 % ophthalmic solution Place 1 drop into both eyes nightly     Historical Provider, MD   potassium chloride (KLOR-CON M) 10 MEQ extended release tablet Take 2 tablets by mouth 2 times daily 11/6/19   TARA Morgan CNP   blood glucose test strips (ONE TOUCH ULTRA TEST) strip USE 1 STRIP TO CHECK GLUCOSE DAILY 3/13/19   TARA Morgan CNP   ONE TOUCH LANCETS MISC Lancets- One Touch Hamilton Hazy for testing daily and as needed.  DX: Diabetes type  E11.9 1/31/19   TARA Morgan CNP   cetirizine (ZYRTEC ALLERGY) 10 MG tablet Take 1 tablet by mouth daily 6/7/18   Greyson Petties TARA Greene CNP   Blood Glucose Monitoring Suppl GENNY 1 Units by Does not apply route 3 times daily What insurance will cover 10/11/16   TARA Candelario - CNP       Future Appointments   Date Time Provider Tara Mtz   12/14/2020 12:00 PM John Mingo Castillo MD [facility] Tiff None   1/27/2021  2:00 PM Shelai Redding MD TIFF CARD [facility]   1/27/2021  3:20 PM TARA Cummings - CNP Tiff Prim Ca TPP

## 2020-11-13 RX ORDER — POTASSIUM CHLORIDE 20 MEQ/1
20 TABLET, EXTENDED RELEASE ORAL 2 TIMES DAILY
Qty: 180 TABLET | Refills: 1 | Status: SHIPPED | OUTPATIENT
Start: 2020-11-13 | End: 2020-12-16 | Stop reason: SDUPTHER

## 2020-11-13 RX ORDER — POTASSIUM CHLORIDE 750 MG/1
TABLET, FILM COATED, EXTENDED RELEASE ORAL
Qty: 180 TABLET | Refills: 0 | OUTPATIENT
Start: 2020-11-13

## 2020-11-20 ENCOUNTER — CARE COORDINATION (OUTPATIENT)
Dept: CARE COORDINATION | Age: 70
End: 2020-11-20

## 2020-11-20 NOTE — CARE COORDINATION
Ambulatory Care Coordination Note  11/20/2020  CM Risk Score: 10  Charlson 10 Year Mortality Risk Score: 100%     ACC: Erasmo Harris RN    Summary Note:   Date Care Coordination Episode Started:   8/7/2020    Reason For Call Today:   Follow up blood pressure readings- are they controlled now? Has she found someone to fill med planner? Follow up ortho appointment from this week- how is pain? Topics discussed today:   1.) Blood Pressure  -Has been compliant with monitoring blood pressure and keeping log  -Reports today was 100/65 HR 76  -[facility] monitoring blood pressure and educating patient/spouse   -No new concerns     2.) Ortho  -Seen ortho on 11/18 for follow up injections   -Elfredia Gist reports appointment went well, her \"left knee is hurting\"   -Has appointment again on 12/16/2020   -No concerns voiced today   -Denies any falls     3.) Med Earmon Alba states her  will be the one filling med planner  -States home health has been showing him how to do this       Chronic Conditions:   1.)Diabets  -Monitoring daily and as needed  -Voices readings remain stable  -Denies any concern about blood sugar   Lab Results   Component Value Date    LABA1C 5.8 08/05/2020     Lab Results   Component Value Date     08/05/2020   Assessment Completed:  Diabetes Assessment    Medic Alert ID:  No  Meal Planning:  Avoidance of concentrated sweets   How often do you test your blood sugar?:  Daily, Other (Comment: as needed)   Do you have barriers with adherence to non-pharmacologic self-management interventions?  (Nutrition/Exercise/Self-Monitoring):  Yes   Have you ever had to go to the ED for symptoms of low blood sugar?:  No       No patient-reported symptoms   Do you have hyperglycemia symptoms?:  No   Do you have hypoglycemia symptoms?:  No   Blood Sugar Monitoring Regimen:  Morning Fasting   Blood Sugar Trends:  No Change          Any ED visits or Hospitalizations since last labels, reviewing high sodium foods to avoid/limit  Confidence: 6/10  Anticipated Goal Completion Date: 12/3/20            Education Reviewed with patient today: See Education Module. Care Coordinator Plan of Care: This nurse CC will plan to:  - follow up with Juwan Molina in 2-3 weeks.   -Any new needs/concerns?   -Blood pressure and blood sugar remain stable? - Family filling med planner?   -Is home health still coming into the home. - If no new needs, will consider sending to PCP  for graduation approval.          Care Coordination Interventions    Program Enrollment:  Complex Care  Referral from Primary Care Provider:  Yes  Suggested Interventions and Community Resources  Diabetes Education:  Declined  Fall Risk Prevention:  Completed  Home Health Services:  Completed (Comment: [facility] home care to monitor BP, Blood sugars, and assess medications)  Meals on Wheels:  Declined  Medi Set or Pill Pack:  Completed (Comment: home care to educate patient/spouse on medications)  Pharmacist:  Completed (Comment: Referred to [facility] pharmacy staff to review medications)  Registered Dietician:  Completed (Comment: pt now agreeable to dietician referral, refer to [facility] dietician)  Social Work:  Completed (Comment: referring for community resources/assess medicatid)  Specialty Services Referral:  Completed (Comment: Nephrology (sent 8/6/2020), need Podiatry)  Transportation Support:  Completed  Zone Management Tools:  Completed (Comment: DM)  Other Services or Interventions:  Referred patient to medication assistant for medication cost review         Prior to Admission medications    Medication Sig Start Date End Date Taking?  Authorizing Provider   potassium chloride (KLOR-CON M) 20 MEQ extended release tablet Take 1 tablet by mouth 2 times daily 11/13/20   Cheryle Huh Might, APRN - CNP   valsartan (DIOVAN) 320 MG tablet Take 1 tablet by mouth daily 10/28/20   Cheryle Huh Might, APRN - CNP   amLODIPine (NORVASC) 10 MG tablet Take 1 tablet by mouth daily 10/28/20   TARA Chapa CNP   spironolactone (ALDACTONE) 25 MG tablet Take 1 tablet by mouth daily 10/21/20   Jonathan Mejia MD   atenolol (TENORMIN) 50 MG tablet Take 2 tablets by mouth daily 10/17/20   Celestine Borden III, MD   hydrALAZINE (APRESOLINE) 50 MG tablet Take 1 tablet by mouth 3 times daily 10/16/20   TARA Waterman CNP   dorzolamide-timolol (COSOPT) 22.3-6.8 MG/ML ophthalmic solution  9/20/20   Historical Provider, MD   ascorbic acid (VITAMIN C) 500 MG tablet Take 1 tablet by mouth 2 times daily With iron tablet 8/6/20   TARA Chapa CNP   atorvastatin (LIPITOR) 40 MG tablet Take 1 tablet by mouth daily 8/6/20   TARA Chapa CNP   Calcium Carbonate-Vitamin D (OYSTER SHELL CALCIUM/D) 500-200 MG-UNIT TABS Take 1 tablet by mouth 2 times daily 8/6/20   TARA Chapa CNP   Multiple Vitamins-Minerals (THERAPEUTIC MULTIVITAMIN-MINERALS) tablet Take 1 tablet by mouth daily 8/6/20 8/6/21  TARA Chapa CNP   metFORMIN (GLUCOPHAGE) 500 MG tablet Take 1 tablet by mouth 2 times daily (with meals) 7/22/20   TARA Chapa CNP   latanoprost (XALATAN) 0.005 % ophthalmic solution Place 1 drop into both eyes nightly     Historical Provider, MD   blood glucose test strips (ONE TOUCH ULTRA TEST) strip USE 1 STRIP TO CHECK GLUCOSE DAILY 3/13/19   TARA Chapa CNP   ONE TOUCH LANCETS MISC Lancets- One Touch Shey Becker for testing daily and as needed.  DX: Diabetes type  E11.9 1/31/19   TARA Chapa CNP   cetirizine (ZYRTEC ALLERGY) 10 MG tablet Take 1 tablet by mouth daily 6/7/18   TARA Mcwilliams CNP   Blood Glucose Monitoring Suppl GENNY 1 Units by Does not apply route 3 times daily What insurance will cover 10/11/16   TARA Marie CNP

## 2020-11-23 ENCOUNTER — TELEPHONE (OUTPATIENT)
Dept: PRIMARY CARE CLINIC | Age: 70
End: 2020-11-23

## 2020-11-24 ENCOUNTER — CARE COORDINATION (OUTPATIENT)
Dept: CARE COORDINATION | Age: 70
End: 2020-11-24

## 2020-11-24 NOTE — CARE COORDINATION
Patient goals reviewed:  1. Reviewed what a serving size is of carbs and how to measure servings out to limit carbohydrates at meals and snacks.  Goal is 45gms of carb/meal, 15gms/snack. Low carb snacking reviewed. 2. Reviewed importance of not going too long between meals and having a snack if going too long, patient admits she is not eating lunch- reviewed at least having a snack. Goal is 3 meals daily spaced every 4-5 hours. 3. Reviewed using plate method at meals and working on increasing non-starchy vegetables. Reviewed what non-starchy vegetables are and encouraged patient to make 1/2 her plate non-starchy vegetables, 1/4 lean protein, 1/4 carbs at meals. Foods from each category reviewed along with serving size. 4. Reviewed avoiding canned and prepackaged/processed foods. Discussed buying frozen vegetables and rinsing canned vegetables before using them.  Reviewed seasonings and sauces to avoid high in sodium.  Goal is <2gms of sodium/day. 5. Reviewed shopping the outer rim of the grocery store where fresh food are found. 11/24/20- Patient;'s A1c controlled at <6 and verbalizes good understanding of low sodium guidelines. Patient relays she has no further questions regarding diet. Patient provided with constact information to call with questions. Will remove patient from panel.   JENNIFER Regalado

## 2020-12-04 ENCOUNTER — CARE COORDINATION (OUTPATIENT)
Dept: CARE COORDINATION | Age: 70
End: 2020-12-04

## 2020-12-04 NOTE — CARE COORDINATION
Ambulatory Care Coordination Note  12/4/2020  CM Risk Score: 10  Charlson 10 Year Mortality Risk Score: 100%     ACC: Gabe Kurtz, RN    Summary Note:   Date Care Coordination Episode Started:   8/7/2020    Reason For Call Today:   Assess for any further care coordination needs; readiness for graduation     Topics Reviewed Today:  1.) Medications  -Does the patient have all of their prescribed medications filled? Yes   -Is there any barriers to medication adherence? No  -Is there any financial issues with affording any medications? No   -Home health taught  how to set up med planner  -Now utilizing med planner     2.) Transportation  -Does the patient drive? No  -How is patient transported to appointments?  drives    -Any additional phone numbers for transportation in the area needed? No     3.) Living/Housing  -Does the patient live alone? No, spouse   -What type of housing does the patient live in? Private home   -Does the patient feel safe in their home? Yes  -Who does the cooking, cleaning, housework?  Haily/spouse       4.) Pain   -Reports she still having pain   -Taking Tylenol which helps relieve pain to a tolerable level     5.) [address] reports that home health came yesterday   -Kenyatta Mariscal was their last scheduled day     6.) BP Home Monitoring   -Complaint with monitoring home blood pressure  -Blood pressure remaining stable   -Taking medications as prescribed  -Follows with Dr. Flavio Leslie- Cardiology       Chronic Conditions:   1.)Diabetes  -Complaint with monitoring blood sugar  -Blood sugar readings remaining stable   -Taking medications as prescribed   -CC LD has signed off   -Following a carb controlled diet, avoidance of concentrated sweets and plate portions   Lab Results   Component Value Date    LABA1C 5.8 08/05/2020     Lab Results   Component Value Date     08/05/2020   Assessment Completed:  Diabetes Assessment    Medic Alert ID:  No  Meal Planning: Avoidance of concentrated sweets   How often do you test your blood sugar?:  Daily, Other (Comment: as needed)   Do you have barriers with adherence to non-pharmacologic self-management interventions? (Nutrition/Exercise/Self-Monitoring):  Yes   Have you ever had to go to the ED for symptoms of low blood sugar?:  No       No patient-reported symptoms   Do you have hyperglycemia symptoms?:  No   Do you have hypoglycemia symptoms?:  No   Blood Sugar Monitoring Regimen:  Morning Fasting   Blood Sugar Trends:  No Change        Any ED visits or Hospitalizations since last Call? · No      Medications Reviewed with Ree Player today:  · Ree Player verbalizes that she has all medications. · Ree Player denies any questions. · Any cost issues with medications No      Zone Management tool reviewed today is applicable:   Yes, Diabetes  Has zone sheet at home       Reviewed social needs/Home Needs if any:   · Does patient have enough food? Yes  · Does patient have transportation to appointment/store/pharmacy, etc?Yes,   · Does patient need any help in the home? Denies   · If yes, what type of help? n/a         Reviewed Upcoming follow up appointments with Ree Player  Future Appointments   Date Time Provider Tara Mtz   12/14/2020 12:00 PM Abdulaziz Izquierdo MD [facility] Tiff None   1/27/2021  2:00 PM Won Arauz MD TIFF CARD [facility]   1/27/2021  3:20 PM TARA Mcnulty - CNP Tiff Prim Ca [facility]         Goals reviewed and completed    Education Reviewed with patient today: See Education Module. Care Coordinator Plan of Care:     This nurse CC will plan to:  -send to PCP for graduation approval.       Care Coordination Interventions    Program Enrollment:  Complex Care  Referral from Primary Care Provider:  Yes  Suggested Interventions and Community Resources  Diabetes Education:  Declined  Fall Risk Prevention:  Completed  Home Health Services:  Completed (Comment: [facility] home [facility])  Meals on Wheels:  Declined  Medi Set or Pill Pack:  Completed (Comment: home care to educate patient/spouse on medications)  Pharmacist:  Completed (Comment: Referred to [facility] pharmacy staff to review medications)  Registered Dietician:  Completed (Comment: pt now agreeable to dietician referral, refer to [facility] dietician)  Social Work:  Completed (Comment: referring for community resources/assess medicatid)  Specialty Services Referral:  Completed (Comment: Nephrology (sent 8/6/2020), need Podiatry)  Transportation Support:  Completed  Zone Management Tools:  Completed (Comment: DM)  Other Services or Interventions:  Referred patient to medication assistant for medication cost review         Prior to Admission medications    Medication Sig Start Date End Date Taking?  Authorizing Provider   potassium chloride (KLOR-CON M) 20 MEQ extended release tablet Take 1 tablet by mouth 2 times daily 11/13/20   TARA Jaquez - CNP   valsartan (DIOVAN) 320 MG tablet Take 1 tablet by mouth daily 10/28/20   Ankush Bullock APRN - CNP   amLODIPine (NORVASC) 10 MG tablet Take 1 tablet by mouth daily 10/28/20   Anksuh Bullock APRN - CNP   spironolactone (ALDACTONE) 25 MG tablet Take 1 tablet by mouth daily 10/21/20   Bailee Conklin MD   atenolol (TENORMIN) 50 MG tablet Take 2 tablets by mouth daily 10/17/20   Nixon Dexter III, MD   hydrALAZINE (APRESOLINE) 50 MG tablet Take 1 tablet by mouth 3 times daily 10/16/20   TARA Cabrera CNP   dorzolamide-timolol (COSOPT) 22.3-6.8 MG/ML ophthalmic solution  9/20/20   Historical Provider, MD   ascorbic acid (VITAMIN C) 500 MG tablet Take 1 tablet by mouth 2 times daily With iron tablet 8/6/20   TARA Jaquez - CNP   atorvastatin (LIPITOR) 40 MG tablet Take 1 tablet by mouth daily 8/6/20   Ankush Bullock APRN - CNP   Calcium Carbonate-Vitamin D (OYSTER SHELL CALCIUM/D) 500-200 MG-UNIT TABS Take 1 tablet by mouth 2 times daily 8/6/20   Ankush Bullock APRN - CNP   Multiple Vitamins-Minerals (THERAPEUTIC MULTIVITAMIN-MINERALS) tablet Take 1 tablet by mouth daily 8/6/20 8/6/21  TARA Abraham CNP   metFORMIN (GLUCOPHAGE) 500 MG tablet Take 1 tablet by mouth 2 times daily (with meals) 7/22/20   TARA Abraham CNP   latanoprost (XALATAN) 0.005 % ophthalmic solution Place 1 drop into both eyes nightly     Historical Provider, MD   blood glucose test strips (ONE TOUCH ULTRA TEST) strip USE 1 STRIP TO CHECK GLUCOSE DAILY 3/13/19   TARA Abraham CNP   ONE TOUCH LANCETS MISC Lancets- One Touch Verlinda Ghee for testing daily and as needed.  DX: Diabetes type  E11.9 1/31/19   TARA Abraham CNP   cetirizine (ZYRTEC ALLERGY) 10 MG tablet Take 1 tablet by mouth daily 6/7/18   TARA Tamez CNP   Blood Glucose Monitoring Suppl GENNY 1 Units by Does not apply route 3 times daily What insurance will cover 10/11/16   TARA Brandt CNP

## 2020-12-16 ENCOUNTER — TELEPHONE (OUTPATIENT)
Dept: PRIMARY CARE CLINIC | Age: 70
End: 2020-12-16

## 2020-12-16 RX ORDER — POTASSIUM CHLORIDE 20 MEQ/1
20 TABLET, EXTENDED RELEASE ORAL 2 TIMES DAILY
Qty: 180 TABLET | Refills: 3 | Status: SHIPPED | OUTPATIENT
Start: 2020-12-16 | End: 2021-04-12 | Stop reason: SDUPTHER

## 2020-12-16 RX ORDER — ATORVASTATIN CALCIUM 40 MG/1
40 TABLET, FILM COATED ORAL DAILY
Qty: 90 TABLET | Refills: 3 | Status: SHIPPED | OUTPATIENT
Start: 2020-12-16 | End: 2021-04-12 | Stop reason: SDUPTHER

## 2020-12-16 RX ORDER — CETIRIZINE HYDROCHLORIDE 10 MG/1
10 TABLET ORAL DAILY
Qty: 30 TABLET | Refills: 3 | Status: SHIPPED | OUTPATIENT
Start: 2020-12-16

## 2020-12-16 RX ORDER — HYDRALAZINE HYDROCHLORIDE 50 MG/1
50 TABLET, FILM COATED ORAL 3 TIMES DAILY
Qty: 270 TABLET | Refills: 3 | Status: SHIPPED | OUTPATIENT
Start: 2020-12-16 | End: 2021-04-12 | Stop reason: SDUPTHER

## 2020-12-16 RX ORDER — ATENOLOL 100 MG/1
100 TABLET ORAL DAILY
Qty: 90 TABLET | Refills: 3 | Status: SHIPPED | OUTPATIENT
Start: 2020-12-16 | End: 2021-04-12 | Stop reason: SDUPTHER

## 2020-12-16 RX ORDER — ASCORBIC ACID 500 MG
500 TABLET ORAL 2 TIMES DAILY
Qty: 180 TABLET | Refills: 3 | Status: SHIPPED | OUTPATIENT
Start: 2020-12-16 | End: 2021-04-12 | Stop reason: SDUPTHER

## 2020-12-16 RX ORDER — B-COMPLEX WITH VITAMIN C
1 TABLET ORAL 2 TIMES DAILY
Qty: 180 TABLET | Refills: 3 | Status: SHIPPED | OUTPATIENT
Start: 2020-12-16 | End: 2021-04-12 | Stop reason: SDUPTHER

## 2020-12-16 RX ORDER — VALSARTAN 320 MG/1
320 TABLET ORAL DAILY
Qty: 90 TABLET | Refills: 3 | Status: SHIPPED | OUTPATIENT
Start: 2020-12-16 | End: 2021-04-12 | Stop reason: SDUPTHER

## 2020-12-16 RX ORDER — AMLODIPINE BESYLATE 10 MG/1
10 TABLET ORAL DAILY
Qty: 90 TABLET | Refills: 3 | Status: SHIPPED | OUTPATIENT
Start: 2020-12-16 | End: 2021-04-12 | Stop reason: SDUPTHER

## 2020-12-16 RX ORDER — SPIRONOLACTONE 25 MG/1
25 TABLET ORAL DAILY
Qty: 90 TABLET | Refills: 3 | Status: SHIPPED | OUTPATIENT
Start: 2020-12-16 | End: 2021-04-12 | Stop reason: SDUPTHER

## 2020-12-16 NOTE — TELEPHONE ENCOUNTER
Jesus called from [place] requesting for all meds be sent to [facility] for \"p[facility] paks\"  Health Maintenance   Topic Date Due    Flu vaccine (1) 09/01/2020    Annual Wellness Visit (AWV)  10/30/2020    Diabetic retinal exam  01/28/2021 (Originally 6/15/2018)    DTaP/Tdap/Td vaccine (1 - Tdap) 01/28/2021 (Originally 1/9/1969)    Hepatitis C screen  01/28/2021 (Originally 1950)    Shingles Vaccine (1 of 2) 10/08/2021 (Originally 1/9/2000)    Diabetic foot exam  07/27/2021    A1C test (Diabetic or Prediabetic)  08/05/2021    Lipid screen  08/05/2021    Potassium monitoring  10/26/2021    Creatinine monitoring  10/26/2021    Breast cancer screen  02/26/2022    Colon cancer screen colonoscopy  03/23/2025    DEXA (modify frequency per FRAX score)  Completed    Pneumococcal 65+ years Vaccine  Completed    Hepatitis A vaccine  Aged Out    Hib vaccine  Aged Out    Meningococcal (ACWY) vaccine  Aged Out             (applicable per patient's age: Cancer Screenings, Depression Screening, Fall Risk Screening, Immunizations)    Hemoglobin A1C (%)   Date Value   08/05/2020 5.8   03/31/2020 5.8   09/24/2019 5.6     Microalb/Crt.  Ratio (mcg/mg creat)   Date Value   08/05/2020 30 (H)     LDL Cholesterol (mg/dL)   Date Value   08/05/2020 97     AST (U/L)   Date Value   10/26/2020 12     ALT (U/L)   Date Value   10/26/2020 7     BUN (mg/dL)   Date Value   10/26/2020 9      (goal A1C is < 7)   (goal LDL is <100) need 30-50% reduction from baseline     BP Readings from Last 3 Encounters:   10/30/20 (!) 158/70   10/28/20 126/72   10/26/20 (!) 180/68    (goal /80)      All Future Testing planned in [facility]:  Lab Frequency Next Occurrence   Basic Metabolic Panel Once 09/09/5539       Next Visit Date:  Future Appointments   Date Time Provider Tara Mtz   1/27/2021  2:00 PM Jonathan Mejia MD [facility]   1/27/2021  3:20 PM TARA Chapa - CNP Tiff Prim [facility]   2/22/2021  9:30 AM Harjinder Mars MD [facility] Tiff None            Patient Active Problem List:     Hypokalemia     Type 2 diabetes mellitus with diabetic nephropathy, without long-term current use of insulin (HCC)     Hyperlipidemia     Bilateral leg edema     Non compliance w medication regimen     Gastroesophageal reflux disease     Mild intermittent asthma without complication     Postmenopausal     Osteopenia determined by x-ray     Chronic midline low back pain without sciatica     Calcaneal spur of left foot     Iron deficiency anemia     Recurrent UTI     ACS (acute coronary syndrome) (HCC)     Abnormal cardiovascular stress test     CKD (chronic kidney disease), stage II     Resistant hypertension

## 2020-12-23 ENCOUNTER — TELEPHONE (OUTPATIENT)
Dept: PRIMARY CARE CLINIC | Age: 70
End: 2020-12-23

## 2020-12-23 NOTE — TELEPHONE ENCOUNTER
Received phone call from [facility] care stating that they were out to see Rosalee Chino today, her /64, blood sugar 107 and starting January 8th she will start receiving her meds in Pill Pack form from [facility] and this might help with her taking the correct medications. Health Maintenance   Topic Date Due    Flu vaccine (1) 09/01/2020    Annual Wellness Visit (AWV)  10/30/2020    Diabetic retinal exam  01/28/2021 (Originally 6/15/2018)    DTaP/Tdap/Td vaccine (1 - Tdap) 01/28/2021 (Originally 1/9/1969)    Hepatitis C screen  01/28/2021 (Originally 1950)    Shingles Vaccine (1 of 2) 10/08/2021 (Originally 1/9/2000)    Diabetic foot exam  07/27/2021    A1C test (Diabetic or Prediabetic)  08/05/2021    Lipid screen  08/05/2021    Potassium monitoring  10/26/2021    Creatinine monitoring  10/26/2021    Breast cancer screen  02/26/2022    Colon cancer screen colonoscopy  03/23/2025    DEXA (modify frequency per FRAX score)  Completed    Pneumococcal 65+ years Vaccine  Completed    Hepatitis A vaccine  Aged Out    Hib vaccine  Aged Out    Meningococcal (ACWY) vaccine  Aged Out             (applicable per patient's age: Cancer Screenings, Depression Screening, Fall Risk Screening, Immunizations)    Hemoglobin A1C (%)   Date Value   08/05/2020 5.8   03/31/2020 5.8   09/24/2019 5.6     Microalb/Crt.  Ratio (mcg/mg creat)   Date Value   08/05/2020 30 (H)     LDL Cholesterol (mg/dL)   Date Value   08/05/2020 97     AST (U/L)   Date Value   10/26/2020 12     ALT (U/L)   Date Value   10/26/2020 7     BUN (mg/dL)   Date Value   10/26/2020 9      (goal A1C is < 7)   (goal LDL is <100) need 30-50% reduction from baseline     BP Readings from Last 3 Encounters:   10/30/20 (!) 158/70   10/28/20 126/72   10/26/20 (!) 180/68    (goal /80)      All Future Testing planned in [facility]:  Lab Frequency Next Occurrence   Basic Metabolic Panel Once 60/96/3379       Next Visit Date:  Future

## 2020-12-30 ENCOUNTER — TELEPHONE (OUTPATIENT)
Dept: PRIMARY CARE CLINIC | Age: 70
End: 2020-12-30

## 2020-12-30 RX ORDER — ALBUTEROL SULFATE 90 UG/1
2 AEROSOL, METERED RESPIRATORY (INHALATION) 4 TIMES DAILY PRN
Qty: 1 INHALER | Refills: 0 | Status: SHIPPED | OUTPATIENT
Start: 2020-12-30 | End: 2021-01-19 | Stop reason: SDUPTHER

## 2020-12-30 NOTE — TELEPHONE ENCOUNTER
Can you please contact Janak Cardenas and ask her how she is feeling. See if she feels like she is wheezing or having shortness of breath. How bad is her cough? .  Looks like she has a history of asthma as she needed to use her inhaler?   Thank you

## 2020-12-30 NOTE — TELEPHONE ENCOUNTER
Noted. Given hx of asthma she should have inhaler to use as needed. I will send one in. If she develops any shortness of breath, wheezing, worsening cough please have her make an appointment.   Thank you

## 2021-01-19 ENCOUNTER — OFFICE VISIT (OUTPATIENT)
Dept: PRIMARY CARE CLINIC | Age: 71
End: 2021-01-19
Payer: MEDICARE

## 2021-01-19 VITALS
SYSTOLIC BLOOD PRESSURE: 122 MMHG | TEMPERATURE: 96.4 F | WEIGHT: 162.5 LBS | BODY MASS INDEX: 27.04 KG/M2 | HEART RATE: 66 BPM | DIASTOLIC BLOOD PRESSURE: 62 MMHG | OXYGEN SATURATION: 99 % | RESPIRATION RATE: 18 BRPM

## 2021-01-19 DIAGNOSIS — I10 ESSENTIAL HYPERTENSION: ICD-10-CM

## 2021-01-19 DIAGNOSIS — E11.21 TYPE 2 DIABETES MELLITUS WITH DIABETIC NEPHROPATHY, WITHOUT LONG-TERM CURRENT USE OF INSULIN (HCC): Primary | ICD-10-CM

## 2021-01-19 DIAGNOSIS — Z23 NEED FOR VACCINATION: ICD-10-CM

## 2021-01-19 DIAGNOSIS — J44.9 CHRONIC OBSTRUCTIVE PULMONARY DISEASE, UNSPECIFIED COPD TYPE (HCC): ICD-10-CM

## 2021-01-19 PROBLEM — J44.89 BRONCHITIS WITH OBSTRUCTION: Status: ACTIVE | Noted: 2021-01-19

## 2021-01-19 LAB — HBA1C MFR BLD: 5.8 %

## 2021-01-19 PROCEDURE — 3044F HG A1C LEVEL LT 7.0%: CPT | Performed by: NURSE PRACTITIONER

## 2021-01-19 PROCEDURE — 83036 HEMOGLOBIN GLYCOSYLATED A1C: CPT | Performed by: NURSE PRACTITIONER

## 2021-01-19 PROCEDURE — 1036F TOBACCO NON-USER: CPT | Performed by: NURSE PRACTITIONER

## 2021-01-19 PROCEDURE — G8926 SPIRO NO PERF OR DOC: HCPCS | Performed by: NURSE PRACTITIONER

## 2021-01-19 PROCEDURE — G0008 ADMIN INFLUENZA VIRUS VAC: HCPCS | Performed by: NURSE PRACTITIONER

## 2021-01-19 PROCEDURE — 3017F COLORECTAL CA SCREEN DOC REV: CPT | Performed by: NURSE PRACTITIONER

## 2021-01-19 PROCEDURE — 2022F DILAT RTA XM EVC RTNOPTHY: CPT | Performed by: NURSE PRACTITIONER

## 2021-01-19 PROCEDURE — 99214 OFFICE O/P EST MOD 30 MIN: CPT | Performed by: NURSE PRACTITIONER

## 2021-01-19 PROCEDURE — G8399 PT W/DXA RESULTS DOCUMENT: HCPCS | Performed by: NURSE PRACTITIONER

## 2021-01-19 PROCEDURE — G8482 FLU IMMUNIZE ORDER/ADMIN: HCPCS | Performed by: NURSE PRACTITIONER

## 2021-01-19 PROCEDURE — G8417 CALC BMI ABV UP PARAM F/U: HCPCS | Performed by: NURSE PRACTITIONER

## 2021-01-19 PROCEDURE — 1090F PRES/ABSN URINE INCON ASSESS: CPT | Performed by: NURSE PRACTITIONER

## 2021-01-19 PROCEDURE — 4040F PNEUMOC VAC/ADMIN/RCVD: CPT | Performed by: NURSE PRACTITIONER

## 2021-01-19 PROCEDURE — 3023F SPIROM DOC REV: CPT | Performed by: NURSE PRACTITIONER

## 2021-01-19 PROCEDURE — 90688 IIV4 VACCINE SPLT 0.5 ML IM: CPT | Performed by: NURSE PRACTITIONER

## 2021-01-19 PROCEDURE — 1123F ACP DISCUSS/DSCN MKR DOCD: CPT | Performed by: NURSE PRACTITIONER

## 2021-01-19 PROCEDURE — G8427 DOCREV CUR MEDS BY ELIG CLIN: HCPCS | Performed by: NURSE PRACTITIONER

## 2021-01-19 RX ORDER — ALBUTEROL SULFATE 90 UG/1
2 AEROSOL, METERED RESPIRATORY (INHALATION) 4 TIMES DAILY PRN
Qty: 1 INHALER | Refills: 0 | Status: SHIPPED | OUTPATIENT
Start: 2021-01-19

## 2021-01-19 ASSESSMENT — ENCOUNTER SYMPTOMS
DIARRHEA: 0
DIFFICULTY BREATHING: 0
HOARSE VOICE: 0
FREQUENT THROAT CLEARING: 0
BLURRED VISION: 0
NAUSEA: 0
VOMITING: 0
ABDOMINAL PAIN: 0
WHEEZING: 0
RHINORRHEA: 0
SPUTUM PRODUCTION: 0
SHORTNESS OF BREATH: 0
SORE THROAT: 0
CHEST TIGHTNESS: 0
HEMOPTYSIS: 0
CONSTIPATION: 0
COUGH: 0

## 2021-01-19 ASSESSMENT — PATIENT HEALTH QUESTIONNAIRE - PHQ9
SUM OF ALL RESPONSES TO PHQ QUESTIONS 1-9: 0
2. FEELING DOWN, DEPRESSED OR HOPELESS: 0
1. LITTLE INTEREST OR PLEASURE IN DOING THINGS: 0
SUM OF ALL RESPONSES TO PHQ QUESTIONS 1-9: 0
SUM OF ALL RESPONSES TO PHQ QUESTIONS 1-9: 0

## 2021-01-19 NOTE — PROGRESS NOTES
Name: Curtis Hardwick  : 1950         Chief Complaint:     Chief Complaint   Patient presents with    Hypertension     routine check, no concerns    Diabetes       History of Present Illness:      Curtis Hardwick is a 70 y.o.  female who presents with Hypertension (routine check, no concerns) and Diabetes      Jossie Rosenberg is here today for a routine office visit. Hypertension  This is a chronic problem. The current episode started more than 1 year ago. The problem is unchanged. The problem is controlled. Pertinent negatives include no anxiety, blurred vision, chest pain, headaches, neck pain, palpitations, peripheral edema, shortness of breath or sweats. Risk factors for coronary artery disease include obesity, diabetes mellitus and post-menopausal state. Past treatments include ACE inhibitors and calcium channel blockers. The current treatment provides moderate improvement. Compliance problems include exercise. Hypertensive end-organ damage includes kidney disease. There is no history of CAD/MI, CVA or heart failure. Identifiable causes of hypertension include chronic renal disease. Diabetes  She presents for her follow-up diabetic visit. She has type 2 diabetes mellitus. Her disease course has been stable. There are no hypoglycemic associated symptoms. Pertinent negatives for hypoglycemia include no dizziness, headaches or sweats. There are no diabetic associated symptoms. Pertinent negatives for diabetes include no blurred vision, no chest pain, no fatigue, no polydipsia, no polyphagia and no polyuria. There are no hypoglycemic complications. Diabetic complications include nephropathy (IMPROVED). Pertinent negatives for diabetic complications include no CVA, heart disease or peripheral neuropathy. Risk factors for coronary artery disease include diabetes mellitus, hypertension, post-menopausal and sedentary lifestyle. Current diabetic treatment includes oral agent (monotherapy).  She is compliant with treatment all of the time. Her weight is stable. She is following a high fat/cholesterol and generally healthy diet. Meal planning includes avoidance of concentrated sweets. She has not had a previous visit with a dietitian. She rarely participates in exercise. There is no change in her home blood glucose trend. An ACE inhibitor/angiotensin II receptor blocker is being taken. She does not see a podiatrist.Eye exam is not current. Asthma  There is no chest tightness, cough, difficulty breathing, frequent throat clearing, hemoptysis, hoarse voice, shortness of breath, sputum production or wheezing. This is a chronic problem. The current episode started more than 1 year ago. The problem occurs intermittently. The problem has been unchanged. Pertinent negatives include no chest pain, fever, headaches, rhinorrhea, sore throat or sweats. Her symptoms are aggravated by URI, strenuous activity, exposure to smoke, exposure to fumes, climbing stairs and change in weather. Her symptoms are alleviated by beta-agonist and rest. She reports significant improvement on treatment. Her symptoms are not alleviated by rest. Her past medical history is significant for asthma and COPD. There is no history of bronchiectasis, bronchitis, emphysema or pneumonia. Past Medical History:     Past Medical History:   Diagnosis Date    Arthritis     Asthma     Chronic bronchitis ([address]     Diabetes mellitus ([facility] [address]     GERD (gastroesophageal reflux disease)     H/O echocardiogram 3/9/16    EF >60%. LV wall thickness is mildly increased. Mild mitral and tricuspid regurgitation. Borderline pulmonary hypertension estimated right ventricular sysolic pressure of [id number]. Mild (grade l) diastolic dysfunction.  History of PFTs 3/10/16     Suboptimal effort. Restrictive in nature. clionical correlations is advised.  History of stress test 2/18/16    Abnoraml.  Moderate perfusion defect of mild to moderate intensity in the anterolateral and anteroapical regions during stress imaging. Intermediate risk for CAD.  Hyperlipidemia     Hypertension       Reviewed all health maintenance requirements and ordered appropriate tests  Health Maintenance Due   Topic Date Due    Annual Wellness Visit (AWV)  10/30/2020       Past Surgical History:     Past Surgical History:   Procedure Laterality Date    BREAST SURGERY      cyst removed    CARDIAC CATHETERIZATION  2014    CARDIAC CATHETERIZATION Left 2019    [facility]     SECTION      CHOLECYSTECTOMY      COLONOSCOPY  3/23/15    -diverticulosis,hemorrhoids    FOOT SURGERY      rt    HYSTERECTOMY          Medications:       Prior to Admission medications    Medication Sig Start Date End Date Taking?  Authorizing Provider   albuterol sulfate HFA (VENTOLIN HFA) 108 (90 Base) MCG/ACT inhaler Inhale 2 puffs into the lungs 4 times daily as needed for Wheezing 21  Yes Ariela Bullock, APRN - CNP   amLODIPine (NORVASC) 10 MG tablet Take 1 tablet by mouth daily 20  Yes Ariela Bullock APRN - CNP   ascorbic acid (VITAMIN C) 500 MG tablet Take 1 tablet by mouth 2 times daily With iron tablet 20  Yes Ariela Bullock, APRN - CNP   atenolol (TENORMIN) 100 MG tablet Take 1 tablet by mouth daily 20  Yes Ariela Bullock APRN - CNP   atorvastatin (LIPITOR) 40 MG tablet Take 1 tablet by mouth daily 20  Yes Ariela Bullock, APRN - CNP   Calcium Carbonate-Vitamin D (OYSTER SHELL CALCIUM/D) 500-200 MG-UNIT TABS Take 1 tablet by mouth 2 times daily 20  Yes Ariela Bullock, APRN - CNP   cetirizine (ZYRTEC ALLERGY) 10 MG tablet Take 1 tablet by mouth daily 20  Yes Ariela Bullock, APRN - CNP   hydrALAZINE (APRESOLINE) 50 MG tablet Take 1 tablet by mouth 3 times daily 20  Yes Ariela Bullock APRN - SHELL   metFORMIN (GLUCOPHAGE) 500 MG tablet Take 1 tablet by mouth 2 times daily (with meals) 20  Yes Tamar Colin APRN - SHELL valsartan (DIOVAN) 320 MG tablet Take 1 tablet by mouth daily 12/16/20  Yes TARA Pearl CNP   potassium chloride (KLOR-CON M) 20 MEQ extended release tablet Take 1 tablet by mouth 2 times daily 12/16/20  Yes TARA Pearl - CNP   spironolactone (ALDACTONE) 25 MG tablet Take 1 tablet by mouth daily 12/16/20  Yes TARA Pearl CNP   dorzolamide-timolol (COSOPT) 22.3-6.8 MG/ML ophthalmic solution  9/20/20  Yes Historical Provider, MD   Multiple Vitamins-Minerals (THERAPEUTIC MULTIVITAMIN-MINERALS) tablet Take 1 tablet by mouth daily 8/6/20 8/6/21 Yes TARA Pearl CNP   latanoprost (XALATAN) 0.005 % ophthalmic solution Place 1 drop into both eyes nightly    Yes Historical Provider, MD   blood glucose test strips (ONE TOUCH ULTRA TEST) strip USE 1 STRIP TO CHECK GLUCOSE DAILY 3/13/19  Yes TARA Pearl CNP   ONE TOUCH LANCETS [address] [place] [address] for testing daily and as needed. DX: Diabetes type  E11.9 1/31/19  Yes TARA Pearl CNP   Blood Glucose Monitoring Suppl GENNY 1 Units by Does not apply route 3 times daily What insurance will cover 10/11/16   TARA Martines CNP        Allergies: Iv dye [iodides]    Social History:     Tobacco:    reports that she has never smoked. She has never used smokeless tobacco.  Alcohol:      reports no history of alcohol use. Drug Use:  reports no history of drug use. Family History:     Family History   Problem Relation Age of Onset   [facility] Stroke Father     Stroke Sister     Diabetes Sister         x6 sisters    High Blood Pressure Sister     Stroke Brother     Diabetes Brother        Review of Systems:     Positive and Negative as described in HPI    Review of Systems   Constitutional: Negative for chills, fatigue and fever. HENT: Negative for congestion, hoarse voice, rhinorrhea and sore throat. Eyes: Negative for blurred vision and visual disturbance.    Respiratory: Negative for cough, hemoptysis, sputum production, shortness of breath and wheezing. Cardiovascular: Negative for chest pain and palpitations. Gastrointestinal: Negative for abdominal pain, constipation, diarrhea, nausea and vomiting. Endocrine: Negative for polydipsia, polyphagia and polyuria. Genitourinary: Negative for difficulty urinating and dysuria. Musculoskeletal: Positive for arthralgias. Negative for gait problem, neck pain and neck stiffness. Skin: Negative for rash. Neurological: Negative for dizziness, syncope, light-headedness and headaches. Physical Exam:   Vitals:  /62 (Site: Left Upper Arm)   Pulse 66   Temp 96.4 °F (35.8 °C) (Temporal)   Resp 18   Wt 162 lb 8 oz (73.7 kg)   SpO2 99%   BMI 27.04 kg/m²     Physical Exam  Vitals signs and nursing note reviewed. Constitutional:       General: She is not in acute distress. Appearance: Normal appearance. She is well-developed. She is not ill-appearing. HENT:      Mouth/Throat:      Mouth: Mucous membranes are moist.   Eyes:      General: No scleral icterus. Conjunctiva/sclera: Conjunctivae normal.   Neck:      Musculoskeletal: Normal range of motion and neck supple. Cardiovascular:      Rate and Rhythm: Normal rate and regular rhythm. Heart sounds: No murmur. Pulmonary:      Effort: Pulmonary effort is normal.      Breath sounds: Normal breath sounds. No wheezing. Abdominal:      General: Bowel sounds are normal. There is no distension. Palpations: Abdomen is soft. Tenderness: There is no abdominal tenderness. Musculoskeletal:      Right lower leg: No edema. Left lower leg: No edema. Skin:     General: Skin is warm and dry. Findings: No rash. Neurological:      Mental Status: She is alert and oriented to person, place, and time.    Psychiatric:         Mood and Affect: Mood normal.         Behavior: Behavior normal.         Data:     Lab Results   Component Value Date     10/26/2020    K 3.4 10/26/2020     10/26/2020    CO2 25 10/26/2020    BUN 9 10/26/2020    CREATININE 0.86 10/26/2020    GLUCOSE 146 10/26/2020    GLUCOSE 110 03/26/2012    PROT 7.3 10/26/2020    LABALBU 4.0 10/26/2020    BILITOT 0.25 10/26/2020    ALKPHOS 131 10/26/2020    AST 12 10/26/2020    ALT 7 10/26/2020     Lab Results   Component Value Date    WBC 15.6 10/26/2020    RBC 3.97 10/26/2020    RBC 3.85 03/26/2012    HGB 12.1 10/26/2020    HCT 37.9 10/26/2020    MCV 95.5 10/26/2020    MCH 30.5 10/26/2020    MCHC 31.9 10/26/2020    RDW 12.7 10/26/2020     10/26/2020     03/26/2012    MPV 10.0 10/26/2020     Lab Results   Component Value Date    TSH 2.25 10/22/2020     Lab Results   Component Value Date    CHOL 174 08/05/2020    HDL 54 08/05/2020    LABA1C 5.8 08/05/2020       Assessment/Plan:      Diagnosis Orders   1. Type 2 diabetes mellitus with diabetic nephropathy, without long-term current use of insulin (HCC)  CBC Auto Differential    ALT    AST    Basic Metabolic Panel    Lipid Panel    Microalbumin, Ur    Hemoglobin A1C    POCT glycosylated hemoglobin (Hb A1C)   2. Essential hypertension     3. Chronic obstructive pulmonary disease, unspecified COPD type ([facility]     4. Need for vaccination  INFLUENZA, QUADV, 3 YRS AND OLDER, IM, MDV, 0.5ML (Julio Gaucher)     Blood pressure has been well controlled. Patient is very compliant with medication regimen. Diabetes well controlled, continue current meds. 1.  Lauri Phan received counseling on the following healthy behaviors: nutrition, exercise and medication adherence  2. Patient given educational materials - see patient instructions  3. Was a self-tracking handout given in paper form or via [email or web address]? No  If yes, see orders or list here. 4.  Discussed use, benefit, and side effects of prescribed medications. Barriers to medication compliance addressed. All patient questions answered. Pt voiced understanding.    5.  Reviewed prior labs and health maintenance  6. Continue current medications, diet and exercise. Completed Refills   Requested Prescriptions     Signed Prescriptions Disp Refills    albuterol sulfate HFA (VENTOLIN HFA) 108 (90 Base) MCG/ACT inhaler 1 Inhaler 0     Sig: Inhale 2 puffs into the lungs 4 times daily as needed for Wheezing         Return in about 6 months (around 7/19/2021) for Check up.

## 2021-01-19 NOTE — PATIENT INSTRUCTIONS
SURVEY:    You may be receiving a survey from [employer] regarding your visit today. Please complete the survey to enable us to provide the highest quality of care to you and your family. If you cannot score us a very good on any question, please call the office to discuss how we could have made your experience a very good one. Thank you. Patient Education        Influenza (Flu) Vaccine: Care Instructions  Your Care Instructions     Influenza (flu) is an infection in the lungs and breathing passages. It is caused by the influenza virus. There are different strains, or types, of the flu virus every year. The flu comes on quickly. It can cause a cough, stuffy nose, fever, chills, tiredness, and aches and pains. These symptoms may last up to 10 days. The flu can make you feel very sick, but most of the time it doesn't lead to other problems. But it can cause serious problems in people who are older or who have a long-term illness, such as heart disease or diabetes. You can help prevent the flu by getting a flu vaccine every year, as soon as it is available. You cannot get the flu from the vaccine. The vaccine prevents most cases of the flu. But even when the vaccine doesn't prevent the flu, it can make symptoms less severe and reduce the chance of problems from the flu. Sometimes, young children and people who have an immune system problem may have a slight fever or muscle aches or pains 6 to 12 hours after getting the shot. These symptoms usually last 1 or 2 days. Follow-up care is a key part of your treatment and safety. Be sure to make and go to all appointments, and call your doctor if you are having problems. It's also a good idea to know your test results and keep a list of the medicines you take. Who should get the flu vaccine? Everyone age 7 months or older should get a flu vaccine each year. It lowers the chance of getting and spreading the flu.  The vaccine is very important for people who are at high risk for getting other health problems from the flu. This includes:  · Anyone 48years of age or older. · People who live in a long-term care center, such as a nursing home. · All children 6 months through 25years of age. · Adults and children 6 months and older who have long-term heart or lung problems, such as asthma. · Adults and children 6 months and older who needed medical care or were in a hospital during the past year because of diabetes, chronic kidney disease, or a weak immune system (including HIV or AIDS). · Women who will be pregnant during the flu season. · People who have any condition that can make it hard to breathe or swallow (such as a brain injury or muscle disorders). · People who can give the flu to others who are at high risk for problems from the flu. This includes all health care workers and close contacts of people age 72 or older. Who should not get the flu vaccine? The person who gives the vaccine may tell you not to get it if you:  · Have a severe allergy to eggs or any part of the vaccine. · Have had a severe reaction to a flu vaccine in the past.  · Have had Guillain-Barré syndrome (GBS). · Are sick with a fever. (Get the vaccine when symptoms are gone.)  How can you care for yourself at home? · If you or your child has a sore arm or a slight fever after the shot, take an over-the-counter pain medicine, such as acetaminophen (Tylenol) or ibuprofen (Advil, Motrin). Read and follow all instructions on the label. Do not give aspirin to anyone younger than 20. It has been linked to Reye syndrome, a serious illness. · Do not take two or more pain medicines at the same time unless the doctor told you to. Many pain medicines have acetaminophen, which is Tylenol. Too much acetaminophen (Tylenol) can be harmful. When should you call for help? Call 911 anytime you think you may need emergency care.  For example, call if after getting the flu vaccine:    · You have symptoms of a severe reaction to the flu vaccine. Symptoms of a severe reaction may include:  ? Severe difficulty breathing. ? Sudden raised, red areas (hives) all over your body. ? Severe lightheadedness. Call your doctor now or seek immediate medical care if after getting the flu vaccine:    · You think you are having a reaction to the flu vaccine, such as a new fever. Watch closely for changes in your health, and be sure to contact your doctor if you have any problems. Where can you learn more? Go to https://TaifatechpeInVivo Therapeuticseb.PhishMe. org and sign in to your [employer] account. Enter C101 in the [employer] box to learn more about \"Influenza (Flu) Vaccine: Care Instructions. \"     If you do not have an account, please click on the \"Sign Up Now\" link. Current as of: December 9, 2019               Content Version: 12.6  © 0690-4750 [employer]. Care instructions adapted under license by [facility] [address] . If you have questions about a medical condition or this instruction, always ask your healthcare professional. [place] [address] any warranty or liability for your use of this information. Patient Education        Counting Carbohydrates: Care Instructions  Your Care Instructions     You don't have to eat special foods when you have diabetes. You just have to be careful to eat healthy foods. Carbohydrates (carbs) raise blood sugar higher and quicker than any other nutrient. Carbs are found in desserts, breads and cereals, and fruit. They're also in starchy vegetables. These include potatoes, corn, and grains such as rice and pasta. Carbs are also in milk and yogurt. The more carbs you eat at one time, the higher your blood sugar will rise. Spreading carbs all through the day helps keep your blood sugar levels within your target range. Counting carbs is one of the best ways to keep your blood sugar under control.   If you use insulin, counting carbs helps you match the right amount of insulin to the number of grams of carbs in a meal. Then you can change your diet and insulin dose as needed. Testing your blood sugar several times a day can help you learn how carbs affect your blood sugar. A registered dietitian or certified diabetes educator can help you plan meals and snacks. Follow-up care is a key part of your treatment and safety. Be sure to make and go to all appointments, and call your doctor if you are having problems. It's also a good idea to know your test results and keep a list of the medicines you take. How can you care for yourself at home? Know your daily amount of carbohydrates  Your daily amount depends on several things, such as your weight, how active you are, which diabetes medicines you take, and what your goals are for your blood sugar levels. A registered dietitian or certified diabetes educator can help you plan how many carbs to include in each meal and snack. For most adults, a guideline for the daily amount of carbs is:  · 45 to 60 grams at each meal. That's about the same as 3 to 4 carbohydrate servings. · 15 to 20 grams at each snack. That's about the same as 1 carbohydrate serving. Count carbs  Counting carbs lets you know how much rapid-acting insulin to take before you eat. If you use an insulin pump, you get a constant rate of insulin during the day. So the pump must be programmed at meals. This gives you extra insulin to cover the rise in blood sugar after meals. If you take insulin:  · Learn your own insulin-to-carb ratio. You and your diabetes health professional will figure out the ratio. You can do this by testing your blood sugar after meals. For example, you may need a certain amount of insulin for every 15 grams of carbs. · Add up the carb grams in a meal. Then you can figure out how many units of insulin to take based on your insulin-to-carb ratio. · Exercise lowers blood sugar.  You can use less insulin than you would if you were not doing exercise. Keep in mind that timing matters. If you exercise within 1 hour after a meal, your body may need less insulin for that meal than it would if you exercised 3 hours after the meal. Test your blood sugar to find out how exercise affects your need for insulin. If you do or don't take insulin:  · Look at labels on packaged foods. This can tell you how many carbs are in a serving. You can also use guides from the American Diabetes Association. · Be aware of portions, or serving sizes. If a package has two servings and you eat the whole package, you need to double the number of grams of carbohydrate listed for one serving. · Protein, fat, and fiber do not raise blood sugar as much as carbs do. If you eat a lot of these nutrients in a meal, your blood sugar will rise more slowly than it would otherwise. Eat from all food groups  · Eat at least three meals a day. · Plan meals to include food from all the food groups. The food groups include grains, fruits, dairy, proteins, and vegetables. · Talk to your dietitian or diabetes educator about ways to add limited amounts of sweets into your meal plan. · If you drink alcohol, talk to your doctor. It may not be recommended when you are taking certain diabetes medicines. Where can you learn more? Go to https://QingCloud.seedtag. org and sign in to your M[employer]d account. Enter V431 in the [facility] box to learn more about \"Counting Carbohydrates: Care Instructions. \"     If you do not have an account, please click on the \"Sign Up Now\" link. Current as of: December 20, 2019               Content Version: 12.6  © 5206-9378 [employer]. Care instructions adapted under license by [facility] ([facility]). If you have questions about a medical condition or this instruction, always ask your healthcare professional. [address] any warranty or liability for your use of this information.

## 2021-01-19 NOTE — PROGRESS NOTES
After obtaining consent, and per orders of Troy Bullock, injection of flu given in Left deltoid by Akila Campuzano. Patient instructed to remain in clinic for 20 minutes afterwards, and to report any adverse reaction to me immediately. Vaccine Information Sheet, \"Influenza - Inactivated\"  given to Nixon Bardales, or parent/legal guardian of  Nixon Bardales and verbalized understanding. Patient responses:    Have you ever had a reaction to a flu vaccine? No  Are you able to eat eggs without adverse effects? Yes  Do you have any current illness? No  Have you ever had Guillian [place] Syndrome? No    Flu vaccine given per order. Please see immunization tab.

## 2021-01-25 ENCOUNTER — TELEPHONE (OUTPATIENT)
Dept: CARDIOLOGY | Age: 71
End: 2021-01-25

## 2021-01-25 ENCOUNTER — HOSPITAL ENCOUNTER (OUTPATIENT)
Age: 71
Discharge: HOME | End: 2021-01-25
Payer: MEDICARE

## 2021-01-25 DIAGNOSIS — I25.10 ASHD (ARTERIOSCLEROTIC HEART DISEASE): ICD-10-CM

## 2021-01-25 DIAGNOSIS — I10 RESISTANT HYPERTENSION: ICD-10-CM

## 2021-01-25 LAB
ANION GAP SERPL CALCULATED.3IONS-SCNC: 5 MMOL/L (ref 9–17)
BUN BLDV-MCNC: 25 MG/DL (ref 8–23)
BUN/CREAT BLD: 22 (ref 9–20)
CALCIUM SERPL-MCNC: 9.9 MG/DL (ref 8.6–10.4)
CHLORIDE BLD-SCNC: 106 MMOL/L (ref 98–107)
CO2: 26 MMOL/L (ref 20–31)
CREAT SERPL-MCNC: 1.15 MG/DL (ref 0.5–0.9)
GFR AFRICAN AMERICAN: 56 ML/MIN
GFR NON-AFRICAN AMERICAN: 47 ML/MIN
GFR SERPL CREATININE-BSD FRML MDRD: ABNORMAL ML/MIN/{1.73_M2}
GFR SERPL CREATININE-BSD FRML MDRD: ABNORMAL ML/MIN/{1.73_M2}
GLUCOSE BLD-MCNC: 92 MG/DL (ref 70–99)
POTASSIUM SERPL-SCNC: 4.8 MMOL/L (ref 3.7–5.3)
SODIUM BLD-SCNC: 137 MMOL/L (ref 135–144)

## 2021-01-25 PROCEDURE — 36415 COLL VENOUS BLD VENIPUNCTURE: CPT

## 2021-01-25 PROCEDURE — 80048 BASIC METABOLIC PNL TOTAL CA: CPT

## 2021-01-27 ENCOUNTER — OFFICE VISIT (OUTPATIENT)
Dept: CARDIOLOGY | Age: 71
End: 2021-01-27
Payer: MEDICARE

## 2021-01-27 VITALS
RESPIRATION RATE: 18 BRPM | HEART RATE: 60 BPM | SYSTOLIC BLOOD PRESSURE: 126 MMHG | OXYGEN SATURATION: 98 % | BODY MASS INDEX: 27.82 KG/M2 | HEIGHT: 65 IN | WEIGHT: 167 LBS | DIASTOLIC BLOOD PRESSURE: 71 MMHG

## 2021-01-27 DIAGNOSIS — I25.10 MILD CAD: ICD-10-CM

## 2021-01-27 DIAGNOSIS — E78.2 MIXED HYPERLIPIDEMIA: ICD-10-CM

## 2021-01-27 DIAGNOSIS — I10 RESISTANT HYPERTENSION: Primary | ICD-10-CM

## 2021-01-27 PROCEDURE — 1036F TOBACCO NON-USER: CPT | Performed by: FAMILY MEDICINE

## 2021-01-27 PROCEDURE — 99213 OFFICE O/P EST LOW 20 MIN: CPT | Performed by: FAMILY MEDICINE

## 2021-01-27 PROCEDURE — 1123F ACP DISCUSS/DSCN MKR DOCD: CPT | Performed by: FAMILY MEDICINE

## 2021-01-27 PROCEDURE — 4040F PNEUMOC VAC/ADMIN/RCVD: CPT | Performed by: FAMILY MEDICINE

## 2021-01-27 PROCEDURE — G8427 DOCREV CUR MEDS BY ELIG CLIN: HCPCS | Performed by: FAMILY MEDICINE

## 2021-01-27 PROCEDURE — G8417 CALC BMI ABV UP PARAM F/U: HCPCS | Performed by: FAMILY MEDICINE

## 2021-01-27 PROCEDURE — G8482 FLU IMMUNIZE ORDER/ADMIN: HCPCS | Performed by: FAMILY MEDICINE

## 2021-01-27 PROCEDURE — 1090F PRES/ABSN URINE INCON ASSESS: CPT | Performed by: FAMILY MEDICINE

## 2021-01-27 PROCEDURE — 99211 OFF/OP EST MAY X REQ PHY/QHP: CPT | Performed by: FAMILY MEDICINE

## 2021-01-27 PROCEDURE — G8399 PT W/DXA RESULTS DOCUMENT: HCPCS | Performed by: FAMILY MEDICINE

## 2021-01-27 PROCEDURE — 3017F COLORECTAL CA SCREEN DOC REV: CPT | Performed by: FAMILY MEDICINE

## 2021-01-27 NOTE — PATIENT INSTRUCTIONS
SURVEY:    You may be receiving a survey from [employer] regarding your visit today. Please complete the survey to enable us to provide the highest quality of care to you and your family. If you cannot score us a very good on any question, please call the office to discuss how we could have made your experience a very good one. Thank you.

## 2021-01-27 NOTE — PROGRESS NOTES
Joy Payan am scribing for and in the presence of Ricardo Chaney. Rachell LAKE, MS, F.A.C.C. Patient: Elizabeth Nicholas  : 1950  Date of Visit: 2021    REASON FOR VISIT / CONSULTATION: 3 Month Follow-Up (HX: CAD, HTN. Pt states she is doing well. Denies: CP, palpitations, Lighteaded/dizziness, SOb. )    History of Present Illness:        Dear [address] [place] [address], APRN - CNP    I had the pleasure of seeing your patient Elizabeth Nicholas as part of a pre-operative cardiac evaluation today. Heart catheterization done on 2019 that showed mild to moderate single vessel disease involving the circumflex coronary artery. Normal left ventricular end diastolic pressure. (LVEDP). Ms. Vincent Ac is here today for follow up. She reports doing well at this time cardiac wise. She denies having any current or recent chest pain, shortness of breath, lightheaded/dizziness or palpitations. She denies any increased lower extremity edema, abdominal pain, bleeding problems, problems with her medications or any other concerns at this time. Exercise Tolerance: Ms. Vincent Ac reports that she has a fairly good exercise tolerance. Her says that she could walk about one block without developing chest discomfort or significant shortness of breath. PAST MEDICAL HISTORY:        Past Medical History:   Diagnosis Date    Arthritis     Asthma     [facility]     Diabetes mellitus ([facility] [facility]     GERD (gastroesophageal reflux disease)     H/O echocardiogram 3/9/16    EF >60%. LV wall thickness is mildly increased. Mild mitral and tricuspid regurgitation. Borderline pulmonary hypertension estimated right ventricular sysolic pressure of 25OFKZ. Mild (grade l) diastolic dysfunction.  History of PFTs 3/10/16     Suboptimal effort. Restrictive in nature. clionical correlations is advised.  History of stress test 16    Abnoraml.  Moderate perfusion defect of mild to moderate intensity in the anterolateral and anteroapical regions during stress imaging. Intermediate risk for CAD.  Hyperlipidemia     Hypertension        CURRENT ALLERGIES: Iv dye [iodides] REVIEW OF SYSTEMS: 14 systems were reviewed. Pertinent positives and negatives as above, all else negative.      Past Surgical History:   Procedure Laterality Date    BREAST SURGERY      cyst removed    CARDIAC CATHETERIZATION      CARDIAC CATHETERIZATION Left 2019    [facility]     SECTION      CHOLECYSTECTOMY      COLONOSCOPY  3/23/15    -diverticulosis,hemorrhoids    FOOT SURGERY      rt    HYSTERECTOMY      Social History:  Social History     Tobacco Use    Smoking status: Never Smoker    Smokeless tobacco: Never Used   Substance Use Topics    Alcohol use: No     Alcohol/week: 0.0 standard drinks     Frequency: Never     Binge frequency: Never    Drug use: No        CURRENT MEDICATIONS:        Outpatient Medications Marked as Taking for the 21 encounter (Office Visit) with Bunny Gutiérrez MD   Medication Sig Dispense Refill    albuterol sulfate HFA (VENTOLIN HFA) 108 (90 Base) MCG/ACT inhaler Inhale 2 puffs into the lungs 4 times daily as needed for Wheezing 1 Inhaler 0    amLODIPine (NORVASC) 10 MG tablet Take 1 tablet by mouth daily 90 tablet 3    ascorbic acid (VITAMIN C) 500 MG tablet Take 1 tablet by mouth 2 times daily With iron tablet 180 tablet 3    atenolol (TENORMIN) 100 MG tablet Take 1 tablet by mouth daily 90 tablet 3    atorvastatin (LIPITOR) 40 MG tablet Take 1 tablet by mouth daily 90 tablet 3    Calcium Carbonate-Vitamin D (OYSTER SHELL CALCIUM/D) 500-200 MG-UNIT TABS Take 1 tablet by mouth 2 times daily 180 tablet 3    cetirizine (ZYRTEC ALLERGY) 10 MG tablet Take 1 tablet by mouth daily 30 tablet 3    hydrALAZINE (APRESOLINE) 50 MG tablet Take 1 tablet by mouth 3 times daily 270 tablet 3    metFORMIN (GLUCOPHAGE) 500 MG tablet Take 1 tablet by mouth 2 times daily (with meals) 180 tablet 3    valsartan (DIOVAN) 320 MG tablet Take 1 tablet by mouth daily 90 tablet 3    potassium chloride (KLOR-CON M) 20 MEQ extended release tablet Take 1 tablet by mouth 2 times daily 180 tablet 3    spironolactone (ALDACTONE) 25 MG tablet Take 1 tablet by mouth daily 90 tablet 3    dorzolamide-timolol (COSOPT) 22.3-6.8 MG/ML ophthalmic solution       Multiple Vitamins-Minerals (THERAPEUTIC MULTIVITAMIN-MINERALS) tablet Take 1 tablet by mouth daily 30 tablet 11    latanoprost (XALATAN) 0.005 % ophthalmic solution Place 1 drop into both eyes nightly       blood glucose test strips (ONE TOUCH ULTRA TEST) strip USE 1 STRIP TO CHECK GLUCOSE DAILY 100 strip 3    ONE TOUCH LANCETS MISC Lancets- One Touch Delica for testing daily and as needed. DX: Diabetes type  E11.9 100 each 3    Blood Glucose Monitoring Suppl GENNY 1 Units by Does not apply route 3 times daily What insurance will cover 1 Device 0       FAMILY HISTORY: family history includes Diabetes in her brother and sister; High Blood Pressure in her sister; Stroke in her brother, father, and sister. Physical Examination:     BP (!) 151/78 (Site: Left Upper Arm, Position: Sitting, Cuff Size: Medium Adult)   Pulse 66   Resp 18   Ht 5' 5\" (1.651 m)   Wt 167 lb (75.8 kg)   SpO2 98%   BMI 27.79 kg/m²  Body mass index is 27.79 kg/m². Constitutional: She is oriented to person, place, and time. She appears well-developed and well-nourished. In no acute distress. HEENT: Normocephalic and atraumatic. No JVD present. Carotid bruit is not present. No mass and no thyromegaly present. No lymphadenopathy present. Cardiovascular: Normal rate, regular rhythm, normal heart sounds. Exam reveals no gallop and no friction rubs. 1/6 systolic murmur, 2nd intercostal space on the RIGHT just lateral to the sternum. Pulmonary/Chest: Effort normal and breath sounds normal. No respiratory distress. She has no wheezes, rhonchi or rales.  Abdominal: medication including lightheadedness and dizziness and instructed them to stop the medication of this occurs and call our office if this occurs. · Calcium Channel Blocker: Continue amlodipine (Norvasc) 10 mg once daily. · Hyperlipidemia: Mixed, LDL done on 8/5/2020 was 97 mg/dL   · Cholesterol Reduction Therapy: Continue Atorvastatin (Lipitor) 40 mg daily. I also told Ms. Ramsey Light to continue her other medications and follow up with you as previously scheduled. FOLLOW UP:   I told Ms. Ramsey Light to call my office if she had any problems, but otherwise told her to Return in about 6 months (around 7/27/2021). However, I would be happy to see her sooner should the need arise. Once again, thank you for allowing me to participate in this patients care. Please do not hesitate to contact me could I be of further assistance. Sincerely,  Alejandro Oropeza. Rachell LAKE, MS, F.A.C.C. [facility] Cardiology Specialist    [address] [place] [address]  Phone: [phone], Fax: [phone]     I believe that the risk of significant morbidity and mortality related to the patient's current medical conditions are: low-intermediate. The documentation recorded by the scribe, accurately and completely reflects the services I personally performed and the decisions made by me. Laury Luna MD, MS, F.A.C.C.  January 27, 2021

## 2021-01-28 ENCOUNTER — OFFICE VISIT (OUTPATIENT)
Dept: PRIMARY CARE CLINIC | Age: 71
End: 2021-01-28
Payer: MEDICARE

## 2021-01-28 VITALS
DIASTOLIC BLOOD PRESSURE: 66 MMHG | TEMPERATURE: 96.1 F | HEIGHT: 65 IN | SYSTOLIC BLOOD PRESSURE: 128 MMHG | WEIGHT: 168.7 LBS | RESPIRATION RATE: 18 BRPM | BODY MASS INDEX: 28.11 KG/M2 | HEART RATE: 98 BPM | OXYGEN SATURATION: 99 %

## 2021-01-28 DIAGNOSIS — Z00.00 ROUTINE GENERAL MEDICAL EXAMINATION AT A HEALTH CARE FACILITY: Primary | ICD-10-CM

## 2021-01-28 PROCEDURE — G0438 PPPS, INITIAL VISIT: HCPCS | Performed by: NURSE PRACTITIONER

## 2021-01-28 PROCEDURE — 4040F PNEUMOC VAC/ADMIN/RCVD: CPT | Performed by: NURSE PRACTITIONER

## 2021-01-28 PROCEDURE — 1123F ACP DISCUSS/DSCN MKR DOCD: CPT | Performed by: NURSE PRACTITIONER

## 2021-01-28 PROCEDURE — 3017F COLORECTAL CA SCREEN DOC REV: CPT | Performed by: NURSE PRACTITIONER

## 2021-01-28 PROCEDURE — G8482 FLU IMMUNIZE ORDER/ADMIN: HCPCS | Performed by: NURSE PRACTITIONER

## 2021-01-28 ASSESSMENT — PATIENT HEALTH QUESTIONNAIRE - PHQ9
SUM OF ALL RESPONSES TO PHQ QUESTIONS 1-9: 0
1. LITTLE INTEREST OR PLEASURE IN DOING THINGS: 0
SUM OF ALL RESPONSES TO PHQ QUESTIONS 1-9: 0

## 2021-01-28 ASSESSMENT — LIFESTYLE VARIABLES: HOW OFTEN DO YOU HAVE A DRINK CONTAINING ALCOHOL: 0

## 2021-01-28 NOTE — PATIENT INSTRUCTIONS
SURVEY:    You may be receiving a survey from [employer] regarding your visit today. Please complete the survey to enable us to provide the highest quality of care to you and your family. If you cannot score us a very good on any question, please call the office to discuss how we could have made your experience a very good one. Thank you. Personalized Preventive Plan for Estefani Garner - 1/28/2021  Medicare offers a range of preventive health benefits. Some of the tests and screenings are paid in full while other may be subject to a deductible, co-insurance, and/or copay. Some of these benefits include a comprehensive review of your medical history including lifestyle, illnesses that may run in your family, and various assessments and screenings as appropriate. After reviewing your medical record and screening and assessments performed today your provider may have ordered immunizations, labs, imaging, and/or referrals for you. A list of these orders (if applicable) as well as your Preventive Care list are included within your After Visit Summary for your review. Other Preventive Recommendations:    · A preventive eye exam performed by an eye specialist is recommended every 1-2 years to screen for glaucoma; cataracts, macular degeneration, and other eye disorders. · A preventive dental visit is recommended every 6 months. · Try to get at least 150 minutes of exercise per week or 10,000 steps per day on a pedometer . · Order or download the FREE \"Exercise & Physical Activity: Your Everyday Guide\" from [employer]. Call 9-754.310.3252 or search [employer] online. · You need 9155-9173 mg of calcium and 6080-0327 IU of vitamin D per day.  It is possible to meet your calcium requirement with diet alone, but a vitamin D supplement is usually necessary to meet this goal.  · When exposed to the sun, use a sunscreen that protects against both UVA and UVB radiation with an SPF of 30 or greater. Reapply every 2 to 3 hours or after sweating, drying off with a towel, or swimming. · Always wear a seat belt when traveling in a car. Always wear a helmet when riding a bicycle or motorcycle.

## 2021-01-28 NOTE — PROGRESS NOTES
Medicare Annual Wellness Visit  Name: India Clayton Date: 2021   MRN: [id number] Sex: Female   Age: 70 y.o. Ethnicity: Non-/Non    : 1950 Race: Black      Mallory Bardales is here for Medicare AWV    Screenings for behavioral, psychosocial and functional/safety risks, and cognitive dysfunction are all negative except as indicated below. These results, as well as other patient data from the [address] form, are documented in Flowsheets linked to this Encounter. Allergies   Allergen Reactions    Iv Dye [Iodides] Nausea And Vomiting         Prior to Visit Medications    Medication Sig Taking?  Authorizing Provider   albuterol sulfate HFA (VENTOLIN HFA) 108 (90 Base) MCG/ACT inhaler Inhale 2 puffs into the lungs 4 times daily as needed for Wheezing Yes Iwona Peon Might, APRN - CNP   amLODIPine (NORVASC) 10 MG tablet Take 1 tablet by mouth daily Yes Iwona Anastasian Might, APRN - CNP   ascorbic acid (VITAMIN C) 500 MG tablet Take 1 tablet by mouth 2 times daily With iron tablet Yes Iwona Anastasian Might, APRN - CNP   atenolol (TENORMIN) 100 MG tablet Take 1 tablet by mouth daily Yes Iwona Peon Might, APRN - CNP   atorvastatin (LIPITOR) 40 MG tablet Take 1 tablet by mouth daily Yes Iwona Anastasian Might, APRN - CNP   Calcium Carbonate-Vitamin D (OYSTER SHELL CALCIUM/D) 500-200 MG-UNIT TABS Take 1 tablet by mouth 2 times daily Yes Iwona Peon Might, APRN - CNP   cetirizine (ZYRTEC ALLERGY) 10 MG tablet Take 1 tablet by mouth daily Yes Iwona Peon Might, APRN - CNP   hydrALAZINE (APRESOLINE) 50 MG tablet Take 1 tablet by mouth 3 times daily Yes Iwona Peon Might, APRN - CNP   metFORMIN (GLUCOPHAGE) 500 MG tablet Take 1 tablet by mouth 2 times daily (with meals) Yes Iwona Peon Might, APRN - CNP   valsartan (DIOVAN) 320 MG tablet Take 1 tablet by mouth daily Yes Iwona Peon Might, APRN - CNP   potassium chloride (KLOR-CON M) 20 MEQ extended release tablet Take 1 tablet by mouth 2 times daily Yes Iwona Peon Might, APRN - CNP spironolactone (ALDACTONE) 25 MG tablet Take 1 tablet by mouth daily Yes TARA Crabtree CNP   dorzolamide-timolol (COSOPT) 22.3-6.8 MG/ML ophthalmic solution  Yes Historical Provider, MD   Multiple Vitamins-Minerals (THERAPEUTIC MULTIVITAMIN-MINERALS) tablet Take 1 tablet by mouth daily Yes TARA Crabtree CNP   latanoprost (XALATAN) 0.005 % ophthalmic solution Place 1 drop into both eyes nightly  Yes Historical Provider, MD   blood glucose test strips (ONE TOUCH ULTRA TEST) strip USE 1 STRIP TO CHECK GLUCOSE DAILY Yes TARA Crabtree CNP   ONE TOUCH LANCETS MISC Lancets- One Touch Gala Frieze for testing daily and as needed. DX: Diabetes type  E11.9 Yes TARA Crabtree CNP   Blood Glucose Monitoring Suppl GENNY 1 Units by Does not apply route 3 times daily What insurance will cover Yes TARA Cleary CNP         Past Medical History:   Diagnosis Date    Arthritis     Asthma     [facility] bronchitis ([facility] [address]     Diabetes mellitus ([facility] [address] [facility]     GERD (gastroesophageal reflux disease)     H/O echocardiogram 3/9/16    EF >60%. LV wall thickness is mildly increased. Mild mitral and tricuspid regurgitation. Borderline pulmonary hypertension estimated right ventricular sysolic pressure of 51UYHE. Mild (grade l) diastolic dysfunction.  History of PFTs 3/10/16     Suboptimal effort. Restrictive in nature. clionical correlations is advised.  History of stress test 16    Abnoraml. Moderate perfusion defect of mild to moderate intensity in the anterolateral and anteroapical regions during stress imaging. Intermediate risk for CAD.     Hyperlipidemia     Hypertension        Past Surgical History:   Procedure Laterality Date    BREAST SURGERY      cyst removed    CARDIAC CATHETERIZATION  2014    CARDIAC CATHETERIZATION Left 2019    Right Radial/[facility] Anupam/     SECTION      CHOLECYSTECTOMY      COLONOSCOPY  3/23/15    -diverticulosis,hemorrhoids  FOOT SURGERY      rt    HYSTERECTOMY           Family History   Problem Relation Age of Onset   [facility] Stroke Father    [facility] Stroke Sister     Diabetes Sister         x6 sisters    High Blood Pressure Sister     Stroke Brother     Diabetes Brother        CareTeam (Including outside providers/suppliers regularly involved in providing care):   Patient Care Team:  TARA Ghotra CNP as PCP - General (Family Nurse Practitioner)  TARA Ghotra CNP as PCP - [facility] Empaneled Provider    Wt Readings from Last 3 Encounters:   01/28/21 168 lb 11.2 oz (76.5 kg)   01/27/21 167 lb (75.8 kg)   01/19/21 162 lb 8 oz (73.7 kg)     Vitals:    01/28/21 1132   BP: 128/66   Site: Left Upper Arm   Position: Sitting   Cuff Size: Large Adult   Pulse: 98   Resp: 18   Temp: 96.1 °F (35.6 °C)   TempSrc: Temporal   SpO2: 99%   Weight: 168 lb 11.2 oz (76.5 kg)   Height: 5' 5\" (1.651 m)     Body mass index is 28.07 kg/m². Based upon direct observation of the patient, evaluation of cognition reveals remote memory intact, recent memory impaired. General Appearance: well-developed and well nourished and in no acute distress  Skin: warm and dry, no rash or erythema  Pulmonary/Chest: clear to auscultation bilaterally- no wheezes, rales or rhonchi, normal air movement, no respiratory distress  Cardiovascular: normal rate and regular rhythm  Abdomen: soft, non-tender  Extremities: no edema  Musculoskeletal: no joint instability    Patient's complete Health Risk Assessment and screening values have been reviewed and are found in Flowsheets. The following problems were reviewed today and where indicated follow up appointments were made and/or referrals ordered. Positive Risk Factor Screenings with Interventions:      Cognitive:   Words recalled: 1 Word Recalled  Clock Drawing Test (CDT) Score: (!) Abnormal  Total Score Interpretation: Positive Mini-Cog  Cognitive Impairment Interventions:  · Patient declines any further evaluation/treatment for cognitive impairment         General Health and ACP:  General  In general, how would you say your health is?: Good  In the past 7 days, have you experienced any of the following?  New or Increased Pain, New or Increased Fatigue, Loneliness, Social Isolation, Stress or Anger?: None of These  Do you get the social and emotional support that you need?: (!) No  Do you have a Living Will?: Yes  Advance Directives     [facility] Will ACP-Advance Directive ACP-Power of     Not on File Not on File Not on File Not on File      General Health Risk Interventions:  · Inadequate social/emotional support: patient declines any further intervention for this issue    Health Habits/Nutrition:  Health Habits/Nutrition  Do you exercise for at least 20 minutes 2-3 times per week?: Yes  Have you lost any weight without trying in the past 3 months?: (!) Yes  Do you eat fewer than 2 meals per day?: No  Have you seen a dentist within the past year?: (!) No  Body mass index: (!) 28.07  Health Habits/Nutrition Interventions:  · Inadequate physical activity:  educational materials provided to promote increased physical activity  · Nutritional issues:  educational materials for healthy, well-balanced diet provided  · Dental exam overdue:  patient encouraged to make appointment with his/her dentist    Hearing/Vision:  No exam data present  Hearing/Vision  Do you or your family notice any trouble with your hearing?: (!) Yes  Do you have difficulty driving, watching TV, or doing any of your daily activities because of your eyesight?: (!) Yes  Have you had an eye exam within the past year?: (!) No  Hearing/Vision Interventions:  · Hearing concerns:  patient declines any further evaluation/treatment for hearing issues  · Vision concerns:  patient encouraged to make appointment with his/her eye specialist      Personalized Preventive Plan   Current Health Maintenance Status  Immunization History Administered Date(s) Administered    Influenza Whole 12/08/2015    Influenza, High Dose (Fluzone 65 yrs and older) 12/09/2017    Influenza, Quadv, IM, (6 mo and older Fluzone, Flulaval, Fluarix and 3 yrs and older Afluria) 01/19/2021    Influenza, Quadv, IM, PF (6 mo and older Fluzone, Flulaval, Fluarix, and 3 yrs and older Afluria) 10/11/2016, 10/15/2018, 01/09/2020    Pneumococcal Conjugate 13-valent ([id number]) 01/07/2016    Pneumococcal Polysaccharide ([id number]) 06/07/2017, 12/09/2017        Health Maintenance   Topic Date Due    Annual Wellness Visit (AWV)  10/30/2020    Diabetic retinal exam  01/28/2021 (Originally 6/15/2018)    DTaP/Tdap/Td vaccine (1 - Tdap) 01/28/2021 (Originally 1/9/1969)    Hepatitis C screen  01/28/2021 (Originally 1950)    Shingles Vaccine (1 of 2) 10/08/2021 (Originally 1/9/2000)    Diabetic foot exam  07/27/2021    Lipid screen  08/05/2021    A1C test (Diabetic or Prediabetic)  01/19/2022    Potassium monitoring  01/25/2022    Creatinine monitoring  01/25/2022    Breast cancer screen  02/26/2022    Colon cancer screen colonoscopy  03/23/2025    DEXA (modify frequency per FRAX score)  Completed    Flu vaccine  Completed    Pneumococcal 65+ years Vaccine  Completed    Hepatitis A vaccine  Aged Out    Hib vaccine  Aged Out    Meningococcal (ACWY) vaccine  Aged Out     Recommendations for [facility] Due: see orders and patient instructions/AVS.  . Recommended screening schedule for the next 5-10 years is provided to the patient in written form: see Patient Instructions/AVS.    Victorina Deshpande was seen today for medicare awv.     Diagnoses and all orders for this visit:    Routine general medical examination at a health care facility

## 2021-02-22 ENCOUNTER — HOSPITAL ENCOUNTER (OUTPATIENT)
Age: 71
Discharge: HOME | End: 2021-02-22
Payer: MEDICARE

## 2021-02-22 DIAGNOSIS — N18.30 STAGE 3 CHRONIC KIDNEY DISEASE, UNSPECIFIED WHETHER STAGE 3A OR 3B CKD (HCC): ICD-10-CM

## 2021-02-22 LAB
ANION GAP SERPL CALCULATED.3IONS-SCNC: 9 MMOL/L (ref 9–17)
BUN BLDV-MCNC: 16 MG/DL (ref 8–23)
BUN/CREAT BLD: 14 (ref 9–20)
CALCIUM SERPL-MCNC: 8.8 MG/DL (ref 8.6–10.4)
CHLORIDE BLD-SCNC: 104 MMOL/L (ref 98–107)
CO2: 27 MMOL/L (ref 20–31)
CREAT SERPL-MCNC: 1.18 MG/DL (ref 0.5–0.9)
CREATININE URINE: 184.5 MG/DL (ref 28–217)
GFR AFRICAN AMERICAN: 55 ML/MIN
GFR NON-AFRICAN AMERICAN: 45 ML/MIN
GFR SERPL CREATININE-BSD FRML MDRD: ABNORMAL ML/MIN/{1.73_M2}
GFR SERPL CREATININE-BSD FRML MDRD: ABNORMAL ML/MIN/{1.73_M2}
GLUCOSE BLD-MCNC: 95 MG/DL (ref 70–99)
PHOSPHORUS: 3.7 MG/DL (ref 2.6–4.5)
POTASSIUM SERPL-SCNC: 4.4 MMOL/L (ref 3.7–5.3)
SODIUM BLD-SCNC: 140 MMOL/L (ref 135–144)
TOTAL PROTEIN, URINE: 12 MG/DL
VITAMIN D 25-HYDROXY: 21.2 NG/ML (ref 30–100)

## 2021-02-22 PROCEDURE — 84100 ASSAY OF PHOSPHORUS: CPT

## 2021-02-22 PROCEDURE — 36415 COLL VENOUS BLD VENIPUNCTURE: CPT

## 2021-02-22 PROCEDURE — 83970 ASSAY OF PARATHORMONE: CPT

## 2021-02-22 PROCEDURE — 82330 ASSAY OF CALCIUM: CPT

## 2021-02-22 PROCEDURE — 84156 ASSAY OF PROTEIN URINE: CPT

## 2021-02-22 PROCEDURE — 82570 ASSAY OF URINE CREATININE: CPT

## 2021-02-22 PROCEDURE — 82306 VITAMIN D 25 HYDROXY: CPT

## 2021-02-22 PROCEDURE — 80048 BASIC METABOLIC PNL TOTAL CA: CPT

## 2021-02-23 LAB
CALCIUM IONIZED: 1.25 MMOL/L (ref 1.13–1.33)
PTH INTACT: 118.8 PG/ML (ref 15–65)

## 2021-04-12 DIAGNOSIS — D50.9 IRON DEFICIENCY ANEMIA, UNSPECIFIED IRON DEFICIENCY ANEMIA TYPE: ICD-10-CM

## 2021-04-12 DIAGNOSIS — E87.6 HYPOKALEMIA: ICD-10-CM

## 2021-04-12 DIAGNOSIS — E78.2 MIXED HYPERLIPIDEMIA: ICD-10-CM

## 2021-04-12 DIAGNOSIS — I10 ESSENTIAL HYPERTENSION: ICD-10-CM

## 2021-04-12 DIAGNOSIS — E11.9 TYPE 2 DIABETES MELLITUS WITHOUT COMPLICATION (HCC): ICD-10-CM

## 2021-04-12 RX ORDER — SPIRONOLACTONE 25 MG/1
25 TABLET ORAL DAILY
Qty: 90 TABLET | Refills: 3 | Status: SHIPPED | OUTPATIENT
Start: 2021-04-12 | End: 2021-08-23

## 2021-04-12 RX ORDER — AMLODIPINE BESYLATE 10 MG/1
10 TABLET ORAL DAILY
Qty: 90 TABLET | Refills: 3 | Status: SHIPPED | OUTPATIENT
Start: 2021-04-12 | End: 2021-11-11 | Stop reason: ALTCHOICE

## 2021-04-12 RX ORDER — ASCORBIC ACID 500 MG
500 TABLET ORAL 2 TIMES DAILY
Qty: 180 TABLET | Refills: 3 | Status: SHIPPED | OUTPATIENT
Start: 2021-04-12

## 2021-04-12 RX ORDER — ATORVASTATIN CALCIUM 40 MG/1
40 TABLET, FILM COATED ORAL DAILY
Qty: 90 TABLET | Refills: 3 | Status: SHIPPED | OUTPATIENT
Start: 2021-04-12 | End: 2022-01-11 | Stop reason: SDUPTHER

## 2021-04-12 RX ORDER — HYDRALAZINE HYDROCHLORIDE 50 MG/1
50 TABLET, FILM COATED ORAL 3 TIMES DAILY
Qty: 270 TABLET | Refills: 3 | Status: SHIPPED | OUTPATIENT
Start: 2021-04-12 | End: 2022-01-11 | Stop reason: SDUPTHER

## 2021-04-12 RX ORDER — ATENOLOL 100 MG/1
100 TABLET ORAL DAILY
Qty: 90 TABLET | Refills: 3 | Status: SHIPPED | OUTPATIENT
Start: 2021-04-12 | End: 2022-01-11 | Stop reason: SDUPTHER

## 2021-04-12 RX ORDER — B-COMPLEX WITH VITAMIN C
1 TABLET ORAL 2 TIMES DAILY
Qty: 180 TABLET | Refills: 3 | Status: SHIPPED | OUTPATIENT
Start: 2021-04-12

## 2021-04-12 RX ORDER — VALSARTAN 320 MG/1
320 TABLET ORAL DAILY
Qty: 90 TABLET | Refills: 3 | Status: SHIPPED | OUTPATIENT
Start: 2021-04-12 | End: 2022-01-11 | Stop reason: SDUPTHER

## 2021-04-12 RX ORDER — POTASSIUM CHLORIDE 20 MEQ/1
20 TABLET, EXTENDED RELEASE ORAL 2 TIMES DAILY
Qty: 180 TABLET | Refills: 3 | Status: SHIPPED | OUTPATIENT
Start: 2021-04-12 | End: 2022-06-21

## 2021-05-24 ENCOUNTER — HOSPITAL ENCOUNTER (OUTPATIENT)
Age: 71
Discharge: HOME | End: 2021-05-24
Payer: MEDICARE

## 2021-05-24 DIAGNOSIS — N18.30 STAGE 3 CHRONIC KIDNEY DISEASE, UNSPECIFIED WHETHER STAGE 3A OR 3B CKD (HCC): ICD-10-CM

## 2021-05-24 LAB
ANION GAP SERPL CALCULATED.3IONS-SCNC: 6 MMOL/L (ref 9–17)
BUN BLDV-MCNC: 11 MG/DL (ref 8–23)
BUN/CREAT BLD: 10 (ref 9–20)
CALCIUM SERPL-MCNC: 9.5 MG/DL (ref 8.6–10.4)
CHLORIDE BLD-SCNC: 106 MMOL/L (ref 98–107)
CO2: 28 MMOL/L (ref 20–31)
CREAT SERPL-MCNC: 1.12 MG/DL (ref 0.5–0.9)
CREATININE URINE: 292.4 MG/DL (ref 28–217)
GFR AFRICAN AMERICAN: 58 ML/MIN
GFR NON-AFRICAN AMERICAN: 48 ML/MIN
GFR SERPL CREATININE-BSD FRML MDRD: ABNORMAL ML/MIN/{1.73_M2}
GFR SERPL CREATININE-BSD FRML MDRD: ABNORMAL ML/MIN/{1.73_M2}
GLUCOSE BLD-MCNC: 89 MG/DL (ref 70–99)
POTASSIUM SERPL-SCNC: 4.3 MMOL/L (ref 3.7–5.3)
SODIUM BLD-SCNC: 140 MMOL/L (ref 135–144)
TOTAL PROTEIN, URINE: 20 MG/DL

## 2021-05-24 PROCEDURE — 80048 BASIC METABOLIC PNL TOTAL CA: CPT

## 2021-05-24 PROCEDURE — 84156 ASSAY OF PROTEIN URINE: CPT

## 2021-05-24 PROCEDURE — 36415 COLL VENOUS BLD VENIPUNCTURE: CPT

## 2021-05-24 PROCEDURE — 82570 ASSAY OF URINE CREATININE: CPT

## 2021-06-21 NOTE — TELEPHONE ENCOUNTER
Health Maintenance   Topic Date Due    Flu vaccine (1) 09/01/2020    Annual Wellness Visit (AWV)  10/30/2020    Diabetic retinal exam  01/28/2021 (Originally 6/15/2018)    DTaP/Tdap/Td vaccine (1 - Tdap) 01/28/2021 (Originally 1/9/1969)    Hepatitis C screen  01/28/2021 (Originally 1950)    Shingles Vaccine (1 of 2) 10/08/2021 (Originally 1/9/2000)    Diabetic foot exam  07/27/2021    A1C test (Diabetic or Prediabetic)  08/05/2021    Lipid screen  08/05/2021    Potassium monitoring  10/26/2021    Creatinine monitoring  10/26/2021    Breast cancer screen  02/26/2022    Colon cancer screen colonoscopy  03/23/2025    DEXA (modify frequency per FRAX score)  Completed    Pneumococcal 65+ years Vaccine  Completed    Hepatitis A vaccine  Aged Out    Hib vaccine  Aged Out    Meningococcal (ACWY) vaccine  Aged Out             (applicable per patient's age: Cancer Screenings, Depression Screening, Fall Risk Screening, Immunizations)    Hemoglobin A1C (%)   Date Value   08/05/2020 5.8   03/31/2020 5.8   09/24/2019 5.6     Microalb/Crt.  Ratio (mcg/mg creat)   Date Value   08/05/2020 30 (H)     LDL Cholesterol (mg/dL)   Date Value   08/05/2020 97     AST (U/L)   Date Value   10/26/2020 12     ALT (U/L)   Date Value   10/26/2020 7     BUN (mg/dL)   Date Value   10/26/2020 9      (goal A1C is < 7)   (goal LDL is <100) need 30-50% reduction from baseline     BP Readings from Last 3 Encounters:   10/30/20 (!) 158/70   10/28/20 126/72   10/26/20 (!) 180/68    (goal /80)      All Future Testing planned in [facility]:  Lab Frequency Next Occurrence   Basic Metabolic Panel Once 18/17/9744       Next Visit Date:  Future Appointments   Date Time Provider Tara Mtz   12/14/2020 12:00 PM Delmi Moss MD [facility] Tiff None   1/27/2021  2:00 PM Rohit Rios MD TIFF CARD [facility]   1/27/2021  3:20 PM Debbrah Kayser Might, APRN - CNP Tiff Prim Ca [facility]            Patient Active Problem List:     Hypokalemia     Type 2 [] [facility] [address]  [address]  [phone]  F: [phone] [x] [address]  [facility] [address]   [address]  P: [phone]  F: [phone] [] [address]  Therapy  [address]  P: [phone]  F: [phone] [] [address]  P: [phone]  F: [phone] [] [address]  [facility]   [address]   [place]: [phone]  F: [phone]      Physical Therapy Daily Treatment Note    Date:  2021  Patient Name:  Jackson Officer    :  1964  MRN: [id number]  Physician: Carlos Jennings MD                       Insurance: [address] [place] [address]( 14 visits  - 2021)  Medical Diagnosis: acute pain in the right shoulder                          Rehab Codes: M25.511, M25.611, M54.2, M79.602, R20.2  Onset Date: ~April 15, 2021               Next 's appt: 2021  Visit# / total visits: ; STGs at visit 7     Cancels/No Shows: 1/0      Subjective:    Pain:  [] Yes  [x] No Location: N/A  Pain Rating: (0-10 scale) 0/10 R shoulder/neck  Pain altered Tx:  [x] No  [] Yes  Action:    Comments: Patient arrives with minimal pain present in shoulder. No other complaints this date. She denies soreness after last PT session. Cervical spine X-ray results from 21 posted below. Objective:    Impression   No acute abnormality cervical spine.  Straightening with mild multilevel   degenerative changes and evidence mild DDD, as above.        Modalities: IFC stim to right scapula with heat supine (neck and scapula) to address pain in right arm and muscular guarding and tightness- HELD 21   Precautions:  Exercises:  Exercise Reps/ Time Weight/ Level Comments   UBE 2,2 L2.5          SUPINE      Cervical PROM with SOR, and diabetes mellitus with diabetic nephropathy, without long-term current use of insulin (HCC)     Hyperlipidemia     Bilateral leg edema     Non compliance w medication regimen     Gastroesophageal reflux disease     Mild intermittent asthma without complication     Postmenopausal     Osteopenia determined by x-ray     Chronic midline low back pain without sciatica     Calcaneal spur of left foot     Iron deficiency anemia     Recurrent UTI     ACS (acute coronary syndrome) (HCC)     Abnormal cardiovascular stress test     CKD (chronic kidney disease), stage II     Resistant hypertension manual traction    10 min total; PRN   Chin tucks  10x5\"     Self mobs over ball   Rotation, flex/ext          Rhythmic stabs  3x20\"  R UE only   Supine ABC's x1 2#          Shoulder       Flexion        H abduction       Protraction             Side Lying    On L working R    Open books  15x A    Abduction   15x 1# Added weight 6/16   ER  15x 1#    H abduction  15x 1#          Prone    Added 6/11/21; weight increased 6/21/21   Shoulder extension 15x 1#    Horizontal abd 15x 2#    scaption  15x 2#    rows 15x 2#          SEATED      Cervical retraction  15x5\"     scap retraction  15x5\"     UT stretch  3x30\"ea     Levator stretch  3x20\"ea     Cervical AROM  5x5\"ea  Side bending, rotation , flex/ext          STANDING       Don't shoots  10x 2-3\"ea           T-band    Increased reps 6/14   Ext  20x Lime     Retraction  20x Lime     ER  20x Lime  Standing with back at wall   H. abd  15x Lime           Shoulder flexion 15x A Added 6/14   Shoulder abduction  15x A    Shoulder scaption  15x     Wall push up plus  2x10     Serratus slides   15x           Wall Clocks  2x5 Lime  In Wall-push up position   1,3,5 o'clock directions          Other:       Treatment Charges: Mins Units   []  Modalities     [x]  Ther Exercise 40 3   [x]  Manual Therapy     []  Ther Activities     []  Aquatics     []  Vasocompression     []  Other     Total Treatment time 40 3       Assessment: [x] Progressing toward goals. Initiated session on UBE followed by seated stretches and mat ROM and strengthening. Increased resistance to prone exercises to further progress strengthening. Held PROM this date secondary to pain free AROM. Introduced multiple exercises to address scapular stabilization strength with patient reporting fatigue, but no pain. Patient provided with education on posture throughout session and during cervical AROM to improve mobility. No complaints of pain throughout session will continue to progress strengthening as pt tolerates. treatments: Monitor symptoms, continue manual work (may not tolerate mechanical traction yet)  Educate on fall prevention , handout      Time In: 11:25 am   Time Out: 12:05 pm    Electronically signed by:  Neel Ellington PT

## 2021-07-14 ENCOUNTER — TELEPHONE (OUTPATIENT)
Dept: PEDIATRICS CLINIC | Age: 71
End: 2021-07-14

## 2021-07-26 ENCOUNTER — TELEPHONE (OUTPATIENT)
Dept: PRIMARY CARE CLINIC | Age: 71
End: 2021-07-26

## 2021-07-26 NOTE — TELEPHONE ENCOUNTER
Called and spoke with patient and reminded her to have her labs done that Tj Sanchez had ordered. States she will go out tomorrow to have done.

## 2021-07-27 ENCOUNTER — HOSPITAL ENCOUNTER (OUTPATIENT)
Age: 71
Discharge: HOME | End: 2021-07-27
Payer: MEDICARE

## 2021-07-27 DIAGNOSIS — E11.21 TYPE 2 DIABETES MELLITUS WITH DIABETIC NEPHROPATHY, WITHOUT LONG-TERM CURRENT USE OF INSULIN (HCC): ICD-10-CM

## 2021-07-27 LAB
ABSOLUTE EOS #: 0.09 K/UL (ref 0–0.44)
ABSOLUTE IMMATURE GRANULOCYTE: <0.03 K/UL (ref 0–0.3)
ABSOLUTE LYMPH #: 1.86 K/UL (ref 1.1–3.7)
ABSOLUTE MONO #: 0.47 K/UL (ref 0.1–1.2)
ALT SERPL-CCNC: <5 U/L (ref 5–33)
ANION GAP SERPL CALCULATED.3IONS-SCNC: 10 MMOL/L (ref 9–17)
AST SERPL-CCNC: 6 U/L
BASOPHILS # BLD: 1 % (ref 0–2)
BASOPHILS ABSOLUTE: 0.04 K/UL (ref 0–0.2)
BUN BLDV-MCNC: 18 MG/DL (ref 8–23)
BUN/CREAT BLD: 13 (ref 9–20)
CALCIUM SERPL-MCNC: 9.7 MG/DL (ref 8.6–10.4)
CHLORIDE BLD-SCNC: 107 MMOL/L (ref 98–107)
CHOLESTEROL/HDL RATIO: 2.6
CHOLESTEROL: 138 MG/DL
CO2: 21 MMOL/L (ref 20–31)
CREAT SERPL-MCNC: 1.43 MG/DL (ref 0.5–0.9)
DIFFERENTIAL TYPE: ABNORMAL
EOSINOPHILS RELATIVE PERCENT: 2 % (ref 1–4)
ESTIMATED AVERAGE GLUCOSE: 114 MG/DL
GFR AFRICAN AMERICAN: 44 ML/MIN
GFR NON-AFRICAN AMERICAN: 36 ML/MIN
GFR SERPL CREATININE-BSD FRML MDRD: ABNORMAL ML/MIN/{1.73_M2}
GFR SERPL CREATININE-BSD FRML MDRD: ABNORMAL ML/MIN/{1.73_M2}
GLUCOSE BLD-MCNC: 91 MG/DL (ref 70–99)
HBA1C MFR BLD: 5.6 % (ref 4–6)
HCT VFR BLD CALC: 35.6 % (ref 36.3–47.1)
HDLC SERPL-MCNC: 53 MG/DL
HEMOGLOBIN: 11.2 G/DL (ref 11.9–15.1)
IMMATURE GRANULOCYTES: 0 %
LDL CHOLESTEROL: 74 MG/DL (ref 0–130)
LYMPHOCYTES # BLD: 35 % (ref 24–43)
MCH RBC QN AUTO: 30.4 PG (ref 25.2–33.5)
MCHC RBC AUTO-ENTMCNC: 31.5 G/DL (ref 28.4–34.8)
MCV RBC AUTO: 96.5 FL (ref 82.6–102.9)
MONOCYTES # BLD: 9 % (ref 3–12)
NRBC AUTOMATED: 0 PER 100 WBC
PDW BLD-RTO: 12.2 % (ref 11.8–14.4)
PLATELET # BLD: 341 K/UL (ref 138–453)
PLATELET ESTIMATE: ABNORMAL
PMV BLD AUTO: 8.7 FL (ref 8.1–13.5)
POTASSIUM SERPL-SCNC: 5.6 MMOL/L (ref 3.7–5.3)
RBC # BLD: 3.69 M/UL (ref 3.95–5.11)
RBC # BLD: ABNORMAL 10*6/UL
SEG NEUTROPHILS: 53 % (ref 36–65)
SEGMENTED NEUTROPHILS ABSOLUTE COUNT: 2.86 K/UL (ref 1.5–8.1)
SODIUM BLD-SCNC: 138 MMOL/L (ref 135–144)
TRIGL SERPL-MCNC: 55 MG/DL
VLDLC SERPL CALC-MCNC: NORMAL MG/DL (ref 1–30)
WBC # BLD: 5.3 K/UL (ref 3.5–11.3)
WBC # BLD: ABNORMAL 10*3/UL

## 2021-07-27 PROCEDURE — 84460 ALANINE AMINO (ALT) (SGPT): CPT

## 2021-07-27 PROCEDURE — 80061 LIPID PANEL: CPT

## 2021-07-27 PROCEDURE — 84450 TRANSFERASE (AST) (SGOT): CPT

## 2021-07-27 PROCEDURE — 83036 HEMOGLOBIN GLYCOSYLATED A1C: CPT

## 2021-07-27 PROCEDURE — 85025 COMPLETE CBC W/AUTO DIFF WBC: CPT

## 2021-07-27 PROCEDURE — 36415 COLL VENOUS BLD VENIPUNCTURE: CPT

## 2021-07-27 PROCEDURE — 80048 BASIC METABOLIC PNL TOTAL CA: CPT

## 2021-07-28 ENCOUNTER — HOSPITAL ENCOUNTER (OUTPATIENT)
Age: 71
Setting detail: SPECIMEN
Discharge: HOME | End: 2021-07-28
Payer: MEDICARE

## 2021-07-28 ENCOUNTER — TELEPHONE (OUTPATIENT)
Dept: PRIMARY CARE CLINIC | Age: 71
End: 2021-07-28

## 2021-07-28 DIAGNOSIS — E11.21 TYPE 2 DIABETES MELLITUS WITH DIABETIC NEPHROPATHY, WITHOUT LONG-TERM CURRENT USE OF INSULIN (HCC): ICD-10-CM

## 2021-07-28 LAB
CREATININE URINE: 134.5 MG/DL (ref 28–217)
MICROALBUMIN/CREAT 24H UR: 12 MG/L
MICROALBUMIN/CREAT UR-RTO: 9 MCG/MG CREAT

## 2021-07-28 PROCEDURE — 82043 UR ALBUMIN QUANTITATIVE: CPT

## 2021-07-28 PROCEDURE — 82570 ASSAY OF URINE CREATININE: CPT

## 2021-07-28 NOTE — TELEPHONE ENCOUNTER
Contacted patient in regards to lab results. Patient verbalized understanding. Contacted nephrology office and patient is scheduled for 8/16 @ 1:15 pm. Patient notified.

## 2021-07-28 NOTE — TELEPHONE ENCOUNTER
----- Message from [address] [place] [address], APRN - CNP sent at 7/28/2021  7:26 AM EDT -----  Kidney function is a little worse. Please make sure she has an appointment with nephrology ASAP. It looks like she missed her follow-up. Thank you.

## 2021-08-23 ENCOUNTER — OFFICE VISIT (OUTPATIENT)
Dept: CARDIOLOGY | Age: 71
End: 2021-08-23
Payer: MEDICARE

## 2021-08-23 VITALS
DIASTOLIC BLOOD PRESSURE: 64 MMHG | HEIGHT: 64 IN | HEART RATE: 57 BPM | BODY MASS INDEX: 29.74 KG/M2 | RESPIRATION RATE: 18 BRPM | SYSTOLIC BLOOD PRESSURE: 187 MMHG | WEIGHT: 174.2 LBS

## 2021-08-23 DIAGNOSIS — E87.5 HYPERKALEMIA: ICD-10-CM

## 2021-08-23 DIAGNOSIS — I10 RESISTANT HYPERTENSION: Primary | ICD-10-CM

## 2021-08-23 DIAGNOSIS — I25.10 MILD CAD: ICD-10-CM

## 2021-08-23 DIAGNOSIS — E78.2 MIXED HYPERLIPIDEMIA: ICD-10-CM

## 2021-08-23 PROCEDURE — 1036F TOBACCO NON-USER: CPT | Performed by: FAMILY MEDICINE

## 2021-08-23 PROCEDURE — G8417 CALC BMI ABV UP PARAM F/U: HCPCS | Performed by: FAMILY MEDICINE

## 2021-08-23 PROCEDURE — 4040F PNEUMOC VAC/ADMIN/RCVD: CPT | Performed by: FAMILY MEDICINE

## 2021-08-23 PROCEDURE — 3017F COLORECTAL CA SCREEN DOC REV: CPT | Performed by: FAMILY MEDICINE

## 2021-08-23 PROCEDURE — 99211 OFF/OP EST MAY X REQ PHY/QHP: CPT | Performed by: FAMILY MEDICINE

## 2021-08-23 PROCEDURE — G8399 PT W/DXA RESULTS DOCUMENT: HCPCS | Performed by: FAMILY MEDICINE

## 2021-08-23 PROCEDURE — G8427 DOCREV CUR MEDS BY ELIG CLIN: HCPCS | Performed by: FAMILY MEDICINE

## 2021-08-23 PROCEDURE — 99214 OFFICE O/P EST MOD 30 MIN: CPT | Performed by: FAMILY MEDICINE

## 2021-08-23 PROCEDURE — 1090F PRES/ABSN URINE INCON ASSESS: CPT | Performed by: FAMILY MEDICINE

## 2021-08-23 PROCEDURE — 1123F ACP DISCUSS/DSCN MKR DOCD: CPT | Performed by: FAMILY MEDICINE

## 2021-08-23 RX ORDER — SPIRONOLACTONE AND HYDROCHLOROTHIAZIDE 25; 25 MG/1; MG/1
1 TABLET ORAL DAILY
Qty: 30 TABLET | Refills: 0 | Status: SHIPPED | OUTPATIENT
Start: 2021-08-23 | End: 2021-10-21 | Stop reason: SDUPTHER

## 2021-08-23 NOTE — PROGRESS NOTES
Get Iyer am scribing for and in the presence of Salena Angulo. Rachell LAKE, MS, F.A.C.C. Patient: Emil Benítez  : 1950  Date of Visit: 2021    REASON FOR VISIT / CONSULTATION: Follow-up (Hx: CAD, HTN, HLD. pt is here for 6 month f/u she states she is doing well occasional SOB with steps  Denies:CP,lightheaded/dizziness,palp)    History of Present Illness:        Dear Lanice Bence, APRN - CNP    I had the pleasure of seeing your patient Emil Benítez as part of a pre-operative cardiac evaluation today. Heart catheterization done on 2019 that showed mild to moderate single vessel disease involving the circumflex coronary artery. Normal left ventricular end diastolic pressure. (LVEDP). Ms. Rayray Diallo is here today for six week follow up. She reports doing well at this time cardiac wise. She does have some shortness of breath with walking steps. She denies having any current or recent chest pain, shortness of breath, lightheaded/dizziness or palpitations. She denies any increased lower extremity edema, abdominal pain, bleeding problems, problems with her medications or any other concerns at this time. PAST MEDICAL HISTORY:        Past Medical History:   Diagnosis Date    Arthritis     Asthma     Chronic bronchitis ([address]     Diabetes mellitus ([address]     GERD (gastroesophageal reflux disease)     H/O echocardiogram 3/9/16    EF >60%. LV wall thickness is mildly increased. Mild mitral and tricuspid regurgitation. Borderline pulmonary hypertension estimated right ventricular sysolic pressure of 36TUEI. Mild (grade l) diastolic dysfunction.  History of PFTs 3/10/16     Suboptimal effort. Restrictive in nature. clionical correlations is advised.  History of stress test 16    Abnoraml. Moderate perfusion defect of mild to moderate intensity in the anterolateral and anteroapical regions during stress imaging. Intermediate risk for CAD.     Hyperlipidemia     Hypertension CURRENT ALLERGIES: Iv dye [iodides] REVIEW OF SYSTEMS: 14 systems were reviewed. Pertinent positives and negatives as above, all else negative.      Past Surgical History:   Procedure Laterality Date    BREAST SURGERY      cyst removed    CARDIAC CATHETERIZATION      CARDIAC CATHETERIZATION Left 2019    [facility]/     SECTION      CHOLECYSTECTOMY      COLONOSCOPY  3/23/15    -diverticulosis,hemorrhoids    FOOT SURGERY      rt    HYSTERECTOMY      Social History:  Social History     Tobacco Use    Smoking status: Never Smoker    Smokeless tobacco: Never Used   Vaping Use    Vaping Use: Never used   Substance Use Topics    Alcohol use: No     Alcohol/week: 0.0 standard drinks    Drug use: No        CURRENT MEDICATIONS:        Outpatient Medications Marked as Taking for the 21 encounter (Office Visit) with Shelia Redding MD   Medication Sig Dispense Refill    amLODIPine (NORVASC) 10 MG tablet Take 1 tablet by mouth daily 90 tablet 3    ascorbic acid (VITAMIN C) 500 MG tablet Take 1 tablet by mouth 2 times daily With iron tablet 180 tablet 3    atenolol (TENORMIN) 100 MG tablet Take 1 tablet by mouth daily 90 tablet 3    atorvastatin (LIPITOR) 40 MG tablet Take 1 tablet by mouth daily 90 tablet 3    Calcium Carbonate-Vitamin D (OYSTER SHELL CALCIUM/D) 500-200 MG-UNIT TABS Take 1 tablet by mouth 2 times daily 180 tablet 3    hydrALAZINE (APRESOLINE) 50 MG tablet Take 1 tablet by mouth 3 times daily 270 tablet 3    metFORMIN (GLUCOPHAGE) 500 MG tablet Take 1 tablet by mouth 2 times daily (with meals) 180 tablet 3    valsartan (DIOVAN) 320 MG tablet Take 1 tablet by mouth daily 90 tablet 3    potassium chloride (KLOR-CON M) 20 MEQ extended release tablet Take 1 tablet by mouth 2 times daily 180 tablet 3    spironolactone (ALDACTONE) 25 MG tablet Take 1 tablet by mouth daily 90 tablet 3    albuterol sulfate HFA (VENTOLIN HFA) 108 (90 Base) MCG/ACT inhaler Inhale 2 puffs into the lungs 4 times daily as needed for Wheezing 1 Inhaler 0    cetirizine (ZYRTEC ALLERGY) 10 MG tablet Take 1 tablet by mouth daily 30 tablet 3    dorzolamide-timolol (COSOPT) 22.3-6.8 MG/ML ophthalmic solution Place 1 drop into both eyes 2 times daily       latanoprost (XALATAN) 0.005 % ophthalmic solution Place 1 drop into both eyes nightly       blood glucose test strips (ONE TOUCH ULTRA TEST) strip USE 1 STRIP TO CHECK GLUCOSE DAILY 100 strip 3    ONE TOUCH LANCETS MISC Lancets- One Touch Delica for testing daily and as needed. DX: Diabetes type  E11.9 100 each 3    Blood Glucose Monitoring Suppl GENNY 1 Units by Does not apply route 3 times daily What insurance will cover 1 Device 0       FAMILY HISTORY: family history includes Diabetes in her brother and sister; High Blood Pressure in her sister; Stroke in her brother, father, and sister. Physical Examination:     BP (!) 162/67 (Site: Left Upper Arm, Position: Sitting, Cuff Size: Medium Adult)   Pulse 60   Resp 18   Ht 5' 4\" (1.626 m)   Wt 174 lb 3.2 oz (79 kg)   BMI 29.90 kg/m²  Body mass index is 29.9 kg/m². Constitutional: She is oriented to person, place, and time. She appears well-developed and well-nourished. In no acute distress. HEENT: Normocephalic and atraumatic. No JVD present. Carotid bruit is not present. No mass and no thyromegaly present. No lymphadenopathy present. Cardiovascular: Normal rate, regular rhythm, normal heart sounds. Exam reveals no gallop and no friction rubs. 2/6 systolic murmur, 5th intercostal space on the LEFT in the mid-clavicular line (cardiac apex). Pulmonary/Chest: Effort normal and breath sounds normal. No respiratory distress. She has no wheezes, rhonchi or rales. Abdominal: Soft, non-tender. Bowel sounds and aorta are normal. She exhibits no organomegaly, mass or bruit. Extremities: Trace. No cyanosis or clubbing. 2+ radial and carotid pulses.  Distal extremity pulses: 2+ bilaterally. Neurological: She is alert and oriented to person, place, and time. No evidence of gross cranial nerve deficit. Coordination appeared normal.   Skin: Skin is warm and dry. There is no rash or diaphoresis. Psychiatric: She has a normal mood and affect. Her speech is normal and behavior is normal.      MOST RECENT LABS ON RECORD:   Lab Results   Component Value Date    WBC 5.3 07/27/2021    HGB 11.2 (L) 07/27/2021    HCT 35.6 (L) 07/27/2021     07/27/2021    CHOL 138 07/27/2021    TRIG 55 07/27/2021    HDL 53 07/27/2021    ALT <5 (L) 07/27/2021    AST 6 07/27/2021     07/27/2021    K 5.6 (H) 07/27/2021     07/27/2021    CREATININE 1.43 (H) 07/27/2021    BUN 18 07/27/2021    CO2 21 07/27/2021    TSH 2.25 10/22/2020    INR 0.9 04/10/2016    LABA1C 5.6 07/27/2021    LABMICR 9 07/28/2021       ASSESSMENT:     1. Resistant hypertension    2. Mild CAD    3. Mixed hyperlipidemia    4. Hyperkalemia       PLAN:        · Mild Coronary Artery Disease: Appears stable with no active signs of angina   · Beta Blocker: Not indicated. Normal EF. · Anti-anginal medications: Continue amlodipine (Norvasc) 10 mg once daily. · Cholesterol Reduction Therapy: Continue Atorvastatin (Lipitor) 40 mg     · Additional counseling: I advised them to call our office or go to the emergency room if they developed worsening or persistent chest pain or increased shortness of breath as this could be life threatening.     Resistant Hypertension, possibly secondary hypertension: Uncontroled at this time but better. Average 130-150 at home. Recent Hyperkalemia likely related to aldactone   · Beta Blocker: Continue Atenolol (Tenormin) 100 mg daily. · ACE Inibitor/ARB: Continue valsartan (Diovan) 320 mg daily.    · Diuretics: STOP Spironolactone (Aldactone) and START Aldactozide 25/25 mg daily   · Start Aldactazide 25-25 mg daily   · Calcium Channel Blocker: Continue amlodipine (Norvasc) 10 mg once daily.     · Hyperlipidemia: Mixed, LDL done on 8/5/2020 was 97 mg/dL   · Cholesterol Reduction Therapy: Continue Atorvastatin (Lipitor) 40 mg daily. I also told Ms. Fay Mallory to continue her other medications and follow up with you as previously scheduled. FOLLOW UP:   I told Ms. Fay Mallory to call my office if she had any problems, but otherwise told her to Return in about 6 months (around 2/23/2022). However, I would be happy to see her sooner should the need arise. Once again, thank you for allowing me to participate in this patients care. Please do not hesitate to contact me could I be of further assistance. Sincerely,  Laury Hernandez. Rachell LAKE, MS, F.A.C.C. [facility]    [address] [place] [address]  Phone: [phone], Fax: [phone]     I believe that the risk of significant morbidity and mortality related to the patient's current medical conditions are: low-intermediate. The documentation recorded by the scribe, accurately and completely reflects the services I personally performed and the decisions made by me. Jonathan Mejia MD, MS, F.A.C.C.  August 23, 2021

## 2021-08-23 NOTE — PATIENT INSTRUCTIONS
SURVEY:    You may be receiving a survey from [employer] regarding your visit today. Please complete the survey to enable us to provide the highest quality of care to you and your family. If you cannot score us a very good on any question, please call the office to discuss how we could have made your experience a very good one. Thank you.

## 2021-09-07 ENCOUNTER — TELEPHONE (OUTPATIENT)
Dept: PRIMARY CARE CLINIC | Age: 71
End: 2021-09-07

## 2021-09-07 ENCOUNTER — HOSPITAL ENCOUNTER (OUTPATIENT)
Age: 71
Discharge: HOME | End: 2021-09-07
Payer: MEDICARE

## 2021-09-07 ENCOUNTER — HOSPITAL ENCOUNTER (OUTPATIENT)
Dept: NON INVASIVE DIAGNOSTICS | Age: 71
Discharge: HOME | End: 2021-09-07
Payer: MEDICARE

## 2021-09-07 DIAGNOSIS — N18.2 CKD (CHRONIC KIDNEY DISEASE), STAGE II: ICD-10-CM

## 2021-09-07 DIAGNOSIS — I10 RESISTANT HYPERTENSION: ICD-10-CM

## 2021-09-07 DIAGNOSIS — E78.2 MIXED HYPERLIPIDEMIA: ICD-10-CM

## 2021-09-07 DIAGNOSIS — I25.10 MILD CAD: ICD-10-CM

## 2021-09-07 LAB
ANION GAP SERPL CALCULATED.3IONS-SCNC: 9 MMOL/L (ref 9–17)
BUN BLDV-MCNC: 11 MG/DL (ref 8–23)
BUN/CREAT BLD: 9 (ref 9–20)
CALCIUM SERPL-MCNC: 9.5 MG/DL (ref 8.6–10.4)
CHLORIDE BLD-SCNC: 106 MMOL/L (ref 98–107)
CO2: 26 MMOL/L (ref 20–31)
CREAT SERPL-MCNC: 1.21 MG/DL (ref 0.5–0.9)
GFR AFRICAN AMERICAN: 53 ML/MIN
GFR NON-AFRICAN AMERICAN: 44 ML/MIN
GFR SERPL CREATININE-BSD FRML MDRD: ABNORMAL ML/MIN/{1.73_M2}
GFR SERPL CREATININE-BSD FRML MDRD: ABNORMAL ML/MIN/{1.73_M2}
GLUCOSE BLD-MCNC: 89 MG/DL (ref 70–99)
LV EF: 60 %
LVEF MODALITY: NORMAL
POTASSIUM SERPL-SCNC: 4.1 MMOL/L (ref 3.7–5.3)
SODIUM BLD-SCNC: 141 MMOL/L (ref 135–144)

## 2021-09-07 PROCEDURE — 36415 COLL VENOUS BLD VENIPUNCTURE: CPT

## 2021-09-07 PROCEDURE — 93306 TTE W/DOPPLER COMPLETE: CPT

## 2021-09-07 PROCEDURE — 80048 BASIC METABOLIC PNL TOTAL CA: CPT

## 2021-09-07 NOTE — TELEPHONE ENCOUNTER
Called patient and informed her that she needs to call her pharmacy to get refills that Yves Gongora sent in her medications in April with enough refills for 1 year. Verbalizes understanding.

## 2021-09-08 ENCOUNTER — OFFICE VISIT (OUTPATIENT)
Dept: PRIMARY CARE CLINIC | Age: 71
End: 2021-09-08
Payer: MEDICARE

## 2021-09-08 ENCOUNTER — TELEPHONE (OUTPATIENT)
Dept: CARDIOLOGY | Age: 71
End: 2021-09-08

## 2021-09-08 VITALS
HEIGHT: 64 IN | BODY MASS INDEX: 29.4 KG/M2 | WEIGHT: 172.2 LBS | HEART RATE: 59 BPM | DIASTOLIC BLOOD PRESSURE: 66 MMHG | SYSTOLIC BLOOD PRESSURE: 159 MMHG | OXYGEN SATURATION: 97 % | TEMPERATURE: 98.7 F | RESPIRATION RATE: 18 BRPM

## 2021-09-08 DIAGNOSIS — J45.21 MILD INTERMITTENT ASTHMA WITH EXACERBATION: Primary | ICD-10-CM

## 2021-09-08 PROCEDURE — 1123F ACP DISCUSS/DSCN MKR DOCD: CPT | Performed by: NURSE PRACTITIONER

## 2021-09-08 PROCEDURE — G8417 CALC BMI ABV UP PARAM F/U: HCPCS | Performed by: NURSE PRACTITIONER

## 2021-09-08 PROCEDURE — 3017F COLORECTAL CA SCREEN DOC REV: CPT | Performed by: NURSE PRACTITIONER

## 2021-09-08 PROCEDURE — G8399 PT W/DXA RESULTS DOCUMENT: HCPCS | Performed by: NURSE PRACTITIONER

## 2021-09-08 PROCEDURE — 1090F PRES/ABSN URINE INCON ASSESS: CPT | Performed by: NURSE PRACTITIONER

## 2021-09-08 PROCEDURE — G8427 DOCREV CUR MEDS BY ELIG CLIN: HCPCS | Performed by: NURSE PRACTITIONER

## 2021-09-08 PROCEDURE — 4040F PNEUMOC VAC/ADMIN/RCVD: CPT | Performed by: NURSE PRACTITIONER

## 2021-09-08 PROCEDURE — 99213 OFFICE O/P EST LOW 20 MIN: CPT | Performed by: NURSE PRACTITIONER

## 2021-09-08 PROCEDURE — 1036F TOBACCO NON-USER: CPT | Performed by: NURSE PRACTITIONER

## 2021-09-08 RX ORDER — GUAIFENESIN 600 MG/1
600 TABLET, EXTENDED RELEASE ORAL 2 TIMES DAILY
Qty: 30 TABLET | Refills: 0 | Status: SHIPPED | OUTPATIENT
Start: 2021-09-08 | End: 2021-09-23

## 2021-09-08 RX ORDER — PREDNISONE 20 MG/1
20 TABLET ORAL 2 TIMES DAILY
Qty: 10 TABLET | Refills: 0 | Status: SHIPPED | OUTPATIENT
Start: 2021-09-08 | End: 2021-09-13

## 2021-09-08 ASSESSMENT — ENCOUNTER SYMPTOMS
RHINORRHEA: 0
SHORTNESS OF BREATH: 0
COUGH: 1
WHEEZING: 0
EYES NEGATIVE: 1
SORE THROAT: 0
GASTROINTESTINAL NEGATIVE: 1

## 2021-09-08 NOTE — TELEPHONE ENCOUNTER
----- Message from Nasim Black MD sent at 9/7/2021 11:11 PM EDT -----  Let Ms. Karina Chavis know their test result was ok. Will discuss at next visit. Thanks.

## 2021-09-08 NOTE — PROGRESS NOTES
[facility] W[facility]  [address] [facility] [address] [facility]  Dept: [phone]  Dept Fax: [phone]    Jada Ward is a 70 y.o. female who presents to the [facility] in Care today for her medical conditions/complaints as noted below. Jada Ward is c/o of Cough (x 2 days. c/o productive cough. )      HPI:    Jada Ward is a 70 y.o. female who presents with  Cough  This is a new problem. Episode onset: 2 days. The problem has been gradually worsening. The cough is productive of sputum. Pertinent negatives include no ear pain, fever, nasal congestion, rhinorrhea, sore throat, shortness of breath or wheezing. She has tried nothing for the symptoms. Her past medical history is significant for asthma and environmental allergies. Past Medical History:   Diagnosis Date    Arthritis     Asthma     [address] [facility] [address]     Diabetes mellitus ([facility]     GERD (gastroesophageal reflux disease)     H/O echocardiogram 3/9/16    EF >60%. LV wall thickness is mildly increased. Mild mitral and tricuspid regurgitation. Borderline pulmonary hypertension estimated right ventricular sysolic pressure of 28KDMJ. Mild (grade l) diastolic dysfunction.  History of PFTs 3/10/16     Suboptimal effort. Restrictive in nature. clionical correlations is advised.  History of stress test 2/18/16    Abnoraml. Moderate perfusion defect of mild to moderate intensity in the anterolateral and anteroapical regions during stress imaging. Intermediate risk for CAD.     Hyperlipidemia     Hypertension         Current Outpatient Medications   Medication Sig Dispense Refill    predniSONE (DELTASONE) 20 MG tablet Take 1 tablet by mouth 2 times daily for 5 days 10 tablet 0    guaiFENesin (MUCINEX) 600 MG extended release tablet Take 1 tablet by mouth 2 times daily for 15 days 30 tablet 0    spironolactone-hydroCHLOROthiazide (ALDACTAZIDE) 25-25 MG per tablet Take 1 tablet by mouth daily 30 tablet 0    amLODIPine (NORVASC) 10 MG tablet Take 1 tablet by mouth daily 90 tablet 3    ascorbic acid (VITAMIN C) 500 MG tablet Take 1 tablet by mouth 2 times daily With iron tablet 180 tablet 3    atenolol (TENORMIN) 100 MG tablet Take 1 tablet by mouth daily 90 tablet 3    atorvastatin (LIPITOR) 40 MG tablet Take 1 tablet by mouth daily 90 tablet 3    Calcium Carbonate-Vitamin D (OYSTER SHELL CALCIUM/D) 500-200 MG-UNIT TABS Take 1 tablet by mouth 2 times daily 180 tablet 3    hydrALAZINE (APRESOLINE) 50 MG tablet Take 1 tablet by mouth 3 times daily 270 tablet 3    metFORMIN (GLUCOPHAGE) 500 MG tablet Take 1 tablet by mouth 2 times daily (with meals) 180 tablet 3    valsartan (DIOVAN) 320 MG tablet Take 1 tablet by mouth daily 90 tablet 3    potassium chloride (KLOR-CON M) 20 MEQ extended release tablet Take 1 tablet by mouth 2 times daily 180 tablet 3    albuterol sulfate HFA (VENTOLIN HFA) 108 (90 Base) MCG/ACT inhaler Inhale 2 puffs into the lungs 4 times daily as needed for Wheezing 1 Inhaler 0    cetirizine (ZYRTEC ALLERGY) 10 MG tablet Take 1 tablet by mouth daily 30 tablet 3    dorzolamide-timolol (COSOPT) 22.3-6.8 MG/ML ophthalmic solution Place 1 drop into both eyes 2 times daily       latanoprost (XALATAN) 0.005 % ophthalmic solution Place 1 drop into both eyes nightly       blood glucose test strips (ONE TOUCH ULTRA TEST) strip USE 1 STRIP TO CHECK GLUCOSE DAILY 100 strip 3    ONE TOUCH LANCETS MISC Lancets- One Touch Delica for testing daily and as needed.  DX: Diabetes type  E11.9 100 each 3    Blood Glucose Monitoring Suppl GENNY 1 Units by Does not apply route 3 times daily What insurance will cover 1 Device 0    vitamin D (CHOLECALCIFEROL) 08298 UNIT CAPS Take 1 capsule by mouth once a week for 8 doses 8 capsule 0    Multiple Vitamins-Minerals (THERAPEUTIC MULTIVITAMIN-MINERALS) tablet Take 1 tablet by mouth daily 30 tablet 11     No current facility-administered medications for this visit. Allergies   Allergen Reactions    Iv Dye [Iodides] Nausea And Vomiting       Subjective:      Review of Systems   Constitutional: Negative. Negative for appetite change, fatigue and fever. HENT: Negative for ear pain, rhinorrhea and sore throat. Eyes: Negative. Respiratory: Positive for cough. Negative for shortness of breath and wheezing. Cardiovascular: Negative. Gastrointestinal: Negative. Endocrine: Negative. Genitourinary: Negative. Musculoskeletal: Negative. Skin: Negative. Allergic/Immunologic: Positive for environmental allergies. Neurological: Negative. Hematological: Negative. Psychiatric/Behavioral: Negative. Objective:     Physical Exam  Vitals and nursing note reviewed. Constitutional:       Appearance: Normal appearance. HENT:      Head: Normocephalic. Right Ear: Tympanic membrane normal.      Left Ear: Tympanic membrane normal.      Nose: Nose normal.      Mouth/Throat:      Mouth: Mucous membranes are moist.      Pharynx: Oropharynx is clear. Eyes:      Pupils: Pupils are equal, round, and reactive to light. Cardiovascular:      Rate and Rhythm: Normal rate and regular rhythm. Heart sounds: Normal heart sounds. Pulmonary:      Effort: Pulmonary effort is normal.      Breath sounds: Normal air entry. Examination of the right-upper field reveals wheezing. Wheezing (slight upper lobe expiratory wheeze) present. Musculoskeletal:         General: Normal range of motion. Cervical back: Normal range of motion. Skin:     General: Skin is warm. Capillary Refill: Capillary refill takes less than 2 seconds. Neurological:      General: No focal deficit present. Mental Status: She is alert and oriented to person, place, and time.    Psychiatric:         Mood and Affect: Mood normal.       BP (!) 159/66 (Site: Right Upper Arm, Position: Sitting, Cuff Size: Large Adult)   Pulse 59   Temp 98.7 °F (37.1 °C) (Temporal)   Resp 18   Ht 5' 4\" (1.626 m)   Wt 172 lb 3.2 oz (78.1 kg)   SpO2 97%   BMI 29.56 kg/m²     Assessment:      Diagnosis Orders   1. Mild intermittent asthma with exacerbation       No results found for this visit on 09/08/21. Plan:     Reviewed exam findings and plan of care as well as supportive management as listed below with patient at bedside. · Start  Prednisone and continue albuterol MDI as prescribed. · Mucinex as prescribed. · Supportive management encouraged including Tylenol over-the-counter as instructed on packaging for fever and discomfort. Cool mist humidifiers. Hot tea with honey and lemon for cough as needed. · Instructions pertinent to diagnoses and treatment plan given with AVS.  · Encouraged to follow up to ER for any difficulty breathing, shortness of breath or inability to swallow including hives and fevers above 103°. · Follow-up at this office or PCP if no improvement. No follow-ups on file.     Orders Placed This Encounter   Medications    predniSONE (DELTASONE) 20 MG tablet     Sig: Take 1 tablet by mouth 2 times daily for 5 days     Dispense:  10 tablet     Refill:  0    guaiFENesin (MUCINEX) 600 MG extended release tablet     Sig: Take 1 tablet by mouth 2 times daily for 15 days     Dispense:  30 tablet     Refill:  0        Electronically signed by TARA Reza CNP on 9/8/2021 at 2:32 PM

## 2021-09-08 NOTE — PATIENT INSTRUCTIONS
SURVEY:    You may be receiving a survey from [employer] regarding your visit today. Please complete the survey to enable us to provide the highest quality of care to you and your family. If you cannot score us a very good on any question, please call the office to discuss how we could have made your experience a very good one. Thank you. Tj uBllock, APRN-SHELL Holden, SHELL Castillo, LPN  [place] [employer], LPN  Isrrael Hernandez, [address]  [place], PCA    Patient Education        Asthma in Adults: Care Instructions  Overview     Asthma makes it hard for you to breathe. During an asthma attack, the airways swell and narrow. Severe asthma attacks can be dangerous, but you can usually prevent them. Controlling asthma and treating symptoms before they get bad can help you avoid bad attacks. You may also avoid future trips to the doctor. Follow-up care is a key part of your treatment and safety. Be sure to make and go to all appointments, and call your doctor if you are having problems. It's also a good idea to know your test results and keep a list of the medicines you take. How can you care for yourself at home? · Follow your asthma action plan so you can manage your symptoms at home. An asthma action plan will help you prevent and control airway reactions and will tell you what to do during an asthma attack. If you do not have an asthma action plan, work with your doctor to build one. · Take your asthma medicine exactly as prescribed. Medicine plays an important role in controlling asthma. Talk to your doctor right away if you have any questions about what to take and how to take it. ? Use your quick-relief medicine when you have symptoms of an attack. Quick-relief medicine often is an albuterol inhaler. Some people need to use quick-relief medicine before they exercise. ? Take your controller medicine every day, not just when you have symptoms. Controller medicine is usually an inhaled corticosteroid.  The goal is to prevent problems before they occur. ? If your doctor prescribed corticosteroid pills to use during an attack, take them as directed. They may take hours to work, but they may shorten the attack and help you breathe better. ? Keep your quick-relief medicine with you at all times. · Talk to your doctor before using other medicines. Some medicines, such as aspirin, can cause asthma attacks in some people. · Check yourself for asthma symptoms to know which step to follow in your action plan. Watch for things like being short of breath, having chest tightness, coughing, and wheezing. Also notice if symptoms wake you up at night or if you get tired quickly when you exercise. · If you have a peak flow meter, use it to check how well you are breathing. This can help you predict when an asthma attack is going to occur. Then you can take medicine to prevent the asthma attack or make it less severe. · See your doctor regularly. These visits will help you learn more about asthma and what you can do to control it. Your doctor will monitor your treatment to make sure the medicine is helping you. · Keep track of your asthma attacks and your treatment. After you have had an attack, write down what triggered it, what helped end it, and any concerns you have about your asthma action plan. Take your diary when you see your doctor. You can then review your asthma action plan and decide if it is working. · Do not smoke or allow others to smoke around you. Avoid smoky places. Smoking makes asthma worse. If you need help quitting, talk to your doctor about stop-smoking programs and medicines. These can increase your chances of quitting for good. · Learn what triggers an asthma attack for you, and avoid the triggers when you can. Common triggers include colds, smoke, air pollution, dust, pollen, mold, pets, cockroaches, stress, and cold air. · Avoid colds and the flu. Talk to your doctor about getting a pneumococcal vaccine shot.  If you have had one before, ask your doctor whether you need a second dose. Get a flu vaccine every fall. If you must be around people with colds or the flu, wash your hands often. When should you call for help? Call 911 anytime you think you may need emergency care. For example, call if:    · You have severe trouble breathing. Call your doctor now or seek immediate medical care if:    · Your symptoms do not get better after you have followed your asthma action plan.     · You cough up yellow, dark brown, or bloody mucus (sputum). Watch closely for changes in your health, and be sure to contact your doctor if:    · Your coughing and wheezing get worse.     · You need to use quick-relief medicine on more than 2 days a week within a month (unless it is just for exercise).     · You need help figuring out what is triggering your asthma attacks. Where can you learn more? Go to https://[employer]. org and sign in to your [employer] account. Enter P597 in the Top Prospect box to learn more about \"Asthma in Adults: Care Instructions. \"     If you do not have an account, please click on the \"Sign Up Now\" link. Current as of: October 26, 2020               Content Version: 12.9  © 2006-2021 Healthwise, Incorporated. Care instructions adapted under license by [facility] ([facility]). If you have questions about a medical condition or this instruction, always ask your healthcare professional. [place] [address] any warranty or liability for your use of this information.

## 2021-10-21 ENCOUNTER — OFFICE VISIT (OUTPATIENT)
Dept: PRIMARY CARE CLINIC | Age: 71
End: 2021-10-21
Payer: MEDICARE

## 2021-10-21 ENCOUNTER — NURSE TRIAGE (OUTPATIENT)
Dept: OTHER | Facility: CLINIC | Age: 71
End: 2021-10-21

## 2021-10-21 VITALS
DIASTOLIC BLOOD PRESSURE: 74 MMHG | SYSTOLIC BLOOD PRESSURE: 122 MMHG | BODY MASS INDEX: 30.86 KG/M2 | HEART RATE: 64 BPM | OXYGEN SATURATION: 97 % | WEIGHT: 179.8 LBS | TEMPERATURE: 97.4 F | RESPIRATION RATE: 24 BRPM

## 2021-10-21 DIAGNOSIS — I10 RESISTANT HYPERTENSION: ICD-10-CM

## 2021-10-21 DIAGNOSIS — Z23 NEED FOR INFLUENZA VACCINATION: ICD-10-CM

## 2021-10-21 DIAGNOSIS — I25.10 MILD CAD: ICD-10-CM

## 2021-10-21 DIAGNOSIS — R60.0 LOWER LEG EDEMA: Primary | ICD-10-CM

## 2021-10-21 PROCEDURE — G0008 ADMIN INFLUENZA VIRUS VAC: HCPCS | Performed by: NURSE PRACTITIONER

## 2021-10-21 PROCEDURE — 90694 VACC AIIV4 NO PRSRV 0.5ML IM: CPT | Performed by: NURSE PRACTITIONER

## 2021-10-21 PROCEDURE — 99214 OFFICE O/P EST MOD 30 MIN: CPT | Performed by: NURSE PRACTITIONER

## 2021-10-21 RX ORDER — SPIRONOLACTONE AND HYDROCHLOROTHIAZIDE 25; 25 MG/1; MG/1
1 TABLET ORAL DAILY
Qty: 90 TABLET | Refills: 3 | Status: SHIPPED | OUTPATIENT
Start: 2021-10-21 | End: 2021-11-11 | Stop reason: SDUPTHER

## 2021-10-21 SDOH — ECONOMIC STABILITY: FOOD INSECURITY: WITHIN THE PAST 12 MONTHS, THE FOOD YOU BOUGHT JUST DIDN'T LAST AND YOU DIDN'T HAVE MONEY TO GET MORE.: PATIENT DECLINED

## 2021-10-21 SDOH — ECONOMIC STABILITY: FOOD INSECURITY: WITHIN THE PAST 12 MONTHS, YOU WORRIED THAT YOUR FOOD WOULD RUN OUT BEFORE YOU GOT MONEY TO BUY MORE.: PATIENT DECLINED

## 2021-10-21 ASSESSMENT — ENCOUNTER SYMPTOMS
SORE THROAT: 0
RHINORRHEA: 0
CONSTIPATION: 0
ABDOMINAL PAIN: 0
VOMITING: 0
CHEST TIGHTNESS: 0
DIARRHEA: 0
COUGH: 0
SHORTNESS OF BREATH: 0
WHEEZING: 0
NAUSEA: 0

## 2021-10-21 ASSESSMENT — SOCIAL DETERMINANTS OF HEALTH (SDOH): HOW HARD IS IT FOR YOU TO PAY FOR THE VERY BASICS LIKE FOOD, HOUSING, MEDICAL CARE, AND HEATING?: PATIENT DECLINED

## 2021-10-21 NOTE — PROGRESS NOTES
Vaccine Information Sheet, \"Influenza - Inactivated\"  given to Fiorella Salvador, or parent/legal guardian of  Fiorella Salvador and verbalized understanding. Patient responses:    Have you ever had a reaction to a flu vaccine? No  Are you able to eat eggs without adverse effects? Yes  Do you have any current illness? No  Have you ever had Guillian Gonvick Syndrome? No    Flu vaccine given per order. Please see immunization tab. After obtaining consent, and per orders of Eusebio Bullock CNP, injection of Fluad Quadrivalent given in Right deltoid by Nahomy Vera. Patient instructed to remain in clinic for 20 minutes afterwards, and to report any adverse reaction to me immediately.

## 2021-10-21 NOTE — PROGRESS NOTES
Name: Agustín Conley  : 1950         Chief Complaint:     Chief Complaint   Patient presents with    Swelling     bilateral lower legs X 3 days. History of Present Illness:      Agustín Conley is a 70 y.o.  female who presents with Swelling (bilateral lower legs X 3 days.)      Andrés Blankenship is here today for a routine office visit. Lower leg edema-worsening, upon medication review it was noted that her cardiologist changed her spironolactone to spironolactone/hydrochlorothiazide in August.  She did fill the medication once but has not been taking for approximately 1 month. Her legs are swelling. See below for further comments. Coronary Artery Disease  Presents for follow-up visit. Symptoms include leg swelling. Pertinent negatives include no chest pain, chest pressure, chest tightness, dizziness, muscle weakness, palpitations, shortness of breath or weight gain. The symptoms have been worsening. Compliance with diet is good. Compliance with exercise is variable. Compliance with medications is variable. Hypertension  This is a chronic problem. The current episode started more than 1 year ago. The problem is unchanged. The problem is controlled. Associated symptoms include peripheral edema. Pertinent negatives include no chest pain, headaches, neck pain, palpitations or shortness of breath. There are no associated agents to hypertension. Risk factors for coronary artery disease include dyslipidemia, family history, obesity, post-menopausal state, stress and sedentary lifestyle. Past treatments include diuretics, beta blockers, calcium channel blockers and direct vasodilators. The current treatment provides moderate improvement. Compliance problems include exercise. Hypertensive end-organ damage includes CAD/MI. There is no history of kidney disease or CVA. There is no history of chronic renal disease.          Past Medical History:     Past Medical History:   Diagnosis Date    Arthritis     Asthma  [facility]     Diabetes mellitus ([facility]     GERD (gastroesophageal reflux disease)     H/O echocardiogram 3/9/16    EF >60%. LV wall thickness is mildly increased. Mild mitral and tricuspid regurgitation. Borderline pulmonary hypertension estimated right ventricular sysolic pressure of 20TPSW. Mild (grade l) diastolic dysfunction.  History of PFTs 3/10/16     Suboptimal effort. Restrictive in nature. clionical correlations is advised.  History of stress test 16    Abnoraml. Moderate perfusion defect of mild to moderate intensity in the anterolateral and anteroapical regions during stress imaging. Intermediate risk for CAD.  Hyperlipidemia     Hypertension       Reviewed all health maintenance requirements and ordered appropriate tests  There are no preventive care reminders to display for this patient. Past Surgical History:     Past Surgical History:   Procedure Laterality Date    BREAST SURGERY      cyst removed    CARDIAC CATHETERIZATION      CARDIAC CATHETERIZATION Left 2019    [facility]     SECTION      CHOLECYSTECTOMY      COLONOSCOPY  3/23/15    -diverticulosis,hemorrhoids    FOOT SURGERY      rt    HYSTERECTOMY          Medications:       Prior to Admission medications    Medication Sig Start Date End Date Taking?  Authorizing Provider   spironolactone-hydroCHLOROthiazide (ALDACTAZIDE) 25-25 MG per tablet Take 1 tablet by mouth daily 10/21/21  Yes Jimenez Lara Might, APRN - CNP   amLODIPine (NORVASC) 10 MG tablet Take 1 tablet by mouth daily 21  Yes Jimenez Bullock, APRN - CNP   ascorbic acid (VITAMIN C) 500 MG tablet Take 1 tablet by mouth 2 times daily With iron tablet 21  Yes Jimenez Bullock, APRN - CNP   atenolol (TENORMIN) 100 MG tablet Take 1 tablet by mouth daily 21  Yes Jimenez Bullock, APRN - CNP   atorvastatin (LIPITOR) 40 MG tablet Take 1 tablet by mouth daily 21  Yes Jimenez Lara Might, APRN - CNP   Calcium Carbonate-Vitamin D (OYSTER SHELL CALCIUM/D) 500-200 MG-UNIT TABS Take 1 tablet by mouth 2 times daily 4/12/21  Yes Adaline Massa Might, APRN - CNP   hydrALAZINE (APRESOLINE) 50 MG tablet Take 1 tablet by mouth 3 times daily 4/12/21  Yes Adaline Massa Might, APRN - CNP   metFORMIN (GLUCOPHAGE) 500 MG tablet Take 1 tablet by mouth 2 times daily (with meals) 4/12/21  Yes Adaline Massa Might, APRN - CNP   valsartan (DIOVAN) 320 MG tablet Take 1 tablet by mouth daily 4/12/21  Yes Adaline Massa Might, APRN - CNP   potassium chloride (KLOR-CON M) 20 MEQ extended release tablet Take 1 tablet by mouth 2 times daily 4/12/21  Yes Adaline Massa Might, APRN - CNP   vitamin D (CHOLECALCIFEROL) 73754 UNIT CAPS Take 1 capsule by mouth once a week for 8 doses 2/24/21 10/21/21 Yes Lois Erazo MD   albuterol sulfate HFA (VENTOLIN HFA) 108 (90 Base) MCG/ACT inhaler Inhale 2 puffs into the lungs 4 times daily as needed for Wheezing 1/19/21  Yes Adaline Massa Might, APRN - CNP   cetirizine (ZYRTEC ALLERGY) 10 MG tablet Take 1 tablet by mouth daily 12/16/20  Yes Adaline Massa Might, APRN - CNP   dorzolamide-timolol (COSOPT) 22.3-6.8 MG/ML ophthalmic solution Place 1 drop into both eyes 2 times daily  9/20/20  Yes Historical Provider, MD   Multiple Vitamins-Minerals (THERAPEUTIC MULTIVITAMIN-MINERALS) tablet Take 1 tablet by mouth daily 8/6/20 10/21/21 Yes Adaline Massa Might, APRN - CNP   latanoprost (XALATAN) 0.005 % ophthalmic solution Place 1 drop into both eyes nightly    Yes Historical Provider, MD   blood glucose test strips (ONE TOUCH ULTRA TEST) strip USE 1 STRIP TO CHECK GLUCOSE DAILY 3/13/19  Yes Adaline Massa Might, APRN - CNP   ONE TOUCH LANCETS [address] [facility] Lancets- One Touch [facility] Come for testing daily and as needed. DX: Diabetes type  E11.9 1/31/19  Yes TARA Reyes CNP   Blood Glucose Monitoring Suppl GENNY 1 Units by Does not apply route 3 times daily What insurance will cover 10/11/16  Yes TARA Vera CNP        Allergies:        Iv dye [iodides]    Social History:     Tobacco:    reports that she has never smoked. She has never used smokeless tobacco.  Alcohol:      reports no history of alcohol use. Drug Use:  reports no history of drug use. Family History:     Family History   Problem Relation Age of Onset   Angeli Cervantes Stroke Father     Stroke Sister     Diabetes Sister         x6 sisters    High Blood Pressure Sister     Stroke Brother     Diabetes Brother        Review of Systems:     Positive and Negative as described in HPI    Review of Systems   Constitutional: Negative for chills, fatigue, fever and weight gain. HENT: Negative for congestion, rhinorrhea and sore throat. Eyes: Negative for visual disturbance. Respiratory: Negative for cough, chest tightness, shortness of breath and wheezing. Cardiovascular: Positive for leg swelling. Negative for chest pain and palpitations. Gastrointestinal: Negative for abdominal pain, constipation, diarrhea, nausea and vomiting. Endocrine: Negative for polydipsia, polyphagia and polyuria. Genitourinary: Negative for difficulty urinating and dysuria. Musculoskeletal: Negative for gait problem, muscle weakness, neck pain and neck stiffness. Skin: Negative for rash. Neurological: Negative for dizziness, syncope, light-headedness and headaches. Physical Exam:   Vitals:  /74 (Position: Sitting)   Pulse 64   Temp 97.4 °F (36.3 °C) (Temporal)   Resp 24   Wt 179 lb 12.8 oz (81.6 kg)   SpO2 97%   BMI 30.86 kg/m²     Physical Exam  Vitals and nursing note reviewed. Constitutional:       General: She is not in acute distress. Appearance: Normal appearance. She is well-developed. She is not ill-appearing. HENT:      Mouth/Throat:      Mouth: Mucous membranes are moist.   Eyes:      General: No scleral icterus. Conjunctiva/sclera: Conjunctivae normal.   Cardiovascular:      Rate and Rhythm: Normal rate and regular rhythm. Heart sounds: No murmur heard. Pulmonary:      Effort: Pulmonary effort is normal.      Breath sounds: Normal breath sounds. No wheezing. Abdominal:      General: Bowel sounds are normal. There is no distension. Palpations: Abdomen is soft. Tenderness: There is no abdominal tenderness. Musculoskeletal:      Cervical back: Normal range of motion and neck supple. Right lower leg: Edema (1+ pitting) present. Left lower leg: Edema (1+ pitting) present. Skin:     General: Skin is warm and dry. Findings: No rash. Neurological:      Mental Status: She is alert and oriented to person, place, and time. Psychiatric:         Mood and Affect: Mood normal.         Behavior: Behavior normal.         Data:     Lab Results   Component Value Date     09/07/2021    K 4.1 09/07/2021     09/07/2021    CO2 26 09/07/2021    BUN 11 09/07/2021    CREATININE 1.21 09/07/2021    GLUCOSE 89 09/07/2021    GLUCOSE 110 03/26/2012    PROT 7.3 10/26/2020    LABALBU 4.0 10/26/2020    BILITOT 0.25 10/26/2020    ALKPHOS 131 10/26/2020    AST 6 07/27/2021    ALT <5 07/27/2021     Lab Results   Component Value Date    WBC 5.3 07/27/2021    RBC 3.69 07/27/2021    RBC 3.85 03/26/2012    HGB 11.2 07/27/2021    HCT 35.6 07/27/2021    MCV 96.5 07/27/2021    MCH 30.4 07/27/2021    MCHC 31.5 07/27/2021    RDW 12.2 07/27/2021     07/27/2021     03/26/2012    MPV 8.7 07/27/2021     Lab Results   Component Value Date    TSH 2.25 10/22/2020     Lab Results   Component Value Date    CHOL 138 07/27/2021    HDL 53 07/27/2021    LABA1C 5.6 07/27/2021       Assessment/Plan:      Diagnosis Orders   1. Lower leg edema     2. Mild CAD  spironolactone-hydroCHLOROthiazide (ALDACTAZIDE) 25-25 MG per tablet   3. Resistant hypertension  spironolactone-hydroCHLOROthiazide (ALDACTAZIDE) 25-25 MG per tablet   4.  Need for influenza vaccination  INFLUENZA, QUADV, ADJUVANTED, 72 YRS =, IM, PF, PREFILL SYR, 0.5ML (FLUAD)     She is to restart Aldactazide daily. Continue all other medications. Call me on Monday if legs do not improve. Sooner if any problems. 1.  Elli Higgins received counseling on the following healthy behaviors: nutrition, exercise and medication adherence  2. Patient given educational materials - see patient instructions  3. Was a self-tracking handout given in paper form or via [employer]? No  If yes, see orders or list here. 4.  Discussed use, benefit, and side effects of prescribed medications. Barriers to medication compliance addressed. All patient questions answered. Pt voiced understanding. 5.  Reviewed prior labs and health maintenance  6. Continue current medications, diet and exercise. Completed Refills   Requested Prescriptions     Signed Prescriptions Disp Refills    spironolactone-hydroCHLOROthiazide (ALDACTAZIDE) 25-25 MG per tablet 90 tablet 3     Sig: Take 1 tablet by mouth daily         Return in about 6 months (around 4/21/2022).

## 2021-10-21 NOTE — TELEPHONE ENCOUNTER
Received call from Mike Jefferson at [facility] with [employer]. Brief description of triage: bilateral leg swelling since Monday with pitting edema. See below assessment. Triage indicates for patient to be seen by PCP today, advised caller if unable to get an appointment in the suggested time frame to go to an [facility] or ED, caller agreeable. Care advice provided, patient verbalizes understanding; denies any other questions or concerns; instructed to call back for any new or worsening symptoms. Writer provided warm transfer to [place] at [facility] for appointment scheduling. Attention Provider: Thank you for allowing me to participate in the care of your patient. The patient was connected to triage in response to information provided to the ECC/PSC. Please do not respond through this encounter as the response is not directed to a shared pool. Reason for Disposition   MODERATE swelling of both ankles (e.g., swelling extends up to the knees) AND new onset or worsening    Answer Assessment - Initial Assessment Questions  1. ONSET: \"When did the swelling start? \" (e.g., minutes, hours, days)      Monday     2. LOCATION: \"What part of the leg is swollen? \"  \"Are both legs swollen or just one leg? \"      Both, swelling stops at knees. Both are equally swollen     3. SEVERITY: \"How bad is the swelling? \" (e.g., localized; mild, moderate, severe)   - Localized - small area of swelling localized to one leg   - MILD pedal edema - swelling limited to foot and ankle, pitting edema < 1/4 inch (6 mm) deep, rest and elevation eliminate most or all swelling   - MODERATE edema - swelling of lower leg to knee, pitting edema > 1/4 inch (6 mm) deep, rest and elevation only partially reduce swelling   - SEVERE edema - swelling extends above knee, facial or hand swelling present       Moderate     4. REDNESS: \"Does the swelling look red or infected? \"      No redness, no wounds, no drainage    5.  PAIN: \"Is the swelling painful to touch? \" If so, ask: \"How painful is it? \"   (Scale 1-10; mild, moderate or severe)      Painful to bear weight but able to walk     6. FEVER: \"Do you have a fever? \" If so, ask: \"What is it, how was it measured, and when did it start? \"       Denies     7. CAUSE: \"What do you think is causing the leg swelling? \"      Unknown     8. MEDICAL HISTORY: \"Do you have a history of heart failure, kidney disease, liver failure, or cancer? \"      States she has heart failure    9. RECURRENT SYMPTOM: \"Have you had leg swelling before? \" If so, ask: \"When was the last time? \" \"What happened that time? \"      No    10. OTHER SYMPTOMS: \"Do you have any other symptoms? \" (e.g., chest pain, difficulty breathing)        Denies orthopnea, sob with exertion, denies chest pain, sob    11. PREGNANCY: \"Is there any chance you are pregnant? \" \"When was your last menstrual period? \"        N/a    Protocols used: LEG SWELLING AND EDEMA-ADULT-OH

## 2021-10-29 ENCOUNTER — APPOINTMENT (OUTPATIENT)
Dept: GENERAL RADIOLOGY | Age: 71
End: 2021-10-29
Payer: MEDICARE

## 2021-10-29 ENCOUNTER — HOSPITAL ENCOUNTER (EMERGENCY)
Age: 71
Discharge: HOME | End: 2021-10-29
Payer: MEDICARE

## 2021-10-29 ENCOUNTER — APPOINTMENT (OUTPATIENT)
Dept: VASCULAR LAB | Age: 71
End: 2021-10-29
Payer: MEDICARE

## 2021-10-29 VITALS
BODY MASS INDEX: 30.04 KG/M2 | WEIGHT: 175 LBS | RESPIRATION RATE: 16 BRPM | DIASTOLIC BLOOD PRESSURE: 64 MMHG | SYSTOLIC BLOOD PRESSURE: 126 MMHG | OXYGEN SATURATION: 98 % | HEART RATE: 62 BPM | TEMPERATURE: 98.4 F

## 2021-10-29 DIAGNOSIS — M79.89 LEG SWELLING: Primary | ICD-10-CM

## 2021-10-29 LAB
ABSOLUTE EOS #: 0.09 K/UL (ref 0–0.44)
ABSOLUTE IMMATURE GRANULOCYTE: <0.03 K/UL (ref 0–0.3)
ABSOLUTE LYMPH #: 2.37 K/UL (ref 1.1–3.7)
ABSOLUTE MONO #: 0.64 K/UL (ref 0.1–1.2)
ANION GAP SERPL CALCULATED.3IONS-SCNC: 10 MMOL/L (ref 9–17)
BASOPHILS # BLD: 1 % (ref 0–2)
BASOPHILS ABSOLUTE: 0.04 K/UL (ref 0–0.2)
BNP INTERPRETATION: ABNORMAL
BUN BLDV-MCNC: 7 MG/DL (ref 8–23)
BUN/CREAT BLD: 7 (ref 9–20)
CALCIUM SERPL-MCNC: 9.1 MG/DL (ref 8.6–10.4)
CHLORIDE BLD-SCNC: 102 MMOL/L (ref 98–107)
CO2: 25 MMOL/L (ref 20–31)
CREAT SERPL-MCNC: 1.03 MG/DL (ref 0.5–0.9)
D-DIMER QUANTITATIVE: 2.09 MG/L FEU (ref 0–0.59)
DIFFERENTIAL TYPE: ABNORMAL
EKG ATRIAL RATE: 59 BPM
EKG P AXIS: 106 DEGREES
EKG P-R INTERVAL: 174 MS
EKG Q-T INTERVAL: 418 MS
EKG QRS DURATION: 84 MS
EKG QTC CALCULATION (BAZETT): 413 MS
EKG R AXIS: 7 DEGREES
EKG T AXIS: 41 DEGREES
EKG VENTRICULAR RATE: 59 BPM
EOSINOPHILS RELATIVE PERCENT: 1 % (ref 1–4)
GFR AFRICAN AMERICAN: >60 ML/MIN
GFR NON-AFRICAN AMERICAN: 53 ML/MIN
GFR SERPL CREATININE-BSD FRML MDRD: ABNORMAL ML/MIN/{1.73_M2}
GFR SERPL CREATININE-BSD FRML MDRD: ABNORMAL ML/MIN/{1.73_M2}
GLUCOSE BLD-MCNC: 90 MG/DL (ref 70–99)
HCT VFR BLD CALC: 32.5 % (ref 36.3–47.1)
HEMOGLOBIN: 10.3 G/DL (ref 11.9–15.1)
IMMATURE GRANULOCYTES: 0 %
LYMPHOCYTES # BLD: 37 % (ref 24–43)
MCH RBC QN AUTO: 30.1 PG (ref 25.2–33.5)
MCHC RBC AUTO-ENTMCNC: 31.7 G/DL (ref 28.4–34.8)
MCV RBC AUTO: 95 FL (ref 82.6–102.9)
MONOCYTES # BLD: 10 % (ref 3–12)
NRBC AUTOMATED: 0 PER 100 WBC
PDW BLD-RTO: 12.1 % (ref 11.8–14.4)
PLATELET # BLD: 351 K/UL (ref 138–453)
PLATELET ESTIMATE: ABNORMAL
PMV BLD AUTO: 8.8 FL (ref 8.1–13.5)
POTASSIUM SERPL-SCNC: 3.9 MMOL/L (ref 3.7–5.3)
PRO-BNP: 537 PG/ML
RBC # BLD: 3.42 M/UL (ref 3.95–5.11)
RBC # BLD: ABNORMAL 10*6/UL
SEG NEUTROPHILS: 51 % (ref 36–65)
SEGMENTED NEUTROPHILS ABSOLUTE COUNT: 3.29 K/UL (ref 1.5–8.1)
SODIUM BLD-SCNC: 137 MMOL/L (ref 135–144)
WBC # BLD: 6.5 K/UL (ref 3.5–11.3)
WBC # BLD: ABNORMAL 10*3/UL

## 2021-10-29 PROCEDURE — 71045 X-RAY EXAM CHEST 1 VIEW: CPT

## 2021-10-29 PROCEDURE — 93005 ELECTROCARDIOGRAM TRACING: CPT | Performed by: PHYSICIAN ASSISTANT

## 2021-10-29 PROCEDURE — 80048 BASIC METABOLIC PNL TOTAL CA: CPT

## 2021-10-29 PROCEDURE — 96374 THER/PROPH/DIAG INJ IV PUSH: CPT

## 2021-10-29 PROCEDURE — 83880 ASSAY OF NATRIURETIC PEPTIDE: CPT

## 2021-10-29 PROCEDURE — 99284 EMERGENCY DEPT VISIT MOD MDM: CPT

## 2021-10-29 PROCEDURE — 93010 ELECTROCARDIOGRAM REPORT: CPT | Performed by: INTERNAL MEDICINE

## 2021-10-29 PROCEDURE — 85025 COMPLETE CBC W/AUTO DIFF WBC: CPT

## 2021-10-29 PROCEDURE — 93970 EXTREMITY STUDY: CPT

## 2021-10-29 PROCEDURE — 36415 COLL VENOUS BLD VENIPUNCTURE: CPT

## 2021-10-29 PROCEDURE — 6360000002 HC RX W HCPCS: Performed by: PHYSICIAN ASSISTANT

## 2021-10-29 PROCEDURE — 85379 FIBRIN DEGRADATION QUANT: CPT

## 2021-10-29 RX ORDER — FUROSEMIDE 10 MG/ML
20 INJECTION INTRAMUSCULAR; INTRAVENOUS ONCE
Status: COMPLETED | OUTPATIENT
Start: 2021-10-29 | End: 2021-10-29

## 2021-10-29 RX ADMIN — FUROSEMIDE 20 MG: 10 INJECTION, SOLUTION INTRAVENOUS at 14:58

## 2021-10-29 ASSESSMENT — ENCOUNTER SYMPTOMS
WHEEZING: 0
SHORTNESS OF BREATH: 0

## 2021-10-29 ASSESSMENT — PAIN DESCRIPTION - PAIN TYPE: TYPE: ACUTE PAIN

## 2021-10-29 ASSESSMENT — PAIN DESCRIPTION - LOCATION: LOCATION: LEG

## 2021-10-29 ASSESSMENT — PAIN DESCRIPTION - ORIENTATION: ORIENTATION: RIGHT;LEFT

## 2021-10-29 ASSESSMENT — PAIN DESCRIPTION - DESCRIPTORS: DESCRIPTORS: ACHING

## 2021-10-29 ASSESSMENT — PAIN SCALES - GENERAL
PAINLEVEL_OUTOF10: 7
PAINLEVEL_OUTOF10: 7

## 2021-10-29 NOTE — ED PROVIDER NOTES
[facility] ED  eMERGENCY dEPARTMENT eNCOUnter      Pt Name: Brandy Spivey  MRN: [id number]  [place] 1950  Date of evaluation: 10/29/21      CHIEF COMPLAINT       Chief Complaint   Patient presents with    Leg Swelling     pt states this is an ongoing issue. Pt states she has seen her PCP and was started on a water pill         HISTORY OF PRESENT ILLNESS    Brandy Spivey is a 70 y.o. female who presents complaining of her legs are swollen she just started back on Aldactazide a few days ago. The history is provided by the patient. Leg Injury  Lower extremity pain location: She has bilateral leg swelling states this is ongoing. She went to see her primary care provider they put her on a diuretic she is still having increased leg pain and swelling. REVIEW OF SYSTEMS       Review of Systems   Respiratory: Negative for shortness of breath and wheezing. Cardiovascular: Positive for leg swelling. Negative for chest pain and palpitations. All other systems reviewed and are negative. PAST MEDICAL HISTORY     Past Medical History:   Diagnosis Date    Arthritis     Asthma     Chronic bronchitis ([address]     Diabetes mellitus ([address]     GERD (gastroesophageal reflux disease)     H/O echocardiogram 3/9/16    EF >60%. LV wall thickness is mildly increased. Mild mitral and tricuspid regurgitation. Borderline pulmonary hypertension estimated right ventricular sysolic pressure of 35NIKC. Mild (grade l) diastolic dysfunction.  History of PFTs 3/10/16     Suboptimal effort. Restrictive in nature. clionical correlations is advised.  History of stress test 2/18/16    Abnoraml. Moderate perfusion defect of mild to moderate intensity in the anterolateral and anteroapical regions during stress imaging. Intermediate risk for CAD.     Hyperlipidemia     Hypertension        SURGICAL HISTORY       Past Surgical History:   Procedure Laterality Date    BREAST SURGERY      cyst removed    CARDIAC CATHETERIZATION  2014    CARDIAC CATHETERIZATION Left 2019    [facility] Radial/[facility]/     SECTION      CHOLECYSTECTOMY      COLONOSCOPY  3/23/15    -diverticulosis,hemorrhoids    FOOT SURGERY      rt    HYSTERECTOMY         CURRENT MEDICATIONS       Discharge Medication List as of 10/29/2021  4:50 PM      CONTINUE these medications which have NOT CHANGED    Details   spironolactone-hydroCHLOROthiazide (ALDACTAZIDE) 25-25 MG per tablet Take 1 tablet by mouth daily, Disp-90 tablet, R-3Normal      amLODIPine (NORVASC) 10 MG tablet Take 1 tablet by mouth daily, Disp-90 tablet, R-3Normal      ascorbic acid (VITAMIN C) 500 MG tablet Take 1 tablet by mouth 2 times daily With iron tablet, Disp-180 tablet, R-3Normal      atenolol (TENORMIN) 100 MG tablet Take 1 tablet by mouth daily, Disp-90 tablet, R-3Normal      atorvastatin (LIPITOR) 40 MG tablet Take 1 tablet by mouth daily, Disp-90 tablet, R-3Normal      Calcium Carbonate-Vitamin D (OYSTER SHELL CALCIUM/D) 500-200 MG-UNIT TABS Take 1 tablet by mouth 2 times daily, Disp-180 tablet, R-3Normal      hydrALAZINE (APRESOLINE) 50 MG tablet Take 1 tablet by mouth 3 times daily, Disp-270 tablet, R-3Normal      metFORMIN (GLUCOPHAGE) 500 MG tablet Take 1 tablet by mouth 2 times daily (with meals), Disp-180 tablet, R-3Normal      valsartan (DIOVAN) 320 MG tablet Take 1 tablet by mouth daily, Disp-90 tablet, R-3Normal      potassium chloride (KLOR-CON M) 20 MEQ extended release tablet Take 1 tablet by mouth 2 times daily, Disp-180 tablet, R-3Normal      albuterol sulfate HFA (VENTOLIN HFA) 108 (90 Base) MCG/ACT inhaler Inhale 2 puffs into the lungs 4 times daily as needed for Wheezing, Disp-1 Inhaler, R-0Normal      cetirizine (ZYRTEC ALLERGY) 10 MG tablet Take 1 tablet by mouth daily, Disp-30 tablet, R-3Normal      dorzolamide-timolol (COSOPT) 22.3-6.8 MG/ML ophthalmic solution Place 1 drop into both eyes 2 times daily Historical Med latanoprost (XALATAN) 0.005 % ophthalmic solution Place 1 drop into both eyes nightly Historical Med      vitamin D (CHOLECALCIFEROL) 76358 UNIT CAPS Take 1 capsule by mouth once a week for 8 doses, Disp-8 capsule, R-0Normal      Multiple Vitamins-Minerals (THERAPEUTIC MULTIVITAMIN-MINERALS) tablet Take 1 tablet by mouth daily, Disp-30 tablet, R-11Normal      blood glucose test strips (ONE TOUCH ULTRA TEST) strip Disp-100 strip, R-3, NormalUSE 1 STRIP TO CHECK GLUCOSE DAILY      ONE TOUCH LANCETS MISC Disp-100 each, R-3, NormalLancets- One Touch [employer] for testing daily and as needed. DX: Diabetes type  E11.9      Blood Glucose Monitoring Suppl GENNY 3 TIMES DAILY Starting 10/11/2016, Until Discontinued, Disp-1 Device, R-0, NormalWhat insurance will cover             ALLERGIES     is allergic to iv dye [iodides]. FAMILY HISTORY     She indicated that her mother is . She indicated that her father is . She indicated that four of her six sisters are alive. She indicated that only one of her four brothers is alive. SOCIAL HISTORY      reports that she has never smoked. She has never used smokeless tobacco. She reports that she does not drink alcohol and does not use drugs. PHYSICAL EXAM     INITIAL VITALS: /64   Pulse 62   Temp 98.4 °F (36.9 °C) (Tympanic)   Resp 16   Wt 175 lb (79.4 kg)   SpO2 98%   BMI 30.04 kg/m²      Physical Exam  Vitals and nursing note reviewed. Constitutional:       Appearance: She is well-developed. HENT:      Head: Normocephalic and atraumatic. Eyes:      Pupils: Pupils are equal, round, and reactive to light. Cardiovascular:      Rate and Rhythm: Normal rate and regular rhythm. Heart sounds: Normal heart sounds. No murmur heard. Pulmonary:      Effort: Pulmonary effort is normal.      Breath sounds: Normal breath sounds. Abdominal:      General: Bowel sounds are normal.      Palpations: Abdomen is soft. Tenderness:  There is no abdominal tenderness. Musculoskeletal:         General: Normal range of motion. Cervical back: Normal range of motion. Right lower leg: Edema present. Left lower leg: Edema present. Comments: She has +1 pitting edema bilateral lower extremities pedal pulses are palpable. Skin:     General: Skin is warm and dry. Capillary Refill: Capillary refill takes less than 2 seconds. Neurological:      Mental Status: She is alert and oriented to person, place, and time. MEDICAL DECISION MAKING:     Patient with some swelling in her lower extremities she has been taking Aldactazide but she is still having some swelling to her lower extremities. On the emergency department we did check labs EKG chest x-ray shows a bit of fluid retention no pulmonary edema she does have some chf changes. She was given Lasix 20 IV she voided several times feels much better her legs are softer. I spoke with the cardiologist Dr. Gilberto Saeed recommends continue current medications avoid added salt elevate affected extremities and follow-up in the office in 1 to 2 days for recheck symptoms worsen please return to the emergency department  DIAGNOSTIC RESULTS     EKG: All EKG's are interpreted by the Emergency Department Physician who either signs or Co-signs this chart in the absence of acardiologist.        RADIOLOGY:[place] film, CT, MRI, and formal ultrasound images (except ED bedside ultrasound) are read by the radiologist and the images and interpretations are directly viewed by the emergency physician. LABS:All lab results were reviewed by myself, and all abnormals are listed below.   Labs Reviewed   CBC WITH AUTO DIFFERENTIAL - Abnormal; Notable for the following components:       Result Value    RBC 3.42 (*)     Hemoglobin 10.3 (*)     Hematocrit 32.5 (*)     All other components within normal limits   BASIC METABOLIC PANEL W/ REFLEX TO MG FOR LOW K - Abnormal; Notable for the following components:    BUN 7 (*)     CREATININE 1.03 (*)     Bun/Cre Ratio 7 (*)     GFR Non- 53 (*)     All other components within normal limits   BRAIN NATRIURETIC PEPTIDE - Abnormal; Notable for the following components:    Pro- (*)     All other components within normal limits   D-DIMER, QUANTITATIVE - Abnormal; Notable for the following components:    D-Dimer, Quant 2.09 (*)     All other components within normal limits         EMERGENCY DEPARTMENT COURSE:   Vitals:    Vitals:    10/29/21 1321 10/29/21 1459 10/29/21 1649   BP: 121/71 126/64    Pulse: 62 60 62   Resp: 16 16 16   Temp: 98.4 °F (36.9 °C)     TempSrc: Tympanic     SpO2: 98% 98%    Weight: 175 lb (79.4 kg)         The patient was given the following medications while in the emergency department:  Orders Placed This Encounter   Medications    furosemide (LASIX) injection 20 mg       -------------------------      CRITICAL CARE:    CONSULTS:  None    PROCEDURES:  Procedures    FINAL IMPRESSION      1. Leg swelling Stable         DISPOSITION/PLAN   DISPOSITION Decision To Discharge 10/29/2021 04:46:37 PM      PATIENT REFERREDTO:  TARA Khoury CNP  [place] [address]  [phone]    Schedule an appointment as soon as possible for a visit       [facility] ED  [address]  [address]  [phone]    If symptoms worsen      DISCHARGEMEDICATIONS:  Discharge Medication List as of 10/29/2021  4:50 PM          (Please note that portions of this note were completed with a voice recognition program.  Efforts were made to edit thedictations but occasionally words are mis-transcribed.)    [facility] ED  eMERGENCY dEPARTMENT eNCOUnter   Independent Attestation     Pt Name: Agustín Conley  MRN: [id number]  [place] 1950  Date of evaluation: 11/5/21      I was personally available for consultation in the Emergency Department.        Electronically signed by Elfida Ahumada, DO on 11/5/21 at 8:20 AM EDT  Attending Emergency  Physician                Cici Young PA-C  10/29/21 [address] [place] [address]  11/05/21 [id number]

## 2021-11-11 ENCOUNTER — OFFICE VISIT (OUTPATIENT)
Dept: PRIMARY CARE CLINIC | Age: 71
End: 2021-11-11
Payer: MEDICARE

## 2021-11-11 VITALS
WEIGHT: 170.6 LBS | HEART RATE: 59 BPM | OXYGEN SATURATION: 98 % | DIASTOLIC BLOOD PRESSURE: 58 MMHG | RESPIRATION RATE: 20 BRPM | TEMPERATURE: 97 F | BODY MASS INDEX: 29.28 KG/M2 | SYSTOLIC BLOOD PRESSURE: 94 MMHG

## 2021-11-11 DIAGNOSIS — I10 ESSENTIAL HYPERTENSION: Primary | ICD-10-CM

## 2021-11-11 DIAGNOSIS — R60.0 LOWER LEG EDEMA: ICD-10-CM

## 2021-11-11 PROCEDURE — G8427 DOCREV CUR MEDS BY ELIG CLIN: HCPCS | Performed by: NURSE PRACTITIONER

## 2021-11-11 PROCEDURE — 99214 OFFICE O/P EST MOD 30 MIN: CPT | Performed by: NURSE PRACTITIONER

## 2021-11-11 PROCEDURE — 1123F ACP DISCUSS/DSCN MKR DOCD: CPT | Performed by: NURSE PRACTITIONER

## 2021-11-11 PROCEDURE — G8417 CALC BMI ABV UP PARAM F/U: HCPCS | Performed by: NURSE PRACTITIONER

## 2021-11-11 PROCEDURE — G8484 FLU IMMUNIZE NO ADMIN: HCPCS | Performed by: NURSE PRACTITIONER

## 2021-11-11 PROCEDURE — 1036F TOBACCO NON-USER: CPT | Performed by: NURSE PRACTITIONER

## 2021-11-11 PROCEDURE — 4040F PNEUMOC VAC/ADMIN/RCVD: CPT | Performed by: NURSE PRACTITIONER

## 2021-11-11 PROCEDURE — 1090F PRES/ABSN URINE INCON ASSESS: CPT | Performed by: NURSE PRACTITIONER

## 2021-11-11 PROCEDURE — 3017F COLORECTAL CA SCREEN DOC REV: CPT | Performed by: NURSE PRACTITIONER

## 2021-11-11 PROCEDURE — G8399 PT W/DXA RESULTS DOCUMENT: HCPCS | Performed by: NURSE PRACTITIONER

## 2021-11-11 RX ORDER — SPIRONOLACTONE AND HYDROCHLOROTHIAZIDE 25; 25 MG/1; MG/1
1 TABLET ORAL DAILY
Qty: 90 TABLET | Refills: 3 | Status: SHIPPED | OUTPATIENT
Start: 2021-11-11 | End: 2022-01-11 | Stop reason: SDUPTHER

## 2021-11-11 ASSESSMENT — ENCOUNTER SYMPTOMS
COUGH: 0
SHORTNESS OF BREATH: 0
SORE THROAT: 0
CHEST TIGHTNESS: 0
NAUSEA: 0
ABDOMINAL PAIN: 0
CONSTIPATION: 0
WHEEZING: 0
VOMITING: 0
RHINORRHEA: 0
DIARRHEA: 0

## 2021-11-11 NOTE — PATIENT INSTRUCTIONS
Health Maintenance   Topic Date Due    COVID-19 Vaccine (3 - Booster for Moderna series) 09/27/2021    DTaP/Tdap/Td vaccine (1 - Tdap) 11/11/2021 (Originally 1/9/1969)    Diabetic foot exam  10/21/2022 (Originally 7/27/2021)    Shingles Vaccine (1 of 2) 10/21/2022 (Originally 1/9/2000)    Hepatitis C screen  10/21/2022 (Originally 1950)    Diabetic retinal exam  10/31/2022 (Originally 6/15/2018)    Annual Wellness Visit (AWV)  01/29/2022    Breast cancer screen  02/26/2022    A1C test (Diabetic or Prediabetic)  07/27/2022    Lipid screen  07/27/2022    Potassium monitoring  10/29/2022    Creatinine monitoring  10/29/2022    Colon cancer screen colonoscopy  03/23/2025    DEXA (modify frequency per FRAX score)  Completed    Flu vaccine  Completed    Pneumococcal 65+ years Vaccine  Completed    Hepatitis A vaccine  Aged Out    Hib vaccine  Aged Out    Meningococcal (ACWY) vaccine  Aged Out             (applicable per patient's age: Cancer Screenings, Depression Screening, Fall Risk Screening, Immunizations)    Hemoglobin A1C (%)   Date Value   07/27/2021 5.6   01/19/2021 5.8   08/05/2020 5.8     Microalb/Crt.  Ratio (mcg/mg creat)   Date Value   07/28/2021 9     LDL Cholesterol (mg/dL)   Date Value   07/27/2021 74     AST (U/L)   Date Value   07/27/2021 6     ALT (U/L)   Date Value   07/27/2021 <5 (L)     BUN (mg/dL)   Date Value   10/29/2021 7 (L)      (goal A1C is < 7)   (goal LDL is <100) need 30-50% reduction from baseline     BP Readings from Last 3 Encounters:   11/11/21 (!) 94/58   10/29/21 126/64   10/21/21 122/74    (goal /80)      All Future Testing planned in [facility]:  Lab Frequency Next Occurrence   Basic Metabolic Panel Once 32/53/8422   Vitamin D 25 Hydroxy Once 11/08/2021   PTH, INTACT WITH IONIZED CALCIUM Once 11/08/2021   Creatinine, Random Urine Once 11/08/2021   Protein, urine, random Once 11/08/2021       Next Visit Date:  Future Appointments   Date Time Provider Tara Mtz   11/11/2021  8:40 AM Nile Ply Might, APRN - CNP [facility]   12/8/2021 11:30 AM John Peraza MD [facility] None   1/31/2022  2:00 PM Nile Ply Might, APRN - CNP [facility]   2/23/2022 11:00 AM Vickie Davies MD [facility]   4/27/2022 11:00 AM Nile Ply Might, APRN - CNP [facility]            Patient Active Problem List:     Hypokalemia     Type 2 diabetes mellitus with diabetic nephropathy, without long-term current use of insulin ([facility])     Hyperlipidemia     Lower leg edema     Gastroesophageal reflux disease     Mild intermittent asthma without complication     Postmenopausal     Osteopenia determined by x-ray     Chronic midline low back pain without sciatica     Calcaneal spur of left foot     Iron deficiency anemia     Recurrent UTI     ACS (acute coronary syndrome) ([facility])     Abnormal cardiovascular stress test     CKD (chronic kidney disease), stage II     Resistant hypertension     Hyperkalemia     Mild CAD

## 2021-11-11 NOTE — PROGRESS NOTES
Name: Hiren Brooks  : 1950         Chief Complaint:     Chief Complaint   Patient presents with    Edema     ED f/u edema, right lower leg and pain in ankle area. States having trouble walking and using a walker at times       History of Present Illness:      Hiren Brooks is a 70 y.o.  female who presents with Edema (ED f/u edema, right lower leg and pain in ankle area. States having trouble walking and using a walker at times)      Osmin Golhilario is here today for a routine office visit. Lower leg edema-worsening, upon medication review it was noted that her cardiologist changed her spironolactone to spironolactone/hydrochlorothiazide in August.  She did fill the medication once but has not been taking for approximately 1 month. Her legs are swelling. UPDATE 2021-unfortunately she continues to have lower leg edema and low blood pressure at this point. She does not believe she is taking spironolactone/hydrochlorothiazide. We will need to double check with the pharmacy. See below for further comments. Hypertension  This is a chronic problem. The current episode started more than 1 year ago. The problem is unchanged. The problem is controlled. Associated symptoms include peripheral edema. Pertinent negatives include no chest pain, headaches, neck pain, palpitations or shortness of breath. There are no associated agents to hypertension. Risk factors for coronary artery disease include dyslipidemia, family history, obesity, post-menopausal state, stress and sedentary lifestyle. Past treatments include diuretics, beta blockers and direct vasodilators. The current treatment provides moderate improvement. Compliance problems include exercise. Hypertensive end-organ damage includes CAD/MI. There is no history of kidney disease or CVA. There is no history of chronic renal disease.          Past Medical History:     Past Medical History:   Diagnosis Date    Arthritis     Asthma     Chronic bronchitis ([facility] [address]     Diabetes mellitus ([facility] [address]     GERD (gastroesophageal reflux disease)     H/O echocardiogram 3/9/16    EF >60%. LV wall thickness is mildly increased. Mild mitral and tricuspid regurgitation. Borderline pulmonary hypertension estimated right ventricular sysolic pressure of 83EEOX. Mild (grade l) diastolic dysfunction.  History of PFTs 3/10/16     Suboptimal effort. Restrictive in nature. clionical correlations is advised.  History of stress test 16    Abnoraml. Moderate perfusion defect of mild to moderate intensity in the anterolateral and anteroapical regions during stress imaging. Intermediate risk for CAD.  Hyperlipidemia     Hypertension       Reviewed all health maintenance requirements and ordered appropriate tests  Health Maintenance Due   Topic Date Due    COVID-19 Vaccine (3 - Booster for Moderna series) 2021       Past Surgical History:     Past Surgical History:   Procedure Laterality Date    BREAST SURGERY      cyst removed    CARDIAC CATHETERIZATION      CARDIAC CATHETERIZATION Left 2019    Right Radial/[employer]/     SECTION      CHOLECYSTECTOMY      COLONOSCOPY  3/23/15    -diverticulosis,hemorrhoids    FOOT SURGERY      rt    HYSTERECTOMY          Medications:       Prior to Admission medications    Medication Sig Start Date End Date Taking?  Authorizing Provider   spironolactone-hydroCHLOROthiazide (ALDACTAZIDE) 25-25 MG per tablet Take 1 tablet by mouth daily 21  Yes Marcela Barbosa Might, APRN - CNP   ascorbic acid (VITAMIN C) 500 MG tablet Take 1 tablet by mouth 2 times daily With iron tablet 21  Yes Marcela Barbosa Might, APRN - CNP   atenolol (TENORMIN) 100 MG tablet Take 1 tablet by mouth daily 21  Yes Marcela Barbosa Might, APRN - CNP   atorvastatin (LIPITOR) 40 MG tablet Take 1 tablet by mouth daily 21  Yes Marcela Barbosa Might, APRN - CNP   hydrALAZINE (APRESOLINE) 50 MG tablet Take 1 tablet by mouth 3 times daily 4/12/21  Yes TARA Carmona CNP   metFORMIN (GLUCOPHAGE) 500 MG tablet Take 1 tablet by mouth 2 times daily (with meals) 4/12/21  Yes TARA Carmona CNP   valsartan (DIOVAN) 320 MG tablet Take 1 tablet by mouth daily 4/12/21  Yes TARA Carmona CNP   potassium chloride (KLOR-CON M) 20 MEQ extended release tablet Take 1 tablet by mouth 2 times daily 4/12/21  Yes TARA Camrona CNP   albuterol sulfate HFA (VENTOLIN HFA) 108 (90 Base) MCG/ACT inhaler Inhale 2 puffs into the lungs 4 times daily as needed for Wheezing 1/19/21  Yes TARA Carmona CNP   cetirizine (ZYRTEC ALLERGY) 10 MG tablet Take 1 tablet by mouth daily 12/16/20  Yes TARA Carmona CNP   dorzolamide-timolol (COSOPT) 22.3-6.8 MG/ML ophthalmic solution Place 1 drop into both eyes 2 times daily  9/20/20  Yes Historical Provider, MD   latanoprost (XALATAN) 0.005 % ophthalmic solution Place 1 drop into both eyes nightly    Yes Historical Provider, MD   Calcium Carbonate-Vitamin D (OYSTER SHELL CALCIUM/D) 500-200 MG-UNIT TABS Take 1 tablet by mouth 2 times daily 4/12/21   TARA Carmona CNP   vitamin D (CHOLECALCIFEROL) 51549 UNIT CAPS Take 1 capsule by mouth once a week for 8 doses 2/24/21 10/21/21  Tramaine Benjamin MD   Multiple Vitamins-Minerals (THERAPEUTIC MULTIVITAMIN-MINERALS) tablet Take 1 tablet by mouth daily 8/6/20 10/21/21  TARA Carmona CNP   blood glucose test strips (ONE TOUCH ULTRA TEST) strip USE 1 STRIP TO CHECK GLUCOSE DAILY 3/13/19   TARA Carmona CNP   ONE TOUCH LANCETS MISC Lancets- One Touch Lorena Bones for testing daily and as needed. DX: Diabetes type  E11.9 1/31/19   TARA Carmona CNP   Blood Glucose Monitoring Suppl GENNY 1 Units by Does not apply route 3 times daily What insurance will cover 10/11/16   Nabil Litter, APRN - CNP        Allergies: Iv dye [iodides]    Social History:     Tobacco:    reports that she has never smoked.  She has never used smokeless tobacco.  Alcohol:      reports no history of alcohol use. Drug Use:  reports no history of drug use. Family History:     Family History   Problem Relation Age of Onset   [facility] Stroke Father     Stroke Sister     Diabetes Sister         x6 sisters    High Blood Pressure Sister     Stroke Brother     Diabetes Brother        Review of Systems:     Positive and Negative as described in HPI    Review of Systems   Constitutional: Negative for chills, fatigue, fever and weight gain. HENT: Negative for congestion, rhinorrhea and sore throat. Eyes: Negative for visual disturbance. Respiratory: Negative for cough, chest tightness, shortness of breath and wheezing. Cardiovascular: Positive for leg swelling. Negative for chest pain and palpitations. Gastrointestinal: Negative for abdominal pain, constipation, diarrhea, nausea and vomiting. Endocrine: Negative for polydipsia, polyphagia and polyuria. Genitourinary: Negative for difficulty urinating and dysuria. Musculoskeletal: Negative for gait problem, muscle weakness, neck pain and neck stiffness. Skin: Negative for rash. Neurological: Negative for dizziness, syncope, light-headedness and headaches. Physical Exam:   Vitals:  BP (!) 94/58 (Site: Left Upper Arm, Position: Sitting, Cuff Size: Large Adult)   Pulse 59   Temp 97 °F (36.1 °C) (Temporal)   Resp 20   Wt 170 lb 9.6 oz (77.4 kg)   SpO2 98%   BMI 29.28 kg/m²     Physical Exam  Vitals and nursing note reviewed. Constitutional:       General: She is not in acute distress. Appearance: Normal appearance. She is well-developed. She is not ill-appearing. HENT:      Mouth/Throat:      Mouth: Mucous membranes are moist.   Eyes:      General: No scleral icterus. Conjunctiva/sclera: Conjunctivae normal.   Cardiovascular:      Rate and Rhythm: Normal rate and regular rhythm. Heart sounds: No murmur heard.       Pulmonary:      Effort: Pulmonary effort is normal.      Breath sounds: Normal breath sounds. No wheezing. Abdominal:      General: Bowel sounds are normal. There is no distension. Palpations: Abdomen is soft. Tenderness: There is no abdominal tenderness. Musculoskeletal:      Cervical back: Normal range of motion and neck supple. Right lower leg: Edema (trace to 1+ pitting) present. Left lower leg: Edema (trace to 1+ pitting) present. Skin:     General: Skin is warm and dry. Findings: No rash. Neurological:      Mental Status: She is alert and oriented to person, place, and time. Psychiatric:         Mood and Affect: Mood normal.         Behavior: Behavior normal.         Data:     Lab Results   Component Value Date     10/29/2021    K 3.9 10/29/2021     10/29/2021    CO2 25 10/29/2021    BUN 7 10/29/2021    CREATININE 1.03 10/29/2021    GLUCOSE 90 10/29/2021    GLUCOSE 110 03/26/2012    PROT 7.3 10/26/2020    LABALBU 4.0 10/26/2020    BILITOT 0.25 10/26/2020    ALKPHOS 131 10/26/2020    AST 6 07/27/2021    ALT <5 07/27/2021     Lab Results   Component Value Date    WBC 6.5 10/29/2021    RBC 3.42 10/29/2021    RBC 3.85 03/26/2012    HGB 10.3 10/29/2021    HCT 32.5 10/29/2021    MCV 95.0 10/29/2021    MCH 30.1 10/29/2021    MCHC 31.7 10/29/2021    RDW 12.1 10/29/2021     10/29/2021     03/26/2012    MPV 8.8 10/29/2021     Lab Results   Component Value Date    TSH 2.25 10/22/2020     Lab Results   Component Value Date    CHOL 138 07/27/2021    HDL 53 07/27/2021    LABA1C 5.6 07/27/2021       Assessment/Plan:      Diagnosis Orders   1. Essential hypertension  spironolactone-hydroCHLOROthiazide (ALDACTAZIDE) 25-25 MG per tablet   2. Lower leg edema  spironolactone-hydroCHLOROthiazide (ALDACTAZIDE) 25-25 MG per tablet     To have her stop amlodipine at this point. We will make sure she has spironolactone/hydrochlorothiazide. We will recheck blood pressure in 1 week.       1.  Jesus Avina received counseling on the following healthy behaviors: nutrition, exercise and medication adherence  2. Patient given educational materials - see patient instructions  3. Was a self-tracking handout given in paper form or via [employer]? No  If yes, see orders or list here. 4.  Discussed use, benefit, and side effects of prescribed medications. Barriers to medication compliance addressed. All patient questions answered. Pt voiced understanding. 5.  Reviewed prior labs and health maintenance  6. Continue current medications, diet and exercise. Completed Refills   Requested Prescriptions     Signed Prescriptions Disp Refills    spironolactone-hydroCHLOROthiazide (ALDACTAZIDE) 25-25 MG per tablet 90 tablet 3     Sig: Take 1 tablet by mouth daily         Return if symptoms worsen or fail to improve.

## 2021-12-20 ENCOUNTER — TELEPHONE (OUTPATIENT)
Dept: PRIMARY CARE CLINIC | Age: 71
End: 2021-12-20

## 2021-12-20 ENCOUNTER — OFFICE VISIT (OUTPATIENT)
Dept: PRIMARY CARE CLINIC | Age: 71
End: 2021-12-20
Payer: MEDICARE

## 2021-12-20 VITALS
HEART RATE: 66 BPM | SYSTOLIC BLOOD PRESSURE: 154 MMHG | TEMPERATURE: 96.4 F | RESPIRATION RATE: 18 BRPM | WEIGHT: 171.4 LBS | OXYGEN SATURATION: 98 % | BODY MASS INDEX: 29.26 KG/M2 | DIASTOLIC BLOOD PRESSURE: 93 MMHG | HEIGHT: 64 IN

## 2021-12-20 DIAGNOSIS — J01.40 ACUTE NON-RECURRENT PANSINUSITIS: Primary | ICD-10-CM

## 2021-12-20 PROCEDURE — 1090F PRES/ABSN URINE INCON ASSESS: CPT | Performed by: NURSE PRACTITIONER

## 2021-12-20 PROCEDURE — 4040F PNEUMOC VAC/ADMIN/RCVD: CPT | Performed by: NURSE PRACTITIONER

## 2021-12-20 PROCEDURE — 1123F ACP DISCUSS/DSCN MKR DOCD: CPT | Performed by: NURSE PRACTITIONER

## 2021-12-20 PROCEDURE — G8484 FLU IMMUNIZE NO ADMIN: HCPCS | Performed by: NURSE PRACTITIONER

## 2021-12-20 PROCEDURE — G8399 PT W/DXA RESULTS DOCUMENT: HCPCS | Performed by: NURSE PRACTITIONER

## 2021-12-20 PROCEDURE — G8427 DOCREV CUR MEDS BY ELIG CLIN: HCPCS | Performed by: NURSE PRACTITIONER

## 2021-12-20 PROCEDURE — G8417 CALC BMI ABV UP PARAM F/U: HCPCS | Performed by: NURSE PRACTITIONER

## 2021-12-20 PROCEDURE — 99213 OFFICE O/P EST LOW 20 MIN: CPT | Performed by: NURSE PRACTITIONER

## 2021-12-20 PROCEDURE — 1036F TOBACCO NON-USER: CPT | Performed by: NURSE PRACTITIONER

## 2021-12-20 PROCEDURE — 3017F COLORECTAL CA SCREEN DOC REV: CPT | Performed by: NURSE PRACTITIONER

## 2021-12-20 RX ORDER — FLUTICASONE PROPIONATE 50 MCG
2 SPRAY, SUSPENSION (ML) NASAL DAILY
Qty: 48 G | Refills: 1 | Status: SHIPPED | OUTPATIENT
Start: 2021-12-20

## 2021-12-20 RX ORDER — AMOXICILLIN AND CLAVULANATE POTASSIUM 875; 125 MG/1; MG/1
1 TABLET, FILM COATED ORAL 2 TIMES DAILY
Qty: 20 TABLET | Refills: 0 | Status: SHIPPED | OUTPATIENT
Start: 2021-12-20 | End: 2021-12-30

## 2021-12-20 ASSESSMENT — ENCOUNTER SYMPTOMS
SORE THROAT: 0
SHORTNESS OF BREATH: 0
RHINORRHEA: 1
WHEEZING: 0
COUGH: 1
EYES NEGATIVE: 1
GASTROINTESTINAL NEGATIVE: 1

## 2021-12-20 NOTE — PROGRESS NOTES
[facility]  [address] [facility]  Dept: [phone]  Dept Fax: [phone]    Gilbert Shepard is a 70 y.o. female who presents to the [facility] in Care today for her medical conditions/complaints as noted below. Gilbert Shepard is c/o of Cough (x2 days) and Congestion (x2 days )      HPI:    Gilbert Shepard is a 70 y.o. female who presents with  Cough  This is a new problem. Episode onset: 2 days. The problem has been gradually worsening. The cough is productive of purulent sputum. Associated symptoms include nasal congestion, postnasal drip and rhinorrhea. Pertinent negatives include no ear pain, fever, sore throat, shortness of breath or wheezing. She has tried nothing for the symptoms. Her past medical history is significant for asthma and environmental allergies. Past Medical History:   Diagnosis Date    Arthritis     Asthma     Chronic bronchitis ([facility] [address]     Diabetes mellitus ([facility] [address]     GERD (gastroesophageal reflux disease)     H/O echocardiogram 3/9/16    EF >60%. LV wall thickness is mildly increased. Mild mitral and tricuspid regurgitation. Borderline pulmonary hypertension estimated right ventricular sysolic pressure of 20GNTR. Mild (grade l) diastolic dysfunction.  History of PFTs 3/10/16     Suboptimal effort. Restrictive in nature. clionical correlations is advised.  History of stress test 2/18/16    Abnoraml. Moderate perfusion defect of mild to moderate intensity in the anterolateral and anteroapical regions during stress imaging. Intermediate risk for CAD.     Hyperlipidemia     Hypertension         Current Outpatient Medications   Medication Sig Dispense Refill    amoxicillin-clavulanate (AUGMENTIN) 875-125 MG per tablet Take 1 tablet by mouth 2 times daily for 10 days 20 tablet 0    fluticasone (FLONASE) 50 MCG/ACT nasal spray 2 sprays by Each Nostril route daily 48 g 1    spironolactone-hydroCHLOROthiazide (ALDACTAZIDE) 25-25 MG per tablet Take 1 tablet by mouth daily 90 tablet 3    ascorbic acid (VITAMIN C) 500 MG tablet Take 1 tablet by mouth 2 times daily With iron tablet 180 tablet 3    atenolol (TENORMIN) 100 MG tablet Take 1 tablet by mouth daily 90 tablet 3    atorvastatin (LIPITOR) 40 MG tablet Take 1 tablet by mouth daily 90 tablet 3    Calcium Carbonate-Vitamin D (OYSTER SHELL CALCIUM/D) 500-200 MG-UNIT TABS Take 1 tablet by mouth 2 times daily 180 tablet 3    hydrALAZINE (APRESOLINE) 50 MG tablet Take 1 tablet by mouth 3 times daily 270 tablet 3    metFORMIN (GLUCOPHAGE) 500 MG tablet Take 1 tablet by mouth 2 times daily (with meals) 180 tablet 3    valsartan (DIOVAN) 320 MG tablet Take 1 tablet by mouth daily 90 tablet 3    potassium chloride (KLOR-CON M) 20 MEQ extended release tablet Take 1 tablet by mouth 2 times daily 180 tablet 3    albuterol sulfate HFA (VENTOLIN HFA) 108 (90 Base) MCG/ACT inhaler Inhale 2 puffs into the lungs 4 times daily as needed for Wheezing 1 Inhaler 0    cetirizine (ZYRTEC ALLERGY) 10 MG tablet Take 1 tablet by mouth daily 30 tablet 3    dorzolamide-timolol (COSOPT) 22.3-6.8 MG/ML ophthalmic solution Place 1 drop into both eyes 2 times daily       latanoprost (XALATAN) 0.005 % ophthalmic solution Place 1 drop into both eyes nightly       ONE TOUCH LANCETS MISC Lancets- One Touch Delica for testing daily and as needed.  DX: Diabetes type  E11.9 100 each 3    Blood Glucose Monitoring Suppl GENNY 1 Units by Does not apply route 3 times daily What insurance will cover 1 Device 0    vitamin D (CHOLECALCIFEROL) 38617 UNIT CAPS Take 1 capsule by mouth once a week for 8 doses 8 capsule 0    Multiple Vitamins-Minerals (THERAPEUTIC MULTIVITAMIN-MINERALS) tablet Take 1 tablet by mouth daily 30 tablet 11    blood glucose test strips (ONE TOUCH ULTRA TEST) strip USE 1 STRIP TO CHECK GLUCOSE DAILY 100 strip 3     No current facility-administered medications for this visit. Allergies   Allergen Reactions    Iv Dye [Iodides] Nausea And Vomiting       Subjective:      Review of Systems   Constitutional: Negative. Negative for appetite change, fatigue and fever. HENT: Positive for congestion, postnasal drip and rhinorrhea. Negative for ear pain and sore throat. Eyes: Negative. Respiratory: Positive for cough. Negative for shortness of breath and wheezing. Cardiovascular: Negative. Gastrointestinal: Negative. Endocrine: Negative. Genitourinary: Negative. Musculoskeletal: Negative. Skin: Negative. Allergic/Immunologic: Positive for environmental allergies. Neurological: Negative. Hematological: Negative. Psychiatric/Behavioral: Negative. Objective:     Physical Exam  Vitals and nursing note reviewed. Constitutional:       Appearance: Normal appearance. HENT:      Head: Normocephalic. Right Ear: Tympanic membrane normal.      Left Ear: Tympanic membrane normal.      Nose: Congestion and rhinorrhea present. Rhinorrhea is clear. Right Turbinates: Swollen. Left Turbinates: Swollen (turbinates boggy). Right Sinus: Maxillary sinus tenderness and frontal sinus tenderness present. Left Sinus: Maxillary sinus tenderness and frontal sinus tenderness present. Mouth/Throat:      Lips: Pink. Mouth: Mucous membranes are moist.      Pharynx: Oropharynx is clear. Eyes:      Conjunctiva/sclera: Conjunctivae normal.      Pupils: Pupils are equal, round, and reactive to light. Cardiovascular:      Rate and Rhythm: Normal rate and regular rhythm. Heart sounds: Normal heart sounds. Pulmonary:      Effort: Pulmonary effort is normal.      Breath sounds: Normal breath sounds. Musculoskeletal:         General: Normal range of motion. Cervical back: Normal range of motion. Lymphadenopathy:      Cervical: No cervical adenopathy.    Skin:     General: Skin is warm.      Capillary Refill: Capillary refill takes less than 2 seconds. Neurological:      General: No focal deficit present. Mental Status: She is alert and oriented to person, place, and time. Psychiatric:         Mood and Affect: Mood normal.       BP (!) 154/93 (Position: Sitting)   Pulse 66   Temp 96.4 °F (35.8 °C) (Temporal)   Resp 18   Ht 5' 4\" (1.626 m)   Wt 171 lb 6.4 oz (77.7 kg)   SpO2 98%   BMI 29.42 kg/m²     Assessment:      Diagnosis Orders   1. Acute non-recurrent pansinusitis       No results found for this visit on 21. Plan:     Exam findings and plan of care discussed at bedside including:    · Start Augmentin  today; discussed administration and side effects. I have encouraged to take with a probiotic and food to descrease side effects. Examples of probiotics discussed. · Flonase as prescribed. · Supportive management discussed including: Encouraged to increase fluids and rest, Mucinex/Guaifenesin OTC as directed on package, Nasal saline spray OTC every couple of hours for nasal congestion, Warm facial packs applied to face for 5 to 10 minutes, 3 times per day. Aleve/Ibuprofen/Tylenol OTC PRN for pain, discomfort or fever  · Written instructions provided with AVS.      · To ER or call 911 if any difficulty breathing, shortness of breath, inability to swallow, hives, rash, facial/tongue swelling or temp greater than 103 degrees. · Follow up as needed with PCP if symptoms worsen or do not improve        No follow-ups on file.     Orders Placed This Encounter   Medications    amoxicillin-clavulanate (AUGMENTIN) 875-125 MG per tablet     Sig: Take 1 tablet by mouth 2 times daily for 10 days     Dispense:  20 tablet     Refill:  0    fluticasone (FLONASE) 50 MCG/ACT nasal spray     Si sprays by Each Nostril route daily     Dispense:  48 g     Refill:  1        Electronically signed by TARA Rebolledo CNP on 2021 at 1:19 PM

## 2021-12-20 NOTE — PATIENT INSTRUCTIONS
SURVEY:     You may be receiving a survey from [employer] regarding your visit today. Please complete the survey to enable us to provide the highest quality of care to you and your family. If you cannot score us a very good on any question, please call the office to discuss how we could have made your experience a very good one. Thank you. Demi Bullock, APRN-SHELL Mccarthyve Bhupinder, CNP  Ilia Burgos, LPN  Haley Dhaliwal, LPN  Lavon Costa, CINDY Abdalla, NEY  [place]ma, CMA  [place], PCA    Patient Education        Sinusitis: Care Instructions  Your Care Instructions     Sinusitis is an infection of the lining of the sinus cavities in your head. Sinusitis often follows a cold. It causes pain and pressure in your head and face. In most cases, sinusitis gets better on its own in 1 to 2 weeks. But some mild symptoms may last for several weeks. Sometimes antibiotics are needed. Follow-up care is a key part of your treatment and safety. Be sure to make and go to all appointments, and call your doctor if you are having problems. It's also a good idea to know your test results and keep a list of the medicines you take. How can you care for yourself at home? · Take an over-the-counter pain medicine, such as acetaminophen (Tylenol), ibuprofen (Advil, Motrin), or naproxen (Aleve). Read and follow all instructions on the label. · If the doctor prescribed antibiotics, take them as directed. Do not stop taking them just because you feel better. You need to take the full course of antibiotics. · Be careful when taking over-the-counter cold or flu medicines and Tylenol at the same time. Many of these medicines have acetaminophen, which is Tylenol. Read the labels to make sure that you are not taking more than the recommended dose. Too much acetaminophen (Tylenol) can be harmful. · Breathe warm, moist air from a steamy shower, a hot bath, or a sink filled with hot water. Avoid cold, dry air. Using a humidifier in your home may help.  Follow the directions for cleaning the machine. · Use saline (saltwater) nasal washes. This can help keep your nasal passages open and wash out mucus and bacteria. You can buy saline nose drops at a grocery store or drugstore. Or you can make your own at home by adding 1 teaspoon of salt and 1 teaspoon of baking soda to 2 cups of distilled water. If you make your own, fill a bulb syringe with the solution, insert the tip into your nostril, and squeeze gently. Gayleen Poke your nose. · Put a hot, wet towel or a warm gel pack on your face 3 or 4 times a day for 5 to 10 minutes each time. · Try a decongestant nasal spray like oxymetazoline (Afrin). Do not use it for more than 3 days in a row. Using it for more than 3 days can make your congestion worse. When should you call for help? Call your doctor now or seek immediate medical care if:    · You have new or worse swelling or redness in your face or around your eyes.     · You have a new or higher fever. Watch closely for changes in your health, and be sure to contact your doctor if:    · You have new or worse facial pain.     · The mucus from your nose becomes thicker (like pus) or has new blood in it.     · You are not getting better as expected. Where can you learn more? Go to https://Crowd[employer]unes[employer]Body [employer] Souleb.Peek. org and sign in to your [employer] account. Enter B975 in the [facility] box to learn more about \"Sinusitis: Care Instructions. \"     If you do not have an account, please click on the \"Sign Up Now\" link. Current as of: December 2, 2020               Content Version: 13.0  © 4864-1457 Healthwise, Incorporated. Care instructions adapted under license by [facility] ([facility]). If you have questions about a medical condition or this instruction, always ask your healthcare professional. [address] any warranty or liability for your use of this information.

## 2021-12-20 NOTE — TELEPHONE ENCOUNTER
----- Message from Miri Braeden sent at 12/20/2021 11:12 AM EST -----  Subject: Appointment Request    Reason for Call: Semi-Routine Cough, Cold Symptoms    QUESTIONS  Type of Appointment? Established Patient  Reason for appointment request? No appointments available during search  Additional Information for Provider? Has a cough and cold, coughing up   mucus. Would like to be seen. ---------------------------------------------------------------------------  --------------  [employer] INFO  What is the best way for the office to contact you? OK to leave message on   voicemail  Preferred Call Back Phone Number? [phone]  ---------------------------------------------------------------------------  --------------  SCRIPT ANSWERS  Relationship to Patient? Other  Representative Name? -  Additional information verified (besides Name and Date of Birth)? Address  Are you currently unable to finish sentences due to any difficulty   breathing? No  Are you unable to swallow liquids? No  Are you having fevers (100.4 or greater), chills, or sweats? No  Do you have COPD, asthma or a chronic lung condition? No  Have your symptoms been present for more than 5 days? No  Have you recently (14 days) been seen by a provider for this issue? No  Have you been diagnosed with, awaiting test results for, or told that you   are suspected of having COVID-19 (Coronavirus)? (If patient has tested   negative or was tested as a requirement for work, school, or travel and   not based on symptoms, answer no)? No  Within the past two weeks have you developed any of the following symptoms   (answer no if symptoms have been present longer than 2 weeks or began   more than 2 weeks ago)?  Fever or Chills, Cough, Shortness of breath or   difficulty breathing, Loss of taste or smell, Sore throat, Nasal   congestion, Sneezing or runny nose, Fatigue or generalized body aches   (answer no if pain is specific to a body part e.g. back pain), Diarrhea,   Headache?  Yes

## 2022-01-11 ENCOUNTER — NURSE ONLY (OUTPATIENT)
Dept: PRIMARY CARE CLINIC | Age: 72
End: 2022-01-11

## 2022-01-11 VITALS
DIASTOLIC BLOOD PRESSURE: 78 MMHG | HEART RATE: 85 BPM | SYSTOLIC BLOOD PRESSURE: 154 MMHG | OXYGEN SATURATION: 96 % | WEIGHT: 165.7 LBS | BODY MASS INDEX: 28.44 KG/M2 | TEMPERATURE: 97.2 F | RESPIRATION RATE: 20 BRPM

## 2022-01-11 DIAGNOSIS — E78.2 MIXED HYPERLIPIDEMIA: ICD-10-CM

## 2022-01-11 DIAGNOSIS — I10 ESSENTIAL HYPERTENSION: ICD-10-CM

## 2022-01-11 DIAGNOSIS — R60.0 LOWER LEG EDEMA: ICD-10-CM

## 2022-01-11 RX ORDER — ATORVASTATIN CALCIUM 40 MG/1
40 TABLET, FILM COATED ORAL DAILY
Qty: 90 TABLET | Refills: 3 | Status: SHIPPED | OUTPATIENT
Start: 2022-01-11 | End: 2022-06-21

## 2022-01-11 RX ORDER — SPIRONOLACTONE AND HYDROCHLOROTHIAZIDE 25; 25 MG/1; MG/1
1 TABLET ORAL DAILY
Qty: 90 TABLET | Refills: 3 | Status: SHIPPED | OUTPATIENT
Start: 2022-01-11 | End: 2022-06-28 | Stop reason: SDUPTHER

## 2022-01-11 RX ORDER — ATENOLOL 100 MG/1
100 TABLET ORAL DAILY
Qty: 90 TABLET | Refills: 3 | Status: SHIPPED | OUTPATIENT
Start: 2022-01-11 | End: 2022-06-21

## 2022-01-11 RX ORDER — HYDRALAZINE HYDROCHLORIDE 50 MG/1
50 TABLET, FILM COATED ORAL 3 TIMES DAILY
Qty: 270 TABLET | Refills: 3 | Status: SHIPPED | OUTPATIENT
Start: 2022-01-11 | End: 2022-06-21

## 2022-01-11 RX ORDER — AMLODIPINE BESYLATE 5 MG/1
5 TABLET ORAL DAILY
Qty: 90 TABLET | Refills: 3 | Status: SHIPPED | OUTPATIENT
Start: 2022-01-11 | End: 2022-06-21

## 2022-01-11 RX ORDER — VALSARTAN 320 MG/1
320 TABLET ORAL DAILY
Qty: 90 TABLET | Refills: 3 | Status: SHIPPED | OUTPATIENT
Start: 2022-01-11 | End: 2022-06-21

## 2022-01-11 NOTE — PROGRESS NOTES
Here for BP check, states has been out of [facility]. States feels her heart either beating fast of real slow. Also states her vision is not been right for 1 weeks. Eusebio Might CNP notified. Patient to go home and bring all pill bottles back to office.

## 2022-01-11 NOTE — PROGRESS NOTES
Patient returned with pill bottles and medications checked. Eusebio Bullock CNP notified of all meds and need for refills. Medications reordered and new medication list given to patient. Patient instructed to call office in 2 weeks with BP reading.

## 2022-01-31 ENCOUNTER — OFFICE VISIT (OUTPATIENT)
Dept: PRIMARY CARE CLINIC | Age: 72
End: 2022-01-31
Payer: MEDICARE

## 2022-01-31 VITALS
HEART RATE: 82 BPM | OXYGEN SATURATION: 97 % | SYSTOLIC BLOOD PRESSURE: 122 MMHG | WEIGHT: 163.4 LBS | HEIGHT: 64 IN | BODY MASS INDEX: 27.9 KG/M2 | RESPIRATION RATE: 20 BRPM | TEMPERATURE: 97.7 F | DIASTOLIC BLOOD PRESSURE: 70 MMHG

## 2022-01-31 DIAGNOSIS — E11.21 TYPE 2 DIABETES MELLITUS WITH DIABETIC NEPHROPATHY, WITHOUT LONG-TERM CURRENT USE OF INSULIN (HCC): ICD-10-CM

## 2022-01-31 DIAGNOSIS — N18.30 STAGE 3 CHRONIC KIDNEY DISEASE, UNSPECIFIED WHETHER STAGE 3A OR 3B CKD (HCC): ICD-10-CM

## 2022-01-31 DIAGNOSIS — Z00.00 ROUTINE GENERAL MEDICAL EXAMINATION AT A HEALTH CARE FACILITY: Primary | ICD-10-CM

## 2022-01-31 DIAGNOSIS — K21.00 GASTROESOPHAGEAL REFLUX DISEASE WITH ESOPHAGITIS WITHOUT HEMORRHAGE: ICD-10-CM

## 2022-01-31 PROCEDURE — 3046F HEMOGLOBIN A1C LEVEL >9.0%: CPT | Performed by: NURSE PRACTITIONER

## 2022-01-31 PROCEDURE — G0439 PPPS, SUBSEQ VISIT: HCPCS | Performed by: NURSE PRACTITIONER

## 2022-01-31 PROCEDURE — 4040F PNEUMOC VAC/ADMIN/RCVD: CPT | Performed by: NURSE PRACTITIONER

## 2022-01-31 PROCEDURE — G8484 FLU IMMUNIZE NO ADMIN: HCPCS | Performed by: NURSE PRACTITIONER

## 2022-01-31 PROCEDURE — 1123F ACP DISCUSS/DSCN MKR DOCD: CPT | Performed by: NURSE PRACTITIONER

## 2022-01-31 PROCEDURE — 3017F COLORECTAL CA SCREEN DOC REV: CPT | Performed by: NURSE PRACTITIONER

## 2022-01-31 RX ORDER — PANTOPRAZOLE SODIUM 40 MG/1
40 TABLET, DELAYED RELEASE ORAL
Qty: 90 TABLET | Refills: 1 | Status: SHIPPED | OUTPATIENT
Start: 2022-01-31 | End: 2022-10-27 | Stop reason: ALTCHOICE

## 2022-01-31 ASSESSMENT — ENCOUNTER SYMPTOMS
SPUTUM PRODUCTION: 0
RHINORRHEA: 0
DIFFICULTY BREATHING: 0
CHEST TIGHTNESS: 0
ABDOMINAL PAIN: 0
HEARTBURN: 1
BLURRED VISION: 0
HEMOPTYSIS: 0
CHOKING: 0
FREQUENT THROAT CLEARING: 0
WATER BRASH: 0
GLOBUS SENSATION: 0
SHORTNESS OF BREATH: 0
BELCHING: 1
WHEEZING: 0
STRIDOR: 0
HOARSE VOICE: 0
SORE THROAT: 0
COUGH: 0
NAUSEA: 0

## 2022-01-31 ASSESSMENT — LIFESTYLE VARIABLES: HOW OFTEN DO YOU HAVE A DRINK CONTAINING ALCOHOL: 0

## 2022-01-31 ASSESSMENT — PATIENT HEALTH QUESTIONNAIRE - PHQ9
2. FEELING DOWN, DEPRESSED OR HOPELESS: 0
SUM OF ALL RESPONSES TO PHQ QUESTIONS 1-9: 0
1. LITTLE INTEREST OR PLEASURE IN DOING THINGS: 0
SUM OF ALL RESPONSES TO PHQ9 QUESTIONS 1 & 2: 0

## 2022-01-31 NOTE — PATIENT INSTRUCTIONS
SURVEY:     You may be receiving a survey from [facility] regarding your visit today. Please complete the survey to enable us to provide the highest quality of care to you and your family. If you cannot score us a very good on any question, please call the office to discuss how we could have made your experience a very good one. Thank you. Merced Bullock, APRN-SHELL  Jhon Bertram, SHELL  Kobybetzaida Sullivan, LPN  Debra Cortes, LPN  Francisca Loredo, NEY Brown, NEY Marquez, CMA  [place], PCA    Patient Education        Learning About Carbohydrate (Carb) Counting and Eating Out When You Have Diabetes  Why plan your meals? Meal planning can be a key part of managing diabetes. Planning meals and snacks with the right balance of carbohydrate, protein, and fat can help you keep your blood sugar at the target level you set with your doctor. You don't have to eat special foods. You can eat what your family eats, including sweets once in a while. But you do have to pay attention to how often you eat and how much you eat of certain foods. You may want to work with a dietitian or a certified diabetes educator. He or she can give you tips and meal ideas and can answer your questions about meal planning. This health professional can also help you reach a healthy weight if that is one of your goals. What should you know about eating carbs? Managing the amount of carbohydrate (carbs) you eat is an important part of healthy meals when you have diabetes. Carbohydrate is found in many foods. · Learn which foods have carbs. And learn the amounts of carbs in different foods. ? Bread, cereal, pasta, and rice have about 15 grams of carbs in a serving. A serving is 1 slice of bread (1 ounce), ½ cup of cooked cereal, or 1/3 cup of cooked pasta or rice. ? Fruits have 15 grams of carbs in a serving.  A serving is 1 small fresh fruit, such as an apple or orange; ½ of a banana; ½ cup of cooked or canned fruit; ½ cup of fruit juice; 1 cup of melon or raspberries; or 2 tablespoons of dried fruit. ? Milk and no-sugar-added yogurt have 15 grams of carbs in a serving. A serving is 1 cup of milk or 2/3 cup of no-sugar-added yogurt. ? Starchy vegetables have 15 grams of carbs in a serving. A serving is ½ cup of mashed potatoes or sweet potato; 1 cup winter squash; ½ of a small baked potato; ½ cup of cooked beans; or ½ cup cooked corn or green peas. · Learn how much carbs to eat each day and at each meal. A dietitian or CDE can teach you how to keep track of the amount of carbs you eat. This is called carbohydrate counting. · If you are not sure how to count carbohydrate grams, use the Plate Method to plan meals. It is a good, quick way to make sure that you have a balanced meal. It also helps you spread carbs throughout the day. ? Divide your plate by types of foods. Put non-starchy vegetables on half the plate, meat or other protein food on one-quarter of the plate, and a grain or starchy vegetable in the final quarter of the plate. To this you can add a small piece of fruit and 1 cup of milk or yogurt, depending on how many carbs you are supposed to eat at a meal.  · Try to eat about the same amount of carbs at each meal. Do not \"save up\" your daily allowance of carbs to eat at one meal.  · Proteins have very little or no carbs per serving. Examples of proteins are beef, chicken, turkey, fish, eggs, tofu, cheese, cottage cheese, and peanut butter. A serving size of meat is 3 ounces, which is about the size of a deck of cards. Examples of meat substitute serving sizes (equal to 1 ounce of meat) are 1/4 cup of cottage cheese, 1 egg, 1 tablespoon of peanut butter, and ½ cup of tofu. How can you eat out and still eat healthy? · Learn to estimate the serving sizes of foods that have carbohydrate. If you measure food at home, it will be easier to estimate the amount in a serving of restaurant food.   · If the meal you order has too much carbohydrate (such as potatoes, corn, or baked beans), ask to have a low-carbohydrate food instead. Ask for a salad or green vegetables. · If you use insulin, check your blood sugar before and after eating out to help you plan how much to eat in the future. · If you eat more carbohydrate at a meal than you had planned, take a walk or do other exercise. This will help lower your blood sugar. What are some tips for eating healthy? · Limit saturated fat, such as the fat from meat and dairy products. This is a healthy choice because people who have diabetes are at higher risk of heart disease. So choose lean cuts of meat and nonfat or low-fat dairy products. Use olive or canola oil instead of butter or shortening when cooking. · Don't skip meals. Your blood sugar may drop too low if you skip meals and take insulin or certain medicines for diabetes. · Check with your doctor before you drink alcohol. Alcohol can cause your blood sugar to drop too low. Alcohol can also cause a bad reaction if you take certain diabetes medicines. Follow-up care is a key part of your treatment and safety. Be sure to make and go to all appointments, and call your doctor if you are having problems. It's also a good idea to know your test results and keep a list of the medicines you take. Where can you learn more? Go to https://Womenalia.compeCadre Technologies.6renyou.com. org and sign in to your [employer] account. Enter Y707 in the [facility] box to learn more about \"Learning About Carbohydrate (Carb) Counting and Eating Out When You Have Diabetes. \"     If you do not have an account, please click on the \"Sign Up Now\" link. Current as of: September 8, 2021               Content Version: 13.1  © 9494-4780 Healthwise, Incorporated. Care instructions adapted under license by [facility] ([facility]).  If you have questions about a medical condition or this instruction, always ask your healthcare professional. [address] any warranty or liability for your use of this information. Personalized Preventive Plan for Dereje Mays - 1/31/2022  Medicare offers a range of preventive health benefits. Some of the tests and screenings are paid in full while other may be subject to a deductible, co-insurance, and/or copay. Some of these benefits include a comprehensive review of your medical history including lifestyle, illnesses that may run in your family, and various assessments and screenings as appropriate. After reviewing your medical record and screening and assessments performed today your provider may have ordered immunizations, labs, imaging, and/or referrals for you. A list of these orders (if applicable) as well as your Preventive Care list are included within your After Visit Summary for your review. Other Preventive Recommendations:    · A preventive eye exam performed by an eye specialist is recommended every 1-2 years to screen for glaucoma; cataracts, macular degeneration, and other eye disorders. · A preventive dental visit is recommended every 6 months. · Try to get at least 150 minutes of exercise per week or 10,000 steps per day on a pedometer . · Order or download the FREE \"Exercise & Physical Activity: Your Everyday Guide\" from [employer]. Call 8-548.205.6631 or search [employer] online. · You need 0130-3986 mg of calcium and 9562-2522 IU of vitamin D per day. It is possible to meet your calcium requirement with diet alone, but a vitamin D supplement is usually necessary to meet this goal.  · When exposed to the sun, use a sunscreen that protects against both UVA and UVB radiation with an SPF of 30 or greater. Reapply every 2 to 3 hours or after sweating, drying off with a towel, or swimming. · Always wear a seat belt when traveling in a car. Always wear a helmet when riding a bicycle or motorcycle.

## 2022-01-31 NOTE — PROGRESS NOTES
Medicare Annual Wellness Visit  Name: Terry Stern Date: 2022   MRN: [id number] Sex: Female   Age: 67 y.o. Ethnicity: Non- / Deborah Gross   : 1950 Race: Carter Ariel / African American      Geroldine Brittle is here for [employer] AWV    Screenings for behavioral, psychosocial and functional/safety risks, and cognitive dysfunction are all negative except as indicated below. These results, as well as other patient data from the [address] form, are documented in Flowsheets linked to this Encounter. Aga Benito is here today for a Medicare Annual Wellness visit and routine office visit. CKD- stable, follows with nephrology. Diabetes  She presents for her follow-up diabetic visit. She has type 2 diabetes mellitus. Her disease course has been stable. There are no hypoglycemic associated symptoms. Pertinent negatives for hypoglycemia include no dizziness, headaches or sweats. There are no diabetic associated symptoms. Pertinent negatives for diabetes include no blurred vision, no chest pain, no fatigue, no polydipsia, no polyphagia, no polyuria and no weight loss. There are no hypoglycemic complications. Diabetic complications include nephropathy (IMPROVED). Pertinent negatives for diabetic complications include no CVA, heart disease or peripheral neuropathy. Risk factors for coronary artery disease include diabetes mellitus, hypertension, post-menopausal and sedentary lifestyle. Current diabetic treatment includes oral agent (monotherapy). She is compliant with treatment all of the time. Her weight is stable. She is following a high fat/cholesterol and generally healthy diet. Meal planning includes avoidance of concentrated sweets. She has not had a previous visit with a dietitian. She rarely participates in exercise. There is no change in her home blood glucose trend. An ACE inhibitor/angiotensin II receptor blocker is being taken. She does not see a podiatrist.Eye exam is not current. Gastroesophageal Reflux  She complains of belching and heartburn. She reports no abdominal pain, no chest pain, no choking, no coughing, no dysphagia, no early satiety, no globus sensation, no hoarse voice, no nausea, no sore throat, no stridor, no tooth decay, no water brash or no wheezing. This is a chronic problem. The current episode started more than 1 year ago. The problem occurs frequently. The problem has been gradually worsening. The heartburn duration is several minutes. The heartburn is located in the substernum. The heartburn is of moderate intensity. The heartburn does not wake her from sleep. The heartburn does not limit her activity. The heartburn doesn't change with position. The symptoms are aggravated by certain foods. Pertinent negatives include no anemia, fatigue, melena, muscle weakness, orthopnea or weight loss. Risk factors include caffeine use. She has tried an antacid for the symptoms. The treatment provided mild relief. Past procedures do not include an EGD or a UGI. Past invasive treatments do not include gastroplasty, gastroplication or reflux surgery. Review of Systems   Constitutional: Negative for fatigue, fever and weight loss. HENT: Negative for hoarse voice, rhinorrhea and sore throat. Eyes: Negative for blurred vision. Respiratory: Negative for cough, hemoptysis, sputum production, choking, shortness of breath and wheezing. Cardiovascular: Negative for chest pain and palpitations. Gastrointestinal: Positive for heartburn. Negative for abdominal pain, dysphagia, melena and nausea. Endocrine: Negative for polydipsia, polyphagia and polyuria. Musculoskeletal: Negative for muscle weakness and neck pain. Neurological: Negative for dizziness and headaches. Allergies   Allergen Reactions    Iv Dye [Iodides] Nausea And Vomiting         Prior to Visit Medications    Medication Sig Taking?  Authorizing Provider   pantoprazole (PROTONIX) 40 MG tablet Take 1 tablet by mouth every morning (before breakfast) Yes TARA Lyn CNP   amLODIPine (NORVASC) 5 MG tablet Take 1 tablet by mouth daily Yes TARA Lyn - SHELL   spironolactone-hydroCHLOROthiazide (ALDACTAZIDE) 25-25 MG per tablet Take 1 tablet by mouth daily Yes TARA Lyn - SHELL   hydrALAZINE (APRESOLINE) 50 MG tablet Take 1 tablet by mouth 3 times daily Yes TARA Lyn - CNP   atorvastatin (LIPITOR) 40 MG tablet Take 1 tablet by mouth daily Yes TARA Lyn - SHELL   atenolol (TENORMIN) 100 MG tablet Take 1 tablet by mouth daily Yes TARA Lyn CNP   valsartan (DIOVAN) 320 MG tablet Take 1 tablet by mouth daily Yes TARA Lyn - CNP   fluticasone (FLONASE) 50 MCG/ACT nasal spray 2 sprays by Each Nostril route daily Yes TARA Montalvo CNP   Calcium Carbonate-Vitamin D (OYSTER SHELL CALCIUM/D) 500-200 MG-UNIT TABS Take 1 tablet by mouth 2 times daily Yes TARA Lyn CNP   metFORMIN (GLUCOPHAGE) 500 MG tablet Take 1 tablet by mouth 2 times daily (with meals) Yes TARA Lyn CNP   potassium chloride (KLOR-CON M) 20 MEQ extended release tablet Take 1 tablet by mouth 2 times daily Yes TARA Lyn CNP   albuterol sulfate HFA (VENTOLIN HFA) 108 (90 Base) MCG/ACT inhaler Inhale 2 puffs into the lungs 4 times daily as needed for Wheezing Yes TARA Lyn CNP   dorzolamide-timolol (COSOPT) 22.3-6.8 MG/ML ophthalmic solution Place 1 drop into both eyes 2 times daily  Yes Historical Provider, MD   Multiple Vitamins-Minerals (THERAPEUTIC MULTIVITAMIN-MINERALS) tablet Take 1 tablet by mouth daily Yes TARA Lyn CNP   latanoprost (XALATAN) 0.005 % ophthalmic solution Place 1 drop into both eyes nightly  Yes Historical Provider, MD   blood glucose test strips (ONE TOUCH ULTRA TEST) strip USE 1 STRIP TO CHECK GLUCOSE DAILY Yes Johnita Shames Might, APRN - CNP   ONE TOUCH LANCETS MISC Lancets- One Touch Cathy Clemens for testing daily and as needed. DX: Diabetes type  E11.9 Yes TARA Heard CNP   Blood Glucose Monitoring Suppl GENNY 1 Units by Does not apply route 3 times daily What insurance will cover Yes TARA Rangel CNP   ascorbic acid (VITAMIN C) 500 MG tablet Take 1 tablet by mouth 2 times daily With iron tablet  Patient not taking: Reported on 2022  TARA Heard CNP   cetirizine (ZYRTEC ALLERGY) 10 MG tablet Take 1 tablet by mouth daily  Patient not taking: Reported on 2022  TARA Heard CNP         Past Medical History:   Diagnosis Date    Arthritis     Asthma     Chronic bronchitis ([address]     Diabetes mellitus ([address]     GERD (gastroesophageal reflux disease)     H/O echocardiogram 3/9/16    EF >60%. LV wall thickness is mildly increased. Mild mitral and tricuspid regurgitation. Borderline pulmonary hypertension estimated right ventricular sysolic pressure of 40YOZW. Mild (grade l) diastolic dysfunction.  History of PFTs 3/10/16     Suboptimal effort. Restrictive in nature. clionical correlations is advised.  History of stress test 16    Abnoraml. Moderate perfusion defect of mild to moderate intensity in the anterolateral and anteroapical regions during stress imaging. Intermediate risk for CAD.     Hyperlipidemia     Hypertension        Past Surgical History:   Procedure Laterality Date    BREAST SURGERY      cyst removed    CARDIAC CATHETERIZATION      CARDIAC CATHETERIZATION Left 2019    Right Radial/[facility]/     SECTION      CHOLECYSTECTOMY      COLONOSCOPY  3/23/15    -diverticulosis,hemorrhoids    FOOT SURGERY      rt    HYSTERECTOMY           Family History   Problem Relation Age of Onset    Stroke Father     Stroke Sister     Diabetes Sister         x6 sisters    High Blood Pressure Sister     Stroke Brother     Diabetes Brother        CareTeam (Including outside providers/suppliers regularly involved in providing care):   Patient Care Team:  TARA Smith CNP as PCP - General (Family Nurse Practitioner)  TARA Smith CNP as PCP - [facility] Empaneled Provider    Wt Readings from Last 3 Encounters:   01/31/22 163 lb 6.4 oz (74.1 kg)   01/11/22 165 lb 11.2 oz (75.2 kg)   12/20/21 171 lb 6.4 oz (77.7 kg)     Vitals:    01/31/22 1348   BP: 122/70   Position: Sitting   Pulse: 82   Resp: 20   Temp: 97.7 °F (36.5 °C)   TempSrc: Temporal   SpO2: 97%   Weight: 163 lb 6.4 oz (74.1 kg)   Height: 5' 4\" (1.626 m)     Body mass index is 28.05 kg/m². Based upon direct observation of the patient, evaluation of cognition reveals recent and remote memory intact. General Appearance: alert and oriented to person, place and time  Skin: warm and dry  Head: normocephalic and atraumatic  Eyes: conjunctivae normal and sclera anicteric  ENT: tympanic membrane, external ear and ear canal normal bilaterally, oropharynx clear and moist with normal mucous membranes and oropharynx clear and moist with normal mucous membranes  Neck: neck supple and non tender without mass   Pulmonary/Chest: clear to auscultation bilaterally- no wheezes, rales or rhonchi, normal air movement, no respiratory distress  Cardiovascular: normal rate and regular rhythm  Abdomen: soft, non-tender  Extremities: no edema  Musculoskeletal: normal range of motion, no joint swelling, deformity or tenderness  Neurologic: speech normal    Patient's complete Health Risk Assessment and screening values have been reviewed and are found in Flowsheets. The following problems were reviewed today and where indicated follow up appointments were made and/or referrals ordered. Positive Risk Factor Screenings with Interventions:      Cognitive:   Words recalled: 1 Word Recalled  Clock Drawing Test (CDT) Score: (!) Abnormal (refused)  Total Score Interpretation: Positive Mini-Cog  Did the patient refuse to take the cognition test?: Yes (clock)  Cognitive Impairment Interventions:  · Patient declines any further evaluation/treatment for cognitive impairment           General Health and ACP:  General  In general, how would you say your health is?: Good  In the past 7 days, have you experienced any of the following?  New or Increased Pain, New or Increased Fatigue, Loneliness, Social Isolation, Stress or Anger?: None of These  Do you get the social and emotional support that you need?: Yes  Do you have a Living Will?: (!) No  Advance Directives     Power of [facility] Will ACP-Advance Directive ACP-Power of     Not on File Not on File Not on File Not on File      General Health Risk Interventions:  · No Living Will: Patient declines ACP discussion/assistance    Health Habits/Nutrition:  Health Habits/Nutrition  Do you exercise for at least 20 minutes 2-3 times per week?: (!) No  Have you lost any weight without trying in the past 3 months?: No  Do you eat only one meal per day?: No  Have you seen the dentist within the past year?: (!) No  Body mass index: (!) 28.04  Health Habits/Nutrition Interventions:  · Inadequate physical activity:  educational materials provided to promote increased physical activity  · Nutritional issues:  educational materials for healthy, well-balanced diet provided  · Dental exam overdue:  patient encouraged to make appointment with his/her dentist    Hearing/Vision:  No exam data present  Hearing/Vision  Do you or your family notice any trouble with your hearing that hasn't been managed with hearing aids?: No  Do you have difficulty driving, watching TV, or doing any of your daily activities because of your eyesight?: No  Have you had an eye exam within the past year?: (!) No  Hearing/Vision Interventions:  · Vision concerns:  patient encouraged to make appointment with his/her eye specialist        Personalized Preventive Plan   Current Health Maintenance Status  Immunization History   Administered Date(s) Administered    COVID-19, Joana Bateman, Primary or Immunocompromised, PF, 100mcg/0.5mL 02/27/2021, 03/27/2021    Influenza Whole 12/08/2015    Influenza, High Dose (Fluzone 65 yrs and older) 12/09/2017    Influenza, Quadv, IM, (6 mo and older Fluzone, Flulaval, Fluarix and 3 yrs and older Afluria) 01/19/2021    Influenza, Quadv, IM, PF (6 mo and older Fluzone, Flulaval, Fluarix, and 3 yrs and older Afluria) 10/11/2016, 10/15/2018, 01/09/2020    Influenza, Quadv, adjuvanted, 65 yrs +, IM, PF (Fluad) 10/21/2021    Pneumococcal Conjugate 13-valent ([id number]) 01/07/2016    Pneumococcal Polysaccharide ([id number]) 06/07/2017, 12/09/2017        Health Maintenance   Topic Date Due    Depression Screen  Never done    Annual Wellness Visit (AWV)  01/29/2022    Breast cancer screen  02/26/2022    DTaP/Tdap/Td vaccine (1 - Tdap) 02/21/2022 (Originally 1/9/1969)    Diabetic foot exam  10/21/2022 (Originally 7/27/2021)    Shingles Vaccine (1 of 2) 10/21/2022 (Originally 1/9/2000)    Hepatitis C screen  10/21/2022 (Originally 1950)    Diabetic retinal exam  10/31/2022 (Originally 6/15/2018)    COVID-19 Vaccine (3 - Booster for Joana Bateman series) 01/31/2023 (Originally 8/27/2021)    A1C test (Diabetic or Prediabetic)  07/27/2022    Lipid screen  07/27/2022    Potassium monitoring  10/29/2022    Creatinine monitoring  10/29/2022    Colon cancer screen colonoscopy  03/23/2025    DEXA (modify frequency per FRAX score)  Completed    Flu vaccine  Completed    Pneumococcal 65+ years Vaccine  Completed    Hepatitis A vaccine  Aged Out    Hib vaccine  Aged Out    Meningococcal (ACWY) vaccine  Aged Out     Recommendations for URBANARA Due: see orders and patient instructions/AVS.  . Recommended screening schedule for the next 5-10 years is provided to the patient in written form: see Patient Instructions/AVS.    Elliot Brito was seen today for medicare awv.     Diagnoses and all orders for this visit:    Routine general medical examination at a health care facility    Type 2 diabetes mellitus with diabetic nephropathy, without long-term current use of insulin ([facility])    Gastroesophageal reflux disease with esophagitis without hemorrhage  -     pantoprazole (PROTONIX) 40 MG tablet;  Take 1 tablet by mouth every morning (before breakfast)    Stage 3 chronic kidney disease, unspecified whether stage 3a or 3b CKD ([facility]

## 2022-02-15 ENCOUNTER — OFFICE VISIT (OUTPATIENT)
Dept: PRIMARY CARE CLINIC | Age: 72
End: 2022-02-15
Payer: MEDICARE

## 2022-02-15 VITALS
SYSTOLIC BLOOD PRESSURE: 122 MMHG | HEART RATE: 68 BPM | BODY MASS INDEX: 28.17 KG/M2 | WEIGHT: 164.1 LBS | OXYGEN SATURATION: 98 % | TEMPERATURE: 98 F | DIASTOLIC BLOOD PRESSURE: 72 MMHG | RESPIRATION RATE: 24 BRPM

## 2022-02-15 DIAGNOSIS — B34.9 VIRAL ILLNESS: Primary | ICD-10-CM

## 2022-02-15 DIAGNOSIS — R50.9 FEVER, UNSPECIFIED FEVER CAUSE: ICD-10-CM

## 2022-02-15 LAB
INFLUENZA A ANTIBODY: NORMAL
INFLUENZA B ANTIBODY: NORMAL

## 2022-02-15 PROCEDURE — G8427 DOCREV CUR MEDS BY ELIG CLIN: HCPCS | Performed by: NURSE PRACTITIONER

## 2022-02-15 PROCEDURE — 1123F ACP DISCUSS/DSCN MKR DOCD: CPT | Performed by: NURSE PRACTITIONER

## 2022-02-15 PROCEDURE — 87804 INFLUENZA ASSAY W/OPTIC: CPT | Performed by: NURSE PRACTITIONER

## 2022-02-15 PROCEDURE — G8484 FLU IMMUNIZE NO ADMIN: HCPCS | Performed by: NURSE PRACTITIONER

## 2022-02-15 PROCEDURE — G8417 CALC BMI ABV UP PARAM F/U: HCPCS | Performed by: NURSE PRACTITIONER

## 2022-02-15 PROCEDURE — 99213 OFFICE O/P EST LOW 20 MIN: CPT | Performed by: NURSE PRACTITIONER

## 2022-02-15 PROCEDURE — G8399 PT W/DXA RESULTS DOCUMENT: HCPCS | Performed by: NURSE PRACTITIONER

## 2022-02-15 PROCEDURE — 4040F PNEUMOC VAC/ADMIN/RCVD: CPT | Performed by: NURSE PRACTITIONER

## 2022-02-15 PROCEDURE — 1090F PRES/ABSN URINE INCON ASSESS: CPT | Performed by: NURSE PRACTITIONER

## 2022-02-15 PROCEDURE — 1036F TOBACCO NON-USER: CPT | Performed by: NURSE PRACTITIONER

## 2022-02-15 PROCEDURE — 3017F COLORECTAL CA SCREEN DOC REV: CPT | Performed by: NURSE PRACTITIONER

## 2022-02-15 ASSESSMENT — ENCOUNTER SYMPTOMS
VOMITING: 0
CONSTIPATION: 0
SHORTNESS OF BREATH: 0
NAUSEA: 0
WHEEZING: 0
RHINORRHEA: 0
DIARRHEA: 0
COUGH: 0
SORE THROAT: 0
ABDOMINAL PAIN: 0

## 2022-02-15 NOTE — PROGRESS NOTES
Name: Braulio Valenzuela  : 1950         Chief Complaint:     Chief Complaint   Patient presents with    Other     arms,abdomen, feet are hot,warm to touch X 4 days       History of Present Illness:      Braulio Valenzuela is a 67 y.o.  female who presents with Other (arms,abdomen, feet are hot,warm to touch X 4 days)      [place] is here today for a same day office visit. Fever   This is a new problem. The current episode started in the past 7 days. The problem occurs intermittently. The problem has been unchanged. Her temperature was unmeasured prior to arrival. Pertinent negatives include no abdominal pain, chest pain, congestion, coughing, diarrhea, headaches, nausea, rash, sore throat, urinary pain, vomiting or wheezing. She has tried acetaminophen for the symptoms. The treatment provided moderate relief. Risk factors: no contaminated food, no contaminated water, no hx of cancer, no immunosuppression, no occupational exposure, no recent sickness, no recent travel and no sick contacts          Past Medical History:     Past Medical History:   Diagnosis Date    Arthritis     Asthma     Chronic bronchitis ([address]     Diabetes mellitus ([address]     GERD (gastroesophageal reflux disease)     H/O echocardiogram 3/9/16    EF >60%. LV wall thickness is mildly increased. Mild mitral and tricuspid regurgitation. Borderline pulmonary hypertension estimated right ventricular sysolic pressure of 97XESX. Mild (grade l) diastolic dysfunction.  History of PFTs 3/10/16     Suboptimal effort. Restrictive in nature. clionical correlations is advised.  History of stress test 16    Abnoraml. Moderate perfusion defect of mild to moderate intensity in the anterolateral and anteroapical regions during stress imaging. Intermediate risk for CAD.     Hyperlipidemia     Hypertension       Reviewed all health maintenance requirements and ordered appropriate tests  Health Maintenance Due   Topic Date Due    Breast cancer screen  2022       Past Surgical History:     Past Surgical History:   Procedure Laterality Date    BREAST SURGERY      cyst removed    CARDIAC CATHETERIZATION  2014    CARDIAC CATHETERIZATION Left 2019    [facility] Radial[facility]/     SECTION      CHOLECYSTECTOMY      COLONOSCOPY  3/23/15    -diverticulosis,hemorrhoids    FOOT SURGERY      rt    HYSTERECTOMY          Medications:       Prior to Admission medications    Medication Sig Start Date End Date Taking?  Authorizing Provider   pantoprazole (PROTONIX) 40 MG tablet Take 1 tablet by mouth every morning (before breakfast) 22  Yes TARA Soto - CNP   amLODIPine (NORVASC) 5 MG tablet Take 1 tablet by mouth daily 22  Yes Haritha Bullock APRN - CNP   spironolactone-hydroCHLOROthiazide (ALDACTAZIDE) 25-25 MG per tablet Take 1 tablet by mouth daily 22  Yes TARA Soto - CNP   hydrALAZINE (APRESOLINE) 50 MG tablet Take 1 tablet by mouth 3 times daily 22  Yes Haritha Bullock APRN - CNP   atorvastatin (LIPITOR) 40 MG tablet Take 1 tablet by mouth daily 22  Yes Haritha Bullock APRN - CNP   atenolol (TENORMIN) 100 MG tablet Take 1 tablet by mouth daily 22  Yes Haritha Bullock APRN - CNP   fluticasone (FLONASE) 50 MCG/ACT nasal spray 2 sprays by Each Nostril route daily 21  Yes TARA Bowling - CNP   ascorbic acid (VITAMIN C) 500 MG tablet Take 1 tablet by mouth 2 times daily With iron tablet 21  Yes TARA Soto - CNP   Calcium Carbonate-Vitamin D (OYSTER SHELL CALCIUM/D) 500-200 MG-UNIT TABS Take 1 tablet by mouth 2 times daily 21  Yes TARA Soto - CNP   metFORMIN (GLUCOPHAGE) 500 MG tablet Take 1 tablet by mouth 2 times daily (with meals) 21  Yes TARA Soto - CNP   potassium chloride (KLOR-CON M) 20 MEQ extended release tablet Take 1 tablet by mouth 2 times daily 21  Yes TARA Soto - CNP   albuterol sulfate HFA (VENTOLIN HFA) 108 (90 Base) MCG/ACT inhaler Inhale 2 puffs into the lungs 4 times daily as needed for Wheezing 1/19/21  Yes TARA Tilley CNP   cetirizine (ZYRTEC ALLERGY) 10 MG tablet Take 1 tablet by mouth daily 12/16/20  Yes TARA Tilley CNP   dorzolamide-timolol (COSOPT) 22.3-6.8 MG/ML ophthalmic solution Place 1 drop into both eyes 2 times daily  9/20/20  Yes Historical Provider, MD   Multiple Vitamins-Minerals (THERAPEUTIC MULTIVITAMIN-MINERALS) tablet Take 1 tablet by mouth daily 8/6/20 2/15/22 Yes TARA Tilley CNP   latanoprost (XALATAN) 0.005 % ophthalmic solution Place 1 drop into both eyes nightly    Yes Historical Provider, MD   blood glucose test strips (ONE TOUCH ULTRA TEST) strip USE 1 STRIP TO CHECK GLUCOSE DAILY 3/13/19  Yes TARA Tilley CNP   ONE TOUCH LANCETS [address] [place] [address] for testing daily and as needed. DX: Diabetes type  E11.9 1/31/19  Yes TARA Tilley CNP   Blood Glucose Monitoring Suppl GENNY 1 Units by Does not apply route 3 times daily What insurance will cover 10/11/16  Yes TARA Irizarry CNP   valsartan (DIOVAN) 320 MG tablet Take 1 tablet by mouth daily 1/11/22   TARA Tilley CNP        Allergies: Iv dye [iodides]    Social History:     Tobacco:    reports that she has never smoked. She has never used smokeless tobacco.  Alcohol:      reports no history of alcohol use. Drug Use:  reports no history of drug use. Family History:     Family History   Problem Relation Age of Onset   [facility] Stroke Father     Stroke Sister     Diabetes Sister         x6 sisters    High Blood Pressure Sister     Stroke Brother     Diabetes Brother        Review of Systems:     Positive and Negative as described in HPI    Review of Systems   Constitutional: Positive for chills, fatigue and fever. HENT: Negative for congestion, rhinorrhea and sore throat. Eyes: Negative for visual disturbance.    Respiratory: Negative for cough, shortness of breath and wheezing. Cardiovascular: Negative for chest pain and palpitations. Gastrointestinal: Negative for abdominal pain, constipation, diarrhea, nausea and vomiting. Genitourinary: Negative for difficulty urinating and dysuria. Musculoskeletal: Positive for myalgias. Negative for gait problem, neck pain and neck stiffness. Skin: Negative for rash. Neurological: Negative for dizziness, syncope, light-headedness and headaches. Physical Exam:   Vitals:  /72 (Position: Sitting)   Pulse 68   Temp 98 °F (36.7 °C) (Oral)   Resp 24   Wt 164 lb 1.6 oz (74.4 kg)   SpO2 98%   BMI 28.17 kg/m²     Physical Exam  Vitals and nursing note reviewed. Constitutional:       General: She is not in acute distress. Appearance: Normal appearance. She is obese. She is not ill-appearing. HENT:      Right Ear: Tympanic membrane normal.      Left Ear: Tympanic membrane normal.      Nose: Congestion present. Mouth/Throat:      Mouth: Mucous membranes are moist.      Pharynx: Posterior oropharyngeal erythema present. Eyes:      General: No scleral icterus. Conjunctiva/sclera: Conjunctivae normal.   Cardiovascular:      Rate and Rhythm: Normal rate and regular rhythm. Pulmonary:      Effort: Pulmonary effort is normal.      Breath sounds: Normal breath sounds. No wheezing or rhonchi. Musculoskeletal:      Cervical back: Normal range of motion and neck supple. Right lower leg: No edema. Left lower leg: No edema. Lymphadenopathy:      Cervical: No cervical adenopathy. Skin:     General: Skin is warm and dry. Neurological:      Mental Status: She is alert and oriented to person, place, and time.    Psychiatric:         Mood and Affect: Mood normal.         Behavior: Behavior normal.         Data:     Lab Results   Component Value Date     10/29/2021    K 3.9 10/29/2021     10/29/2021    CO2 25 10/29/2021    BUN 7 10/29/2021    CREATININE 1.03 10/29/2021    GLUCOSE 90 10/29/2021    GLUCOSE 110 03/26/2012    PROT 7.3 10/26/2020    LABALBU 4.0 10/26/2020    BILITOT 0.25 10/26/2020    ALKPHOS 131 10/26/2020    AST 6 07/27/2021    ALT <5 07/27/2021     Lab Results   Component Value Date    WBC 6.5 10/29/2021    RBC 3.42 10/29/2021    RBC 3.85 03/26/2012    HGB 10.3 10/29/2021    HCT 32.5 10/29/2021    MCV 95.0 10/29/2021    MCH 30.1 10/29/2021    MCHC 31.7 10/29/2021    RDW 12.1 10/29/2021     10/29/2021     03/26/2012    MPV 8.8 10/29/2021     Lab Results   Component Value Date    TSH 2.25 10/22/2020     Lab Results   Component Value Date    CHOL 138 07/27/2021    HDL 53 07/27/2021    LABA1C 5.6 07/27/2021       Assessment/Plan:      Diagnosis Orders   1. Viral illness     2. Fever, unspecified fever cause  POCT Influenza A/B     Home rest increase fluids. Tylenol as needed for discomfort. Call in a few days if not improving. Sooner if any worsening. 1.  Aaliyah Sim received counseling on the following healthy behaviors: nutrition, exercise and medication adherence  2. Patient given educational materials - see patient instructions  3. Was a self-tracking handout given in paper form or via [employer]? No  If yes, see orders or list here. 4.  Discussed use, benefit, and side effects of prescribed medications. Barriers to medication compliance addressed. All patient questions answered. Pt voiced understanding. 5.  Reviewed prior labs and health maintenance  6. Continue current medications, diet and exercise. Completed Refills   Requested Prescriptions      No prescriptions requested or ordered in this encounter         Return if symptoms worsen or fail to improve.

## 2022-02-15 NOTE — PATIENT INSTRUCTIONS
SURVEY:     You may be receiving a survey from [employer] regarding your visit today. Please complete the survey to enable us to provide the highest quality of care to you and your family. If you cannot score us a very good on any question, please call the office to discuss how we could have made your experience a very good one. Thank you.   Polo Bullock, APRN-SHELL Somers, CNP  Levy Daniels, LPN  Alex Thomason, LPN  Jose Gallardo, NEY Morales, NEY Marquez, CMA  [place], PCA

## 2022-02-23 ENCOUNTER — OFFICE VISIT (OUTPATIENT)
Dept: CARDIOLOGY | Age: 72
End: 2022-02-23
Payer: MEDICARE

## 2022-02-23 VITALS
RESPIRATION RATE: 18 BRPM | SYSTOLIC BLOOD PRESSURE: 129 MMHG | HEART RATE: 56 BPM | BODY MASS INDEX: 28.41 KG/M2 | DIASTOLIC BLOOD PRESSURE: 69 MMHG | HEIGHT: 64 IN | WEIGHT: 166.4 LBS | OXYGEN SATURATION: 98 %

## 2022-02-23 DIAGNOSIS — E78.2 MIXED HYPERLIPIDEMIA: ICD-10-CM

## 2022-02-23 DIAGNOSIS — I10 RESISTANT HYPERTENSION: ICD-10-CM

## 2022-02-23 DIAGNOSIS — I25.10 MILD CAD: ICD-10-CM

## 2022-02-23 DIAGNOSIS — R53.83 OTHER FATIGUE: ICD-10-CM

## 2022-02-23 DIAGNOSIS — R06.02 SOB (SHORTNESS OF BREATH): Primary | ICD-10-CM

## 2022-02-23 PROCEDURE — 99214 OFFICE O/P EST MOD 30 MIN: CPT | Performed by: FAMILY MEDICINE

## 2022-02-23 NOTE — PROGRESS NOTES
Cherrie Escalona am scribing for and in the presence of Grisel Ramsey. Rachell LAKE, MS, F.A.C.C. Patient: Noreen Dickerson  : 1950  Date of Visit: 2022    REASON FOR VISIT / CONSULTATION: Follow-up (Hx: mild CAD, resistant HTN, hyperkalemia. pt is here for 6 month f/u. doing alright. she does get tired a lo over the last few months. Denies: SOB, CP, dizziness, lightheaded, palps)    History of Present Illness:        Dear Cindy Barlow, APRN - CNP    I had the pleasure of seeing your patient Noreen Dickerson as part of a pre-operative cardiac evaluation today. Heart catheterization done on 2019 that showed mild to moderate single vessel disease involving the circumflex coronary artery. Normal left ventricular end diastolic pressure. (LVEDP). Ms. Shashank Gonzalez is here today for a 6 month follow up. She said she has had more tiredness. She notices more shortness of breath walking down the basement stairs. Her  has noticed her breathing has worsened over the last month. She had to stop grocery shopping due to her shortness of breath. She denies having any current or recent chest pain, shortness of breath, lightheaded/dizziness or palpitations. She denies any increased lower extremity edema, abdominal pain, bleeding problems, problems with her medications or any other concerns at this time. Exercise Tolerance: Ms. Shashank Gonzalez reports that she has a fair exercise tolerance. She says that she could walk about 1 block without developing chest discomfort or significant shortness of breath but says that 3 months ago should could have walked 2 blocks. Her  says that he was surprised to see that even walking in the grocery store she becomes shortness of breath which is says is not normal for her. Denies any known history of getting Covid.     PAST MEDICAL HISTORY:        Past Medical History:   Diagnosis Date    Arthritis     Asthma     [facility] [address]     Diabetes mellitus ([facility] [address]     GERD (gastroesophageal reflux disease)     H/O echocardiogram 3/9/16    EF >60%. LV wall thickness is mildly increased. Mild mitral and tricuspid regurgitation. Borderline pulmonary hypertension estimated right ventricular sysolic pressure of [id number]. Mild (grade l) diastolic dysfunction.  History of PFTs 3/10/16     Suboptimal effort. Restrictive in nature. clionical correlations is advised.  History of stress test 16    Abnoraml. Moderate perfusion defect of mild to moderate intensity in the anterolateral and anteroapical regions during stress imaging. Intermediate risk for CAD.  Hyperlipidemia     Hypertension      CURRENT ALLERGIES: Iv dye [iodides] REVIEW OF SYSTEMS: 14 systems were reviewed. Pertinent positives and negatives as above, all else negative.      Past Surgical History:   Procedure Laterality Date    BREAST SURGERY      cyst removed    CARDIAC CATHETERIZATION      CARDIAC CATHETERIZATION Left 2019    [facility] Radial/[facility]/     SECTION      CHOLECYSTECTOMY      COLONOSCOPY  3/23/15    -diverticulosis,hemorrhoids    FOOT SURGERY      rt    HYSTERECTOMY      Social History:  Social History     Tobacco Use    Smoking status: Never Smoker    Smokeless tobacco: Never Used   Vaping Use    Vaping Use: Never used   Substance Use Topics    Alcohol use: No     Alcohol/week: 0.0 standard drinks    Drug use: No        CURRENT MEDICATIONS:        Outpatient Medications Marked as Taking for the 22 encounter (Office Visit) with Hector Javier MD   Medication Sig Dispense Refill    pantoprazole (PROTONIX) 40 MG tablet Take 1 tablet by mouth every morning (before breakfast) 90 tablet 1    amLODIPine (NORVASC) 5 MG tablet Take 1 tablet by mouth daily 90 tablet 3    spironolactone-hydroCHLOROthiazide (ALDACTAZIDE) 25-25 MG per tablet Take 1 tablet by mouth daily 90 tablet 3    hydrALAZINE (APRESOLINE) 50 MG tablet Take 1 tablet by mouth 3 times daily 270 tablet 3    atorvastatin (LIPITOR) 40 MG tablet Take 1 tablet by mouth daily 90 tablet 3    atenolol (TENORMIN) 100 MG tablet Take 1 tablet by mouth daily 90 tablet 3    valsartan (DIOVAN) 320 MG tablet Take 1 tablet by mouth daily 90 tablet 3    fluticasone (FLONASE) 50 MCG/ACT nasal spray 2 sprays by Each Nostril route daily 48 g 1    ascorbic acid (VITAMIN C) 500 MG tablet Take 1 tablet by mouth 2 times daily With iron tablet 180 tablet 3    Calcium Carbonate-Vitamin D (OYSTER SHELL CALCIUM/D) 500-200 MG-UNIT TABS Take 1 tablet by mouth 2 times daily 180 tablet 3    metFORMIN (GLUCOPHAGE) 500 MG tablet Take 1 tablet by mouth 2 times daily (with meals) 180 tablet 3    potassium chloride (KLOR-CON M) 20 MEQ extended release tablet Take 1 tablet by mouth 2 times daily 180 tablet 3    albuterol sulfate HFA (VENTOLIN HFA) 108 (90 Base) MCG/ACT inhaler Inhale 2 puffs into the lungs 4 times daily as needed for Wheezing 1 Inhaler 0    cetirizine (ZYRTEC ALLERGY) 10 MG tablet Take 1 tablet by mouth daily 30 tablet 3    dorzolamide-timolol (COSOPT) 22.3-6.8 MG/ML ophthalmic solution Place 1 drop into both eyes 2 times daily       Multiple Vitamins-Minerals (THERAPEUTIC MULTIVITAMIN-MINERALS) tablet Take 1 tablet by mouth daily 30 tablet 11    latanoprost (XALATAN) 0.005 % ophthalmic solution Place 1 drop into both eyes nightly       blood glucose test strips (ONE TOUCH ULTRA TEST) strip USE 1 STRIP TO CHECK GLUCOSE DAILY 100 strip 3    ONE TOUCH LANCETS MISC Lancets- One Touch Delica for testing daily and as needed. DX: Diabetes type  E11.9 100 each 3    Blood Glucose Monitoring Suppl GENNY 1 Units by Does not apply route 3 times daily What insurance will cover 1 Device 0       FAMILY HISTORY: family history includes Diabetes in her brother and sister; High Blood Pressure in her sister; Stroke in her brother, father, and sister.      Physical Examination:     /69 (Site: Left Upper Arm, Position: Sitting, Cuff Size: Medium Adult)   Pulse 56   Resp 18   Ht 5' 4.02\" (1.626 m)   Wt 166 lb 6.4 oz (75.5 kg)   SpO2 98%   BMI 28.55 kg/m²  Body mass index is 28.55 kg/m². Constitutional: She is oriented to person, place, and time. She appears well-developed and well-nourished. In no acute distress. HEENT: Normocephalic and atraumatic. No JVD present. Carotid bruit is not present. No mass and no thyromegaly present. No lymphadenopathy present. Cardiovascular: Normal rate, regular rhythm, normal heart sounds. Exam reveals no gallop and no friction rubs. 3/6 systolic murmur, 2nd intercostal space on the RIGHT just lateral to the sternum. Pulmonary/Chest: Effort normal and breath sounds normal. No respiratory distress. She has no wheezes, rhonchi or rales. Abdominal: Soft, non-tender. Bowel sounds and aorta are normal. She exhibits no organomegaly, mass or bruit. Extremities: Trace. No cyanosis or clubbing. 2+ radial and carotid pulses. Distal extremity pulses: 2+ bilaterally. Neurological: She is alert and oriented to person, place, and time. No evidence of gross cranial nerve deficit. Coordination appeared normal.   Skin: Skin is warm and dry. There is no rash or diaphoresis. Psychiatric: She has a normal mood and affect. Her speech is normal and behavior is normal.      MOST RECENT LABS ON RECORD:   Lab Results   Component Value Date    WBC 6.5 10/29/2021    HGB 10.3 (L) 10/29/2021    HCT 32.5 (L) 10/29/2021     10/29/2021    CHOL 138 07/27/2021    TRIG 55 07/27/2021    HDL 53 07/27/2021    ALT <5 (L) 07/27/2021    AST 6 07/27/2021     10/29/2021    K 3.9 10/29/2021     10/29/2021    CREATININE 1.03 (H) 10/29/2021    BUN 7 (L) 10/29/2021    CO2 25 10/29/2021    TSH 2.25 10/22/2020    INR 0.9 04/10/2016    LABA1C 5.6 07/27/2021    LABMICR 9 07/28/2021       ASSESSMENT:     1. SOB (shortness of breath)    2. Resistant hypertension    3. Mild CAD    4.  Mixed hyperlipidemia 5. Other fatigue       PLAN:        · Mild Coronary Artery Disease: Appears stable with no active signs of angina   · Beta Blocker: Continue Atenolol (Tenormin) 100 mg daily. · Anti-anginal medications: Continue amlodipine (Norvasc) 5 mg once daily. · Cholesterol Reduction Therapy: Continue Atorvastatin (Lipitor) 40 mg daily. · Additional counseling: I advised them to call our office or go to the emergency room if they developed worsening or persistent chest pain or increased shortness of breath as this could be life threatening. · Shortness of breath with mild exertion: Unclear etiology but concerning for possible atypical angina with known history of coronary artery disease    Additional Testing List: I took the liberty of ordering a BMP for today to assess their potassium and renal function. I told them that they could get their lab work performed at the location of their choosing, unfortunately, if the lab work was not performed at a [facility]) facility I could not guarantee my ability to follow up with them on their results. ,  I took the liberty of ordering a CBC. I told them that they could get their lab work performed at the location of their choosing, unfortunately, if the lab work was not performed at a [facility]) facility I could not guarantee my ability to follow up with them on their results. and I took the liberty of ordering a TSH with reflex T4. I told them that they could get their lab work performed at the location of their choosing, unfortunately, if the lab work was not performed at a [facility]) facility I could not guarantee my ability to follow up with them on their results.   and Because current signs and symptoms can certainly be caused by significant coronary artery disease, I ordered a Regadenoson (Lexiscan) stress test with SPECT imaging to try and rule out this possibility.     Resistant Hypertension, possibly secondary hypertension: Controlled   · Beta Blocker:

## 2022-03-01 ENCOUNTER — TELEPHONE (OUTPATIENT)
Dept: CARDIOLOGY | Age: 72
End: 2022-03-01

## 2022-03-01 ENCOUNTER — HOSPITAL ENCOUNTER (OUTPATIENT)
Age: 72
Discharge: HOME | End: 2022-03-01
Payer: MEDICARE

## 2022-03-01 DIAGNOSIS — R53.83 OTHER FATIGUE: ICD-10-CM

## 2022-03-01 DIAGNOSIS — E78.2 MIXED HYPERLIPIDEMIA: ICD-10-CM

## 2022-03-01 DIAGNOSIS — I10 RESISTANT HYPERTENSION: ICD-10-CM

## 2022-03-01 DIAGNOSIS — R06.02 SOB (SHORTNESS OF BREATH): ICD-10-CM

## 2022-03-01 DIAGNOSIS — I25.10 MILD CAD: ICD-10-CM

## 2022-03-01 LAB
ANION GAP SERPL CALCULATED.3IONS-SCNC: 11 MMOL/L (ref 9–17)
BUN BLDV-MCNC: 12 MG/DL (ref 8–23)
BUN/CREAT BLD: 10 (ref 9–20)
CALCIUM SERPL-MCNC: 9.2 MG/DL (ref 8.6–10.4)
CHLORIDE BLD-SCNC: 92 MMOL/L (ref 98–107)
CO2: 24 MMOL/L (ref 20–31)
CREAT SERPL-MCNC: 1.2 MG/DL (ref 0.5–0.9)
GFR AFRICAN AMERICAN: 54 ML/MIN
GFR NON-AFRICAN AMERICAN: 44 ML/MIN
GFR SERPL CREATININE-BSD FRML MDRD: ABNORMAL ML/MIN/{1.73_M2}
GFR SERPL CREATININE-BSD FRML MDRD: ABNORMAL ML/MIN/{1.73_M2}
GLUCOSE BLD-MCNC: 73 MG/DL (ref 70–99)
HCT VFR BLD CALC: 31.6 % (ref 36.3–47.1)
HEMOGLOBIN: 10.1 G/DL (ref 11.9–15.1)
MCH RBC QN AUTO: 29.9 PG (ref 25.2–33.5)
MCHC RBC AUTO-ENTMCNC: 32 G/DL (ref 28.4–34.8)
MCV RBC AUTO: 93.5 FL (ref 82.6–102.9)
NRBC AUTOMATED: 0 PER 100 WBC
PDW BLD-RTO: 12.4 % (ref 11.8–14.4)
PLATELET # BLD: 328 K/UL (ref 138–453)
PMV BLD AUTO: 8.2 FL (ref 8.1–13.5)
POTASSIUM SERPL-SCNC: 4.7 MMOL/L (ref 3.7–5.3)
RBC # BLD: 3.38 M/UL (ref 3.95–5.11)
SODIUM BLD-SCNC: 127 MMOL/L (ref 135–144)
TSH SERPL DL<=0.05 MIU/L-ACNC: 1.36 MIU/L (ref 0.3–5)
WBC # BLD: 5.1 K/UL (ref 3.5–11.3)

## 2022-03-01 PROCEDURE — 84443 ASSAY THYROID STIM HORMONE: CPT

## 2022-03-01 PROCEDURE — 85027 COMPLETE CBC AUTOMATED: CPT

## 2022-03-01 PROCEDURE — 36415 COLL VENOUS BLD VENIPUNCTURE: CPT

## 2022-03-01 PROCEDURE — 80048 BASIC METABOLIC PNL TOTAL CA: CPT

## 2022-03-01 NOTE — TELEPHONE ENCOUNTER
----- Message from Liliam Amezquita MD sent at 3/1/2022 10:48 AM EST -----  Let patient know lab work is borderline. Repeat BMP in 1 week. Thanks.

## 2022-03-04 DIAGNOSIS — I10 ESSENTIAL HYPERTENSION: Primary | ICD-10-CM

## 2022-03-04 DIAGNOSIS — I25.10 MILD CAD: ICD-10-CM

## 2022-03-04 DIAGNOSIS — I25.10 ASHD (ARTERIOSCLEROTIC HEART DISEASE): ICD-10-CM

## 2022-03-07 ENCOUNTER — HOSPITAL ENCOUNTER (OUTPATIENT)
Age: 72
Discharge: HOME | End: 2022-03-07
Payer: MEDICARE

## 2022-03-07 DIAGNOSIS — I25.10 MILD CAD: ICD-10-CM

## 2022-03-07 DIAGNOSIS — I10 ESSENTIAL HYPERTENSION: ICD-10-CM

## 2022-03-07 DIAGNOSIS — I25.10 ASHD (ARTERIOSCLEROTIC HEART DISEASE): ICD-10-CM

## 2022-03-07 LAB
ANION GAP SERPL CALCULATED.3IONS-SCNC: 9 MMOL/L (ref 9–17)
BUN BLDV-MCNC: 14 MG/DL (ref 8–23)
BUN/CREAT BLD: 11 (ref 9–20)
CALCIUM SERPL-MCNC: 9.2 MG/DL (ref 8.6–10.4)
CHLORIDE BLD-SCNC: 99 MMOL/L (ref 98–107)
CO2: 25 MMOL/L (ref 20–31)
CREAT SERPL-MCNC: 1.28 MG/DL (ref 0.5–0.9)
GFR AFRICAN AMERICAN: 50 ML/MIN
GFR NON-AFRICAN AMERICAN: 41 ML/MIN
GFR SERPL CREATININE-BSD FRML MDRD: ABNORMAL ML/MIN/{1.73_M2}
GFR SERPL CREATININE-BSD FRML MDRD: ABNORMAL ML/MIN/{1.73_M2}
GLUCOSE BLD-MCNC: 92 MG/DL (ref 70–99)
POTASSIUM SERPL-SCNC: 4.7 MMOL/L (ref 3.7–5.3)
SODIUM BLD-SCNC: 133 MMOL/L (ref 135–144)

## 2022-03-07 PROCEDURE — 80048 BASIC METABOLIC PNL TOTAL CA: CPT

## 2022-03-07 PROCEDURE — 36415 COLL VENOUS BLD VENIPUNCTURE: CPT

## 2022-03-08 ENCOUNTER — TELEPHONE (OUTPATIENT)
Dept: CARDIOLOGY | Age: 72
End: 2022-03-08

## 2022-03-08 NOTE — TELEPHONE ENCOUNTER
----- Message from Cari Garcia MD sent at 3/8/2022 12:47 AM EST -----  Let Ms. Melany Lancaster know their test result was ok. Will discuss at next visit. Thanks.

## 2022-03-23 ENCOUNTER — APPOINTMENT (OUTPATIENT)
Dept: GENERAL RADIOLOGY | Age: 72
End: 2022-03-23
Payer: MEDICARE

## 2022-03-23 ENCOUNTER — HOSPITAL ENCOUNTER (EMERGENCY)
Age: 72
Discharge: HOME | End: 2022-03-23
Attending: EMERGENCY MEDICINE
Payer: MEDICARE

## 2022-03-23 VITALS
SYSTOLIC BLOOD PRESSURE: 120 MMHG | HEART RATE: 79 BPM | RESPIRATION RATE: 16 BRPM | DIASTOLIC BLOOD PRESSURE: 78 MMHG | BODY MASS INDEX: 29.53 KG/M2 | OXYGEN SATURATION: 98 % | HEIGHT: 64 IN | WEIGHT: 173 LBS

## 2022-03-23 DIAGNOSIS — M17.10 ARTHRITIS OF KNEE: Primary | ICD-10-CM

## 2022-03-23 PROCEDURE — 73562 X-RAY EXAM OF KNEE 3: CPT

## 2022-03-23 PROCEDURE — 99284 EMERGENCY DEPT VISIT MOD MDM: CPT

## 2022-03-23 RX ORDER — OXYCODONE HYDROCHLORIDE AND ACETAMINOPHEN 5; 325 MG/1; MG/1
1 TABLET ORAL EVERY 6 HOURS PRN
Qty: 12 TABLET | Refills: 0 | Status: SHIPPED | OUTPATIENT
Start: 2022-03-23 | End: 2022-03-26

## 2022-03-23 ASSESSMENT — PAIN DESCRIPTION - LOCATION
LOCATION: KNEE
LOCATION: KNEE

## 2022-03-23 ASSESSMENT — PAIN - FUNCTIONAL ASSESSMENT
PAIN_FUNCTIONAL_ASSESSMENT: 0-10
PAIN_FUNCTIONAL_ASSESSMENT: 0-10

## 2022-03-23 ASSESSMENT — PAIN DESCRIPTION - ORIENTATION
ORIENTATION: RIGHT;LEFT
ORIENTATION: RIGHT;LEFT

## 2022-03-23 ASSESSMENT — PAIN DESCRIPTION - PAIN TYPE
TYPE: CHRONIC PAIN
TYPE: CHRONIC PAIN

## 2022-03-23 ASSESSMENT — PAIN SCALES - GENERAL
PAINLEVEL_OUTOF10: 10
PAINLEVEL_OUTOF10: 10

## 2022-03-23 ASSESSMENT — PAIN DESCRIPTION - DESCRIPTORS
DESCRIPTORS: DISCOMFORT
DESCRIPTORS: DISCOMFORT

## 2022-03-23 NOTE — ED PROVIDER NOTES
[address] COMPLAINT   Chief Complaint   Patient presents with    Leg Pain     Bilateral knee pain. Right > left. OA hx        HPI   Jose Damico is a 67 y.o. female who presents with pain to the knees. She notes that her knees have been hurting for a long time but she stopped going to the orthopedic doctor because she was helping take care of her spouse. She also notes that they have been talking on knee replacements and doing injections but she has not had any that for a while. No other current complaints. The pain is severe is worse when she puts weight on it. It is soft. From sleeping very well. REVIEW OF SYSTEMS   Musculoskeletal: + right knee pain, no other bony or joint injury   Skin: No lacerations or redness  Neurologic: No numbness or focal extremity weakness      PAST MEDICAL & SURGICAL HISTORY   Past Medical History:   Diagnosis Date    Arthritis     Asthma     Chronic bronchitis ([address]     Diabetes mellitus ([facility] [address]     GERD (gastroesophageal reflux disease)     H/O echocardiogram 3/9/16    EF >60%. LV wall thickness is mildly increased. Mild mitral and tricuspid regurgitation. Borderline pulmonary hypertension estimated right ventricular sysolic pressure of 68TLSL. Mild (grade l) diastolic dysfunction.  History of PFTs 3/10/16     Suboptimal effort. Restrictive in nature. clionical correlations is advised.  History of stress test 16    Abnoraml. Moderate perfusion defect of mild to moderate intensity in the anterolateral and anteroapical regions during stress imaging. Intermediate risk for CAD.     Hyperlipidemia     Hypertension      Past Surgical History:   Procedure Laterality Date    BREAST SURGERY      cyst removed    CARDIAC CATHETERIZATION  2014    CARDIAC CATHETERIZATION Left 2019    [facility]/     SECTION      CHOLECYSTECTOMY      COLONOSCOPY  3/23/15 -diverticulosis,hemorrhoids    FOOT SURGERY      rt    HYSTERECTOMY          CURRENT MEDICATIONS   Current Outpatient Rx   Medication Sig Dispense Refill    pantoprazole (PROTONIX) 40 MG tablet Take 1 tablet by mouth every morning (before breakfast) 90 tablet 1    amLODIPine (NORVASC) 5 MG tablet Take 1 tablet by mouth daily 90 tablet 3    spironolactone-hydroCHLOROthiazide (ALDACTAZIDE) 25-25 MG per tablet Take 1 tablet by mouth daily 90 tablet 3    hydrALAZINE (APRESOLINE) 50 MG tablet Take 1 tablet by mouth 3 times daily 270 tablet 3    atorvastatin (LIPITOR) 40 MG tablet Take 1 tablet by mouth daily 90 tablet 3    atenolol (TENORMIN) 100 MG tablet Take 1 tablet by mouth daily 90 tablet 3    valsartan (DIOVAN) 320 MG tablet Take 1 tablet by mouth daily 90 tablet 3    fluticasone (FLONASE) 50 MCG/ACT nasal spray 2 sprays by Each Nostril route daily 48 g 1    ascorbic acid (VITAMIN C) 500 MG tablet Take 1 tablet by mouth 2 times daily With iron tablet 180 tablet 3    Calcium Carbonate-Vitamin D (OYSTER SHELL CALCIUM/D) 500-200 MG-UNIT TABS Take 1 tablet by mouth 2 times daily (Patient not taking: Reported on 3/23/2022) 180 tablet 3    metFORMIN (GLUCOPHAGE) 500 MG tablet Take 1 tablet by mouth 2 times daily (with meals) 180 tablet 3    potassium chloride (KLOR-CON M) 20 MEQ extended release tablet Take 1 tablet by mouth 2 times daily 180 tablet 3    albuterol sulfate HFA (VENTOLIN HFA) 108 (90 Base) MCG/ACT inhaler Inhale 2 puffs into the lungs 4 times daily as needed for Wheezing 1 Inhaler 0    cetirizine (ZYRTEC ALLERGY) 10 MG tablet Take 1 tablet by mouth daily 30 tablet 3    dorzolamide-timolol (COSOPT) 22.3-6.8 MG/ML ophthalmic solution Place 1 drop into both eyes 2 times daily       Multiple Vitamins-Minerals (THERAPEUTIC MULTIVITAMIN-MINERALS) tablet Take 1 tablet by mouth daily 30 tablet 11    latanoprost (XALATAN) 0.005 % ophthalmic solution Place 1 drop into both eyes nightly  blood glucose test strips (ONE TOUCH ULTRA TEST) strip USE 1 STRIP TO CHECK GLUCOSE DAILY 100 strip 3    ONE TOUCH LANCETS MISC Lancets- One Touch Delica for testing daily and as needed. DX: Diabetes type  E11.9 100 each 3    Blood Glucose Monitoring Suppl GENNY 1 Units by Does not apply route 3 times daily What insurance will cover 1 Device 0        ALLERGIES   Allergies   Allergen Reactions    Iv Dye [Iodides] Nausea And Vomiting        SOCIAL & FAMILY HISTORY   Social History     Socioeconomic History    Marital status:      Spouse name: None    Number of children: None    Years of education: None    Highest education level: None   Occupational History    None   Tobacco Use    Smoking status: Never Smoker    Smokeless tobacco: Never Used   Vaping Use    Vaping Use: Never used   Substance and Sexual Activity    Alcohol use: No     Alcohol/week: 0.0 standard drinks    Drug use: No    Sexual activity: Yes     Partners: Male     Birth control/protection: Post-menopausal   Other Topics Concern    None   Social History Narrative    None     Social Determinants of Health     Financial Resource Strain: Unknown    Difficulty of Paying Living Expenses: Patient refused   Food Insecurity: Unknown    Worried About Running Out of Food in the Last Year: Patient refused    Ran Out of Food in the Last Year: Patient refused   Transportation Needs:     Lack of Transportation (Medical): Not on file    Lack of Transportation (Non-Medical):  Not on file   Physical Activity:     Days of Exercise per Week: Not on file    Minutes of Exercise per Session: Not on file   Stress:     Feeling of Stress : Not on file   Social Connections:     Frequency of Communication with Friends and Family: Not on file    Frequency of Social Gatherings with Friends and Family: Not on file    Attends Christianity Services: Not on file    Active Member of Clubs or Organizations: Not on file    Attends Club or Organization Meetings: Not on file    Marital Status: Not on file   Intimate Partner Violence:     Fear of Current or Ex-Partner: Not on file    Emotionally Abused: Not on file    Physically Abused: Not on file    Sexually Abused: Not on file   Housing Stability:     Unable to Pay for Housing in the Last Year: Not on file    Number of [place] in the Last Year: Not on file    Unstable Housing in the Last Year: Not on file     Family History   Problem Relation Age of Onset    Stroke Father     Stroke Sister     Diabetes Sister         x6 sisters    High Blood Pressure Sister     Stroke Brother     Diabetes Brother           PHYSICAL EXAM   VITAL SIGNS: BP (!) 161/58   Pulse 79   Resp 16   Ht 5' 4\" (1.626 m)   Wt 173 lb (78.5 kg)   SpO2 98%   BMI 29.70 kg/m²   Constitutional: Well developed, well nourished  HENT: Atraumatic, airway patent  NECK: Supple   Respiratory: No respiratory distress, no retractions  Cardiovascular: No JVD  Musculoskeletal: knee not acutely swollen   Vascular: DP b/l  pulse 2+  Integument: Well hydrated, no rash   Neurologic: Awake alert, normal flow ofspeech   Psychiatric: Cooperative, pleasant affect     RADIOLOGY/PROCEDURES   XR KNEE RIGHT (3 VIEWS)   Final Result   Right knee:      1. CPPD. 2. Tricompartmental osteoarthrosis. 3. No acute osseous abnormality. Left knee:      1. CPPD. 2. Tricompartmental osteoarthrosis. Mild narrowing of the patellofemoral and   medial compartments. 3. No acute osseous abnormality. XR KNEE LEFT (3 VIEWS)   Final Result   Right knee:      1. CPPD. 2. Tricompartmental osteoarthrosis. 3. No acute osseous abnormality. Left knee:      1. CPPD. 2. Tricompartmental osteoarthrosis. Mild narrowing of the patellofemoral and   medial compartments. 3. No acute osseous abnormality. ED COURSE & MEDICAL DECISION MAKING   Pertinent Imaging studies reviewed and interpreted.  (See chart for details)     Differential diagnosis: includes but not limited to Arterial Injury/Ischemia, Fracture, Dislocation, Compartment Syndrome, Neurologic Deficit/Injury. FINAL IMPRESSION   1.  Arthritis of knee        PLAN:   Outpatient treatment, follow-up, and discharge instructions (see EMR)    Electronically signed by: Mayelin Carrasco MD, 3/23/2022 10:30 AM          Mayelin Carrasco MD  03/23/22 [id number]

## 2022-04-01 DIAGNOSIS — E11.9 TYPE 2 DIABETES MELLITUS WITHOUT COMPLICATION (HCC): ICD-10-CM

## 2022-04-01 NOTE — TELEPHONE ENCOUNTER
Health Maintenance   Topic Date Due    DTaP/Tdap/Td vaccine (1 - Tdap) Never done    Breast cancer screen  02/26/2022    Diabetic foot exam  10/21/2022 (Originally 7/27/2021)    Shingles Vaccine (1 of 2) 10/21/2022 (Originally 1/9/2000)    Hepatitis C screen  10/21/2022 (Originally 1950)    Diabetic retinal exam  10/31/2022 (Originally 6/15/2018)    COVID-19 Vaccine (3 - Booster for [place] series) 01/31/2023 (Originally 8/27/2021)    A1C test (Diabetic or Prediabetic)  07/27/2022    Lipid screen  07/27/2022    Depression Screen  01/31/2023    Annual Wellness Visit (AWV)  02/01/2023    Potassium monitoring  03/07/2023    Creatinine monitoring  03/07/2023    Colorectal Cancer Screen  03/23/2025    DEXA (modify frequency per FRAX score)  Completed    Flu vaccine  Completed    Pneumococcal 65+ years Vaccine  Completed    Hepatitis A vaccine  Aged Out    Hib vaccine  Aged Out    Meningococcal (ACWY) vaccine  Aged Out             (applicable per patient's age: Cancer Screenings, Depression Screening, Fall Risk Screening, Immunizations)    Hemoglobin A1C (%)   Date Value   07/27/2021 5.6   01/19/2021 5.8   08/05/2020 5.8     Microalb/Crt.  Ratio (mcg/mg creat)   Date Value   07/28/2021 9     LDL Cholesterol (mg/dL)   Date Value   07/27/2021 74     AST (U/L)   Date Value   07/27/2021 6     ALT (U/L)   Date Value   07/27/2021 <5 (L)     BUN (mg/dL)   Date Value   03/07/2022 14      (goal A1C is < 7)   (goal LDL is <100) need 30-50% reduction from baseline     BP Readings from Last 3 Encounters:   03/23/22 120/78   02/23/22 129/69   02/15/22 122/72    (goal /80)      All Future Testing planned in [facility]:  Lab Frequency Next Occurrence   Stress test Matt Peat) Once 02/23/2022       Next Visit Date:  Future Appointments   Date Time Provider Tara Mtz   4/27/2022 11:00 AM Juliana Bullock, APRN - CNP [facility]   8/31/2022 10:40 AM Chucky Bruce MD [facility]   4/6/2023 11:00 AM Quinton Bullock, APRN - CNP [facility]            Patient Active Problem List:     Hypokalemia     Type 2 diabetes mellitus with diabetic nephropathy, without long-term current use of insulin (HCC)     Essential hypertension     Hyperlipidemia     Lower leg edema     Gastroesophageal reflux disease     Mild intermittent asthma without complication     Postmenopausal     Osteopenia determined by x-ray     Chronic midline low back pain without sciatica     Calcaneal spur of left foot     Iron deficiency anemia     Recurrent UTI     ACS (acute coronary syndrome) ([facility])     Abnormal cardiovascular stress test     CKD (chronic kidney disease), stage II     Resistant hypertension     Hyperkalemia     Mild CAD     Stage 3 chronic kidney disease, unspecified whether stage 3a or 3b CKD ([facility] [address]

## 2022-04-26 ENCOUNTER — HOSPITAL ENCOUNTER (EMERGENCY)
Age: 72
Discharge: HOME | End: 2022-04-27
Attending: EMERGENCY MEDICINE
Payer: MEDICARE

## 2022-04-26 ENCOUNTER — APPOINTMENT (OUTPATIENT)
Dept: GENERAL RADIOLOGY | Age: 72
End: 2022-04-26
Payer: MEDICARE

## 2022-04-26 VITALS
TEMPERATURE: 98 F | DIASTOLIC BLOOD PRESSURE: 70 MMHG | SYSTOLIC BLOOD PRESSURE: 121 MMHG | RESPIRATION RATE: 20 BRPM | HEART RATE: 92 BPM | OXYGEN SATURATION: 97 %

## 2022-04-26 DIAGNOSIS — S23.41XA SPRAIN OF COSTAL CARTILAGE, INITIAL ENCOUNTER: Primary | ICD-10-CM

## 2022-04-26 DIAGNOSIS — N30.00 ACUTE CYSTITIS WITHOUT HEMATURIA: ICD-10-CM

## 2022-04-26 LAB
-: ABNORMAL
ABSOLUTE EOS #: 0.04 K/UL (ref 0–0.44)
ABSOLUTE IMMATURE GRANULOCYTE: 0.04 K/UL (ref 0–0.3)
ABSOLUTE LYMPH #: 2.79 K/UL (ref 1.1–3.7)
ABSOLUTE MONO #: 0.72 K/UL (ref 0.1–1.2)
ALBUMIN SERPL-MCNC: 3.5 G/DL (ref 3.5–5.2)
ALBUMIN/GLOBULIN RATIO: 1.1 (ref 1–2.5)
ALP BLD-CCNC: 122 U/L (ref 35–104)
ALT SERPL-CCNC: <5 U/L (ref 5–33)
ANION GAP SERPL CALCULATED.3IONS-SCNC: 8 MMOL/L (ref 9–17)
AST SERPL-CCNC: 9 U/L
BACTERIA: ABNORMAL
BASOPHILS # BLD: 0 % (ref 0–2)
BASOPHILS ABSOLUTE: 0.04 K/UL (ref 0–0.2)
BILIRUB SERPL-MCNC: 0.39 MG/DL (ref 0.3–1.2)
BILIRUBIN URINE: NEGATIVE
BUN BLDV-MCNC: 18 MG/DL (ref 8–23)
BUN/CREAT BLD: 11 (ref 9–20)
CALCIUM SERPL-MCNC: 9.3 MG/DL (ref 8.6–10.4)
CHLORIDE BLD-SCNC: 104 MMOL/L (ref 98–107)
CO2: 22 MMOL/L (ref 20–31)
COLOR: YELLOW
CREAT SERPL-MCNC: 1.62 MG/DL (ref 0.5–0.9)
EOSINOPHILS RELATIVE PERCENT: 0 % (ref 1–4)
EPITHELIAL CELLS UA: ABNORMAL /HPF (ref 0–25)
FLU A ANTIGEN: NEGATIVE
FLU B ANTIGEN: NEGATIVE
GFR AFRICAN AMERICAN: 38 ML/MIN
GFR NON-AFRICAN AMERICAN: 31 ML/MIN
GFR SERPL CREATININE-BSD FRML MDRD: ABNORMAL ML/MIN/{1.73_M2}
GFR SERPL CREATININE-BSD FRML MDRD: ABNORMAL ML/MIN/{1.73_M2}
GLUCOSE BLD-MCNC: 89 MG/DL (ref 70–99)
GLUCOSE URINE: NEGATIVE
HCT VFR BLD CALC: 31.7 % (ref 36.3–47.1)
HEMOGLOBIN: 10.1 G/DL (ref 11.9–15.1)
IMMATURE GRANULOCYTES: 0 %
KETONES, URINE: NEGATIVE
LEUKOCYTE ESTERASE, URINE: ABNORMAL
LIPASE: 35 U/L (ref 13–60)
LYMPHOCYTES # BLD: 27 % (ref 24–43)
MCH RBC QN AUTO: 31 PG (ref 25.2–33.5)
MCHC RBC AUTO-ENTMCNC: 31.9 G/DL (ref 28.4–34.8)
MCV RBC AUTO: 97.2 FL (ref 82.6–102.9)
MONOCYTES # BLD: 7 % (ref 3–12)
NITRITE, URINE: NEGATIVE
NRBC AUTOMATED: 0 PER 100 WBC
PDW BLD-RTO: 13.1 % (ref 11.8–14.4)
PH UA: 7 (ref 5–9)
PLATELET # BLD: 347 K/UL (ref 138–453)
PMV BLD AUTO: 8.4 FL (ref 8.1–13.5)
POTASSIUM SERPL-SCNC: 5.7 MMOL/L (ref 3.7–5.3)
PROTEIN UA: NEGATIVE
RBC # BLD: 3.26 M/UL (ref 3.95–5.11)
RBC UA: ABNORMAL /HPF (ref 0–2)
SEG NEUTROPHILS: 66 % (ref 36–65)
SEGMENTED NEUTROPHILS ABSOLUTE COUNT: 6.88 K/UL (ref 1.5–8.1)
SODIUM BLD-SCNC: 134 MMOL/L (ref 135–144)
SPECIFIC GRAVITY UA: 1.01 (ref 1.01–1.02)
TOTAL CK: 24 U/L (ref 26–192)
TOTAL PROTEIN: 6.8 G/DL (ref 6.4–8.3)
TROPONIN, HIGH SENSITIVITY: 12 NG/L (ref 0–14)
TURBIDITY: CLEAR
URINE HGB: NEGATIVE
UROBILINOGEN, URINE: NORMAL
WBC # BLD: 10.5 K/UL (ref 3.5–11.3)
WBC UA: ABNORMAL /HPF (ref 0–5)

## 2022-04-26 PROCEDURE — 87077 CULTURE AEROBIC IDENTIFY: CPT

## 2022-04-26 PROCEDURE — 87186 SC STD MICRODIL/AGAR DIL: CPT

## 2022-04-26 PROCEDURE — 84484 ASSAY OF TROPONIN QUANT: CPT

## 2022-04-26 PROCEDURE — 99285 EMERGENCY DEPT VISIT HI MDM: CPT

## 2022-04-26 PROCEDURE — 93005 ELECTROCARDIOGRAM TRACING: CPT | Performed by: EMERGENCY MEDICINE

## 2022-04-26 PROCEDURE — 80053 COMPREHEN METABOLIC PANEL: CPT

## 2022-04-26 PROCEDURE — 81001 URINALYSIS AUTO W/SCOPE: CPT

## 2022-04-26 PROCEDURE — 85025 COMPLETE CBC W/AUTO DIFF WBC: CPT

## 2022-04-26 PROCEDURE — 6360000002 HC RX W HCPCS: Performed by: EMERGENCY MEDICINE

## 2022-04-26 PROCEDURE — 71046 X-RAY EXAM CHEST 2 VIEWS: CPT

## 2022-04-26 PROCEDURE — 87086 URINE CULTURE/COLONY COUNT: CPT

## 2022-04-26 PROCEDURE — 82550 ASSAY OF CK (CPK): CPT

## 2022-04-26 PROCEDURE — 96365 THER/PROPH/DIAG IV INF INIT: CPT

## 2022-04-26 PROCEDURE — 6370000000 HC RX 637 (ALT 250 FOR IP): Performed by: EMERGENCY MEDICINE

## 2022-04-26 PROCEDURE — 36415 COLL VENOUS BLD VENIPUNCTURE: CPT

## 2022-04-26 PROCEDURE — 83690 ASSAY OF LIPASE: CPT

## 2022-04-26 PROCEDURE — 87804 INFLUENZA ASSAY W/OPTIC: CPT

## 2022-04-26 PROCEDURE — 2580000003 HC RX 258: Performed by: EMERGENCY MEDICINE

## 2022-04-26 RX ORDER — ACETAMINOPHEN 325 MG/1
650 TABLET ORAL ONCE
Status: COMPLETED | OUTPATIENT
Start: 2022-04-26 | End: 2022-04-26

## 2022-04-26 RX ORDER — CEPHALEXIN 500 MG/1
500 CAPSULE ORAL 3 TIMES DAILY
Qty: 30 CAPSULE | Refills: 0 | Status: SHIPPED | OUTPATIENT
Start: 2022-04-26 | End: 2022-05-06

## 2022-04-26 RX ORDER — 0.9 % SODIUM CHLORIDE 0.9 %
1000 INTRAVENOUS SOLUTION INTRAVENOUS ONCE
Status: COMPLETED | OUTPATIENT
Start: 2022-04-26 | End: 2022-04-27

## 2022-04-26 RX ORDER — BENZONATATE 100 MG/1
100 CAPSULE ORAL 3 TIMES DAILY PRN
Qty: 30 CAPSULE | Refills: 0 | Status: SHIPPED | OUTPATIENT
Start: 2022-04-26 | End: 2022-05-03

## 2022-04-26 RX ORDER — BENZONATATE 100 MG/1
100 CAPSULE ORAL ONCE
Status: COMPLETED | OUTPATIENT
Start: 2022-04-26 | End: 2022-04-26

## 2022-04-26 RX ORDER — 0.9 % SODIUM CHLORIDE 0.9 %
1000 INTRAVENOUS SOLUTION INTRAVENOUS ONCE
Status: COMPLETED | OUTPATIENT
Start: 2022-04-26 | End: 2022-04-26

## 2022-04-26 RX ADMIN — CEFTRIAXONE 1000 MG: 1 INJECTION, POWDER, FOR SOLUTION INTRAMUSCULAR; INTRAVENOUS at 22:52

## 2022-04-26 RX ADMIN — SODIUM CHLORIDE 1000 ML: 9 INJECTION, SOLUTION INTRAVENOUS at 21:34

## 2022-04-26 RX ADMIN — BENZONATATE 100 MG: 100 CAPSULE ORAL at 21:26

## 2022-04-26 RX ADMIN — SODIUM CHLORIDE 1000 ML: 9 INJECTION, SOLUTION INTRAVENOUS at 22:52

## 2022-04-26 RX ADMIN — ACETAMINOPHEN 650 MG: 325 TABLET ORAL at 21:25

## 2022-04-26 ASSESSMENT — PAIN - FUNCTIONAL ASSESSMENT: PAIN_FUNCTIONAL_ASSESSMENT: 0-10

## 2022-04-26 ASSESSMENT — PAIN SCALES - GENERAL
PAINLEVEL_OUTOF10: 10
PAINLEVEL_OUTOF10: 4

## 2022-04-26 ASSESSMENT — PAIN DESCRIPTION - LOCATION: LOCATION: GENERALIZED

## 2022-04-26 ASSESSMENT — PAIN DESCRIPTION - ORIENTATION: ORIENTATION: LEFT

## 2022-04-27 ENCOUNTER — OFFICE VISIT (OUTPATIENT)
Dept: PRIMARY CARE CLINIC | Age: 72
End: 2022-04-27
Payer: MEDICARE

## 2022-04-27 VITALS
OXYGEN SATURATION: 97 % | SYSTOLIC BLOOD PRESSURE: 124 MMHG | TEMPERATURE: 97.7 F | WEIGHT: 162.3 LBS | BODY MASS INDEX: 27.71 KG/M2 | HEART RATE: 74 BPM | DIASTOLIC BLOOD PRESSURE: 68 MMHG | HEIGHT: 64 IN | RESPIRATION RATE: 24 BRPM

## 2022-04-27 DIAGNOSIS — I10 ESSENTIAL HYPERTENSION: Primary | ICD-10-CM

## 2022-04-27 DIAGNOSIS — J30.1 SEASONAL ALLERGIC RHINITIS DUE TO POLLEN: ICD-10-CM

## 2022-04-27 DIAGNOSIS — N18.31 STAGE 3A CHRONIC KIDNEY DISEASE (HCC): ICD-10-CM

## 2022-04-27 DIAGNOSIS — E78.2 MIXED HYPERLIPIDEMIA: ICD-10-CM

## 2022-04-27 DIAGNOSIS — Z12.31 OTHER SCREENING MAMMOGRAM: ICD-10-CM

## 2022-04-27 PROBLEM — N18.30 CHRONIC RENAL DISEASE, STAGE III (HCC): Status: ACTIVE | Noted: 2022-04-27

## 2022-04-27 PROCEDURE — 1123F ACP DISCUSS/DSCN MKR DOCD: CPT | Performed by: NURSE PRACTITIONER

## 2022-04-27 PROCEDURE — 99214 OFFICE O/P EST MOD 30 MIN: CPT | Performed by: NURSE PRACTITIONER

## 2022-04-27 PROCEDURE — G8399 PT W/DXA RESULTS DOCUMENT: HCPCS | Performed by: NURSE PRACTITIONER

## 2022-04-27 PROCEDURE — G8417 CALC BMI ABV UP PARAM F/U: HCPCS | Performed by: NURSE PRACTITIONER

## 2022-04-27 PROCEDURE — 1036F TOBACCO NON-USER: CPT | Performed by: NURSE PRACTITIONER

## 2022-04-27 PROCEDURE — 3017F COLORECTAL CA SCREEN DOC REV: CPT | Performed by: NURSE PRACTITIONER

## 2022-04-27 PROCEDURE — 96372 THER/PROPH/DIAG INJ SC/IM: CPT | Performed by: NURSE PRACTITIONER

## 2022-04-27 PROCEDURE — 1090F PRES/ABSN URINE INCON ASSESS: CPT | Performed by: NURSE PRACTITIONER

## 2022-04-27 PROCEDURE — G8427 DOCREV CUR MEDS BY ELIG CLIN: HCPCS | Performed by: NURSE PRACTITIONER

## 2022-04-27 PROCEDURE — 4040F PNEUMOC VAC/ADMIN/RCVD: CPT | Performed by: NURSE PRACTITIONER

## 2022-04-27 RX ORDER — TRIAMCINOLONE ACETONIDE 40 MG/ML
40 INJECTION, SUSPENSION INTRA-ARTICULAR; INTRAMUSCULAR ONCE
Status: COMPLETED | OUTPATIENT
Start: 2022-04-27 | End: 2022-04-27

## 2022-04-27 RX ADMIN — TRIAMCINOLONE ACETONIDE 40 MG: 40 INJECTION, SUSPENSION INTRA-ARTICULAR; INTRAMUSCULAR at 11:27

## 2022-04-27 ASSESSMENT — PATIENT HEALTH QUESTIONNAIRE - PHQ9
SUM OF ALL RESPONSES TO PHQ QUESTIONS 1-9: 0
2. FEELING DOWN, DEPRESSED OR HOPELESS: 0
SUM OF ALL RESPONSES TO PHQ QUESTIONS 1-9: 0
SUM OF ALL RESPONSES TO PHQ9 QUESTIONS 1 & 2: 0
1. LITTLE INTEREST OR PLEASURE IN DOING THINGS: 0
SUM OF ALL RESPONSES TO PHQ QUESTIONS 1-9: 0
SUM OF ALL RESPONSES TO PHQ QUESTIONS 1-9: 0

## 2022-04-27 ASSESSMENT — ENCOUNTER SYMPTOMS
DIARRHEA: 0
SINUS PAIN: 0
WHEEZING: 0
TROUBLE SWALLOWING: 0
VOMITING: 0
CONSTIPATION: 0
ABDOMINAL PAIN: 0
SORE THROAT: 0
COUGH: 1
SHORTNESS OF BREATH: 0
SINUS PRESSURE: 1
NAUSEA: 0
SWOLLEN GLANDS: 0
RHINORRHEA: 1

## 2022-04-27 ASSESSMENT — LIFESTYLE VARIABLES: HOW OFTEN DO YOU HAVE A DRINK CONTAINING ALCOHOL: NEVER

## 2022-04-27 NOTE — ED PROVIDER NOTES
[facility] ED  [facility]   Chief Complaint   Patient presents with    Shortness of Breath     ongoing all week    Muscle Pain     reports pain all over    Cough      HPI   Mary Alice Cespedes is a 67 y.o. female who presents with left-sided chest pain with coughing. She has had this for several days and she has been coughing for several days. She says that she was also had a lot of burping. No fever. No sweating. No other current complaints except for coughing and pain in the left upper chest area worse with coughing. She is not taking anything for this and has not gotten better by itself. Pain is non exertional.      REVIEW OF SYSTEMS   Cardiac: as er hpi Respiratory: No shortness of breath or new cough  General: No fevers   : No dysuria or hematuria  GI: No vomiting or diarrhea  See HPI for further details. All other systems reviewed and are negative. Carol Ann Billing PAST MEDICAL OR SURGICAL HISTORY     Past Medical History:   Diagnosis Date    Arthritis     Asthma     Chronic bronchitis ([address]     Diabetes mellitus ([address]     GERD (gastroesophageal reflux disease)     H/O echocardiogram 3/9/16    EF >60%. LV wall thickness is mildly increased. Mild mitral and tricuspid regurgitation. Borderline pulmonary hypertension estimated right ventricular sysolic pressure of 43MJAU. Mild (grade l) diastolic dysfunction.  History of PFTs 3/10/16     Suboptimal effort. Restrictive in nature. clionical correlations is advised.  History of stress test 2/18/16    Abnoraml. Moderate perfusion defect of mild to moderate intensity in the anterolateral and anteroapical regions during stress imaging. Intermediate risk for CAD.     Hyperlipidemia     Hypertension        Past Surgical History:   Procedure Laterality Date    BREAST SURGERY      cyst removed    CARDIAC CATHETERIZATION  2014    CARDIAC CATHETERIZATION Left 05/01/2019    Right Radial/[facility]     SECTION      CHOLECYSTECTOMY      COLONOSCOPY  3/23/15    -diverticulosis,hemorrhoids    FOOT SURGERY      rt    HYSTERECTOMY         CURRENT MEDICATIONS     Current Outpatient Rx   Medication Sig Dispense Refill    cephALEXin (KEFLEX) 500 MG capsule Take 1 capsule by mouth 3 times daily for 10 days 30 capsule 0    benzonatate (TESSALON) 100 MG capsule Take 1 capsule by mouth 3 times daily as needed for Cough 30 capsule 0    metFORMIN (GLUCOPHAGE) 500 MG tablet Take 1 tablet by mouth 2 times daily (with meals) 180 tablet 3    pantoprazole (PROTONIX) 40 MG tablet Take 1 tablet by mouth every morning (before breakfast) 90 tablet 1    amLODIPine (NORVASC) 5 MG tablet Take 1 tablet by mouth daily 90 tablet 3    spironolactone-hydroCHLOROthiazide (ALDACTAZIDE) 25-25 MG per tablet Take 1 tablet by mouth daily 90 tablet 3    hydrALAZINE (APRESOLINE) 50 MG tablet Take 1 tablet by mouth 3 times daily 270 tablet 3    atorvastatin (LIPITOR) 40 MG tablet Take 1 tablet by mouth daily 90 tablet 3    atenolol (TENORMIN) 100 MG tablet Take 1 tablet by mouth daily 90 tablet 3    valsartan (DIOVAN) 320 MG tablet Take 1 tablet by mouth daily 90 tablet 3    fluticasone (FLONASE) 50 MCG/ACT nasal spray 2 sprays by Each Nostril route daily 48 g 1    ascorbic acid (VITAMIN C) 500 MG tablet Take 1 tablet by mouth 2 times daily With iron tablet 180 tablet 3    Calcium Carbonate-Vitamin D (OYSTER SHELL CALCIUM/D) 500-200 MG-UNIT TABS Take 1 tablet by mouth 2 times daily (Patient not taking: Reported on 3/23/2022) 180 tablet 3    potassium chloride (KLOR-CON M) 20 MEQ extended release tablet Take 1 tablet by mouth 2 times daily 180 tablet 3    albuterol sulfate HFA (VENTOLIN HFA) 108 (90 Base) MCG/ACT inhaler Inhale 2 puffs into the lungs 4 times daily as needed for Wheezing 1 Inhaler 0    cetirizine (ZYRTEC ALLERGY) 10 MG tablet Take 1 tablet by mouth daily 30 tablet 3    dorzolamide-timolol (COSOPT) 22.3-6.8 MG/ML ophthalmic solution Place 1 drop into both eyes 2 times daily       Multiple Vitamins-Minerals (THERAPEUTIC MULTIVITAMIN-MINERALS) tablet Take 1 tablet by mouth daily 30 tablet 11    latanoprost (XALATAN) 0.005 % ophthalmic solution Place 1 drop into both eyes nightly       blood glucose test strips (ONE TOUCH ULTRA TEST) strip USE 1 STRIP TO CHECK GLUCOSE DAILY 100 strip 3    ONE TOUCH LANCETS MISC Lancets- One Touch Delica for testing daily and as needed.  DX: Diabetes type  E11.9 100 each 3    Blood Glucose Monitoring Suppl GENNY 1 Units by Does not apply route 3 times daily What insurance will cover 1 Device 0       ALLERGIES     Allergies   Allergen Reactions    Iv Dye [Iodides] Nausea And Vomiting       FAMILY OR SOCIAL HISTORY     Family History   Problem Relation Age of Onset    Stroke Father     Stroke Sister     Diabetes Sister         x6 sisters    High Blood Pressure Sister     Stroke Brother     Diabetes Brother        Social History     Socioeconomic History    Marital status:      Spouse name: Not on file    Number of children: Not on file    Years of education: Not on file    Highest education level: Not on file   Occupational History    Not on file   Tobacco Use    Smoking status: Never Smoker    Smokeless tobacco: Never Used   Vaping Use    Vaping Use: Never used   Substance and Sexual Activity    Alcohol use: No     Alcohol/week: 0.0 standard drinks    Drug use: No    Sexual activity: Yes     Partners: Male     Birth control/protection: Post-menopausal   Other Topics Concern    Not on file   Social History Narrative    Not on file     Social Determinants of Health     Financial Resource Strain: Unknown    Difficulty of Paying Living Expenses: Patient refused   Food Insecurity: Unknown    Worried About Running Out of Food in the Last Year: Patient refused    Ran Out of Food in the Last Year: Patient refused Transportation Needs:     Lack of Transportation (Medical): Not on file    Lack of Transportation (Non-Medical): Not on file   Physical Activity:     Days of Exercise per Week: Not on file    Minutes of Exercise per Session: Not on file   Stress:     Feeling of Stress : Not on file   Social Connections:     Frequency of Communication with Friends and Family: Not on file    Frequency of Social Gatherings with Friends and Family: Not on file    Attends Evangelical Services: Not on file    Active Member of [address] [place] [address] or Organizations: Not on file    Attends Club or Organization Meetings: Not on file    Marital Status: Not on file   Intimate Partner Violence:     Fear of Current or Ex-Partner: Not on file    Emotionally Abused: Not on file    Physically Abused: Not on file    Sexually Abused: Not on file   Housing Stability:     Unable to Pay for Housing in the Last Year: Not on file    Number of [place] in the Last Year: Not on file    Unstable Housing in the Last Year: Not on file         PHYSICAL EXAM   VITAL SIGNS: /70   Pulse 92   Temp 98 °F (36.7 °C) (Tympanic)   Resp 20   SpO2 97%   Constitutional: Well developed, well nourished, no acute distress  Eyes: Pupils equally round and reactive to light, sclera nonicteric  HENT: atraumatic, moist mucous membranes  NECK: Normal range of motion, no JVD  Respiratory: No respiratory distress, normal breath sounds, no wheezing   Cardiovascular: regular rate, normal rhythm  GI: Soft, nondistended, nontender  Musculoskeletal: No edema, no acute deformities   Integument: Skin is warm and dry, no obvious rash   Vascular: ext warm and well perfused  Neurologic: awake, alert, oriented x3, finger to nose test bilaterally. Psychiatric: pleasant affect, does not appear anxious     EKG (Interpreted by me)  nsr rhythm at 75 beats per minute, no stemi    RADIOLOGY/PROCEDURES     XR CHEST (2 VW)   Final Result   No acute process.              ED COURSE & MEDICAL DECISION MAKING   Pertinent Labs & Imaging studies reviewed and interpreted. (See chart for details)  See chart for details of medications given during the ED stay. Vitals:    04/26/22 1854   BP: 121/70   Pulse: 92   Resp: 20   Temp: 98 °F (36.7 °C)   TempSrc: Tympanic   SpO2: 97%     Mdm: pain seems to be from coughing and likely muscle sprain. Will check trops and xray and labs. Update: Patient have urinary tract infection. Since she has chest pain worse with coughing which seems to mostly be activated by coughing I suspect that she has a rib sprain or similar related injury. Differential diagnosis: Dehydration, potentially/metabolic problem, anemia, infection, hypotension, Stroke, Structural CNS mass lesion, other    FINAL IMPRESSION   1. Sprain of costal cartilage, initial encounter    2.  Acute cystitis without hematuria        PLAN   home with follow-up          Sonia Tarango MD  04/26/22 3002

## 2022-04-27 NOTE — PROGRESS NOTES
After obtaining consent, and per orders of Eusebio Bullock CNP injection of Kenalog given in Left deltoid by Humble Ortiz MA. Patient instructed to remain in clinic for 20 minutes afterwards, and to report any adverse reaction to me immediately.

## 2022-04-27 NOTE — PROGRESS NOTES
Medicare Annual Wellness Visit    Donna Martinez is here for Medicare AWV (AWV/routine, went to ER 1 night for cough.)    Assessment & Plan   Encounter for annual wellness visit (AWV) in Medicare patient  Essential hypertension  -     CBC with Auto Differential; Future  -     ALT; Future  -     AST; Future  -     Basic Metabolic Panel; Future  Mixed hyperlipidemia  -     Lipid Panel; Future  Seasonal allergic rhinitis due to pollen  -     triamcinolone acetonide (KENALOG-40) injection 40 mg; 40 mg, IntraMUSCular, ONCE, 1 dose, On Wed 4/27/22 at 1130  Stage 3a chronic kidney disease ([facility] [address]  Other screening mammogram  -     [facility] DIGITAL SCREEN BILATERAL; Future      Recommendations for Preventive Services Due: see orders and patient instructions/AVS.  Recommended screening schedule for the next 5-10 years is provided to the patient in written form: see Patient Instructions/AVS.     Return in 6 months (on 10/27/2022) for Medicare Annual Wellness Visit in 1 year. Subjective   The following acute and/or chronic problems were also addressed today:  HPI    Review of Systems      Patient's complete Health Risk Assessment and screening values have been reviewed and are found in Flowsheets. The following problems were reviewed today and where indicated follow up appointments were made and/or referrals ordered. Positive Risk Factor Screenings with Interventions:     Cognitive:   Words recalled: 0 Words Recalled  Clock Drawing Test (CDT): (!) Abnormal  Total Score Interpretation: Abnormal Mini-Cog  Did the patient refuse to take the cognition test?: Yes (clock)    Cognitive Impairment Interventions:  · {Medicare AWV Cognitive Impairment Interventions:330406800}             General Health and ACP:  General  In general, how would you say your health is?: Fair  In the past 7 days, have you experienced any of the following: New or Increased Pain, New or Increased Fatigue, Loneliness, Social Isolation, Stress or Anger?: (!) Yes  Select all that apply: (!) New or Increased Pain (UIT, shoulder left coughing)  Do you have a Living Will?: (!) No    Advance Directives     Power of  Living Will ACP-Advance Directive ACP-Power of     Not on File Not on File Not on File Not on File      General Health Risk Interventions:  · {Medicare AWV General Health Risk Interventions:564718804}    Health Habits/Nutrition:     Physical Activity: Inactive    Days of Exercise per Week: 0 days    Minutes of Exercise per Session: 0 min     Have you lost any weight without trying in the past 3 months?: No  Body mass index: (!) 27.86  Have you seen the dentist within the past year?: (!) No    Health Habits/Nutrition Interventions:  · {Medicare AWV Health Habits/Nutrition Interventions:054674165}    Hearing/Vision:  Do you or your family notice any trouble with your hearing that hasn't been managed with hearing aids?: No  Do you have difficulty driving, watching TV, or doing any of your daily activities because of your eyesight?: No  Have you had an eye exam within the past year?: (!) No  No exam data present    Hearing/Vision Interventions:  · {Medicare AWV Hearing/Vision Interventions:[id number]}            Objective   Vitals:    04/27/22 1105   BP: 124/68   Position: Sitting   Pulse: 74   Resp: 24   Temp: 97.7 °F (36.5 °C)   TempSrc: Temporal   SpO2: 97%   Weight: 162 lb 4.8 oz (73.6 kg)   Height: 5' 4\" (1.626 m)      Body mass index is 27.86 kg/m². {OPTIONAL FOR THIS VISIT TYPE - GENERAL PHYSICAL EXAM (THIS WILL AUTO-DELETE IF NOT NIIV):021532101}       Allergies   Allergen Reactions    Iv Dye [Iodides] Nausea And Vomiting     Prior to Visit Medications    Medication Sig Taking?  Authorizing Provider   metFORMIN (GLUCOPHAGE) 500 MG tablet Take 1 tablet by mouth 2 times daily (with meals) Yes Negrita Ching Might, APRN - CNP   pantoprazole (PROTONIX) 40 MG tablet Take 1 tablet by mouth every morning (before breakfast) Yes [facility] APRN - CNP   amLODIPine (NORVASC) 5 MG tablet Take 1 tablet by mouth daily Yes Ankush Bullock APRN - SHELL   spironolactone-hydroCHLOROthiazide (ALDACTAZIDE) 25-25 MG per tablet Take 1 tablet by mouth daily Yes TARA Jaquez - CNP   hydrALAZINE (APRESOLINE) 50 MG tablet Take 1 tablet by mouth 3 times daily Yes TARA Jaquez - CNP   atorvastatin (LIPITOR) 40 MG tablet Take 1 tablet by mouth daily Yes Ankush Bullock APRN - CNP   atenolol (TENORMIN) 100 MG tablet Take 1 tablet by mouth daily Yes TARA Jaquez - CNP   valsartan (DIOVAN) 320 MG tablet Take 1 tablet by mouth daily Yes TARA Jaquez - CNP   fluticasone (FLONASE) 50 MCG/ACT nasal spray 2 sprays by Each Nostril route daily Yes TARA Wagner CNP   ascorbic acid (VITAMIN C) 500 MG tablet Take 1 tablet by mouth 2 times daily With iron tablet Yes TARA Jaquez - CNP   Calcium Carbonate-Vitamin D (OYSTER SHELL CALCIUM/D) 500-200 MG-UNIT TABS Take 1 tablet by mouth 2 times daily Yes TARA Jaquez - SHELL   potassium chloride (KLOR-CON M) 20 MEQ extended release tablet Take 1 tablet by mouth 2 times daily Yes TARA Jaquez - CNP   cetirizine (ZYRTEC ALLERGY) 10 MG tablet Take 1 tablet by mouth daily Yes TARA Jaquez - SHELL   dorzolamide-timolol (COSOPT) 22.3-6.8 MG/ML ophthalmic solution Place 1 drop into both eyes 2 times daily  Yes Historical Provider, MD   Multiple Vitamins-Minerals (THERAPEUTIC MULTIVITAMIN-MINERALS) tablet Take 1 tablet by mouth daily Yes TARA Jaquez - CNP   latanoprost (XALATAN) 0.005 % ophthalmic solution Place 1 drop into both eyes nightly  Yes Historical Provider, MD   blood glucose test strips (ONE TOUCH ULTRA TEST) strip USE 1 STRIP TO CHECK GLUCOSE DAILY Yes TARA Jaquez CNP   ONE TOUCH LANCETS MISC Lancets- One Touch Rehan Rojas for testing daily and as needed.  DX: Diabetes type  E11.9 Yes Ankush Bullock, TARA - CNP   Blood Glucose Monitoring Suppl GENNY 1 Units by Does not apply route 3 times daily What insurance will cover Yes TARA Rangel CNP   cephALEXin (KEFLEX) 500 MG capsule Take 1 capsule by mouth 3 times daily for 10 days  Patient not taking: Reported on 4/27/2022  Yoandy Whitehead MD   benzonatate (TESSALON) 100 MG capsule Take 1 capsule by mouth 3 times daily as needed for Cough  Patient not taking: Reported on 4/27/2022  Yoandy Whitehead MD   albuterol sulfate HFA (VENTOLIN HFA) 108 (90 Base) MCG/ACT inhaler Inhale 2 puffs into the lungs 4 times daily as needed for Wheezing  Patient not taking: Reported on 4/27/2022  TARA Jaquez CNP       CareTeam (Including outside providers/suppliers regularly involved in providing care):   Patient Care Team:  TARA Carballo CNP as PCP - General (Family Nurse Practitioner)  TARA Carballo CNP as PCP - [facility] Empaneled Provider    Reviewed and updated this visit:  Tobacco  Allergies  Meds  Problems  Med Hx  Surg Hx  Soc Hx  Fam Hx

## 2022-04-27 NOTE — PROGRESS NOTES
Name: Meryle Birchwood  : 1950         Chief Complaint:     Chief Complaint   Patient presents with    Hypertension     Routine, went to ER 1 night for cough.  Cholesterol Problem       History of Present Illness:      Meryle Birchwood is a 67 y.o.  female who presents with Hypertension (Routine, went to ER 1 night for cough.) and Cholesterol Problem      1 8 is here today for a routine office visit. CKD-stable, patient follows with nephrology. Labs are at baseline. Hypertension  This is a chronic problem. The current episode started more than 1 year ago. The problem is unchanged. The problem is controlled. Associated symptoms include peripheral edema. Pertinent negatives include no chest pain, headaches, neck pain, palpitations or shortness of breath. There are no associated agents to hypertension. Risk factors for coronary artery disease include dyslipidemia, family history, obesity, post-menopausal state, stress and sedentary lifestyle. Past treatments include diuretics, beta blockers and direct vasodilators. The current treatment provides moderate improvement. Compliance problems include exercise. Hypertensive end-organ damage includes CAD/MI. There is no history of kidney disease or CVA. There is no history of chronic renal disease. Hyperlipidemia  This is a chronic problem. The current episode started more than 1 year ago. The problem is controlled. Recent lipid tests were reviewed and are normal. She has no history of chronic renal disease, liver disease or obesity. Factors aggravating her hyperlipidemia include fatty foods. Pertinent negatives include no chest pain, leg pain, myalgias or shortness of breath. Current antihyperlipidemic treatment includes statins. The current treatment provides moderate improvement of lipids. Compliance problems include adherence to exercise and adherence to diet.   Risk factors for coronary artery disease include dyslipidemia, family history, hypertension, stress and post-menopausal.   URI   This is a chronic problem. The current episode started more than 1 year ago. The problem has been waxing and waning. There has been no fever. Associated symptoms include congestion, coughing, rhinorrhea and sneezing. Pertinent negatives include no abdominal pain, chest pain, diarrhea, dysuria, ear pain, headaches, joint pain, joint swelling, nausea, neck pain, plugged ear sensation, rash, sinus pain, sore throat, swollen glands, vomiting or wheezing. She has tried nothing for the symptoms. The treatment provided no relief. Past Medical History:     Past Medical History:   Diagnosis Date    Arthritis     Asthma     [facility] [address]     Diabetes mellitus ([facility] [address]     GERD (gastroesophageal reflux disease)     H/O echocardiogram 3/9/16    EF >60%. LV wall thickness is mildly increased. Mild mitral and tricuspid regurgitation. Borderline pulmonary hypertension estimated right ventricular sysolic pressure of 12NTZD. Mild (grade l) diastolic dysfunction.  History of PFTs 3/10/16     Suboptimal effort. Restrictive in nature. clionical correlations is advised.  History of stress test 16    Abnoraml. Moderate perfusion defect of mild to moderate intensity in the anterolateral and anteroapical regions during stress imaging. Intermediate risk for CAD.     Hyperlipidemia     Hypertension       Reviewed all health maintenance requirements and ordered appropriate tests  Health Maintenance Due   Topic Date Due    Breast cancer screen  2022       Past Surgical History:     Past Surgical History:   Procedure Laterality Date    BREAST SURGERY      cyst removed    CARDIAC CATHETERIZATION  2014    CARDIAC CATHETERIZATION Left 2019    [facility]     SECTION      CHOLECYSTECTOMY      COLONOSCOPY  3/23/15    -diverticulosis,hemorrhoids    FOOT SURGERY      rt    HYSTERECTOMY          Medications: Prior to Admission medications    Medication Sig Start Date End Date Taking?  Authorizing Provider   metFORMIN (GLUCOPHAGE) 500 MG tablet Take 1 tablet by mouth 2 times daily (with meals) 4/1/22  Yes TARA Dobson CNP   pantoprazole (PROTONIX) 40 MG tablet Take 1 tablet by mouth every morning (before breakfast) 1/31/22  Yes Juliana Bullock, APRN - CNP   amLODIPine (NORVASC) 5 MG tablet Take 1 tablet by mouth daily 1/11/22  Yes TARA Dobson CNP   spironolactone-hydroCHLOROthiazide (ALDACTAZIDE) 25-25 MG per tablet Take 1 tablet by mouth daily 1/11/22  Yes TARA Dobson CNP   hydrALAZINE (APRESOLINE) 50 MG tablet Take 1 tablet by mouth 3 times daily 1/11/22  Yes TARA Dobson CNP   atorvastatin (LIPITOR) 40 MG tablet Take 1 tablet by mouth daily 1/11/22  Yes TARA Dobson CNP   atenolol (TENORMIN) 100 MG tablet Take 1 tablet by mouth daily 1/11/22  Yes TARA oDbson CNP   valsartan (DIOVAN) 320 MG tablet Take 1 tablet by mouth daily 1/11/22  Yes TARA Dobson CNP   fluticasone (FLONASE) 50 MCG/ACT nasal spray 2 sprays by Each Nostril route daily 12/20/21  Yes TARA Coy CNP   ascorbic acid (VITAMIN C) 500 MG tablet Take 1 tablet by mouth 2 times daily With iron tablet 4/12/21  Yes TARA Dobson CNP   Calcium Carbonate-Vitamin D (OYSTER SHELL CALCIUM/D) 500-200 MG-UNIT TABS Take 1 tablet by mouth 2 times daily 4/12/21  Yes TARA Dobson CNP   potassium chloride (KLOR-CON M) 20 MEQ extended release tablet Take 1 tablet by mouth 2 times daily 4/12/21  TARA Sesay CNP   cetirizine (ZYRTEC ALLERGY) 10 MG tablet Take 1 tablet by mouth daily 12/16/20  TARA Sesay CNP   dorzolamide-timolol (COSOPT) 22.3-6.8 MG/ML ophthalmic solution Place 1 drop into both eyes 2 times daily  9/20/20  Yes Historical Provider, MD   Multiple Vitamins-Minerals (THERAPEUTIC MULTIVITAMIN-MINERALS) tablet Take 1 tablet by mouth daily 8/6/20 4/27/22 Yes TARA Jaquez CNP   latanoprost (XALATAN) 0.005 % ophthalmic solution Place 1 drop into both eyes nightly    Yes Historical Provider, MD   blood glucose test strips (ONE TOUCH ULTRA TEST) strip USE 1 STRIP TO CHECK GLUCOSE DAILY 3/13/19  Yes TARA Jaquez CNP   ONE TOUCH LANCETS 8 [facility] for testing daily and as needed. DX: Diabetes type  E11.9 1/31/19  Yes TARA Jaquez CNP   Blood Glucose Monitoring Suppl GENNY 1 Units by Does not apply route 3 times daily What insurance will cover 10/11/16  Yes TARA Rangel CNP   cephALEXin (KEFLEX) 500 MG capsule Take 1 capsule by mouth 3 times daily for 10 days  Patient not taking: Reported on 4/27/2022 4/26/22 5/6/22  Yoandy Whitehead MD   benzonatate (TESSALON) 100 MG capsule Take 1 capsule by mouth 3 times daily as needed for Cough  Patient not taking: Reported on 4/27/2022 4/26/22 5/3/22  Yoandy Whitehead MD   albuterol sulfate HFA (VENTOLIN HFA) 108 (90 Base) MCG/ACT inhaler Inhale 2 puffs into the lungs 4 times daily as needed for Wheezing  Patient not taking: Reported on 4/27/2022 1/19/21   TARA Jaquez CNP        Allergies: Iv dye [iodides]    Social History:     Tobacco:    reports that she has never smoked. She has never used smokeless tobacco.  Alcohol:      reports no history of alcohol use. Drug Use:  reports no history of drug use. Family History:     Family History   Problem Relation Age of Onset   Kendall Elisea Stroke Father     Stroke Sister     Diabetes Sister         x6 sisters    High Blood Pressure Sister     Stroke Brother     Diabetes Brother        Review of Systems:     Positive and Negative as described in HPI    Review of Systems   Constitutional: Negative for chills, fatigue and fever. HENT: Positive for congestion, postnasal drip, rhinorrhea, sinus pressure and sneezing. Negative for ear pain, sinus pain, sore throat and trouble swallowing.     Eyes: Negative for visual disturbance. Respiratory: Positive for cough. Negative for shortness of breath and wheezing. Cardiovascular: Negative for chest pain and palpitations. Gastrointestinal: Negative for abdominal pain, constipation, diarrhea, nausea and vomiting. Genitourinary: Negative for difficulty urinating and dysuria. Musculoskeletal: Negative for gait problem, joint pain, myalgias, neck pain and neck stiffness. Skin: Negative for rash. Neurological: Negative for dizziness, syncope, light-headedness and headaches. Physical Exam:   Vitals:  /68 (Position: Sitting)   Pulse 74   Temp 97.7 °F (36.5 °C) (Temporal)   Resp 24   Ht 5' 4\" (1.626 m)   Wt 162 lb 4.8 oz (73.6 kg)   SpO2 97%   BMI 27.86 kg/m²     Physical Exam  Vitals and nursing note reviewed. Constitutional:       General: She is not in acute distress. Appearance: Normal appearance. She is well-developed. She is not ill-appearing. HENT:      Nose: Mucosal edema and congestion present. Right Turbinates: Swollen and pale. Left Turbinates: Swollen and pale. Mouth/Throat:      Mouth: Mucous membranes are moist. Mucous membranes are not dry. Pharynx: Posterior oropharyngeal erythema present. Eyes:      General: No scleral icterus. Conjunctiva/sclera: Conjunctivae normal.   Cardiovascular:      Rate and Rhythm: Normal rate and regular rhythm. Heart sounds: No murmur heard. Pulmonary:      Effort: Pulmonary effort is normal.      Breath sounds: Normal breath sounds. No wheezing. Abdominal:      General: Bowel sounds are normal. There is no distension. Palpations: Abdomen is soft. Tenderness: There is no abdominal tenderness. Musculoskeletal:      Cervical back: Normal range of motion and neck supple. Right lower leg: No edema. Left lower leg: No edema. Lymphadenopathy:      Cervical: No cervical adenopathy. Skin:     General: Skin is warm and dry. Findings: No rash. Neurological:      Mental Status: She is alert and oriented to person, place, and time. Psychiatric:         Mood and Affect: Mood normal.         Behavior: Behavior normal.         Data:     Lab Results   Component Value Date     04/26/2022    K 5.7 04/26/2022     04/26/2022    CO2 22 04/26/2022    BUN 18 04/26/2022    CREATININE 1.62 04/26/2022    GLUCOSE 89 04/26/2022    GLUCOSE 110 03/26/2012    PROT 6.8 04/26/2022    LABALBU 3.5 04/26/2022    BILITOT 0.39 04/26/2022    ALKPHOS 122 04/26/2022    AST 9 04/26/2022    ALT <5 04/26/2022     Lab Results   Component Value Date    WBC 10.5 04/26/2022    RBC 3.26 04/26/2022    RBC 3.85 03/26/2012    HGB 10.1 04/26/2022    HCT 31.7 04/26/2022    MCV 97.2 04/26/2022    MCH 31.0 04/26/2022    MCHC 31.9 04/26/2022    RDW 13.1 04/26/2022     04/26/2022     03/26/2012    MPV 8.4 04/26/2022     Lab Results   Component Value Date    TSH 1.36 03/01/2022     Lab Results   Component Value Date    CHOL 138 07/27/2021    HDL 53 07/27/2021    LABA1C 5.6 07/27/2021       Assessment/Plan:      Diagnosis Orders   1. Essential hypertension  CBC with Auto Differential    ALT    AST    Basic Metabolic Panel   2. Mixed hyperlipidemia  Lipid Panel   3. Seasonal allergic rhinitis due to pollen  triamcinolone acetonide (KENALOG-40) injection 40 mg   4. Stage 3a chronic kidney disease ([address]     5. Other screening mammogram  [facility] DIGITAL SCREEN BILATERAL     We will continue all current medications. Blood pressure is well controlled. We will see her back in 6 months with routine labs, sooner if any issues. Kenalog 40 mg in the office today to help with seasonal allergies. 1.  Elena Fernandez received counseling on the following healthy behaviors: nutrition, exercise and medication adherence  2. Patient given educational materials - see patient instructions  3. Was a self-tracking handout given in paper form or via MyChart?  No  If yes, see orders or list here.  4.  Discussed use, benefit, and side effects of prescribed medications. Barriers to medication compliance addressed. All patient questions answered. Pt voiced understanding. 5.  Reviewed prior labs and health maintenance  6. Continue current medications, diet and exercise. Completed Refills   Requested Prescriptions      No prescriptions requested or ordered in this encounter         Return in 6 months (on 10/27/2022) for Medicare Annual Wellness Visit in 1 year.

## 2022-04-27 NOTE — PATIENT INSTRUCTIONS
SURVEY:     You may be receiving a survey from [employer] regarding your visit today. Please complete the survey to enable us to provide the highest quality of care to you and your family. If you cannot score us a very good on any question, please call the office to discuss how we could have made your experience a very good one. Thank you. Jasen Bullock, APRN-SHELL  Codieemily Clemons, CNP  Gutierrezhenry Douglass, LPN  Liset Porter, LPN  Lyn Roldan, [facility]  Jean Marie Cohen, [address] [facility], [facility]  [place], PCA    Personalized Preventive Plan for Tata Massey - 4/27/2022  Medicare offers a range of preventive health benefits. Some of the tests and screenings are paid in full while other may be subject to a deductible, co-insurance, and/or copay. Some of these benefits include a comprehensive review of your medical history including lifestyle, illnesses that may run in your family, and various assessments and screenings as appropriate. After reviewing your medical record and screening and assessments performed today your provider may have ordered immunizations, labs, imaging, and/or referrals for you. A list of these orders (if applicable) as well as your Preventive Care list are included within your After Visit Summary for your review. Other Preventive Recommendations:    · A preventive eye exam performed by an eye specialist is recommended every 1-2 years to screen for glaucoma; cataracts, macular degeneration, and other eye disorders. · A preventive dental visit is recommended every 6 months. · Try to get at least 150 minutes of exercise per week or 10,000 steps per day on a pedometer . · Order or download the FREE \"Exercise & Physical Activity: Your Everyday Guide\" from [employer]. Call 8-106.195.5379 or search [employer] online. · You need 7922-2106 mg of calcium and 5415-5123 IU of vitamin D per day.  It is possible to meet your calcium requirement with diet alone, but a vitamin D supplement is usually necessary to meet this goal.  · When exposed to the sun, use a sunscreen that protects against both UVA and UVB radiation with an SPF of 30 or greater. Reapply every 2 to 3 hours or after sweating, drying off with a towel, or swimming. · Always wear a seat belt when traveling in a car. Always wear a helmet when riding a bicycle or motorcycle.

## 2022-04-28 LAB
EKG ATRIAL RATE: 75 BPM
EKG P AXIS: 61 DEGREES
EKG P-R INTERVAL: 168 MS
EKG Q-T INTERVAL: 368 MS
EKG QRS DURATION: 78 MS
EKG QTC CALCULATION (BAZETT): 410 MS
EKG R AXIS: 14 DEGREES
EKG T AXIS: 77 DEGREES
EKG VENTRICULAR RATE: 75 BPM

## 2022-04-28 PROCEDURE — 93010 ELECTROCARDIOGRAM REPORT: CPT | Performed by: FAMILY MEDICINE

## 2022-04-29 LAB
CULTURE: ABNORMAL
SPECIMEN DESCRIPTION: ABNORMAL

## 2022-05-10 ENCOUNTER — HOSPITAL ENCOUNTER (OUTPATIENT)
Dept: WOMENS IMAGING | Age: 72
Discharge: HOME | End: 2022-05-12
Payer: MEDICARE

## 2022-05-10 DIAGNOSIS — Z12.31 OTHER SCREENING MAMMOGRAM: ICD-10-CM

## 2022-05-10 PROCEDURE — 77063 BREAST TOMOSYNTHESIS BI: CPT

## 2022-05-11 ENCOUNTER — TELEPHONE (OUTPATIENT)
Dept: PRIMARY CARE CLINIC | Age: 72
End: 2022-05-11

## 2022-05-11 NOTE — TELEPHONE ENCOUNTER
----- Message from TARA Hernandez CNP sent at 5/11/2022 12:04 PM EDT -----  Results are normal, please call patient and make them aware.

## 2022-05-17 ENCOUNTER — HOSPITAL ENCOUNTER (OUTPATIENT)
Dept: NON INVASIVE DIAGNOSTICS | Age: 72
Discharge: HOME | End: 2022-05-17
Payer: MEDICARE

## 2022-05-17 ENCOUNTER — HOSPITAL ENCOUNTER (OUTPATIENT)
Age: 72
Discharge: HOME | End: 2022-05-17
Payer: MEDICARE

## 2022-05-17 ENCOUNTER — OFFICE VISIT (OUTPATIENT)
Dept: CARDIOLOGY | Age: 72
End: 2022-05-17
Payer: MEDICARE

## 2022-05-17 VITALS
RESPIRATION RATE: 17 BRPM | OXYGEN SATURATION: 98 % | HEIGHT: 64 IN | HEART RATE: 69 BPM | DIASTOLIC BLOOD PRESSURE: 71 MMHG | BODY MASS INDEX: 27.11 KG/M2 | SYSTOLIC BLOOD PRESSURE: 125 MMHG | WEIGHT: 158.8 LBS

## 2022-05-17 DIAGNOSIS — R06.02 SOB (SHORTNESS OF BREATH): ICD-10-CM

## 2022-05-17 DIAGNOSIS — I25.10 MILD CAD: ICD-10-CM

## 2022-05-17 DIAGNOSIS — R00.2 PALPITATIONS: ICD-10-CM

## 2022-05-17 DIAGNOSIS — I25.10 ASHD (ARTERIOSCLEROTIC HEART DISEASE): ICD-10-CM

## 2022-05-17 DIAGNOSIS — E87.5 HYPERKALEMIA: ICD-10-CM

## 2022-05-17 DIAGNOSIS — R53.83 OTHER FATIGUE: ICD-10-CM

## 2022-05-17 DIAGNOSIS — I10 RESISTANT HYPERTENSION: ICD-10-CM

## 2022-05-17 DIAGNOSIS — E78.2 MIXED HYPERLIPIDEMIA: ICD-10-CM

## 2022-05-17 LAB
ANION GAP SERPL CALCULATED.3IONS-SCNC: 10 MMOL/L (ref 9–17)
BUN BLDV-MCNC: 12 MG/DL (ref 8–23)
BUN/CREAT BLD: 10 (ref 9–20)
CALCIUM SERPL-MCNC: 9.2 MG/DL (ref 8.6–10.4)
CHLORIDE BLD-SCNC: 105 MMOL/L (ref 98–107)
CO2: 24 MMOL/L (ref 20–31)
CREAT SERPL-MCNC: 1.16 MG/DL (ref 0.5–0.9)
GFR AFRICAN AMERICAN: 56 ML/MIN
GFR NON-AFRICAN AMERICAN: 46 ML/MIN
GFR SERPL CREATININE-BSD FRML MDRD: ABNORMAL ML/MIN/{1.73_M2}
GFR SERPL CREATININE-BSD FRML MDRD: ABNORMAL ML/MIN/{1.73_M2}
GLUCOSE BLD-MCNC: 88 MG/DL (ref 70–99)
HCT VFR BLD CALC: 32.1 % (ref 36.3–47.1)
HEMOGLOBIN: 10.3 G/DL (ref 11.9–15.1)
MCH RBC QN AUTO: 31.4 PG (ref 25.2–33.5)
MCHC RBC AUTO-ENTMCNC: 32.1 G/DL (ref 28.4–34.8)
MCV RBC AUTO: 97.9 FL (ref 82.6–102.9)
NRBC AUTOMATED: 0 PER 100 WBC
PDW BLD-RTO: 13.4 % (ref 11.8–14.4)
PLATELET # BLD: 334 K/UL (ref 138–453)
PMV BLD AUTO: 8.9 FL (ref 8.1–13.5)
POTASSIUM SERPL-SCNC: 4.2 MMOL/L (ref 3.7–5.3)
RBC # BLD: 3.28 M/UL (ref 3.95–5.11)
SODIUM BLD-SCNC: 139 MMOL/L (ref 135–144)
WBC # BLD: 8.9 K/UL (ref 3.5–11.3)

## 2022-05-17 PROCEDURE — 93242 EXT ECG>48HR<7D RECORDING: CPT

## 2022-05-17 PROCEDURE — 80048 BASIC METABOLIC PNL TOTAL CA: CPT

## 2022-05-17 PROCEDURE — 85027 COMPLETE CBC AUTOMATED: CPT

## 2022-05-17 PROCEDURE — 4040F PNEUMOC VAC/ADMIN/RCVD: CPT | Performed by: PHYSICIAN ASSISTANT

## 2022-05-17 PROCEDURE — G8399 PT W/DXA RESULTS DOCUMENT: HCPCS | Performed by: PHYSICIAN ASSISTANT

## 2022-05-17 PROCEDURE — G8427 DOCREV CUR MEDS BY ELIG CLIN: HCPCS | Performed by: PHYSICIAN ASSISTANT

## 2022-05-17 PROCEDURE — 3017F COLORECTAL CA SCREEN DOC REV: CPT | Performed by: PHYSICIAN ASSISTANT

## 2022-05-17 PROCEDURE — G8417 CALC BMI ABV UP PARAM F/U: HCPCS | Performed by: PHYSICIAN ASSISTANT

## 2022-05-17 PROCEDURE — 1036F TOBACCO NON-USER: CPT | Performed by: PHYSICIAN ASSISTANT

## 2022-05-17 PROCEDURE — 36415 COLL VENOUS BLD VENIPUNCTURE: CPT

## 2022-05-17 PROCEDURE — 93000 ELECTROCARDIOGRAM COMPLETE: CPT | Performed by: FAMILY MEDICINE

## 2022-05-17 PROCEDURE — 93243 EXT ECG>48HR<7D SCAN A/R: CPT

## 2022-05-17 PROCEDURE — 99214 OFFICE O/P EST MOD 30 MIN: CPT | Performed by: PHYSICIAN ASSISTANT

## 2022-05-17 PROCEDURE — 1123F ACP DISCUSS/DSCN MKR DOCD: CPT | Performed by: PHYSICIAN ASSISTANT

## 2022-05-17 PROCEDURE — 1090F PRES/ABSN URINE INCON ASSESS: CPT | Performed by: PHYSICIAN ASSISTANT

## 2022-05-17 NOTE — PROGRESS NOTES
Justus Sicard am scribing for and in the presence of Kt Palomares. Rachell LAKE, MS, F.A.C.C. Patient: Meryle Birchwood  : 1950  Date of Visit: May 17, 2022    REASON FOR VISIT / CONSULTATION: Follow-up (Not feeling well, fatigue and palpitations at times. Denies: CP, SOB, Lightheaded/dizziness. )    History of Present Illness:        Dear [address] [place] [address], APRN - CNP    I had the pleasure of seeing your patient Meryle Birchwood for follow up today. Heart catheterization done on 2019 that showed mild to moderate single vessel disease involving the circumflex coronary artery. Normal left ventricular end diastolic pressure. (LVEDP). Evaluated in the ER on 2022 for shortness of breath and cough. Recent chest x-ray done on showed no acute process. Troponin was not elevated. EKG showed normal sinus rhythm. BMP did show potassium 5.7 mmol/L. Ms. Lizette Jaime is here today for increasing fatigue. She states she continues to feel. She also has noticed more shortness of breath. Over the last 2 weeks she has noticed palpitations feeling like her heart is racing and skipping beats. She also started having a cough within the past couple of days, she feels like she is getting sick at this time. She denies any fever or chills. She denies any chest pain, pressure, or tightness. When evaluated in the ER she was having left-sided chest pain however she denies this currently. She denies having any current or recent chest pain or lightheaded/dizziness. She denies any increased lower extremity edema, abdominal pain, bleeding problems, problems with hermedications or any other concerns at this time. Exercise Tolerance: Ms. Lizette Jaime reports that she has a fair exercise tolerance. She says that she could walk about 1 block without developing chest discomfort or significant shortness of breath but says that 3 months ago should could have walked 2 blocks.  Her  says that he was surprised to see that even walking in the grocery store she becomes shortness of breath which is says is not normal for her. Denies any known history of getting Covid. PAST MEDICAL HISTORY:        Past Medical History:   Diagnosis Date    Arthritis     Asthma     [facility]     Diabetes mellitus ([facility]     GERD (gastroesophageal reflux disease)     H/O echocardiogram 3/9/16    EF >60%. LV wall thickness is mildly increased. Mild mitral and tricuspid regurgitation. Borderline pulmonary hypertension estimated right ventricular sysolic pressure of 65MWVJ. Mild (grade l) diastolic dysfunction.  History of PFTs 3/10/16     Suboptimal effort. Restrictive in nature. clionical correlations is advised.  History of stress test 16    Abnoraml. Moderate perfusion defect of mild to moderate intensity in the anterolateral and anteroapical regions during stress imaging. Intermediate risk for CAD.  Hyperlipidemia     Hypertension      CURRENT ALLERGIES: Iv dye [iodides] REVIEW OF SYSTEMS: 14 systems were reviewed. Pertinent positives and negatives as above, all else negative.      Past Surgical History:   Procedure Laterality Date    BREAST SURGERY      cyst removed    CARDIAC CATHETERIZATION      CARDIAC CATHETERIZATION Left 2019    [facility]     SECTION      CHOLECYSTECTOMY      COLONOSCOPY  3/23/15    -diverticulosis,hemorrhoids    FOOT SURGERY      rt    HYSTERECTOMY      Social History:  Social History     Tobacco Use    Smoking status: Never Smoker    Smokeless tobacco: Never Used   Vaping Use    Vaping Use: Never used   Substance Use Topics    Alcohol use: No     Alcohol/week: 0.0 standard drinks    Drug use: No        CURRENT MEDICATIONS:        Outpatient Medications Marked as Taking for the 22 encounter (Office Visit) with Arnulfo Comer PA-C   Medication Sig Dispense Refill    metFORMIN (GLUCOPHAGE) 500 MG tablet Take 1 tablet by mouth 2 times daily (with meals) 180 tablet 3    pantoprazole (PROTONIX) 40 MG tablet Take 1 tablet by mouth every morning (before breakfast) 90 tablet 1    amLODIPine (NORVASC) 5 MG tablet Take 1 tablet by mouth daily 90 tablet 3    spironolactone-hydroCHLOROthiazide (ALDACTAZIDE) 25-25 MG per tablet Take 1 tablet by mouth daily 90 tablet 3    hydrALAZINE (APRESOLINE) 50 MG tablet Take 1 tablet by mouth 3 times daily 270 tablet 3    atorvastatin (LIPITOR) 40 MG tablet Take 1 tablet by mouth daily 90 tablet 3    atenolol (TENORMIN) 100 MG tablet Take 1 tablet by mouth daily 90 tablet 3    valsartan (DIOVAN) 320 MG tablet Take 1 tablet by mouth daily 90 tablet 3    fluticasone (FLONASE) 50 MCG/ACT nasal spray 2 sprays by Each Nostril route daily 48 g 1    ascorbic acid (VITAMIN C) 500 MG tablet Take 1 tablet by mouth 2 times daily With iron tablet 180 tablet 3    Calcium Carbonate-Vitamin D (OYSTER SHELL CALCIUM/D) 500-200 MG-UNIT TABS Take 1 tablet by mouth 2 times daily 180 tablet 3    potassium chloride (KLOR-CON M) 20 MEQ extended release tablet Take 1 tablet by mouth 2 times daily 180 tablet 3    albuterol sulfate HFA (VENTOLIN HFA) 108 (90 Base) MCG/ACT inhaler Inhale 2 puffs into the lungs 4 times daily as needed for Wheezing 1 Inhaler 0    cetirizine (ZYRTEC ALLERGY) 10 MG tablet Take 1 tablet by mouth daily 30 tablet 3    dorzolamide-timolol (COSOPT) 22.3-6.8 MG/ML ophthalmic solution Place 1 drop into both eyes 2 times daily       latanoprost (XALATAN) 0.005 % ophthalmic solution Place 1 drop into both eyes nightly       blood glucose test strips (ONE TOUCH ULTRA TEST) strip USE 1 STRIP TO CHECK GLUCOSE DAILY 100 strip 3    ONE TOUCH LANCETS MISC Lancets- One Touch Delica for testing daily and as needed.  DX: Diabetes type  E11.9 100 each 3    Blood Glucose Monitoring Suppl GENNY 1 Units by Does not apply route 3 times daily What insurance will cover 1 Device 0       FAMILY HISTORY: family history includes Diabetes in her brother and sister; High Blood Pressure in her sister; Stroke in her brother, father, and sister. Physical Examination:     /71 (Site: Left Upper Arm, Position: Sitting, Cuff Size: Medium Adult)   Pulse 69   Resp 17   Ht 5' 4\" (1.626 m)   Wt 158 lb 12.8 oz (72 kg)   SpO2 98%   BMI 27.26 kg/m²  Body mass index is 27.26 kg/m². Constitutional: She is oriented to person, place, and time. She appears well-developed and well-nourished. In no acute distress. HEENT: Normocephalic and atraumatic. No JVD present. Carotid bruit is not present. No mass and no thyromegaly present. No lymphadenopathy present. Cardiovascular: Normal rate, regular rhythm, normal heart sounds. Exam reveals no gallop and no friction rubs. 3/6 systolic murmur, 2nd intercostal space on the RIGHT just lateral to the sternum. Pulmonary/Chest: Effort normal and breath sounds normal. No respiratory distress. She has no wheezes, rhonchi or rales. Abdominal: Soft, non-tender. Bowel sounds and aorta are normal. She exhibits no organomegaly, mass or bruit. Extremities: Trace. No cyanosis or clubbing. 2+ radial and carotid pulses. Distal extremity pulses: 2+ bilaterally. Neurological: She is alert and oriented to person, place, and time. No evidence of gross cranial nerve deficit. Coordination appeared normal.   Skin: Skin is warm and dry. There is no rash or diaphoresis. Psychiatric: She has a normal mood and affect.  Her speech is normal and behavior is normal.      MOST RECENT LABS ON RECORD:   Lab Results   Component Value Date    WBC 10.5 04/26/2022    HGB 10.1 (L) 04/26/2022    HCT 31.7 (L) 04/26/2022     04/26/2022    CHOL 138 07/27/2021    TRIG 55 07/27/2021    HDL 53 07/27/2021    ALT <5 (L) 04/26/2022    AST 9 04/26/2022     (L) 04/26/2022    K 5.7 (H) 04/26/2022     04/26/2022    CREATININE 1.62 (H) 04/26/2022    BUN 18 04/26/2022    CO2 22 04/26/2022    TSH 1.36 Atenolol (Tenormin) 100 mg daily. · Calcium Channel Blocker: Continue amlodipine (Norvasc) 5 mg once daily. · Not indicated at this time. · Because of her recent symptoms, I ordered a CAM Event monitor to try and pinpoint the etiology of these symptoms. Resistant Hypertension, possibly secondary hypertension: Controlled   · Beta Blocker: Continue Atenolol (Tenormin) 100 mg daily. ACE Inibitor/ARB: Continue valsartan (Diovan) 320 mg daily. · Diuretics: Continue Aldactazide 25-25 mg daily    · Calcium Channel Blocker: Continue amlodipine (Norvasc) 5 mg once daily. · Hyperlipidemia: Mixed, LDL done on 7/27/2021 was 74 mg/dL   · Cholesterol Reduction Therapy: Continue Atorvastatin (Lipitor) 40 mg daily. · Fatigue  · She has been having worsening fatigue since last being seen. · We spent several minutes discussing this may be multifactoral.  · Additional Testing List: Additional Testing List: I took the liberty of ordering a BMP for today to assess their potassium and renal function. I told them that they could get their lab work performed at the location of their choosing, unfortunately, if the lab work was not performed at a [facility]) facility I could not guarantee my ability to follow up with them on their results. and  I took the liberty of ordering a CBC. I told them that they could get their lab work performed at the location of their choosing, unfortunately, if the lab work was not performed at a [facility]) facility I could not guarantee my ability to follow up with them on their results. and Because current signs and symptoms can certainly be caused by significant coronary artery disease, I ordered a Regadenoson (Lexiscan) stress test with SPECT imaging to try and rule out this possibility. · Hyperkalemia  · Additional Testing List: Additional Testing List: I took the liberty of ordering a BMP for today to assess their potassium and renal function.  I told them that they could get their lab work performed at the location of their choosing, unfortunately, if the lab work was not performed at a [facility]) facility I could not guarantee my ability to follow up with them on their results. and  I took the liberty of ordering a CBC. I told them that they could get their lab work performed at the location of their choosing, unfortunately, if the lab work was not performed at a [facility]) facility I could not guarantee my ability to follow up with them on their results. · She is on aldactazide and potassium supplementation, may discontinue potassium pending blood work results. I also told Ms. Oliver Lindsey to continue her other medications and follow up with you as previously scheduled. I discussed patient's symptoms and treatment plan with Dr Flavio Leslie, he was in agreement with the plan and follow up. FOLLOW UP:   I told Ms. Oliver Lindsey to call my office if she had any problems, but otherwise told her to Return in about 6 weeks (around 6/28/2022). However, I would be happy to see her sooner should the need arise. Once again, thank you for allowing me to participate in this patients care. Please do not hesitate to contact me could I be of further assistance. Sincerely,  Brigitte West PA-C  [facility] Specialist    [address], [address] [facility]  Phone: [phone], Fax: [phone]     I believe that the risk of significant morbidity and mortality related to the patient's current medical conditions are: Intermediate.         May 17, 2022

## 2022-05-18 ENCOUNTER — TELEPHONE (OUTPATIENT)
Dept: CARDIOLOGY | Age: 72
End: 2022-05-18

## 2022-05-18 NOTE — TELEPHONE ENCOUNTER
----- Message from Desirae Hussein PA-C sent at 5/18/2022  9:07 AM EDT -----  Please notify patient that their lab results are normal. Hemoglobin is still low, but stable. Please continue current treatment and follow up.

## 2022-05-31 ENCOUNTER — HOSPITAL ENCOUNTER (OUTPATIENT)
Dept: NON INVASIVE DIAGNOSTICS | Age: 72
Discharge: HOME | End: 2022-05-31
Payer: MEDICARE

## 2022-05-31 DIAGNOSIS — I25.10 ASHD (ARTERIOSCLEROTIC HEART DISEASE): ICD-10-CM

## 2022-05-31 DIAGNOSIS — E78.2 MIXED HYPERLIPIDEMIA: ICD-10-CM

## 2022-05-31 DIAGNOSIS — E87.5 HYPERKALEMIA: ICD-10-CM

## 2022-05-31 DIAGNOSIS — I10 RESISTANT HYPERTENSION: ICD-10-CM

## 2022-05-31 DIAGNOSIS — R53.83 OTHER FATIGUE: ICD-10-CM

## 2022-05-31 DIAGNOSIS — R06.02 SOB (SHORTNESS OF BREATH): ICD-10-CM

## 2022-05-31 DIAGNOSIS — I25.10 MILD CAD: ICD-10-CM

## 2022-05-31 PROCEDURE — 3430000000 HC RX DIAGNOSTIC RADIOPHARMACEUTICAL: Performed by: PHYSICIAN ASSISTANT

## 2022-05-31 PROCEDURE — 93017 CV STRESS TEST TRACING ONLY: CPT

## 2022-05-31 PROCEDURE — A9500 TC99M SESTAMIBI: HCPCS | Performed by: PHYSICIAN ASSISTANT

## 2022-05-31 PROCEDURE — 6360000002 HC RX W HCPCS: Performed by: INTERNAL MEDICINE

## 2022-05-31 RX ADMIN — REGADENOSON 0.4 MG: 0.08 INJECTION, SOLUTION INTRAVENOUS at 11:42

## 2022-05-31 RX ADMIN — Medication 30 MILLICURIE: at 11:22

## 2022-06-02 ENCOUNTER — HOSPITAL ENCOUNTER (OUTPATIENT)
Dept: NON INVASIVE DIAGNOSTICS | Age: 72
Discharge: HOME | End: 2022-06-02
Payer: MEDICARE

## 2022-06-02 PROCEDURE — 3430000000 HC RX DIAGNOSTIC RADIOPHARMACEUTICAL: Performed by: PHYSICIAN ASSISTANT

## 2022-06-02 PROCEDURE — 78452 HT MUSCLE IMAGE SPECT MULT: CPT

## 2022-06-02 PROCEDURE — A9500 TC99M SESTAMIBI: HCPCS | Performed by: PHYSICIAN ASSISTANT

## 2022-06-02 RX ADMIN — Medication 30 MILLICURIE: at 12:55

## 2022-06-03 PROCEDURE — 93016 CV STRESS TEST SUPVJ ONLY: CPT | Performed by: INTERNAL MEDICINE

## 2022-06-03 PROCEDURE — 93018 CV STRESS TEST I&R ONLY: CPT | Performed by: INTERNAL MEDICINE

## 2022-06-03 PROCEDURE — 78452 HT MUSCLE IMAGE SPECT MULT: CPT | Performed by: INTERNAL MEDICINE

## 2022-06-03 NOTE — PROCEDURES
[address]                              CARDIAC STRESS TEST    PATIENT NAME: Jeanine Granger                      :        1950  MED REC NO:   [id number]                              ROOM:  ACCOUNT NO:   [de-identified]                           ADMIT DATE: 2022  PROVIDER:     Gaby Chavis MD    CARDIOVASCULAR DIAGNOSTIC DEPARTMENT    DATE OF STUDY:  2022    ORDERING PROVIDER:  Florencio Mendez PA-C    PRIMARY CARE PROVIDER:  [address] [place] [address]    INTERPRETING PHYSICIAN:  Roney Do. Eli Hernandez MD    PHARMACOLOGIC MYOCARDIAL PERFUSION STRESS TESTING    Stress/Rest single isotope SPECT imaging with exercise stress and gated  SPECT imaging. INDICATIONS:  Assessment of a cardiac cause:  Dyspnea. CLINICAL HISTORY:  The patient is a 72-year-old woman with known  coronary artery disease. Previous cardiac history includes:  Stress test.    Other previous history includes:  Fatigue, dyspnea, diabetes mellitus,  indigestion, hypertension. Symptoms just prior to testing include:  None. Relevant medications:  Atenolol. Amlodipine (Norvasc). PROCEDURE:  The heart rate was 68 at baseline and marquis to 120 beats per  minute during the regadenoson infusion. The rest blood pressure was  136/60 mm/Hg and increased to 152/60 mm/Hg. The patient did not  complain of any significant symptoms following infusion. Pharmacologic stress testing was performed with regadenoson at a dose of  0.4 mg. Additionally, low level exercise using hand compressions were  performed along with vasodilator infusion. MYOCARDIAL PERFUSION IMAGING:  Imaging was performed at rest 30-45  minutes following the injection of 30 mCi of sestamibi. Approximately  10 seconds after Lexiscan injection, the patient was injected with 30  mCi of sestamibi.   Gating post-stress tomographic imaging was performed  30-45 minutes after stress. STRESS ECG RESULTS:  The resting electrocardiogram demonstrated normal  sinus rhythm without significant ST-segment abnormalities that may  impair accurate ECG detection of stress induced cardiac ischemia. During vasodilator infusion and during recovery, the patient developed:    No significant ST segment changes suggestive of myocardial ischemia with  no premature atrial contractions (PACs) and occasional premature  ventricular contractions (PVCs). NUCLEAR IMAGING RESULTS:  The overall quality of the study is fair. Mild attenuation artifact was seen. There is no evidence of abnormal  lung uptake. Additionally, the right ventricle appears normal.  The  left ventricular cavity is noted to be normal in size on the stress  images. There is no evidence of transient ischemic dilatation (TID) of  the left ventricle. Gated SPECT imaging reveals normal myocardial thickening and wall motion  with a calculated left ventricular ejection fraction of 79%. The rest images demonstrated a small to moderate perfusion abnormality  of mild intensity in the apical, anteroapical and inferoapical regions  which is most likely due to artifact. On stress imaging, a small to moderate perfusion abnormality of mild  intensity in the apical, anteroapical and inferoapical regions which is  most likely due to artifact. IMPRESSION:  1. Equivocal myocardial perfusion study. There is a small to moderate  perfusion defect of mild intensity in the apical, anteroapical and  inferoapical regions during stress and rest imaging which is most  consistent with artifact but may be due to a small degree of coronary  ischemia. 2.  Global left ventricular systolic function was normal with an  ejection fraction of 79%, without regional wall motion abnormalities. 3.  No significant electrocardiographic evidence of myocardial ischemia  during EKG monitoring without significant associated arrhythmias.     Overall, these results are most consistent with a low to intermediate  risk for significant coronary artery disease. Depending on the patient's symptoms and level of clinical suspicion,  aggressive medical management versus additional testing by coronary  angiography may be indicated. The sensitivity for detecting ischemia on this test may have been  reduced due to the patient being on a beta blocker and a calcium channel  blocker.         Stanford Restrepo MD    D: 06/03/2022 8:23:56       T: 06/03/2022 8:26:38     BEAU/AMY_AL  Job#: [id number]     Doc#: Unknown    CC:  JAELYN Monroy, APRN-CNP

## 2022-06-06 NOTE — PROCEDURES
[address] [facility], [address] [place] [address]                                 EVENT MONITOR    PATIENT NAME: Odalys Islas                      :        1950  MED REC NO:   [id number]                              ROOM:  ACCOUNT NO:   [de-identified]                           ADMIT DATE: 2022  PROVIDER:     Jayjay Doe MD    CARDIOVASCULAR DIAGNOSTIC DEPARTMENT    DATE OF STUDY:  2022    ORDERING PROVIDER:  Maya Fisher PA-C    PRIMARY CARE PROVIDER:  [address] [place] [address]    INTERPRETING PHYSICIAN: Albertina Hugo. Taylor Zhang MD    DIAGNOSIS:  Palpitations. PHYSICIAN INTERPRETATION:  Findings Summary  1. 7 days recorded. 2. Baseline rhythm is sinus with average heart rate of 75 bpm, ranging  between 50 and 120 bpm.  3. Sinus tachycardia represented 6% of the study duration. 4. Atrial tachycardia (AT) 1 episode, 9 beats at average 105 bpm up to  180 bpm.  5. Premature atrial contractions 0.02%. 6. Premature ventricular contractions 0.32%. 7. Other than the one episode atrial tachycardia, the study is fairly  unremarkable.         Giulia Shine MD    D: 2022 12:20:35       T: 2022 12:21:38     BEAU/AMY_AL  Job#: [id number]     Doc#: Unknown    CC:  JAELYN Floyd APRN-SHELL

## 2022-06-09 ENCOUNTER — TELEPHONE (OUTPATIENT)
Dept: CARDIOLOGY | Age: 72
End: 2022-06-09

## 2022-06-09 NOTE — TELEPHONE ENCOUNTER
----- Message from Luan Andrade PA-C sent at 6/9/2022  8:03 AM EDT -----  Please let them know that their stress test was not completely normal. We can discuss at follow up, or if having symptoms we can see her sooner than 6/28. Thanks.

## 2022-06-09 NOTE — RESULT ENCOUNTER NOTE
Please let them know that their stress test was not completely normal. We can discuss at follow up, or if having symptoms we can see her sooner than 6/28. Thanks.

## 2022-06-21 DIAGNOSIS — I10 ESSENTIAL HYPERTENSION: ICD-10-CM

## 2022-06-21 DIAGNOSIS — E87.6 HYPOKALEMIA: ICD-10-CM

## 2022-06-21 DIAGNOSIS — E78.2 MIXED HYPERLIPIDEMIA: ICD-10-CM

## 2022-06-21 RX ORDER — VALSARTAN 320 MG/1
320 TABLET ORAL DAILY
Qty: 90 TABLET | Refills: 3 | Status: SHIPPED | OUTPATIENT
Start: 2022-06-21

## 2022-06-21 RX ORDER — POTASSIUM CHLORIDE 20 MEQ/1
20 TABLET, EXTENDED RELEASE ORAL 2 TIMES DAILY
Qty: 180 TABLET | Refills: 3 | Status: SHIPPED | OUTPATIENT
Start: 2022-06-21

## 2022-06-21 RX ORDER — ATENOLOL 100 MG/1
100 TABLET ORAL DAILY
Qty: 90 TABLET | Refills: 3 | Status: SHIPPED | OUTPATIENT
Start: 2022-06-21

## 2022-06-21 RX ORDER — ATORVASTATIN CALCIUM 40 MG/1
40 TABLET, FILM COATED ORAL DAILY
Qty: 90 TABLET | Refills: 3 | Status: SHIPPED | OUTPATIENT
Start: 2022-06-21

## 2022-06-21 RX ORDER — AMLODIPINE BESYLATE 5 MG/1
5 TABLET ORAL DAILY
Qty: 90 TABLET | Refills: 3 | Status: SHIPPED | OUTPATIENT
Start: 2022-06-21

## 2022-06-21 RX ORDER — HYDRALAZINE HYDROCHLORIDE 50 MG/1
50 TABLET, FILM COATED ORAL 3 TIMES DAILY
Qty: 270 TABLET | Refills: 3 | Status: SHIPPED | OUTPATIENT
Start: 2022-06-21

## 2022-06-21 NOTE — TELEPHONE ENCOUNTER
Switched to [employer]. [facility] refused to transfer scripts per [place].     Needs new scripts

## 2022-06-22 ENCOUNTER — TELEPHONE (OUTPATIENT)
Dept: PRIMARY CARE CLINIC | Age: 72
End: 2022-06-22

## 2022-06-22 DIAGNOSIS — L60.0 INGROWN TOENAIL: ICD-10-CM

## 2022-06-22 DIAGNOSIS — E11.9 TYPE 2 DIABETES MELLITUS WITHOUT COMPLICATION, WITHOUT LONG-TERM CURRENT USE OF INSULIN (HCC): Primary | ICD-10-CM

## 2022-06-22 NOTE — TELEPHONE ENCOUNTER
Referral for podiatrist placed. If she feels she needs an antibiotic can she come in for an appointment?

## 2022-06-22 NOTE — TELEPHONE ENCOUNTER
Desiree Alvarez, [address] [facility] @ [employer] called to report her findings from the assessment on Gisela Kwon today:    Peripheral Artery Disease  + Dementia  Rt toenail Infection - Desiree Alvarez feels a referral to a podiatrist is warranted as Gisela Kwon is a diabetic. A written assessment is to be faxed.

## 2022-06-28 ENCOUNTER — OFFICE VISIT (OUTPATIENT)
Dept: CARDIOLOGY | Age: 72
End: 2022-06-28
Payer: MEDICARE

## 2022-06-28 VITALS
RESPIRATION RATE: 18 BRPM | OXYGEN SATURATION: 99 % | BODY MASS INDEX: 28.17 KG/M2 | WEIGHT: 165 LBS | HEIGHT: 64 IN | HEART RATE: 58 BPM | DIASTOLIC BLOOD PRESSURE: 71 MMHG | SYSTOLIC BLOOD PRESSURE: 144 MMHG

## 2022-06-28 DIAGNOSIS — E78.2 MIXED HYPERLIPIDEMIA: ICD-10-CM

## 2022-06-28 DIAGNOSIS — R94.39 EQUIVOCAL STRESS TEST: Primary | ICD-10-CM

## 2022-06-28 DIAGNOSIS — I10 ESSENTIAL HYPERTENSION: ICD-10-CM

## 2022-06-28 DIAGNOSIS — I25.10 MILD CAD: ICD-10-CM

## 2022-06-28 PROCEDURE — G8427 DOCREV CUR MEDS BY ELIG CLIN: HCPCS | Performed by: PHYSICIAN ASSISTANT

## 2022-06-28 PROCEDURE — G8399 PT W/DXA RESULTS DOCUMENT: HCPCS | Performed by: PHYSICIAN ASSISTANT

## 2022-06-28 PROCEDURE — 1036F TOBACCO NON-USER: CPT | Performed by: PHYSICIAN ASSISTANT

## 2022-06-28 PROCEDURE — 1090F PRES/ABSN URINE INCON ASSESS: CPT | Performed by: PHYSICIAN ASSISTANT

## 2022-06-28 PROCEDURE — 1123F ACP DISCUSS/DSCN MKR DOCD: CPT | Performed by: PHYSICIAN ASSISTANT

## 2022-06-28 PROCEDURE — 99214 OFFICE O/P EST MOD 30 MIN: CPT | Performed by: PHYSICIAN ASSISTANT

## 2022-06-28 PROCEDURE — G8417 CALC BMI ABV UP PARAM F/U: HCPCS | Performed by: PHYSICIAN ASSISTANT

## 2022-06-28 PROCEDURE — 3017F COLORECTAL CA SCREEN DOC REV: CPT | Performed by: PHYSICIAN ASSISTANT

## 2022-06-28 RX ORDER — ASPIRIN 81 MG/1
81 TABLET ORAL DAILY
Qty: 90 TABLET | Refills: 3 | COMMUNITY
Start: 2022-06-28

## 2022-06-28 RX ORDER — SPIRONOLACTONE AND HYDROCHLOROTHIAZIDE 25; 25 MG/1; MG/1
1 TABLET ORAL DAILY
Qty: 90 TABLET | Refills: 3 | Status: SHIPPED | OUTPATIENT
Start: 2022-06-28

## 2022-06-28 NOTE — PATIENT INSTRUCTIONS
SURVEY:    You may be receiving a survey from "[employer] regarding your visit today. Please complete the survey to enable us to provide the highest quality of care to you and your family. If you cannot score us a very good on any question, please call the office to discuss how we could have made your experience a very good one. Thank you.

## 2022-07-14 ENCOUNTER — HOSPITAL ENCOUNTER (OUTPATIENT)
Age: 72
Discharge: HOME | End: 2022-07-14
Payer: MEDICARE

## 2022-07-14 ENCOUNTER — HOSPITAL ENCOUNTER (EMERGENCY)
Age: 72
Discharge: HOME | End: 2022-07-14
Payer: MEDICARE

## 2022-07-14 VITALS
SYSTOLIC BLOOD PRESSURE: 170 MMHG | HEART RATE: 64 BPM | OXYGEN SATURATION: 97 % | HEIGHT: 64 IN | DIASTOLIC BLOOD PRESSURE: 76 MMHG | RESPIRATION RATE: 19 BRPM | WEIGHT: 174 LBS | BODY MASS INDEX: 29.71 KG/M2 | TEMPERATURE: 97.4 F

## 2022-07-14 DIAGNOSIS — R94.39 EQUIVOCAL STRESS TEST: ICD-10-CM

## 2022-07-14 DIAGNOSIS — I25.10 MILD CAD: ICD-10-CM

## 2022-07-14 DIAGNOSIS — I10 ESSENTIAL HYPERTENSION: ICD-10-CM

## 2022-07-14 DIAGNOSIS — M19.021 OSTEOARTHRITIS INVOLVING JOINT OF RIGHT UPPER ARM: Primary | ICD-10-CM

## 2022-07-14 DIAGNOSIS — E78.2 MIXED HYPERLIPIDEMIA: ICD-10-CM

## 2022-07-14 LAB
ANION GAP SERPL CALCULATED.3IONS-SCNC: 8 MMOL/L (ref 9–17)
BUN BLDV-MCNC: 12 MG/DL (ref 8–23)
BUN/CREAT BLD: 11 (ref 9–20)
CALCIUM SERPL-MCNC: 9.4 MG/DL (ref 8.6–10.4)
CHLORIDE BLD-SCNC: 103 MMOL/L (ref 98–107)
CO2: 27 MMOL/L (ref 20–31)
CREAT SERPL-MCNC: 1.14 MG/DL (ref 0.5–0.9)
GFR AFRICAN AMERICAN: 57 ML/MIN
GFR NON-AFRICAN AMERICAN: 47 ML/MIN
GFR SERPL CREATININE-BSD FRML MDRD: ABNORMAL ML/MIN/{1.73_M2}
GFR SERPL CREATININE-BSD FRML MDRD: ABNORMAL ML/MIN/{1.73_M2}
GLUCOSE BLD-MCNC: 89 MG/DL (ref 70–99)
POTASSIUM SERPL-SCNC: 4.2 MMOL/L (ref 3.7–5.3)
SODIUM BLD-SCNC: 138 MMOL/L (ref 135–144)

## 2022-07-14 PROCEDURE — 6370000000 HC RX 637 (ALT 250 FOR IP): Performed by: PHYSICIAN ASSISTANT

## 2022-07-14 PROCEDURE — 36415 COLL VENOUS BLD VENIPUNCTURE: CPT

## 2022-07-14 PROCEDURE — 80048 BASIC METABOLIC PNL TOTAL CA: CPT

## 2022-07-14 PROCEDURE — 99283 EMERGENCY DEPT VISIT LOW MDM: CPT

## 2022-07-14 RX ORDER — CYCLOBENZAPRINE HCL 5 MG
5 TABLET ORAL 2 TIMES DAILY PRN
Qty: 10 TABLET | Refills: 0 | Status: SHIPPED | OUTPATIENT
Start: 2022-07-14 | End: 2022-07-19

## 2022-07-14 RX ORDER — HYDROCODONE BITARTRATE AND ACETAMINOPHEN 5; 325 MG/1; MG/1
1 TABLET ORAL ONCE
Status: COMPLETED | OUTPATIENT
Start: 2022-07-14 | End: 2022-07-14

## 2022-07-14 RX ADMIN — Medication: at 19:17

## 2022-07-14 RX ADMIN — HYDROCODONE BITARTRATE AND ACETAMINOPHEN 1 TABLET: 5; 325 TABLET ORAL at 17:40

## 2022-07-14 ASSESSMENT — ENCOUNTER SYMPTOMS
GASTROINTESTINAL NEGATIVE: 1
RESPIRATORY NEGATIVE: 1

## 2022-07-14 ASSESSMENT — PAIN SCALES - GENERAL
PAINLEVEL_OUTOF10: 10
PAINLEVEL_OUTOF10: 10

## 2022-07-14 ASSESSMENT — PAIN DESCRIPTION - DESCRIPTORS: DESCRIPTORS: SHARP

## 2022-07-14 ASSESSMENT — PAIN DESCRIPTION - LOCATION: LOCATION: ARM

## 2022-07-14 ASSESSMENT — PAIN DESCRIPTION - ORIENTATION: ORIENTATION: RIGHT

## 2022-07-14 ASSESSMENT — PAIN - FUNCTIONAL ASSESSMENT: PAIN_FUNCTIONAL_ASSESSMENT: 0-10

## 2022-07-14 NOTE — ED PROVIDER NOTES
[facility]  EMERGENCY DEPARTMENT ENCOUNTER      Pt Name: Fiorella Salvador  MRN: [id number]  [place] 1950  Date of evaluation: 7/14/2022  Provider: Ana Paula Macias PA-C    CHIEF COMPLAINT       Chief Complaint   Patient presents with    Arm Pain     right, started last night, history of arthritis, states feels like flare up, denies injury         HISTORY OF PRESENT ILLNESS   (Location/Symptom, Timing/Onset, Context/Setting, Quality, Duration, Modifying Factors, Severity)  Note limiting factors. Fiorella Salvador is a 67 y.o. female who presents to the emergency department PMH of arthritis reports atraumatic right elbow and forearm pain achy worsened with movement beginning yesterday evening that is persisted since onset. Patient reports she has been taking Tylenol for pain without relief. Patient reports she has had similar episodes in the past noting this pain pattern is similar to her prior arthritis pain. Patient denies recent strenuous activity fall, trauma, chest pain, acute shortness of breath, fever, neck pain, rash other pain sites or complaints. No other symptoms noted, patient has no additional complaints at present. Nursing Notes were reviewed. REVIEW OF SYSTEMS    (2-9 systems for level 4, 10 or more for level 5)     Review of Systems   Constitutional: Negative. HENT: Negative. Respiratory: Negative. Cardiovascular: Negative. Negative for chest pain. Gastrointestinal: Negative. Genitourinary: Negative. Musculoskeletal: Positive for arthralgias. See HPI   Skin: Negative. Neurological: Negative. Negative for numbness. Psychiatric/Behavioral: Negative. Except as noted above the remainder of the review of systems was reviewed and negative.        PAST MEDICAL HISTORY     Past Medical History:   Diagnosis Date    Arthritis     Asthma     Chronic bronchitis ([facility] [address]     Diabetes mellitus (HCC)     GERD (gastroesophageal reflux disease)     H/O echocardiogram 3/9/16    EF >60%. LV wall thickness is mildly increased. Mild mitral and tricuspid regurgitation. Borderline pulmonary hypertension estimated right ventricular sysolic pressure of 73ARQJ. Mild (grade l) diastolic dysfunction.  History of PFTs 3/10/16     Suboptimal effort. Restrictive in nature. clionical correlations is advised.  History of stress test 16    Abnoraml. Moderate perfusion defect of mild to moderate intensity in the anterolateral and anteroapical regions during stress imaging. Intermediate risk for CAD.     Hyperlipidemia     Hypertension          SURGICAL HISTORY       Past Surgical History:   Procedure Laterality Date    BREAST SURGERY      cyst removed    CARDIAC CATHETERIZATION      CARDIAC CATHETERIZATION Left 2019    Right Radial/"[employer]/     SECTION      CHOLECYSTECTOMY      COLONOSCOPY  3/23/15    -diverticulosis,hemorrhoids    FOOT SURGERY      rt    HYSTERECTOMY (CERVIX STATUS UNKNOWN)           CURRENT MEDICATIONS       Previous Medications    ALBUTEROL SULFATE HFA (VENTOLIN HFA) 108 (90 BASE) MCG/ACT INHALER    Inhale 2 puffs into the lungs 4 times daily as needed for Wheezing    AMLODIPINE (NORVASC) 5 MG TABLET    Take 1 tablet by mouth daily    ASCORBIC ACID (VITAMIN C) 500 MG TABLET    Take 1 tablet by mouth 2 times daily With iron tablet    ASPIRIN EC 81 MG EC TABLET    Take 1 tablet by mouth daily    ATENOLOL (TENORMIN) 100 MG TABLET    Take 1 tablet by mouth daily    ATORVASTATIN (LIPITOR) 40 MG TABLET    Take 1 tablet by mouth daily    BLOOD GLUCOSE MONITORING SUPPL GENNY    1 Units by Does not apply route 3 times daily What insurance will cover    BLOOD GLUCOSE TEST STRIPS (ONE TOUCH ULTRA TEST) STRIP    USE 1 STRIP TO CHECK GLUCOSE DAILY    CALCIUM CARBONATE-VITAMIN D (OYSTER SHELL CALCIUM/D) 500-200 MG-UNIT TABS    Take 1 tablet by mouth 2 times daily    CETIRIZINE (ZYRTEC ALLERGY) 10 MG TABLET Take 1 tablet by mouth daily    DORZOLAMIDE-TIMOLOL (COSOPT) 22.3-6.8 MG/ML OPHTHALMIC SOLUTION    Place 1 drop into both eyes 2 times daily     FLUTICASONE (FLONASE) 50 MCG/ACT NASAL SPRAY    2 sprays by Each Nostril route daily    HYDRALAZINE (APRESOLINE) 50 MG TABLET    Take 1 tablet by mouth 3 times daily    LATANOPROST (XALATAN) 0.005 % OPHTHALMIC SOLUTION    Place 1 drop into both eyes nightly     METFORMIN (GLUCOPHAGE) 500 MG TABLET    Take 1 tablet by mouth 2 times daily (with meals)    MULTIPLE VITAMINS-MINERALS (THERAPEUTIC MULTIVITAMIN-MINERALS) TABLET    Take 1 tablet by mouth daily    ONE TOUCH LANCETS MISC    Lancets- One Touch Delica for testing daily and as needed.  DX: Diabetes type  E11.9    PANTOPRAZOLE (PROTONIX) 40 MG TABLET    Take 1 tablet by mouth every morning (before breakfast)    POTASSIUM CHLORIDE (KLOR-CON M) 20 MEQ EXTENDED RELEASE TABLET    Take 1 tablet by mouth 2 times daily    SPIRONOLACTONE-HYDROCHLOROTHIAZIDE (ALDACTAZIDE) 25-25 MG PER TABLET    Take 1 tablet by mouth daily    VALSARTAN (DIOVAN) 320 MG TABLET    Take 1 tablet by mouth daily       ALLERGIES     Iv dye [iodides]    FAMILY HISTORY       Family History   Problem Relation Age of Onset    Stroke Father     Stroke Sister     Diabetes Sister         x6 sisters    High Blood Pressure Sister     Stroke Brother     Diabetes Brother           SOCIAL HISTORY       Social History     Socioeconomic History    Marital status:      Spouse name: None    Number of children: None    Years of education: None    Highest education level: None   Occupational History    None   Tobacco Use    Smoking status: Never Smoker    Smokeless tobacco: Never Used   Vaping Use    Vaping Use: Never used   Substance and Sexual Activity    Alcohol use: No     Alcohol/week: 0.0 standard drinks    Drug use: No    Sexual activity: Yes     Partners: Male     Birth control/protection: Post-menopausal   Other Topics Concern    None Social History Narrative    None     Social Determinants of Health     Financial Resource Strain: Unknown    Difficulty of Paying Living Expenses: Patient refused   Food Insecurity: Unknown    Worried About Running Out of Food in the Last Year: Patient refused    [address] in the Last Year: Patient refused   Transportation Needs:     Lack of Transportation (Medical): Not on file    Lack of Transportation (Non-Medical): Not on file   Physical Activity: Inactive    Days of Exercise per Week: 0 days    Minutes of Exercise per Session: 0 min   Stress:     Feeling of Stress : Not on file   Social Connections:     Frequency of Communication with Friends and Family: Not on file    Frequency of Social Gatherings with Friends and Family: Not on file    Attends Taoist Services: Not on file    Active Member of [address] [place] [address] [employer] or Organizations: Not on file    Attends Club or Organization Meetings: Not on file    Marital Status: Not on file   Intimate Partner Violence:     Fear of Current or Ex-Partner: Not on file    Emotionally Abused: Not on file    Physically Abused: Not on file    Sexually Abused: Not on file   Housing Stability:     Unable to Pay for Housing in the Last Year: Not on file    Number of [place] in the Last Year: Not on file    Unstable Housing in the Last Year: Not on file       SCREENINGS                               CIWA Assessment  BP: (!) 170/76  Heart Rate: 64                 PHYSICAL EXAM    (up to 7 for level 4, 8 or more for level 5)     ED Triage Vitals   BP Temp Temp src Heart Rate Resp SpO2 Height Weight   07/14/22 1655 07/14/22 1654 -- 07/14/22 1654 07/14/22 1654 07/14/22 1654 07/14/22 1656 07/14/22 1656   (!) 170/76 97.4 °F (36.3 °C)  64 19 96 % 5' 4\" (1.626 m) 174 lb (78.9 kg)       Physical Exam  Vitals and nursing note reviewed. Constitutional:       Comments: Elderly appearing female no acute distress   HENT:      Head: Normocephalic and atraumatic. Mouth/Throat:      Mouth: Mucous membranes are moist.   Eyes:      Extraocular Movements: Extraocular movements intact. Cardiovascular:      Rate and Rhythm: Normal rate and regular rhythm. Pulmonary:      Effort: Pulmonary effort is normal.      Breath sounds: Normal breath sounds. Abdominal:      General: Abdomen is flat. Palpations: Abdomen is soft. Musculoskeletal:         General: Tenderness present. No swelling, deformity or signs of injury. Arms:       Cervical back: Normal range of motion and neck supple. No tenderness. Skin:     General: Skin is warm and dry. Capillary Refill: Capillary refill takes less than 2 seconds. Coloration: Skin is not pale. Findings: No erythema or rash. Neurological:      Mental Status: She is alert. Psychiatric:         Mood and Affect: Mood normal.         Behavior: Behavior normal.         DIAGNOSTIC RESULTS       Interpretation per the Radiologist below, if available at the time of this note:    No orders to display         ED BEDSIDE ULTRASOUND:   Performed by ED Physician - none    LABS:  Labs Reviewed - No data to display    All other labs were within normal range or not returned as of this dictation. EMERGENCY DEPARTMENT COURSE and DIFFERENTIAL DIAGNOSIS/MDM:   Vitals:    Vitals:    07/14/22 1654 07/14/22 1655 07/14/22 1656   BP:  (!) 170/76    Pulse: 64     Resp: 19 19    Temp: 97.4 °F (36.3 °C)     SpO2: 96% 97%    Weight:   174 lb (78.9 kg)   Height:   5' 4\" (1.626 m)       MDM  66-year-old nontoxic-appearing female presents emergency department for atraumatic right upper extremity pain similar to prior arthritis flares is persisted since yesterday evening. Patient denies trauma chest pain neck pain rash or other complaints. Patient will be treated symptomatically and reevaluated. REASSESSMENT      Patient had uncomplicated ER course patient reevaluated 1843 hrs.  reports improvement of pain at this time after medicines given in the emergency department. She has a follow-up with her primary care doctor. Discussed case with ER attending who agrees with plan. CRITICAL CARE TIME       FINAL IMPRESSION      1. Osteoarthritis involving joint of right upper arm Stable         DISPOSITION/PLAN   DISPOSITION        PATIENT REFERRED TO:  TARA Dozier - [facility]  [phone]    In 3 days  As needed      DISCHARGE MEDICATIONS:  New Prescriptions    CYCLOBENZAPRINE (FLEXERIL) 5 MG TABLET    Take 1 tablet by mouth 2 times daily as needed for Muscle spasms     Controlled Substances Monitoring:     No flowsheet data found.     (Please note that portions of this note were completed with a voice recognition program.  Efforts were made to edit the dictations but occasionally words are mis-transcribed.)    Brenda Saez PA-C (electronically signed)  Attending Emergency Physician            Brenda Saez PA-C  07/14/22 [id number]

## 2022-07-15 ENCOUNTER — TELEPHONE (OUTPATIENT)
Dept: CARDIOLOGY | Age: 72
End: 2022-07-15

## 2022-07-15 NOTE — TELEPHONE ENCOUNTER
----- Message from Delilah Powers PA-C sent at 7/15/2022  9:18 AM EDT -----  Please notify patient that their lab results are normal.   Please continue current treatment and follow up.

## 2022-07-19 ENCOUNTER — OFFICE VISIT (OUTPATIENT)
Dept: PODIATRY | Age: 72
End: 2022-07-19
Payer: MEDICARE

## 2022-07-19 ENCOUNTER — HOSPITAL ENCOUNTER (OUTPATIENT)
Dept: INFUSION THERAPY | Age: 72
End: 2022-07-19

## 2022-07-19 VITALS
BODY MASS INDEX: 29.87 KG/M2 | HEART RATE: 62 BPM | SYSTOLIC BLOOD PRESSURE: 125 MMHG | HEIGHT: 64 IN | DIASTOLIC BLOOD PRESSURE: 62 MMHG | TEMPERATURE: 98.7 F | RESPIRATION RATE: 18 BRPM

## 2022-07-19 DIAGNOSIS — Z79.2 NEED FOR PROPHYLACTIC ANTIBIOTIC: Primary | ICD-10-CM

## 2022-07-19 DIAGNOSIS — L03.039 ONYCHIA OF TOE, UNSPECIFIED LATERALITY: ICD-10-CM

## 2022-07-19 PROCEDURE — 99203 OFFICE O/P NEW LOW 30 MIN: CPT | Performed by: PODIATRIST

## 2022-07-19 PROCEDURE — 1123F ACP DISCUSS/DSCN MKR DOCD: CPT | Performed by: PODIATRIST

## 2022-07-19 RX ORDER — AZITHROMYCIN 250 MG/1
250 TABLET, FILM COATED ORAL SEE ADMIN INSTRUCTIONS
Qty: 1 PACKET | Refills: 0 | Status: SHIPPED | OUTPATIENT
Start: 2022-07-25 | End: 2022-07-30

## 2022-07-19 ASSESSMENT — PATIENT HEALTH QUESTIONNAIRE - PHQ9
2. FEELING DOWN, DEPRESSED OR HOPELESS: 0
SUM OF ALL RESPONSES TO PHQ QUESTIONS 1-9: 0
SUM OF ALL RESPONSES TO PHQ QUESTIONS 1-9: 0
1. LITTLE INTEREST OR PLEASURE IN DOING THINGS: 0
SUM OF ALL RESPONSES TO PHQ QUESTIONS 1-9: 0
SUM OF ALL RESPONSES TO PHQ9 QUESTIONS 1 & 2: 0
SUM OF ALL RESPONSES TO PHQ QUESTIONS 1-9: 0

## 2022-07-19 NOTE — PATIENT INSTRUCTIONS
PODIATRY PATIENT DISCHARGE INSTRUCTIONS    CALL [phone] regarding podiatry questions    OFFICE HOURS ARE TUESDAY MORNING  8:30-NOON and can change with out notice    Discharge instructions for patient's plan of care:  Right great toe and left great toenails  Take antibiotics starting on 7/25/22 as directed. Return to clinic [facility] 7/26/22 at 8:30AM          We hope we gave you VERY GOOD care today!

## 2022-07-19 NOTE — PROGRESS NOTES
Agustín Conley (:  1950) is a 67 y.o. 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 Subjective   SUBJECTIVE/OBJECTIVE:  HPI painful shoe gear gait           No self treatment  / no previous treatment      Review of Systems   NEG. constitutional   -SSA , +DM  -claudication  , night or rest pain in toes  -smoking   -MVP , -pacemaker   +CKD     Past Medical History:   Diagnosis Date    Arthritis     Asthma     Chronic bronchitis ([facility] [address]     Diabetes mellitus ([facility] [address]     GERD (gastroesophageal reflux disease)     H/O echocardiogram 3/9/16    EF >60%. LV wall thickness is mildly increased. Mild mitral and tricuspid regurgitation. Borderline pulmonary hypertension estimated right ventricular sysolic pressure of 66DPDQ. Mild (grade l) diastolic dysfunction. History of PFTs 3/10/16     Suboptimal effort. Restrictive in nature. clionical correlations is advised. History of stress test 16    Abnoraml. Moderate perfusion defect of mild to moderate intensity in the anterolateral and anteroapical regions during stress imaging. Intermediate risk for CAD.     Hyperlipidemia     Hypertension      Psh  Allergies   Allergen Reactions    Iv Dye [Iodides] Nausea And Vomiting       Current Outpatient Medications:     azithromycin (ZITHROMAX) 250 MG tablet, Take 1 tablet by mouth See Admin Instructions for 5 days 500mg on day 1 followed by 250mg on days 2 - 5, Disp: 1 packet, Rfl: 0    spironolactone-hydroCHLOROthiazide (ALDACTAZIDE) 25-25 MG per tablet, Take 1 tablet by mouth daily, Disp: 90 tablet, Rfl: 3    aspirin EC 81 MG EC tablet, Take 1 tablet by mouth daily, Disp: 90 tablet, Rfl: 3    atorvastatin (LIPITOR) 40 MG tablet, Take 1 tablet by mouth daily, Disp: 90 tablet, Rfl: 3    amLODIPine (NORVASC) 5 MG tablet, Take 1 tablet by mouth daily, Disp: 90 tablet, Rfl: 3    atenolol (TENORMIN) 100 MG tablet, Take 1 tablet by mouth daily, Disp: 90 tablet, Rfl: 3    hydrALAZINE (APRESOLINE) 50 MG tablet, Take 1 tablet by mouth 3 times daily, Disp: 270 tablet, Rfl: 3    potassium chloride (KLOR-CON M) 20 MEQ extended release tablet, Take 1 tablet by mouth 2 times daily, Disp: 180 tablet, Rfl: 3    valsartan (DIOVAN) 320 MG tablet, Take 1 tablet by mouth daily, Disp: 90 tablet, Rfl: 3    metFORMIN (GLUCOPHAGE) 500 MG tablet, Take 1 tablet by mouth 2 times daily (with meals), Disp: 180 tablet, Rfl: 3    pantoprazole (PROTONIX) 40 MG tablet, Take 1 tablet by mouth every morning (before breakfast), Disp: 90 tablet, Rfl: 1    fluticasone (FLONASE) 50 MCG/ACT nasal spray, 2 sprays by Each Nostril route daily, Disp: 48 g, Rfl: 1    ascorbic acid (VITAMIN C) 500 MG tablet, Take 1 tablet by mouth 2 times daily With iron tablet, Disp: 180 tablet, Rfl: 3    Calcium Carbonate-Vitamin D (OYSTER SHELL CALCIUM/D) 500-200 MG-UNIT TABS, Take 1 tablet by mouth 2 times daily, Disp: 180 tablet, Rfl: 3    albuterol sulfate HFA (VENTOLIN HFA) 108 (90 Base) MCG/ACT inhaler, Inhale 2 puffs into the lungs 4 times daily as needed for Wheezing, Disp: 1 Inhaler, Rfl: 0    cetirizine (ZYRTEC ALLERGY) 10 MG tablet, Take 1 tablet by mouth daily, Disp: 30 tablet, Rfl: 3    dorzolamide-timolol (COSOPT) 22.3-6.8 MG/ML ophthalmic solution, Place 1 drop into both eyes 2 times daily , Disp: , Rfl:     latanoprost (XALATAN) 0.005 % ophthalmic solution, Place 1 drop into both eyes nightly , Disp: , Rfl:     blood glucose test strips (ONE TOUCH ULTRA TEST) strip, USE 1 STRIP TO CHECK GLUCOSE DAILY, Disp: 100 strip, Rfl: 3    ONE TOUCH LANCETS MISC, Lancets- One Touch Delica for testing daily and as needed.  DX: Diabetes type  E11.9, Disp: 100 each, Rfl: 3    Blood Glucose Monitoring Suppl GENNY, 1 Units by Does not apply route 3 times daily What insurance will cover, Disp: 1 Device, Rfl: 0    Multiple Vitamins-Minerals (THERAPEUTIC MULTIVITAMIN-MINERALS) tablet, Take 1 tablet by mouth daily, Disp: 30 tablet, Rfl: 11    Social History     Socioeconomic History    Marital status:      Spouse name: Not on file    Number of children: Not on file    Years of education: Not on file    Highest education level: Not on file   Occupational History    Not on file   Tobacco Use    Smoking status: Never    Smokeless tobacco: Never   Vaping Use    Vaping Use: Never used   Substance and Sexual Activity    Alcohol use: No     Alcohol/week: 0.0 standard drinks    Drug use: No    Sexual activity: Yes     Partners: Male     Birth control/protection: Post-menopausal   Other Topics Concern    Not on file   Social History Narrative    Not on file     Social Determinants of Health     Financial Resource Strain: Unknown    Difficulty of Paying Living Expenses: Patient refused   Food Insecurity: Unknown    Worried About Running Out of Food in the Last Year: Patient refused    Ran Out of Food in the Last Year: Patient refused   Transportation Needs: Not on file   Physical Activity: Inactive    Days of Exercise per Week: 0 days    Minutes of Exercise per Session: 0 min   Stress: Not on file   Social Connections: Not on file   Intimate Partner Violence: Not on file   Housing Stability: Not on file      +retired , lives @ home with spouse   -household pets          Objective   Physical Exam   73 Y/O BF , WD/WN, A&O x 3,   VSS, Afebrile, NAD,   Ambulatory , Bipedal   RLE / LLE ; +2/4 pedal pulses , -pallor on elevation , +hair growth / excellent skin turgor                       R1 >> L1 ; significant hypertrophic , laminated nail dystrophy                                         +PMT on direct exam , minor distal lysis                                         -drainage                                         Photo documentation   IMP: Onychia , R1 & L1 ; secondary to onychauxis           No s/s active

## 2022-08-02 ENCOUNTER — TELEPHONE (OUTPATIENT)
Dept: WOUND CARE | Age: 72
End: 2022-08-02

## 2022-08-02 NOTE — TELEPHONE ENCOUNTER
Reminder phone call to patient regarding nail procedure on Tuesday 8/9/22 at 9:00AM and to take antibiotic starting on Monday 8/8/2022. Patient verbalized understanding and reinforced information to patient 3x.  Instructed her to call office if not able to make appointment \"I will be there I need to have my nails taken care of.\"

## 2022-08-08 DIAGNOSIS — Z79.2 NEED FOR PROPHYLACTIC ANTIBIOTIC: Primary | ICD-10-CM

## 2022-08-08 RX ORDER — CLINDAMYCIN HYDROCHLORIDE 300 MG/1
300 CAPSULE ORAL 2 TIMES DAILY
Qty: 10 CAPSULE | Refills: 0 | Status: SHIPPED | OUTPATIENT
Start: 2022-08-08 | End: 2022-08-13

## 2022-08-09 ENCOUNTER — HOSPITAL ENCOUNTER (OUTPATIENT)
Dept: INFUSION THERAPY | Age: 72
Discharge: HOME | End: 2022-08-09

## 2022-08-09 ENCOUNTER — OFFICE VISIT (OUTPATIENT)
Dept: PODIATRY | Age: 72
End: 2022-08-09
Payer: MEDICARE

## 2022-08-09 VITALS
DIASTOLIC BLOOD PRESSURE: 70 MMHG | HEIGHT: 64 IN | HEART RATE: 67 BPM | TEMPERATURE: 96.9 F | BODY MASS INDEX: 29.87 KG/M2 | SYSTOLIC BLOOD PRESSURE: 128 MMHG | RESPIRATION RATE: 18 BRPM

## 2022-08-09 DIAGNOSIS — L03.039 ONYCHIA OF TOE, UNSPECIFIED LATERALITY: Primary | ICD-10-CM

## 2022-08-09 PROCEDURE — 11730 AVULSION NAIL PLATE SIMPLE 1: CPT | Performed by: PODIATRIST

## 2022-08-09 PROCEDURE — 11732 AVLSN NAIL PLATE SIMPLE EACH: CPT | Performed by: PODIATRIST

## 2022-08-09 PROCEDURE — 99999 PR OFFICE/OUTPT VISIT,PROCEDURE ONLY: CPT | Performed by: PODIATRIST

## 2022-08-09 RX ORDER — LIDOCAINE HYDROCHLORIDE 20 MG/ML
5 INJECTION, SOLUTION INFILTRATION; PERINEURAL ONCE
Status: DISCONTINUED | OUTPATIENT
Start: 2022-08-09 | End: 2022-08-10 | Stop reason: HOSPADM

## 2022-08-09 RX ORDER — BUPIVACAINE HYDROCHLORIDE 5 MG/ML
30 INJECTION, SOLUTION EPIDURAL; INTRACAUDAL ONCE
Status: DISCONTINUED | OUTPATIENT
Start: 2022-08-09 | End: 2022-08-10 | Stop reason: HOSPADM

## 2022-08-09 NOTE — PROGRESS NOTES
SUBJECTIVE:   Karly Padilla is a 67 y.o. female who presents with chronic dystrophic  ingrown right, left 1st toenail. Has pain and tenderness at the area without fever. MRR: hx surgical consultation  / planning / scheduling. P. o. ATB prophylaxis     OBJECTIVE:  Patient appears well, normal vital signs. Bilateral 1st toenail reveals ingrown edge(s)with blanching erythema, and tenderness. No drainage. ASSESSMENT:  Painful onychauxis, Rq & L1      Procedure Note     Indication: painful onychauxis , B/L Hallux      TIME OUT  Yes       Anatomical marking noted Yes     Consent signed  YES    Procedure: The patient was positioned in dorsal recumbent position. The treatment and consent form reviewed. The skin over the Left 1st toe was prepped with Chloraprep. Local anesthesia was obtained by infiltration using 2% Lidocaine with epinephrine, in V-Block fashion to the base of the hallux. .  Blunt dissection freed the nail from the overriding eponychium and central hyponychium and the underlying nail bed in atraumatic  fashion of the central labia. The nail is avulsed from the distal to proximal and concomitant excision of the nail plate is done. The nail bed/incision site was then irrigated and packed with sterile gauze. Attention is directed to the RIGHT Hallux , and the exact same procedure technique is performed. The patient tolerated the procedure(s)  well. Complications: None    Post procedure care: Antibiotics as per the AVS/orders and prescription.      Henrietta Herndon DPM  8/9/2022

## 2022-08-16 ENCOUNTER — OFFICE VISIT (OUTPATIENT)
Dept: PODIATRY | Age: 72
End: 2022-08-16

## 2022-08-16 VITALS
TEMPERATURE: 96.8 F | BODY MASS INDEX: 29.87 KG/M2 | DIASTOLIC BLOOD PRESSURE: 75 MMHG | HEIGHT: 64 IN | SYSTOLIC BLOOD PRESSURE: 132 MMHG | HEART RATE: 60 BPM | RESPIRATION RATE: 18 BRPM

## 2022-08-16 DIAGNOSIS — Z48.01 ENCOUNTER FOR SURGICAL WOUND DRESSING CHANGE: ICD-10-CM

## 2022-08-16 PROCEDURE — 99024 POSTOP FOLLOW-UP VISIT: CPT | Performed by: PODIATRIST

## 2022-08-16 NOTE — PROGRESS NOTES
POV # 1       POD # 7   N: A little sore today  AND when I change the dressing   MRR: B/L hallux nail avulsion            SSD cream to nail bed dialy  ROS: neg. Constitutional   Past Medical History:   Diagnosis Date    Arthritis     Asthma     Chronic bronchitis ([facility] [address]     Diabetes mellitus ([facility] [address]     GERD (gastroesophageal reflux disease)     H/O echocardiogram 3/9/16    EF >60%. LV wall thickness is mildly increased. Mild mitral and tricuspid regurgitation. Borderline pulmonary hypertension estimated right ventricular sysolic pressure of 90SWVC. Mild (grade l) diastolic dysfunction. History of PFTs 3/10/16     Suboptimal effort. Restrictive in nature. clionical correlations is advised. History of stress test 16    Abnoraml. Moderate perfusion defect of mild to moderate intensity in the anterolateral and anteroapical regions during stress imaging. Intermediate risk for CAD.     Hyperlipidemia     Hypertension      Past Surgical History:   Procedure Laterality Date    BREAST SURGERY      cyst removed    CARDIAC CATHETERIZATION      CARDIAC CATHETERIZATION Left 2019    Right Radial/[facility]/     SECTION      CHOLECYSTECTOMY      COLONOSCOPY  3/23/15    -diverticulosis,hemorrhoids    FOOT SURGERY      rt    HYSTERECTOMY (CERVIX STATUS UNKNOWN)       Allergies   Allergen Reactions    Iv Dye [Iodides] Nausea And Vomiting       Current Outpatient Medications:     silver sulfADIAZINE (SILVADENE) 1 % cream, Apply topically daily , beginning 2022, Disp: 50 g, Rfl: 1    spironolactone-hydroCHLOROthiazide (ALDACTAZIDE) 25-25 MG per tablet, Take 1 tablet by mouth daily, Disp: 90 tablet, Rfl: 3    aspirin EC 81 MG EC tablet, Take 1 tablet by mouth daily, Disp: 90 tablet, Rfl: 3    atorvastatin (LIPITOR) 40 MG tablet, Take 1 tablet by mouth daily, Disp: 90 tablet, Rfl: 3    amLODIPine (NORVASC) 5 MG tablet, Take 1 tablet by mouth daily, Disp: 90 tablet, Rfl: (THERAPEUTIC MULTIVITAMIN-MINERALS) tablet, Take 1 tablet by mouth daily, Disp: 30 tablet, Rfl: 11    PE: VSS, Afebrile, NAD, A&O x 3,          R1 & L1 ; thick dressing intact ; sticking ; removed                         No erythema or warmth                         Nail bed intact  ; delicate maceration                         Photo documentation  IMP: post op nail bed wound(s) ; L1 & R1 -- delicate          No s/s active infection     REC: Betadine astringent sol.  BUD x 7 days           Resume SSD days , 8-14  P: see orders / AVS        RTC 2 weeks     Marisela Hensley DPM  8/16/2022  12:31 PM

## 2022-09-06 ENCOUNTER — OFFICE VISIT (OUTPATIENT)
Dept: PODIATRY | Age: 72
End: 2022-09-06
Payer: MEDICARE

## 2022-09-06 VITALS
SYSTOLIC BLOOD PRESSURE: 127 MMHG | HEIGHT: 64 IN | HEART RATE: 65 BPM | DIASTOLIC BLOOD PRESSURE: 66 MMHG | RESPIRATION RATE: 18 BRPM | BODY MASS INDEX: 29.87 KG/M2 | TEMPERATURE: 96.9 F

## 2022-09-06 DIAGNOSIS — S91.109S OPEN WOUND OF TOE, SEQUELA: Primary | ICD-10-CM

## 2022-09-06 PROCEDURE — 1123F ACP DISCUSS/DSCN MKR DOCD: CPT | Performed by: PODIATRIST

## 2022-09-06 PROCEDURE — 99212 OFFICE O/P EST SF 10 MIN: CPT | Performed by: PODIATRIST

## 2022-09-06 RX ORDER — CLOTRIMAZOLE 1 %
CREAM (GRAM) TOPICAL
Qty: 1 EACH | Refills: 1
Start: 2022-09-06 | End: 2022-09-13

## 2022-09-06 NOTE — PROGRESS NOTES
Patient presents to clinic dept for E, M, & Tx . MRR: nail avulsion hallux , B/L , > 10 days  Cc; NO pain   HPI: Betadine astringent solution application BID   Past Medical History:   Diagnosis Date    Arthritis     Asthma     Chronic bronchitis (HCC)     Diabetes mellitus ([address]     GERD (gastroesophageal reflux disease)     H/O echocardiogram 3/9/16    EF >60%. LV wall thickness is mildly increased. Mild mitral and tricuspid regurgitation. Borderline pulmonary hypertension estimated right ventricular sysolic pressure of [id number]. Mild (grade l) diastolic dysfunction. History of PFTs 3/10/16     Suboptimal effort. Restrictive in nature. clionical correlations is advised. History of stress test 16    Abnoraml. Moderate perfusion defect of mild to moderate intensity in the anterolateral and anteroapical regions during stress imaging. Intermediate risk for CAD.     Hyperlipidemia     Hypertension      Past Surgical History:   Procedure Laterality Date    BREAST SURGERY      cyst removed    CARDIAC CATHETERIZATION      CARDIAC CATHETERIZATION Left 2019    [facility] Radial/[facility]/     SECTION      CHOLECYSTECTOMY      COLONOSCOPY  3/23/15    -diverticulosis,hemorrhoids    FOOT SURGERY      rt    HYSTERECTOMY (CERVIX STATUS UNKNOWN)       Allergies   Allergen Reactions    Iv Dye [Iodides] Nausea And Vomiting       Current Outpatient Medications:     spironolactone-hydroCHLOROthiazide (ALDACTAZIDE) 25-25 MG per tablet, Take 1 tablet by mouth daily, Disp: 90 tablet, Rfl: 3    aspirin EC 81 MG EC tablet, Take 1 tablet by mouth daily, Disp: 90 tablet, Rfl: 3    atorvastatin (LIPITOR) 40 MG tablet, Take 1 tablet by mouth daily, Disp: 90 tablet, Rfl: 3    amLODIPine (NORVASC) 5 MG tablet, Take 1 tablet by mouth daily, Disp: 90 tablet, Rfl: 3    atenolol (TENORMIN) 100 MG tablet, Take 1 tablet by mouth daily, Disp: 90 tablet, Rfl: 3    hydrALAZINE (APRESOLINE) 50 MG tablet, Take 1 tablet by mouth 3 times daily, Disp: 270 tablet, Rfl: 3    potassium chloride (KLOR-CON M) 20 MEQ extended release tablet, Take 1 tablet by mouth 2 times daily, Disp: 180 tablet, Rfl: 3    valsartan (DIOVAN) 320 MG tablet, Take 1 tablet by mouth daily, Disp: 90 tablet, Rfl: 3    metFORMIN (GLUCOPHAGE) 500 MG tablet, Take 1 tablet by mouth 2 times daily (with meals), Disp: 180 tablet, Rfl: 3    pantoprazole (PROTONIX) 40 MG tablet, Take 1 tablet by mouth every morning (before breakfast), Disp: 90 tablet, Rfl: 1    fluticasone (FLONASE) 50 MCG/ACT nasal spray, 2 sprays by Each Nostril route daily, Disp: 48 g, Rfl: 1    ascorbic acid (VITAMIN C) 500 MG tablet, Take 1 tablet by mouth 2 times daily With iron tablet, Disp: 180 tablet, Rfl: 3    Calcium Carbonate-Vitamin D (OYSTER SHELL CALCIUM/D) 500-200 MG-UNIT TABS, Take 1 tablet by mouth 2 times daily, Disp: 180 tablet, Rfl: 3    albuterol sulfate HFA (VENTOLIN HFA) 108 (90 Base) MCG/ACT inhaler, Inhale 2 puffs into the lungs 4 times daily as needed for Wheezing, Disp: 1 Inhaler, Rfl: 0    cetirizine (ZYRTEC ALLERGY) 10 MG tablet, Take 1 tablet by mouth daily, Disp: 30 tablet, Rfl: 3    dorzolamide-timolol (COSOPT) 22.3-6.8 MG/ML ophthalmic solution, Place 1 drop into both eyes 2 times daily , Disp: , Rfl:     latanoprost (XALATAN) 0.005 % ophthalmic solution, Place 1 drop into both eyes nightly , Disp: , Rfl:     blood glucose test strips (ONE TOUCH ULTRA TEST) strip, USE 1 STRIP TO CHECK GLUCOSE DAILY, Disp: 100 strip, Rfl: 3    ONE TOUCH LANCETS MISC, Lancets- One Touch Delica for testing daily and as needed.  DX: Diabetes type  E11.9, Disp: 100 each, Rfl: 3    Blood Glucose Monitoring Suppl GENNY, 1 Units by Does not apply route 3 times daily What insurance will cover, Disp: 1 Device, Rfl: 0    Multiple Vitamins-Minerals (THERAPEUTIC MULTIVITAMIN-MINERALS) tablet, Take 1 tablet by mouth daily, Disp: 30 tablet, Rfl: 11    PE: VSS, Afebrile, NAd, A&O x 3, R1 / L1 ; nail bed wounds ; minimal                       No erythema / no edema                        No PMT on direct exam                        Photo documentation  IMP: nail bed wounds , stable and clean          Amenable to transitional topical med ;  Lotrimin cream ; QHS x 30 days  P: see orders / AVS       RTC prn     Henrietta Herndon DPM  9/6/2022  8:57 AM

## 2022-09-06 NOTE — PATIENT INSTRUCTIONS
PODIATRY PATIENT DISCHARGE INSTRUCTIONS    CALL [phone] regarding podiatry questions    OFFICE HOURS ARE TUESDAY MORNING  8:30-NOON and can change with out notice    Discharge instructions for patient's plan of care:    Bilateral great toes  Apply over the counter Lotrimin antifungal cream to toes nightly for 30 days. (Cream is located in foot care aisle of stores. Return to clinic as needed. We hope we gave you VERY GOOD care today!

## 2022-10-20 ENCOUNTER — TELEPHONE (OUTPATIENT)
Dept: PRIMARY CARE CLINIC | Age: 72
End: 2022-10-20

## 2022-10-21 ENCOUNTER — APPOINTMENT (OUTPATIENT)
Dept: CT IMAGING | Age: 72
End: 2022-10-21
Payer: MEDICARE

## 2022-10-21 ENCOUNTER — HOSPITAL ENCOUNTER (EMERGENCY)
Age: 72
Discharge: HOME | End: 2022-10-21
Attending: EMERGENCY MEDICINE
Payer: MEDICARE

## 2022-10-21 VITALS
RESPIRATION RATE: 20 BRPM | DIASTOLIC BLOOD PRESSURE: 90 MMHG | SYSTOLIC BLOOD PRESSURE: 151 MMHG | HEART RATE: 72 BPM | OXYGEN SATURATION: 98 %

## 2022-10-21 DIAGNOSIS — R51.9 NONINTRACTABLE HEADACHE, UNSPECIFIED CHRONICITY PATTERN, UNSPECIFIED HEADACHE TYPE: Primary | ICD-10-CM

## 2022-10-21 LAB
ABSOLUTE EOS #: 0.11 K/UL (ref 0–0.44)
ABSOLUTE IMMATURE GRANULOCYTE: 0.03 K/UL (ref 0–0.3)
ABSOLUTE LYMPH #: 1.86 K/UL (ref 1.1–3.7)
ABSOLUTE MONO #: 0.74 K/UL (ref 0.1–1.2)
ANION GAP SERPL CALCULATED.3IONS-SCNC: 8 MMOL/L (ref 9–17)
BASOPHILS # BLD: 0 % (ref 0–2)
BASOPHILS ABSOLUTE: 0.03 K/UL (ref 0–0.2)
BUN BLDV-MCNC: 8 MG/DL (ref 8–23)
BUN/CREAT BLD: 9 (ref 9–20)
C-REACTIVE PROTEIN: 16.1 MG/L (ref 0–5)
CALCIUM SERPL-MCNC: 9 MG/DL (ref 8.6–10.4)
CHLORIDE BLD-SCNC: 105 MMOL/L (ref 98–107)
CO2: 26 MMOL/L (ref 20–31)
CREAT SERPL-MCNC: 0.94 MG/DL (ref 0.5–0.9)
EOSINOPHILS RELATIVE PERCENT: 2 % (ref 1–4)
FLU A ANTIGEN: NEGATIVE
FLU B ANTIGEN: NEGATIVE
GFR SERPL CREATININE-BSD FRML MDRD: >60 ML/MIN/1.73M2
GLUCOSE BLD-MCNC: 93 MG/DL (ref 70–99)
HCT VFR BLD CALC: 32.3 % (ref 36.3–47.1)
HEMOGLOBIN: 10 G/DL (ref 11.9–15.1)
IMMATURE GRANULOCYTES: 0 %
LYMPHOCYTES # BLD: 25 % (ref 24–43)
MCH RBC QN AUTO: 30 PG (ref 25.2–33.5)
MCHC RBC AUTO-ENTMCNC: 31 G/DL (ref 28.4–34.8)
MCV RBC AUTO: 97 FL (ref 82.6–102.9)
MONOCYTES # BLD: 10 % (ref 3–12)
NRBC AUTOMATED: 0 PER 100 WBC
PDW BLD-RTO: 13.2 % (ref 11.8–14.4)
PLATELET # BLD: 385 K/UL (ref 138–453)
PMV BLD AUTO: 9.5 FL (ref 8.1–13.5)
POTASSIUM SERPL-SCNC: 4 MMOL/L (ref 3.7–5.3)
RBC # BLD: 3.33 M/UL (ref 3.95–5.11)
SARS-COV-2, RAPID: NOT DETECTED
SEDIMENTATION RATE, ERYTHROCYTE: 27 MM/HR (ref 0–30)
SEG NEUTROPHILS: 63 % (ref 36–65)
SEGMENTED NEUTROPHILS ABSOLUTE COUNT: 4.76 K/UL (ref 1.5–8.1)
SODIUM BLD-SCNC: 139 MMOL/L (ref 135–144)
SPECIMEN DESCRIPTION: NORMAL
WBC # BLD: 7.5 K/UL (ref 3.5–11.3)

## 2022-10-21 PROCEDURE — 96374 THER/PROPH/DIAG INJ IV PUSH: CPT

## 2022-10-21 PROCEDURE — 96375 TX/PRO/DX INJ NEW DRUG ADDON: CPT

## 2022-10-21 PROCEDURE — 70450 CT HEAD/BRAIN W/O DYE: CPT

## 2022-10-21 PROCEDURE — 85652 RBC SED RATE AUTOMATED: CPT

## 2022-10-21 PROCEDURE — 72125 CT NECK SPINE W/O DYE: CPT

## 2022-10-21 PROCEDURE — 2580000003 HC RX 258: Performed by: EMERGENCY MEDICINE

## 2022-10-21 PROCEDURE — 87804 INFLUENZA ASSAY W/OPTIC: CPT

## 2022-10-21 PROCEDURE — 6360000002 HC RX W HCPCS: Performed by: EMERGENCY MEDICINE

## 2022-10-21 PROCEDURE — 86140 C-REACTIVE PROTEIN: CPT

## 2022-10-21 PROCEDURE — 85025 COMPLETE CBC W/AUTO DIFF WBC: CPT

## 2022-10-21 PROCEDURE — 80048 BASIC METABOLIC PNL TOTAL CA: CPT

## 2022-10-21 PROCEDURE — 87635 SARS-COV-2 COVID-19 AMP PRB: CPT

## 2022-10-21 PROCEDURE — 99284 EMERGENCY DEPT VISIT MOD MDM: CPT

## 2022-10-21 PROCEDURE — 36415 COLL VENOUS BLD VENIPUNCTURE: CPT

## 2022-10-21 RX ORDER — SODIUM CHLORIDE 9 MG/ML
INJECTION, SOLUTION INTRAVENOUS CONTINUOUS
Status: DISCONTINUED | OUTPATIENT
Start: 2022-10-21 | End: 2022-10-21 | Stop reason: HOSPADM

## 2022-10-21 RX ORDER — KETOROLAC TROMETHAMINE 15 MG/ML
15 INJECTION, SOLUTION INTRAMUSCULAR; INTRAVENOUS ONCE
Status: COMPLETED | OUTPATIENT
Start: 2022-10-21 | End: 2022-10-21

## 2022-10-21 RX ORDER — PROCHLORPERAZINE EDISYLATE 5 MG/ML
5 INJECTION INTRAMUSCULAR; INTRAVENOUS ONCE
Status: COMPLETED | OUTPATIENT
Start: 2022-10-21 | End: 2022-10-21

## 2022-10-21 RX ORDER — DIPHENHYDRAMINE HYDROCHLORIDE 50 MG/ML
25 INJECTION INTRAMUSCULAR; INTRAVENOUS ONCE
Status: COMPLETED | OUTPATIENT
Start: 2022-10-21 | End: 2022-10-21

## 2022-10-21 RX ADMIN — KETOROLAC TROMETHAMINE 15 MG: 15 INJECTION, SOLUTION INTRAMUSCULAR; INTRAVENOUS at 10:55

## 2022-10-21 RX ADMIN — PROCHLORPERAZINE EDISYLATE 5 MG: 5 INJECTION INTRAMUSCULAR; INTRAVENOUS at 09:35

## 2022-10-21 RX ADMIN — SODIUM CHLORIDE: 9 INJECTION, SOLUTION INTRAVENOUS at 09:35

## 2022-10-21 RX ADMIN — DIPHENHYDRAMINE HYDROCHLORIDE 25 MG: 50 INJECTION, SOLUTION INTRAMUSCULAR; INTRAVENOUS at 09:37

## 2022-10-21 ASSESSMENT — LIFESTYLE VARIABLES: HOW OFTEN DO YOU HAVE A DRINK CONTAINING ALCOHOL: NEVER

## 2022-10-21 NOTE — ED PROVIDER NOTES
[facility] ED  EMERGENCY DEPARTMENT ENCOUNTER      Pt Name: Demi Mckeon  MRN: [id number]  [place] 1950  Date of evaluation: 10/21/2022  Provider: Cyndi Bahena MD    CHIEF COMPLAINT       Chief Complaint   Patient presents with    Headache     Pt. C/o headache w/ radiation to neck starting yesterday         HISTORY OF PRESENT ILLNESS   (Location/Symptom, Timing/Onset, Context/Setting, Quality, Duration, Modifying Factors, Severity)  Note limiting factors. Demi Mckeon is a 67 y.o. female who presents to the emergency department     70-year-old patient presents emergency department with concerns of severe headache. Headache was gradual onset began 1 day prior to ED arrival denies any history for travel. There is been no history for loss of consciousness. No history for trauma. No history for thunderclap headache. Denies any change in visual acuity or symptoms suggestive of photophobia. Denies any recent illnesses. .  The headache was described as generalized. Nursing Notes were reviewed. REVIEW OF SYSTEMS    (2-9 systems for level 4, 10 or more for level 5)     Review of Systems   All other systems reviewed and are negative. Except as noted above the remainder of the review of systems was reviewed and negative. PAST MEDICAL HISTORY     Past Medical History:   Diagnosis Date    Arthritis     Asthma     Chronic bronchitis ([address]     Diabetes mellitus ([address]     GERD (gastroesophageal reflux disease)     H/O echocardiogram 3/9/16    EF >60%. LV wall thickness is mildly increased. Mild mitral and tricuspid regurgitation. Borderline pulmonary hypertension estimated right ventricular sysolic pressure of 00CYEV. Mild (grade l) diastolic dysfunction. History of PFTs 3/10/16     Suboptimal effort. Restrictive in nature. clionical correlations is advised. History of stress test 2/18/16    Abnoraml.  Moderate perfusion defect of mild to moderate intensity in the anterolateral and anteroapical regions during stress imaging. Intermediate risk for CAD.     Hyperlipidemia     Hypertension          SURGICAL HISTORY       Past Surgical History:   Procedure Laterality Date    BREAST SURGERY      cyst removed    CARDIAC CATHETERIZATION      CARDIAC CATHETERIZATION Left 2019    [facility] Radial[facility]/     SECTION      CHOLECYSTECTOMY      COLONOSCOPY  3/23/15    -diverticulosis,hemorrhoids    FOOT SURGERY      rt    HYSTERECTOMY (CERVIX STATUS UNKNOWN)           CURRENT MEDICATIONS       Discharge Medication List as of 10/21/2022 10:53 AM        CONTINUE these medications which have NOT CHANGED    Details   spironolactone-hydroCHLOROthiazide (ALDACTAZIDE) 25-25 MG per tablet Take 1 tablet by mouth daily, Disp-90 tablet, R-3Normal      aspirin EC 81 MG EC tablet Take 1 tablet by mouth daily, Disp-90 tablet, R-3OTC      atorvastatin (LIPITOR) 40 MG tablet Take 1 tablet by mouth daily, Disp-90 tablet, R-3Normal      amLODIPine (NORVASC) 5 MG tablet Take 1 tablet by mouth daily, Disp-90 tablet, R-3Normal      atenolol (TENORMIN) 100 MG tablet Take 1 tablet by mouth daily, Disp-90 tablet, R-3Normal      hydrALAZINE (APRESOLINE) 50 MG tablet Take 1 tablet by mouth 3 times daily, Disp-270 tablet, R-3Normal      potassium chloride (KLOR-CON M) 20 MEQ extended release tablet Take 1 tablet by mouth 2 times daily, Disp-180 tablet, R-3Normal      valsartan (DIOVAN) 320 MG tablet Take 1 tablet by mouth daily, Disp-90 tablet, R-3Normal      metFORMIN (GLUCOPHAGE) 500 MG tablet Take 1 tablet by mouth 2 times daily (with meals), Disp-180 tablet, R-3Normal      pantoprazole (PROTONIX) 40 MG tablet Take 1 tablet by mouth every morning (before breakfast), Disp-90 tablet, R-1Normal      fluticasone (FLONASE) 50 MCG/ACT nasal spray 2 sprays by Each Nostril route daily, Disp-48 g, R-1Normal      ascorbic acid (VITAMIN C) 500 MG tablet Take 1 tablet by mouth 2 times daily With iron tablet, Disp-180 tablet, R-3Normal      Calcium Carbonate-Vitamin D (OYSTER SHELL CALCIUM/D) 500-200 MG-UNIT TABS Take 1 tablet by mouth 2 times daily, Disp-180 tablet, R-3Normal      albuterol sulfate HFA (VENTOLIN HFA) 108 (90 Base) MCG/ACT inhaler Inhale 2 puffs into the lungs 4 times daily as needed for Wheezing, Disp-1 Inhaler, R-0Normal      cetirizine (ZYRTEC ALLERGY) 10 MG tablet Take 1 tablet by mouth daily, Disp-30 tablet, R-3Normal      dorzolamide-timolol (COSOPT) 22.3-6.8 MG/ML ophthalmic solution Place 1 drop into both eyes 2 times daily Historical Med      Multiple Vitamins-Minerals (THERAPEUTIC MULTIVITAMIN-MINERALS) tablet Take 1 tablet by mouth daily, Disp-30 tablet, R-11Normal      latanoprost (XALATAN) 0.005 % ophthalmic solution Place 1 drop into both eyes nightly Historical Med      blood glucose test strips (ONE TOUCH ULTRA TEST) strip Disp-100 strip, R-3, NormalUSE 1 STRIP TO CHECK GLUCOSE DAILY      ONE TOUCH LANCETS MISC Disp-100 each, R-3, NormalLancets- One Touch Delica for testing daily and as needed.  DX: Diabetes type  E11.9      Blood Glucose Monitoring Suppl GENNY 3 TIMES DAILY Starting 10/11/2016, Until Discontinued, Disp-1 Device, R-0, NormalWhat insurance will cover             ALLERGIES     Iv dye [iodides]    FAMILY HISTORY       Family History   Problem Relation Age of Onset    Stroke Father     Stroke Sister     Diabetes Sister         x6 sisters    High Blood Pressure Sister     Stroke Brother     Diabetes Brother           SOCIAL HISTORY       Social History     Socioeconomic History    Marital status:      Spouse name: None    Number of children: None    Years of education: None    Highest education level: None   Tobacco Use    Smoking status: Never    Smokeless tobacco: Never   Vaping Use    Vaping Use: Never used   Substance and Sexual Activity    Alcohol use: No     Alcohol/week: 0.0 standard drinks    Drug use: No    Sexual activity: Yes     Partners: Male Birth control/protection: Post-menopausal     Social Determinants of Health     Physical Activity: Inactive    Days of Exercise per Week: 0 days    Minutes of Exercise per Session: 0 min       SCREENINGS        [place] Coma Scale  Eye Opening: Spontaneous  Best Verbal Response: Oriented  Best Motor Response: Obeys commands  Anthony Coma Scale Score: 15               PHYSICAL EXAM    (up to 7 for level 4, 8 or more for level 5)     ED Triage Vitals [10/21/22 0859]   BP Temp Temp src Heart Rate Resp SpO2 Height Weight   -- -- -- 72 20 96 % -- --       Physical Exam  Vitals and nursing note reviewed. Constitutional:       General: She is not in acute distress. Appearance: Normal appearance. She is not ill-appearing, toxic-appearing or diaphoretic. HENT:      Head: Normocephalic. Comments: No specific tenderness about the temporal regions bilaterally     Nose: Nose normal. No congestion or rhinorrhea. Mouth/Throat:      Mouth: Mucous membranes are moist.   Eyes:      Extraocular Movements: Extraocular movements intact. Pupils: Pupils are equal, round, and reactive to light. Comments: There is no photophobia, sclera is noninjected   Neck:      Vascular: No carotid bruit. Comments: No meningeal signs  Cardiovascular:      Rate and Rhythm: Normal rate and regular rhythm. Pulses: Normal pulses. Heart sounds: Normal heart sounds. Pulmonary:      Effort: Pulmonary effort is normal.      Breath sounds: Normal breath sounds. Abdominal:      General: Abdomen is flat. Palpations: Abdomen is soft. There is no mass. Tenderness: There is no abdominal tenderness. Musculoskeletal:         General: No signs of injury. Normal range of motion. Cervical back: Normal range of motion. Right lower leg: No edema. Left lower leg: No edema. Lymphadenopathy:      Cervical: No cervical adenopathy. Skin:     General: Skin is warm.       Capillary Refill: Capillary refill takes less than 2 seconds. Findings: No lesion or rash. Neurological:      General: No focal deficit present. Mental Status: She is alert and oriented to person, place, and time. Cranial Nerves: No cranial nerve deficit. Sensory: No sensory deficit. DIAGNOSTIC RESULTS     EKG: All EKG's are interpreted by the Emergency Department Physician who either signs or Co-signs this chart in the absence of a cardiologist.      RADIOLOGY:   Non-plain film images such as CT, Ultrasound and MRI are read by the radiologist. Plain radiographic images are visualized and preliminarily interpreted by the emergency physician with the below findings:      Interpretation per the Radiologist below, if available at the time of this note:    CT CSpine W/O Contrast   Final Result   CT HEAD:      No CT evidence for acute intracranial abnormality. .      CT CERVICAL SPINE:      No acute fracture or subluxation of the cervical spine. Disc osteophyte complexes at C4-C5 through C6-C7. CT Head WO Contrast   Final Result   CT HEAD:      No CT evidence for acute intracranial abnormality. .      CT CERVICAL SPINE:      No acute fracture or subluxation of the cervical spine. Disc osteophyte complexes at C4-C5 through C6-C7.                ED BEDSIDE ULTRASOUND:   Performed by ED Physician - none    LABS:  Labs Reviewed   CBC WITH AUTO DIFFERENTIAL - Abnormal; Notable for the following components:       Result Value    RBC 3.33 (*)     Hemoglobin 10.0 (*)     Hematocrit 32.3 (*)     All other components within normal limits   BASIC METABOLIC PANEL - Abnormal; Notable for the following components:    Creatinine 0.94 (*)     Anion Gap 8 (*)     All other components within normal limits   C-REACTIVE PROTEIN - Abnormal; Notable for the following components:    CRP 16.1 (*)     All other components within normal limits   COVID-19, RAPID   RAPID INFLUENZA A/B ANTIGENS   SEDIMENTATION RATE       All other labs were within normal range or not returned as of this dictation. EMERGENCY DEPARTMENT COURSE and DIFFERENTIAL DIAGNOSIS/MDM:   Vitals:    Vitals:    10/21/22 0859 10/21/22 0942   BP:  (!) 151/90   Pulse: 72    Resp: 20    SpO2: 96% 98%         MDM  Number of Diagnoses or Management Options  Nonintractable headache, unspecified chronicity pattern, unspecified headache type  Diagnosis management comments: 70-year-old female presents emergency department with concerns as documented in the HPI    Diagnostic considerations nonspecific headache, intracerebral hemorrhage, encephalitis, meningitis, temporal arteritis    Laboratory studies imaging studies have been reviewed and discussed with the patient    The patient refuses any additional testing to include but not limited to spinal tap etc.    The patient relates that she is feeling much better she is advised follow-up as documented invited to return to the emergency department with any concerns whatsoever       Amount and/or Complexity of Data Reviewed  Decide to obtain previous medical records or to obtain history from someone other than the patient: yes          REASSESSMENT   Patient had complete resolution of her headache after being treated IV Benadryl IV Compazine and IV Toradol-she remained neurologically intact-alert and oriented x3    ED Course as of 10/22/22 1207   Fri Oct 21, 2022   1044 Rapid influenza A/B antigens:    Flu A Antigen NEGATIVE   Flu B Antigen NEGATIVE [RS]   1044 CBC with Auto Differential(!):    WBC 7.5   RBC 3.33(!)   Hemoglobin Quant 10. 0(!)   Hematocrit 32.3(!)   MCV 97.0   MCH 30.0   MCHC 31.0   RDW 13.2   Platelet Count 898   MPV 9.5   NRBC Automated 0.0   Seg Neutrophils 63   Lymphocytes 25   Monocytes 10   Eosinophils % 2   Basophils 0   Immature Granulocytes 0   Segs Absolute 4.76   Absolute Lymph # 1.86   Absolute Mono # 0.74   Absolute Eos # 0.11   Basophils Absolute 0.03   Absolute Immature Granulocyte 0.03 [RS]   1044 C-Reactive Protein(!):    CRP 16.1(!) [RS]   1044 BMP(!):    Glucose, Random 93   BUN,BUNPL 8   Creatinine 0.94(!)   Est, Glom Filt Rate >60   Bun/Cre Ratio 9   CALCIUM, SERUM, 734734 9.0   Sodium 139   Potassium 4.0   Chloride 105   CO2 26   Anion Gap 8(!) [RS]   1044 Sedimentation Rate:    Sed Rate 27 [RS]   2395 COVID-19, Rapid:    Specimen Description . NASOPHARYNGEAL SWAB   SARS-CoV-2, Rapid Not Detected [RS]   8219 CT of patient's brain no acute processes radiologist read [RS]   Sat Oct 22, 2022   1204 COVID-19, Rapid:    Specimen Description . NASOPHARYNGEAL SWAB   SARS-CoV-2, Rapid Not Detected [RS]      ED Course User Index  [RS] Shaka Rowe MD         CRITICAL CARE TIME   Total Critical Care time was minutes, excluding separately reportable procedures. There was a high probability of clinically significant/life threatening deterioration in the patient's condition which required my urgent intervention. CONSULTS:  None    PROCEDURES:  Unless otherwise noted below, none     Procedures    FINAL IMPRESSION      1. Nonintractable headache, unspecified chronicity pattern, unspecified headache type          DISPOSITION/PLAN   DISPOSITION Decision To Discharge 10/21/2022 11:05:05 AM      PATIENT REFERRED TO:  Teresa Bullock, APRN - [facility]  [address]  [phone]    Call in 2 days      DISCHARGE MEDICATIONS:  Discharge Medication List as of 10/21/2022 10:53 AM        Controlled Substances Monitoring:     No flowsheet data found.     (Please note that portions of this note were completed with a voice recognition program.  Efforts were made to edit the dictations but occasionally words are mis-transcribed.)    Shaka Rowe MD (electronically signed)  Attending Emergency Physician            Shaka Rowe MD  10/22/22 8916

## 2022-10-22 ENCOUNTER — HOSPITAL ENCOUNTER (EMERGENCY)
Age: 72
Discharge: HOME | End: 2022-10-22
Attending: EMERGENCY MEDICINE
Payer: MEDICARE

## 2022-10-22 VITALS
OXYGEN SATURATION: 97 % | BODY MASS INDEX: 29.71 KG/M2 | SYSTOLIC BLOOD PRESSURE: 158 MMHG | HEART RATE: 64 BPM | DIASTOLIC BLOOD PRESSURE: 59 MMHG | TEMPERATURE: 98.4 F | WEIGHT: 174 LBS | RESPIRATION RATE: 16 BRPM | HEIGHT: 64 IN

## 2022-10-22 DIAGNOSIS — G44.209 ACUTE NON INTRACTABLE TENSION-TYPE HEADACHE: ICD-10-CM

## 2022-10-22 DIAGNOSIS — M47.812 ARTHRITIS OF NECK: Primary | ICD-10-CM

## 2022-10-22 PROCEDURE — 99282 EMERGENCY DEPT VISIT SF MDM: CPT

## 2022-10-22 ASSESSMENT — ENCOUNTER SYMPTOMS
BACK PAIN: 0
SHORTNESS OF BREATH: 0
ABDOMINAL DISTENTION: 0
SORE THROAT: 0

## 2022-10-22 ASSESSMENT — PAIN SCALES - GENERAL: PAINLEVEL_OUTOF10: 10

## 2022-10-22 ASSESSMENT — PAIN - FUNCTIONAL ASSESSMENT: PAIN_FUNCTIONAL_ASSESSMENT: 0-10

## 2022-10-22 ASSESSMENT — LIFESTYLE VARIABLES: HOW MANY STANDARD DRINKS CONTAINING ALCOHOL DO YOU HAVE ON A TYPICAL DAY: PATIENT DOES NOT DRINK

## 2022-10-22 ASSESSMENT — PAIN DESCRIPTION - LOCATION: LOCATION: HEAD

## 2022-10-23 NOTE — DISCHARGE INSTRUCTIONS
Please take extra strength Tylenol on a regular basis to help with your neck arthritis. Follow-up with your doctors appointment next week and take your blood pressure machine with you so you can calibrate it with their machine. Do not check your blood pressure more than twice every day. Continue taking all your blood pressure medications as you currently are.

## 2022-10-23 NOTE — ED PROVIDER NOTES
[facility] ED  EMERGENCY DEPARTMENT ENCOUNTER      Pt Name: Tanisha Way  MRN: [id number]  [place] 1950  Date of evaluation: 10/22/2022  Provider: Cathie Blair Dr       Chief Complaint   Patient presents with    Headache     Beginning today with elevated bp. Hypertension     States bp at home was 159/80. Hx htn. States is not on any current bp meds. HISTORY OF PRESENT ILLNESS   (Location/Symptom, Timing/Onset, Context/Setting, Quality, Duration, Modifying Factors, Severity)  Note limiting factors. Tanisha Way is a 67 y.o. female who presents to the emergency department with complaints of headache and high blood pressure at home off 158/80. Patient was seen in the emergency department yesterday for the exact same reason and had a full work-up done including a head and neck CT scan. She received a dose of Benadryl, Toradol and Compazine after which she felt better and was discharged home. Patient's blood work was all negative as well. Patient states that today at home she checked her blood pressure twice and the last 1 was around 159/80 and her  got worried and brought her to the emergency department. She states that she has taken all her blood pressure medications both the morning and evening once. She complains of headache at the base of her head which is worse with palpation. She denies any nausea or vomiting. She denies any chest pain or shortness of breath. Currently patient's blood pressure is 167/69. She denies any fever or chills. Patient states that her blood pressure machine is about 3to 11years old. She has a follow-up appointment on 27th October with her primary care doctor. REVIEW OF SYSTEMS    (2-9 systems for level 4, 10 or more for level 5)     Review of Systems   Constitutional:  Negative for fatigue and fever. HENT:  Negative for congestion and sore throat. Eyes:  Negative for visual disturbance.    Respiratory: Negative for shortness of breath. Cardiovascular:  Negative for chest pain and palpitations. Gastrointestinal:  Negative for abdominal distention. Genitourinary:  Negative for dysuria. Musculoskeletal:  Negative for back pain. Skin:  Negative for rash. Neurological:  Positive for headaches. Psychiatric/Behavioral:  Negative for suicidal ideas. Except as noted above the remainder of the review of systems was reviewed and negative. PAST MEDICAL HISTORY     Past Medical History:   Diagnosis Date    Arthritis     Asthma     Chronic bronchitis ([facility] [address]     Diabetes mellitus ([facility] [address]     GERD (gastroesophageal reflux disease)     H/O echocardiogram 3/9/16    EF >60%. LV wall thickness is mildly increased. Mild mitral and tricuspid regurgitation. Borderline pulmonary hypertension estimated right ventricular sysolic pressure of 56HUPH. Mild (grade l) diastolic dysfunction. History of PFTs 3/10/16     Suboptimal effort. Restrictive in nature. clionical correlations is advised. History of stress test 16    Abnoraml. Moderate perfusion defect of mild to moderate intensity in the anterolateral and anteroapical regions during stress imaging. Intermediate risk for CAD.     Hyperlipidemia     Hypertension          SURGICAL HISTORY       Past Surgical History:   Procedure Laterality Date    BREAST SURGERY      cyst removed    CARDIAC CATHETERIZATION      CARDIAC CATHETERIZATION Left 2019    Right Radial/[facility]/     SECTION      CHOLECYSTECTOMY      COLONOSCOPY  3/23/15    -diverticulosis,hemorrhoids    FOOT SURGERY      rt    HYSTERECTOMY (CERVIX STATUS UNKNOWN)           CURRENT MEDICATIONS       Previous Medications    ALBUTEROL SULFATE HFA (VENTOLIN HFA) 108 (90 BASE) MCG/ACT INHALER    Inhale 2 puffs into the lungs 4 times daily as needed for Wheezing    AMLODIPINE (NORVASC) 5 MG TABLET    Take 1 tablet by mouth daily    ASCORBIC ACID (VITAMIN C) 500 MG TABLET    Take 1 tablet by mouth 2 times daily With iron tablet    ASPIRIN EC 81 MG EC TABLET    Take 1 tablet by mouth daily    ATENOLOL (TENORMIN) 100 MG TABLET    Take 1 tablet by mouth daily    ATORVASTATIN (LIPITOR) 40 MG TABLET    Take 1 tablet by mouth daily    BLOOD GLUCOSE MONITORING SUPPL GENNY    1 Units by Does not apply route 3 times daily What insurance will cover    BLOOD GLUCOSE TEST STRIPS (ONE TOUCH ULTRA TEST) STRIP    USE 1 STRIP TO CHECK GLUCOSE DAILY    CALCIUM CARBONATE-VITAMIN D (OYSTER SHELL CALCIUM/D) 500-200 MG-UNIT TABS    Take 1 tablet by mouth 2 times daily    CETIRIZINE (ZYRTEC ALLERGY) 10 MG TABLET    Take 1 tablet by mouth daily    DORZOLAMIDE-TIMOLOL (COSOPT) 22.3-6.8 MG/ML OPHTHALMIC SOLUTION    Place 1 drop into both eyes 2 times daily     FLUTICASONE (FLONASE) 50 MCG/ACT NASAL SPRAY    2 sprays by Each Nostril route daily    HYDRALAZINE (APRESOLINE) 50 MG TABLET    Take 1 tablet by mouth 3 times daily    LATANOPROST (XALATAN) 0.005 % OPHTHALMIC SOLUTION    Place 1 drop into both eyes nightly     METFORMIN (GLUCOPHAGE) 500 MG TABLET    Take 1 tablet by mouth 2 times daily (with meals)    MULTIPLE VITAMINS-MINERALS (THERAPEUTIC MULTIVITAMIN-MINERALS) TABLET    Take 1 tablet by mouth daily    ONE TOUCH LANCETS MISC    Lancets- One Touch [employer] for testing daily and as needed.  DX: Diabetes type  E11.9    PANTOPRAZOLE (PROTONIX) 40 MG TABLET    Take 1 tablet by mouth every morning (before breakfast)    POTASSIUM CHLORIDE (KLOR-CON M) 20 MEQ EXTENDED RELEASE TABLET    Take 1 tablet by mouth 2 times daily    SPIRONOLACTONE-HYDROCHLOROTHIAZIDE (ALDACTAZIDE) 25-25 MG PER TABLET    Take 1 tablet by mouth daily    VALSARTAN (DIOVAN) 320 MG TABLET    Take 1 tablet by mouth daily       ALLERGIES     Iv dye [iodides]    FAMILY HISTORY       Family History   Problem Relation Age of Onset    Stroke Father     Stroke Sister     Diabetes Sister         x6 sisters    High Blood Pressure Sister Stroke Brother     Diabetes Brother           SOCIAL HISTORY       Social History     Socioeconomic History    Marital status:    Tobacco Use    Smoking status: Never    Smokeless tobacco: Never   Vaping Use    Vaping Use: Never used   Substance and Sexual Activity    Alcohol use: No     Alcohol/week: 0.0 standard drinks    Drug use: No    Sexual activity: Yes     Partners: Male     Birth control/protection: Post-menopausal     Social Determinants of Health     Physical Activity: Inactive    Days of Exercise per Week: 0 days    Minutes of Exercise per Session: 0 min       SCREENINGS        Anthony Coma Scale  Eye Opening: Spontaneous  Best Verbal Response: Oriented  Best Motor Response: Obeys commands  Anthony Coma Scale Score: 15               PHYSICAL EXAM    (up to 7 for level 4, 8 or more for level 5)     ED Triage Vitals [10/22/22 2119]   BP Temp Temp src Heart Rate Resp SpO2 Height Weight   (!) 173/68 98.4 °F (36.9 °C) -- 71 16 98 % 5' 4\" (1.626 m) 174 lb (78.9 kg)       Physical Exam  Constitutional:       General: She is not in acute distress. Appearance: Normal appearance. She is not toxic-appearing. HENT:      Head: Normocephalic and atraumatic. Mouth/Throat:      Mouth: Mucous membranes are moist.   Eyes:      Extraocular Movements: Extraocular movements intact. Pupils: Pupils are equal, round, and reactive to light. Cardiovascular:      Rate and Rhythm: Normal rate and regular rhythm. Pulses: Normal pulses. Heart sounds: Normal heart sounds. Pulmonary:      Effort: Pulmonary effort is normal.      Breath sounds: Normal breath sounds. Abdominal:      General: Abdomen is flat. Bowel sounds are normal.      Palpations: Abdomen is soft. Musculoskeletal:         General: Normal range of motion. Cervical back: Normal range of motion and neck supple. Tenderness (Tenderness to palpation of the upper C-spine and the base of her head.) present.    Skin:     General: Skin is warm and dry. Capillary Refill: Capillary refill takes less than 2 seconds. Neurological:      General: No focal deficit present. Mental Status: She is alert and oriented to person, place, and time. Psychiatric:         Mood and Affect: Mood normal.       DIAGNOSTIC RESULTS     RADIOLOGY:   Non-plain film images such as CT, Ultrasound and MRI are read by the radiologist. Plain radiographic images are visualized and preliminarily interpreted by the emergency physician with the below findings:      Interpretation per the Radiologist below, if available at the time of this note:    No orders to display         LABS:  Labs Reviewed - No data to display    All other labs were within normal range or not returned as of this dictation. EMERGENCY DEPARTMENT COURSE and DIFFERENTIAL DIAGNOSIS/MDM:   Vitals:    Vitals:    10/22/22 2119 10/22/22 2130 10/22/22 2145   BP: (!) 173/68 (!) 165/71 (!) 150/45   Pulse: 71     Resp: 16     Temp: 98.4 °F (36.9 °C)     SpO2: 98% 97% 97%   Weight: 174 lb (78.9 kg)     Height: 5' 4\" (1.626 m)       REASSESSMENT      Discussed with patient that her headache is likely coming from her neck pain. Patient is quite tender to palpation in the upper C-spine and the base of her head. She has no nuchal rigidity however. Likely her pain is secondary to arthritis. I recommend that she take Tylenol extra strength regularly on a daily basis to help with her neck pain as that will definitely improve her headache as well. Patient's blood pressure is relatively okay at this time and I would not make any changes to it. I recommend that she continue taking all her blood pressure medications as she is supposed to. Follow-up with her doctor on the 27th and take your blood pressure machine with you so we can calibrate your device. Also advised them to not check her blood pressure more than 2 times every day.   They verbalized understanding and have no other questions or concerns. FINAL IMPRESSION      1. Arthritis of neck    2.  Acute non intractable tension-type headache          DISPOSITION/PLAN   DISPOSITION Decision To Discharge 10/22/2022 10:02:06 PM      PATIENT REFERRED TO:  TARA Eastman - [facility]  [phone]    In 1 week      (Please note that portions of this note were completed with a voice recognition program.  Efforts were made to edit the dictations but occasionally words are mis-transcribed.)    Susan Gaffney DO (electronically signed)  Attending Emergency Physician            Susan Gaffney DO  10/22/22 0819

## 2022-10-27 ENCOUNTER — OFFICE VISIT (OUTPATIENT)
Dept: PRIMARY CARE CLINIC | Age: 72
End: 2022-10-27
Payer: MEDICARE

## 2022-10-27 VITALS
SYSTOLIC BLOOD PRESSURE: 110 MMHG | WEIGHT: 167.1 LBS | HEART RATE: 68 BPM | BODY MASS INDEX: 28.68 KG/M2 | OXYGEN SATURATION: 97 % | TEMPERATURE: 97.5 F | DIASTOLIC BLOOD PRESSURE: 62 MMHG | RESPIRATION RATE: 20 BRPM

## 2022-10-27 DIAGNOSIS — I10 ESSENTIAL HYPERTENSION: ICD-10-CM

## 2022-10-27 DIAGNOSIS — M15.9 GENERALIZED OA: ICD-10-CM

## 2022-10-27 DIAGNOSIS — E11.21 TYPE 2 DIABETES MELLITUS WITH DIABETIC NEPHROPATHY, WITHOUT LONG-TERM CURRENT USE OF INSULIN (HCC): Primary | ICD-10-CM

## 2022-10-27 DIAGNOSIS — E78.2 MIXED HYPERLIPIDEMIA: ICD-10-CM

## 2022-10-27 DIAGNOSIS — Z23 NEED FOR INFLUENZA VACCINATION: ICD-10-CM

## 2022-10-27 LAB — HBA1C MFR BLD: 5.5 %

## 2022-10-27 PROCEDURE — G8484 FLU IMMUNIZE NO ADMIN: HCPCS | Performed by: NURSE PRACTITIONER

## 2022-10-27 PROCEDURE — 1036F TOBACCO NON-USER: CPT | Performed by: NURSE PRACTITIONER

## 2022-10-27 PROCEDURE — G8427 DOCREV CUR MEDS BY ELIG CLIN: HCPCS | Performed by: NURSE PRACTITIONER

## 2022-10-27 PROCEDURE — 90694 VACC AIIV4 NO PRSRV 0.5ML IM: CPT | Performed by: NURSE PRACTITIONER

## 2022-10-27 PROCEDURE — 3078F DIAST BP <80 MM HG: CPT | Performed by: NURSE PRACTITIONER

## 2022-10-27 PROCEDURE — 1123F ACP DISCUSS/DSCN MKR DOCD: CPT | Performed by: NURSE PRACTITIONER

## 2022-10-27 PROCEDURE — G8417 CALC BMI ABV UP PARAM F/U: HCPCS | Performed by: NURSE PRACTITIONER

## 2022-10-27 PROCEDURE — 3044F HG A1C LEVEL LT 7.0%: CPT | Performed by: NURSE PRACTITIONER

## 2022-10-27 PROCEDURE — 1090F PRES/ABSN URINE INCON ASSESS: CPT | Performed by: NURSE PRACTITIONER

## 2022-10-27 PROCEDURE — 83036 HEMOGLOBIN GLYCOSYLATED A1C: CPT | Performed by: NURSE PRACTITIONER

## 2022-10-27 PROCEDURE — 2022F DILAT RTA XM EVC RTNOPTHY: CPT | Performed by: NURSE PRACTITIONER

## 2022-10-27 PROCEDURE — 99214 OFFICE O/P EST MOD 30 MIN: CPT | Performed by: NURSE PRACTITIONER

## 2022-10-27 PROCEDURE — G0008 ADMIN INFLUENZA VIRUS VAC: HCPCS | Performed by: NURSE PRACTITIONER

## 2022-10-27 PROCEDURE — G8399 PT W/DXA RESULTS DOCUMENT: HCPCS | Performed by: NURSE PRACTITIONER

## 2022-10-27 PROCEDURE — 3017F COLORECTAL CA SCREEN DOC REV: CPT | Performed by: NURSE PRACTITIONER

## 2022-10-27 PROCEDURE — 3074F SYST BP LT 130 MM HG: CPT | Performed by: NURSE PRACTITIONER

## 2022-10-27 RX ORDER — TRAMADOL HYDROCHLORIDE 50 MG/1
50 TABLET ORAL 2 TIMES DAILY PRN
Qty: 60 TABLET | Refills: 0 | Status: SHIPPED | OUTPATIENT
Start: 2022-10-27 | End: 2022-11-26

## 2022-10-27 SDOH — ECONOMIC STABILITY: FOOD INSECURITY: WITHIN THE PAST 12 MONTHS, YOU WORRIED THAT YOUR FOOD WOULD RUN OUT BEFORE YOU GOT MONEY TO BUY MORE.: PATIENT DECLINED

## 2022-10-27 SDOH — ECONOMIC STABILITY: FOOD INSECURITY: WITHIN THE PAST 12 MONTHS, THE FOOD YOU BOUGHT JUST DIDN'T LAST AND YOU DIDN'T HAVE MONEY TO GET MORE.: PATIENT DECLINED

## 2022-10-27 ASSESSMENT — ENCOUNTER SYMPTOMS
VOMITING: 0
DIARRHEA: 0
SHORTNESS OF BREATH: 0
WHEEZING: 0
ABDOMINAL PAIN: 0
RHINORRHEA: 0
NAUSEA: 0
SORE THROAT: 0
CHEST TIGHTNESS: 0
COUGH: 0
CONSTIPATION: 0

## 2022-10-27 ASSESSMENT — PATIENT HEALTH QUESTIONNAIRE - PHQ9
SUM OF ALL RESPONSES TO PHQ QUESTIONS 1-9: 0
2. FEELING DOWN, DEPRESSED OR HOPELESS: 0
SUM OF ALL RESPONSES TO PHQ QUESTIONS 1-9: 0
1. LITTLE INTEREST OR PLEASURE IN DOING THINGS: 0
SUM OF ALL RESPONSES TO PHQ QUESTIONS 1-9: 0
SUM OF ALL RESPONSES TO PHQ QUESTIONS 1-9: 0
SUM OF ALL RESPONSES TO PHQ9 QUESTIONS 1 & 2: 0

## 2022-10-27 ASSESSMENT — SOCIAL DETERMINANTS OF HEALTH (SDOH): HOW HARD IS IT FOR YOU TO PAY FOR THE VERY BASICS LIKE FOOD, HOUSING, MEDICAL CARE, AND HEATING?: PATIENT DECLINED

## 2022-10-27 NOTE — PROGRESS NOTES
Name: Montserrat Centeno  : 1950         Chief Complaint:     Chief Complaint   Patient presents with    Diabetes Mellitus     Routine check. Hypertension    Hyperlipidemia       History of Present Illness:      Montserrat Centeno is a 67 y.o.  female who presents with Diabetes Mellitus (Routine check.), Hypertension, and Hyperlipidemia      1 needed is here today for routine office visit. Overall she has been feeling well and has been compliant with her medications. She is complaining some increasingly bothersome neck and joint pain. General OA-patient states that she ended up in the emergency department due to neck pain. She states she was told she has osteoarthritis. She is also having pain in other joints intermittently. She would like some medication for pain relief. Diabetes  She presents for her follow-up diabetic visit. She has type 2 diabetes mellitus. MedicAlert identification noted. Onset time: YEARS. There are no hypoglycemic associated symptoms. Pertinent negatives for hypoglycemia include no dizziness or headaches. There are no diabetic associated symptoms. Pertinent negatives for diabetes include no chest pain, no fatigue, no polydipsia, no polyphagia and no polyuria. There are no hypoglycemic complications. Pertinent negatives for hypoglycemia complications include no blackouts and no hospitalization. Symptoms are stable. Diabetic complications include nephropathy. Pertinent negatives for diabetic complications include no CVA, heart disease or peripheral neuropathy. Risk factors for coronary artery disease include diabetes mellitus, dyslipidemia, family history, hypertension, stress and post-menopausal. Current diabetic treatment includes oral agent (monotherapy). She is compliant with treatment all of the time. Her weight is stable. She is following a generally healthy diet. Meal planning includes avoidance of concentrated sweets. She has had a previous visit with a dietitian.  She rarely participates in exercise. There is no change in her home blood glucose trend. Her breakfast blood glucose range is generally 110-130 mg/dl. An ACE inhibitor/angiotensin II receptor blocker is being taken. She does not see a podiatrist.Eye exam is current. Hypertension  This is a chronic problem. The current episode started more than 1 year ago. The problem is unchanged. The problem is controlled. Associated symptoms include peripheral edema. Pertinent negatives include no chest pain, headaches, neck pain, palpitations or shortness of breath. There are no associated agents to hypertension. Risk factors for coronary artery disease include diabetes mellitus, dyslipidemia, family history, post-menopausal state and stress. Past treatments include direct vasodilators, diuretics, beta blockers, calcium channel blockers and angiotensin blockers. The current treatment provides significant improvement. Compliance problems include exercise. Hypertensive end-organ damage includes kidney disease. There is no history of CAD/MI, CVA or heart failure. Identifiable causes of hypertension include chronic renal disease. Hyperlipidemia  This is a chronic problem. The current episode started more than 1 year ago. The problem is controlled. Recent lipid tests were reviewed and are normal. Exacerbating diseases include chronic renal disease. She has no history of liver disease or obesity. Factors aggravating her hyperlipidemia include fatty foods. Pertinent negatives include no chest pain, focal weakness, myalgias or shortness of breath. Current antihyperlipidemic treatment includes statins. The current treatment provides significant improvement of lipids. There are no compliance problems. Risk factors for coronary artery disease include diabetes mellitus, dyslipidemia, post-menopausal, stress, hypertension and family history.        Past Medical History:     Past Medical History:   Diagnosis Date    Arthritis     Asthma Chronic bronchitis (HCC)     Diabetes mellitus ([facility] [address]     GERD (gastroesophageal reflux disease)     H/O echocardiogram 3/9/16    EF >60%. LV wall thickness is mildly increased. Mild mitral and tricuspid regurgitation. Borderline pulmonary hypertension estimated right ventricular sysolic pressure of 62ZOFX. Mild (grade l) diastolic dysfunction. History of PFTs 3/10/16     Suboptimal effort. Restrictive in nature. clionical correlations is advised. History of stress test 16    Abnoraml. Moderate perfusion defect of mild to moderate intensity in the anterolateral and anteroapical regions during stress imaging. Intermediate risk for CAD. Hyperlipidemia     Hypertension       Reviewed all health maintenance requirements and ordered appropriate tests  Health Maintenance Due   Topic Date Due    Diabetic foot exam  2021    Lipids  2022       Past Surgical History:     Past Surgical History:   Procedure Laterality Date    BREAST SURGERY      cyst removed    CARDIAC CATHETERIZATION      CARDIAC CATHETERIZATION Left 2019    Right Radial/to be [facility]/     SECTION      CHOLECYSTECTOMY      COLONOSCOPY  3/23/15    -diverticulosis,hemorrhoids    FOOT SURGERY      rt    HYSTERECTOMY (CERVIX STATUS UNKNOWN)          Medications:       Prior to Admission medications    Medication Sig Start Date End Date Taking? Authorizing Provider   traMADol (ULTRAM) 50 MG tablet Take 1 tablet by mouth 2 times daily as needed for Pain for up to 30 days.  Take lowest dose possible to manage pain 10/27/22 11/26/22 Yes Padma Bullock APRN - CNP   spironolactone-hydroCHLOROthiazide (ALDACTAZIDE) 25-25 MG per tablet Take 1 tablet by mouth daily 22  Yes Kahlil Dunaway PA-C   aspirin EC 81 MG EC tablet Take 1 tablet by mouth daily 22  Yes Kahlil Dunaway PA-C   atorvastatin (LIPITOR) 40 MG tablet Take 1 tablet by mouth daily 22  Yes Beverley Litten, APRN - CNP amLODIPine (NORVASC) 5 MG tablet Take 1 tablet by mouth daily 6/21/22  Yes Sonia Crimes, TARA Lares CNP   atenolol (TENORMIN) 100 MG tablet Take 1 tablet by mouth daily 6/21/22  Yes Chester Crimes, TARA - CNP   hydrALAZINE (APRESOLINE) 50 MG tablet Take 1 tablet by mouth 3 times daily  Patient taking differently: Take 50 mg by mouth 3 times daily Patient takes daily 6/21/22  Yes Chester Crimes, APRN - CNP   potassium chloride (KLOR-CON M) 20 MEQ extended release tablet Take 1 tablet by mouth 2 times daily 6/21/22  Yes Sonia Crimes, APRN - CNP   valsartan (DIOVAN) 320 MG tablet Take 1 tablet by mouth daily 6/21/22  Yes Chester Crimes, APRN - CNP   metFORMIN (GLUCOPHAGE) 500 MG tablet Take 1 tablet by mouth 2 times daily (with meals) 4/1/22  Yes TARA Claros CNP   fluticasone (FLONASE) 50 MCG/ACT nasal spray 2 sprays by Each Nostril route daily 12/20/21  Yes Sonia Crimes, TARA Lares CNP   ascorbic acid (VITAMIN C) 500 MG tablet Take 1 tablet by mouth 2 times daily With iron tablet 4/12/21  Yes TARA Claros CNP   Calcium Carbonate-Vitamin D (OYSTER SHELL CALCIUM/D) 500-200 MG-UNIT TABS Take 1 tablet by mouth 2 times daily 4/12/21  Yes TARA Claros CNP   cetirizine (ZYRTEC ALLERGY) 10 MG tablet Take 1 tablet by mouth daily 12/16/20  Yes TARA Claros CNP   dorzolamide-timolol (COSOPT) 22.3-6.8 MG/ML ophthalmic solution Place 1 drop into both eyes 2 times daily  9/20/20  Yes Historical Provider, MD   Multiple Vitamins-Minerals (THERAPEUTIC MULTIVITAMIN-MINERALS) tablet Take 1 tablet by mouth daily 8/6/20 10/27/22 Yes TARA Claros CNP   latanoprost (XALATAN) 0.005 % ophthalmic solution Place 1 drop into both eyes nightly    Yes Historical Provider, MD   blood glucose test strips (ONE TOUCH ULTRA TEST) strip USE 1 STRIP TO CHECK GLUCOSE DAILY 3/13/19  Yes Mukesh Cantrell Might, APRN - CNP   ONE TOUCH LANCETS 8 [facility] for testing daily and as needed.  DX: Diabetes type  E11.9 1/31/19  Yes TARA Palafox CNP   Blood Glucose Monitoring Suppl GENNY 1 Units by Does not apply route 3 times daily What insurance will cover 10/11/16  Yes TARA Rangel CNP   albuterol sulfate HFA (VENTOLIN HFA) 108 (90 Base) MCG/ACT inhaler Inhale 2 puffs into the lungs 4 times daily as needed for Wheezing  Patient not taking: Reported on 10/27/2022 1/19/21   TARA Palafox CNP        Allergies: Iv dye [iodides]    Social History:     Tobacco:    reports that she has never smoked. She has never used smokeless tobacco.  Alcohol:      reports no history of alcohol use. Drug Use:  reports no history of drug use. Family History:     Family History   Problem Relation Age of Onset    Stroke Father     Stroke Sister     Diabetes Sister         x6 sisters    High Blood Pressure Sister     Stroke Brother     Diabetes Brother        Review of Systems:     Positive and Negative as described in HPI    Review of Systems   Constitutional:  Negative for chills, fatigue and fever. HENT:  Negative for congestion, rhinorrhea and sore throat. Eyes:  Negative for visual disturbance. Respiratory:  Negative for cough, chest tightness, shortness of breath and wheezing. Cardiovascular:  Positive for leg swelling (intermittent). Negative for chest pain and palpitations. Gastrointestinal:  Negative for abdominal pain, constipation, diarrhea, nausea and vomiting. Endocrine: Negative for polydipsia, polyphagia and polyuria. Genitourinary:  Negative for difficulty urinating and dysuria. Musculoskeletal:  Positive for arthralgias and neck stiffness. Negative for gait problem, myalgias and neck pain. Skin:  Negative for rash. Neurological:  Negative for dizziness, focal weakness, syncope, light-headedness and headaches.      Physical Exam:   Vitals:  /62 (Position: Sitting)   Pulse 68   Temp 97.5 °F (36.4 °C) (Temporal)   Resp 20   Wt 167 lb 1.6 oz (75.8 kg) SpO2 97%   BMI 28.68 kg/m²     Physical Exam  Vitals and nursing note reviewed. Constitutional:       General: She is not in acute distress. Appearance: Normal appearance. She is well-developed. She is not ill-appearing. HENT:      Mouth/Throat:      Mouth: Mucous membranes are moist.   Eyes:      General: No scleral icterus. Conjunctiva/sclera: Conjunctivae normal.   Cardiovascular:      Rate and Rhythm: Normal rate and regular rhythm. Heart sounds: No murmur heard. Pulmonary:      Effort: Pulmonary effort is normal.      Breath sounds: Normal breath sounds. No wheezing. Abdominal:      General: Bowel sounds are normal. There is no distension. Palpations: Abdomen is soft. Tenderness: There is no abdominal tenderness. Musculoskeletal:         General: Tenderness (multiple joint) present. Cervical back: Normal range of motion and neck supple. Right lower leg: No edema. Left lower leg: Edema (trace pitting) present. Skin:     General: Skin is warm and dry. Findings: No rash. Neurological:      Mental Status: She is alert and oriented to person, place, and time.    Psychiatric:         Mood and Affect: Mood normal.         Behavior: Behavior normal.       Data:     Lab Results   Component Value Date/Time     10/21/2022 09:25 AM    K 4.0 10/21/2022 09:25 AM     10/21/2022 09:25 AM    CO2 26 10/21/2022 09:25 AM    BUN 8 10/21/2022 09:25 AM    CREATININE 0.94 10/21/2022 09:25 AM    GLUCOSE 93 10/21/2022 09:25 AM    GLUCOSE 110 03/26/2012 11:13 AM    PROT 6.8 04/26/2022 09:31 PM    LABALBU 3.5 04/26/2022 09:31 PM    BILITOT 0.39 04/26/2022 09:31 PM    ALKPHOS 122 04/26/2022 09:31 PM    AST 9 04/26/2022 09:31 PM    ALT <5 04/26/2022 09:31 PM     Lab Results   Component Value Date/Time    WBC 7.5 10/21/2022 09:25 AM    RBC 3.33 10/21/2022 09:25 AM    RBC 3.85 03/26/2012 11:13 AM    HGB 10.0 10/21/2022 09:25 AM    HCT 32.3 10/21/2022 09:25 AM    MCV 97.0 10/21/2022 09:25 AM    MCH 30.0 10/21/2022 09:25 AM    MCHC 31.0 10/21/2022 09:25 AM    RDW 13.2 10/21/2022 09:25 AM     10/21/2022 09:25 AM     03/26/2012 11:13 AM    MPV 9.5 10/21/2022 09:25 AM     Lab Results   Component Value Date/Time    TSH 1.36 03/01/2022 09:39 AM     Lab Results   Component Value Date/Time    CHOL 138 07/27/2021 09:49 AM    HDL 53 07/27/2021 09:49 AM    LABA1C 5.5 10/27/2022 12:22 PM       Assessment/Plan:      Diagnosis Orders   1. Type 2 diabetes mellitus with diabetic nephropathy, without long-term current use of insulin (HCC)  POCT glycosylated hemoglobin (Hb A1C)    CBC with Auto Differential    ALT    AST    Basic Metabolic Panel    Hemoglobin A1C    Lipid Panel    Microalbumin, Ur      2. Essential hypertension        3. Mixed hyperlipidemia        4. Generalized OA  traMADol (ULTRAM) 50 MG tablet      5. Need for influenza vaccination  Influenza, FLUAD, (age 72 y+), IM, Preservative Free, 0.5 mL        We will continue all current medications. Blood pressure is well controlled. A1c is stable. May start tramadol 2 times daily as needed for pain. Otherwise use Tylenol. We will see her back in 6 months for routine check with full labs, sooner if any issues. 1.  Harvey Ibanez received counseling on the following healthy behaviors: nutrition, exercise, and medication adherence  2. Patient given educational materials - see patient instructions  3. Was a self-tracking handout given in paper form or via [employer]? No  If yes, see orders or list here. 4.  Discussed use, benefit, and side effects of prescribed medications. Barriers to medication compliance addressed. All patient questions answered. Pt voiced understanding. 5.  Reviewed prior labs and health maintenance  6. Continue current medications, diet and exercise.     Completed Refills   Requested Prescriptions     Signed Prescriptions Disp Refills    traMADol (ULTRAM) 50 MG tablet 60 tablet 0     Sig: Take 1 tablet by mouth 2 times daily as needed for Pain for up to 30 days. Take lowest dose possible to manage pain         Return in about 6 months (around 4/27/2023) for Check up.

## 2022-10-27 NOTE — PROGRESS NOTES
After obtaining consent, and per orders of Dr. Yuli Bullock CNP, injection of Flu given in Right deltoid by Jamir Morales LPN. Patient instructed to remain in clinic for 20 minutes afterwards, and to report any adverse reaction to me immediately. Vaccine Information Sheet, \"Influenza - Inactivated\"  given to Demi Mckeon, or parent/legal guardian of  Demi Mckeon and verbalized understanding. Patient responses:    Have you ever had a reaction to a flu vaccine? No  Are you able to eat eggs without adverse effects? Yes  Do you have any current illness? No  Have you ever had [place] Syndrome? No    Flu vaccine given per order. Please see immunization tab.

## 2022-10-27 NOTE — PATIENT INSTRUCTIONS
SURVEY:     You may be receiving a survey from [employer] regarding your visit today. Please complete the survey to enable us to provide the highest quality of care to you and your family. If you cannot score us a very good on any question, please call the office to discuss how we could have made your experience a very good one.      Thank you,    Emily Bullock, APRN-SHELL Gonzalez, APRN-CNP  Anahi Frederick, EVERARDO Masters, NEY Massey, NEY Marquez, NEY Be, MARY Esparza, PM

## 2022-10-31 NOTE — CARE COORDINATION
Continue to monitor and make sure she is taking apresoline 3 times daily. Thank you. Hematology and Medical Oncology   Follow Up     10/31/2022      History of Present Ilness:   Tasia Cardona (Tasia) is a pleasant 23 y.o.male who presents today to discuss elevated [but improving] hemoglobin. Earlier this month hemoglobin was 22.2 and has decreased in the last week to 18.8.     Presents today at [facility] clinic for clarification of diagnosis code attached to his previous lab.Verbalized he researched many sites and nervous about what he read on the Internet. Patient appeared nervous during visit. Explained him about his recent visit and lab work. No acute events since last visit. Denies increased pruritis after a hot shower. No  blurry vision. Headache present for few days, taking OTC meds. Will repeat lab work after today's visit.  Does have significant injected conjuctiva, no longer on optho drops, has been improving.      Interval History:  Mr. Cardona is doing well. Tolerated therapeutic phlebotomies without issue. Could tell when he needed phlebotomy based on headaches or itching in a hot shower. Not currently experiencing these symptoms.     Happy to hear that his hematocrit is below goal. Will schedule phlebotomies on his own and prn.       PAST MEDICAL HISTORY:   Past Medical History:   Diagnosis Date    Asthma        PAST SURGICAL HISTORY:   No past surgical history on file.    PAST SOCIAL HISTORY:   reports that he has been smoking cigarettes. He has never used smokeless tobacco. He reports current alcohol use. He reports that he does not use drugs.    FAMILY HISTORY:  Family History   Problem Relation Age of Onset    Pancreatitis Mother     Cancer Mother     Ovarian cancer Mother     No Known Problems Father     Cancer Brother     Breast cancer Maternal Grandmother        CURRENT MEDICATIONS:   Current Outpatient Medications   Medication Sig    BIKTARVY -25 mg (25 kg or greater) Take 1 tablet by mouth once daily.    ibuprofen (ADVIL,MOTRIN) 400 MG tablet Take 400 mg by mouth every 4  (four) hours.    albuterol (PROVENTIL/VENTOLIN HFA) 90 mcg/actuation inhaler Inhale 1-2 puffs into the lungs every 6 (six) hours as needed for Wheezing or Shortness of Breath. Rescue (Patient not taking: Reported on 10/31/2022)    nystatin (MYCOSTATIN) 100,000 unit/mL suspension SMARTSI By Mouth 4 Times Daily     No current facility-administered medications for this visit.     ALLERGIES:   Review of patient's allergies indicates:   Allergen Reactions    [place] Swelling    Pcn [penicillins] Hives    Pecan nut Swelling    Soy     Sulfa (sulfonamide antibiotics) Hives         Review of Systems:     Review of Systems   Constitutional:  Negative for appetite change, chills, diaphoresis, fatigue, fever and unexpected weight change.   HENT:   Negative for hearing loss, mouth sores, nosebleeds, trouble swallowing and voice change.         Headache when due for phlebotomy   Eyes:  Negative for eye problems and icterus.   Respiratory:  Negative for chest tightness, cough, hemoptysis, shortness of breath and wheezing.    Cardiovascular:  Negative for chest pain, leg swelling and palpitations.   Gastrointestinal:  Negative for abdominal distention, abdominal pain, blood in stool, diarrhea, nausea and vomiting.   Endocrine: Negative for hot flashes.   Genitourinary:  Negative for bladder incontinence, difficulty urinating, dysuria and hematuria.    Musculoskeletal:  Negative for arthralgias, back pain, flank pain, gait problem, myalgias, neck pain and neck stiffness.   Skin:  Negative for itching, rash and wound.   Neurological:  Negative for dizziness, extremity weakness, gait problem, headaches, numbness, seizures and speech difficulty.   Hematological:  Negative for adenopathy. Does not bruise/bleed easily.   Psychiatric/Behavioral:  Negative for confusion, depression and sleep disturbance. The patient is not nervous/anxious.         Physical Exam:     Vitals:    10/31/22 1142   BP: 122/65   Pulse: 91   Resp: 16   Temp:  98.3 °F (36.8 °C)     Physical Exam  Constitutional:       General: He is not in acute distress.     Appearance: He is well-developed. He is not diaphoretic.   HENT:      Head: Normocephalic and atraumatic.      Mouth/Throat:      Pharynx: No oropharyngeal exudate.   Eyes:      Pupils: Pupils are equal, round, and reactive to light.   Neck:      Thyroid: No thyromegaly.      Vascular: No JVD.      Trachea: No tracheal deviation.   Cardiovascular:      Rate and Rhythm: Normal rate and regular rhythm.      Heart sounds: Normal heart sounds. No murmur heard.    No friction rub.   Pulmonary:      Effort: Pulmonary effort is normal. No respiratory distress.      Breath sounds: Normal breath sounds. No stridor. No wheezing or rales.   Chest:      Chest wall: No tenderness.   Abdominal:      General: Bowel sounds are normal. There is no distension.      Palpations: Abdomen is soft.      Tenderness: There is no abdominal tenderness. There is no guarding or rebound.   Musculoskeletal:         General: No tenderness or deformity. Normal range of motion.      Cervical back: Normal range of motion and neck supple.   Skin:     General: Skin is warm and dry.      Capillary Refill: Capillary refill takes less than 2 seconds.      Coloration: Skin is not pale.      Findings: No erythema or rash.   Neurological:      Mental Status: He is alert and oriented to person, place, and time.      Cranial Nerves: No cranial nerve deficit.      Sensory: No sensory deficit.      Motor: No abnormal muscle tone.      Coordination: Coordination normal.      Deep Tendon Reflexes: Reflexes normal.   Psychiatric:         Mood and Affect: Mood is anxious.         Behavior: Behavior normal.         Thought Content: Thought content normal.         Judgment: Judgment normal.       ECOG Performance Status: (foot note - ECOG PS provided by Eastern Cooperative Oncology Group) 0 - Asymptomatic    Karnofsky Performance Score:  100%- Normal, No Complaints, No  Evidence of Disease    Labs:   Lab Results   Component Value Date    WBC 4.80 10/31/2022    HGB 14.0 10/31/2022    HCT 44.3 10/31/2022     10/31/2022    ALT 13 10/31/2022    AST 23 10/31/2022     10/31/2022    K 3.7 10/31/2022     10/31/2022    CREATININE 0.8 10/31/2022    BUN 7 10/31/2022    CO2 27 10/31/2022     MPN panel: negative for JAK2 mutation, PENG-R and MPL    Imaging: Previous imaging has been reviewed     Assessment and Plan:   Kelly Cardona is a 22 year old male with an elevated hemoglobin     Polycythemia  --Improving steadily over the second half of the year  --Denies testosterone supplementation  --MPN panel is negative  --Plan for monthly phlebotomy to maintain hct at goal  --Goal to keep hct <45. Continue low dose ASA  --Education and reassurance given, will close monitor lab    HIV  --Viral load is currently undetectable  --Continue management as per ID    Follow Up  Need phlebotomy if hct>45  Monthly labs x 3  See me in 3 months    Roxy Daniels MD            BMT Chart Routing      Follow up with physician 3 months. 1. cbc monthly x 3 2.see me with the 3rd month of labs [okay to overbook me if needed]   Follow up with JOSE    Provider visit type    Infusion scheduling note    Injection scheduling note    Labs CBC   Lab interval:     Imaging    Pharmacy appointment    Other referrals

## 2022-12-23 DIAGNOSIS — E11.9 TYPE 2 DIABETES MELLITUS WITHOUT COMPLICATION (HCC): ICD-10-CM

## 2023-01-11 ENCOUNTER — APPOINTMENT (OUTPATIENT)
Dept: GENERAL RADIOLOGY | Age: 73
End: 2023-01-11
Payer: COMMERCIAL

## 2023-01-11 ENCOUNTER — HOSPITAL ENCOUNTER (EMERGENCY)
Age: 73
Discharge: HOME | End: 2023-01-11
Attending: EMERGENCY MEDICINE
Payer: COMMERCIAL

## 2023-01-11 VITALS
DIASTOLIC BLOOD PRESSURE: 55 MMHG | TEMPERATURE: 98.1 F | OXYGEN SATURATION: 97 % | HEART RATE: 56 BPM | RESPIRATION RATE: 18 BRPM | SYSTOLIC BLOOD PRESSURE: 136 MMHG

## 2023-01-11 DIAGNOSIS — M10.9 ACUTE GOUT, UNSPECIFIED CAUSE, UNSPECIFIED SITE: Primary | ICD-10-CM

## 2023-01-11 PROCEDURE — 96372 THER/PROPH/DIAG INJ SC/IM: CPT

## 2023-01-11 PROCEDURE — 6360000002 HC RX W HCPCS: Performed by: EMERGENCY MEDICINE

## 2023-01-11 PROCEDURE — 73610 X-RAY EXAM OF ANKLE: CPT

## 2023-01-11 PROCEDURE — 99284 EMERGENCY DEPT VISIT MOD MDM: CPT

## 2023-01-11 RX ORDER — KETOROLAC TROMETHAMINE 30 MG/ML
30 INJECTION, SOLUTION INTRAMUSCULAR; INTRAVENOUS ONCE
Status: COMPLETED | OUTPATIENT
Start: 2023-01-11 | End: 2023-01-11

## 2023-01-11 RX ORDER — IBUPROFEN 400 MG/1
400 TABLET ORAL EVERY 6 HOURS PRN
Qty: 30 TABLET | Refills: 0 | Status: SHIPPED | OUTPATIENT
Start: 2023-01-11

## 2023-01-11 RX ADMIN — KETOROLAC TROMETHAMINE 30 MG: 30 INJECTION, SOLUTION INTRAMUSCULAR at 13:04

## 2023-01-11 ASSESSMENT — PAIN DESCRIPTION - FREQUENCY: FREQUENCY: CONTINUOUS

## 2023-01-11 ASSESSMENT — PAIN SCALES - GENERAL
PAINLEVEL_OUTOF10: 10
PAINLEVEL_OUTOF10: 8

## 2023-01-11 ASSESSMENT — PAIN DESCRIPTION - LOCATION: LOCATION: FOOT

## 2023-01-11 ASSESSMENT — PAIN - FUNCTIONAL ASSESSMENT: PAIN_FUNCTIONAL_ASSESSMENT: 0-10

## 2023-01-11 ASSESSMENT — PAIN DESCRIPTION - DESCRIPTORS: DESCRIPTORS: SHARP

## 2023-01-11 ASSESSMENT — PAIN DESCRIPTION - ORIENTATION: ORIENTATION: RIGHT

## 2023-01-11 ASSESSMENT — PAIN DESCRIPTION - PAIN TYPE: TYPE: ACUTE PAIN

## 2023-01-11 NOTE — DISCHARGE INSTRUCTIONS
Follow-up with podiatry soon as possible. Motrin every 6 hours as needed for pain seek medical attention immediately for any worsening pain or any other acute concerns.

## 2023-01-11 NOTE — ED PROVIDER NOTES
[facility] ED  EMERGENCY DEPARTMENT ENCOUNTER      Pt Name: Demi Mckeon  MRN: [id number]  [place] 1950  Date of evaluation: 2023  Provider: Corinna Forman MD    [address] [place] [address]       Chief Complaint   Patient presents with    Foot Pain     Right foot pain started one week ago, claims to have red streaking and pain to bear weight. HISTORY OF PRESENT ILLNESS   (Location/Symptom, Timing/Onset, Context/Setting, Quality, Duration, Modifying Factors, Severity)  Note limiting factors. Demi Mckeon is a 68 y.o. female who presents to the emergency department ***     HPI    Nursing Notes were reviewed. REVIEW OF SYSTEMS    (2-9 systems for level 4, 10 or more for level 5)     Review of Systems    Except as noted above the remainder of the review of systems was reviewed and negative. PAST MEDICAL HISTORY     Past Medical History:   Diagnosis Date    Arthritis     Asthma     Chronic bronchitis ([address]     Diabetes mellitus ([address]     GERD (gastroesophageal reflux disease)     H/O echocardiogram 3/9/16    EF >60%. LV wall thickness is mildly increased. Mild mitral and tricuspid regurgitation. Borderline pulmonary hypertension estimated right ventricular sysolic pressure of 95EKOP. Mild (grade l) diastolic dysfunction. History of PFTs 3/10/16     Suboptimal effort. Restrictive in nature. clionical correlations is advised. History of stress test 16    Abnoraml. Moderate perfusion defect of mild to moderate intensity in the anterolateral and anteroapical regions during stress imaging. Intermediate risk for CAD.     Hyperlipidemia     Hypertension          SURGICAL HISTORY       Past Surgical History:   Procedure Laterality Date    BREAST SURGERY      cyst removed    CARDIAC CATHETERIZATION  2014    CARDIAC CATHETERIZATION Left 2019    Right Radial/[facility]/     SECTION      CHOLECYSTECTOMY      COLONOSCOPY  3/23/15 -diverticulosis,hemorrhoids    FOOT SURGERY      rt    HYSTERECTOMY (CERVIX STATUS UNKNOWN)           CURRENT MEDICATIONS       Previous Medications    ALBUTEROL SULFATE HFA (VENTOLIN HFA) 108 (90 BASE) MCG/ACT INHALER    Inhale 2 puffs into the lungs 4 times daily as needed for Wheezing    AMLODIPINE (NORVASC) 5 MG TABLET    Take 1 tablet by mouth daily    ASCORBIC ACID (VITAMIN C) 500 MG TABLET    Take 1 tablet by mouth 2 times daily With iron tablet    ASPIRIN EC 81 MG EC TABLET    Take 1 tablet by mouth daily    ATENOLOL (TENORMIN) 100 MG TABLET    Take 1 tablet by mouth daily    ATORVASTATIN (LIPITOR) 40 MG TABLET    Take 1 tablet by mouth daily    BLOOD GLUCOSE MONITORING SUPPL GENNY    1 Units by Does not apply route 3 times daily What insurance will cover    BLOOD GLUCOSE TEST STRIPS (ONE TOUCH ULTRA TEST) STRIP    USE 1 STRIP TO CHECK GLUCOSE DAILY    CALCIUM CARBONATE-VITAMIN D (OYSTER SHELL CALCIUM/D) 500-200 MG-UNIT TABS    Take 1 tablet by mouth 2 times daily    CETIRIZINE (ZYRTEC ALLERGY) 10 MG TABLET    Take 1 tablet by mouth daily    DORZOLAMIDE-TIMOLOL (COSOPT) 22.3-6.8 MG/ML OPHTHALMIC SOLUTION    Place 1 drop into both eyes 2 times daily     FLUTICASONE (FLONASE) 50 MCG/ACT NASAL SPRAY    2 sprays by Each Nostril route daily    HYDRALAZINE (APRESOLINE) 50 MG TABLET    Take 1 tablet by mouth 3 times daily    LATANOPROST (XALATAN) 0.005 % OPHTHALMIC SOLUTION    Place 1 drop into both eyes nightly     METFORMIN (GLUCOPHAGE) 500 MG TABLET    TAKE 1 TABLET BY MOUTH  TWICE DAILY WITH MEALS    MULTIPLE VITAMINS-MINERALS (THERAPEUTIC MULTIVITAMIN-MINERALS) TABLET    Take 1 tablet by mouth daily    ONE TOUCH LANCETS MISC    Lancets- One Touch Delica for testing daily and as needed.  DX: Diabetes type  E11.9    POTASSIUM CHLORIDE (KLOR-CON M) 20 MEQ EXTENDED RELEASE TABLET    Take 1 tablet by mouth 2 times daily    SPIRONOLACTONE-HYDROCHLOROTHIAZIDE (ALDACTAZIDE) 25-25 MG PER TABLET    Take 1 tablet by mouth daily    VALSARTAN (DIOVAN) 320 MG TABLET    Take 1 tablet by mouth daily       ALLERGIES     Iv dye [iodides]    FAMILY HISTORY       Family History   Problem Relation Age of Onset    Stroke Father     Stroke Sister     Diabetes Sister         x6 sisters    High Blood Pressure Sister     Stroke Brother     Diabetes Brother           SOCIAL HISTORY       Social History     Socioeconomic History    Marital status:    Tobacco Use    Smoking status: Never    Smokeless tobacco: Never   Vaping Use    Vaping Use: Never used   Substance and Sexual Activity    Alcohol use: No     Alcohol/week: 0.0 standard drinks    Drug use: No    Sexual activity: Yes     Partners: Male     Birth control/protection: Post-menopausal     Social Determinants of Health     Financial Resource Strain: Unknown    Difficulty of Paying Living Expenses: Patient refused   Food Insecurity: Unknown    Worried About Running Out of Food in the Last Year: Patient refused    Ran Out of Food in the Last Year: Patient refused   Physical Activity: Inactive    Days of Exercise per Week: 0 days    Minutes of Exercise per Session: 0 min       SCREENINGS                        PHYSICAL EXAM    (up to 7 for level 4, 8 or more for level 5)     ED Triage Vitals   BP Temp Temp src Heart Rate Resp SpO2 Height Weight   01/11/23 1008 01/11/23 1007 -- 01/11/23 1008 01/11/23 1008 01/11/23 1008 -- --   (!) 136/55 98.1 °F (36.7 °C)  56 18 97 %         Physical Exam    DIAGNOSTIC RESULTS     EKG: All EKG's are interpreted by the Emergency Department Physician who either signs or Co-signs this chart in the absence of a cardiologist.    ***    RADIOLOGY:   Non-plain film images such as CT, Ultrasound and MRI are read by the radiologist. Plain radiographic images are visualized and preliminarily interpreted by the emergency physician with the below findings:    ***    Interpretation per the Radiologist below, if available at the time of this note:    XR ANKLE  RIGHT (MIN 3 VIEWS)   Preliminary Result   1. No acute osseous abnormality in the right ankle. No radiographic   evidence of osteomyelitis. 2.  Postoperative and degenerative changes, as above. 3.  Mild diffuse soft tissue swelling. ED BEDSIDE ULTRASOUND:   Performed by ED Physician - none    LABS:  Labs Reviewed - No data to display    All other labs were within normal range or not returned as of this dictation. EMERGENCY DEPARTMENT COURSE and DIFFERENTIAL DIAGNOSIS/MDM:   Vitals:    Vitals:    01/11/23 1007 01/11/23 1008   BP:  (!) 136/55   Pulse:  56   Resp:  18   Temp: 98.1 °F (36.7 °C)    SpO2:  97%       ***    MDM      MIPS  ***     REASSESSMENT          CRITICAL CARE TIME   Total Critical Care time was *** minutes, excluding separately reportable procedures. There was a high probability of clinically significant/life threatening deterioration in the patient's condition which required my urgent intervention. ***    CONSULTS:  None    PROCEDURES:  Unless otherwise noted below, none     Procedures    No LOS Charge filed ***    FINAL IMPRESSION    No diagnosis found. DISPOSITION/PLAN   DISPOSITION        PATIENT REFERRED TO:  No follow-up provider specified. DISCHARGE MEDICATIONS:  New Prescriptions    No medications on file     Controlled Substances Monitoring:     No flowsheet data found.     (Please note that portions of this note were completed with a voice recognition program.  Efforts were made to edit the dictations but occasionally words are mis-transcribed.)    Susie Jiménez MD (electronically signed)  Attending Emergency Physician

## 2023-01-13 ENCOUNTER — OFFICE VISIT (OUTPATIENT)
Dept: PRIMARY CARE CLINIC | Age: 73
End: 2023-01-13

## 2023-01-13 VITALS
TEMPERATURE: 97.7 F | WEIGHT: 164.6 LBS | BODY MASS INDEX: 28.25 KG/M2 | HEART RATE: 72 BPM | OXYGEN SATURATION: 97 % | RESPIRATION RATE: 22 BRPM | SYSTOLIC BLOOD PRESSURE: 134 MMHG | DIASTOLIC BLOOD PRESSURE: 84 MMHG

## 2023-01-13 DIAGNOSIS — S93.401A MODERATE RIGHT ANKLE SPRAIN, INITIAL ENCOUNTER: ICD-10-CM

## 2023-01-13 DIAGNOSIS — M25.571 ACUTE RIGHT ANKLE PAIN: Primary | ICD-10-CM

## 2023-01-13 ASSESSMENT — ENCOUNTER SYMPTOMS
COLOR CHANGE: 0
SHORTNESS OF BREATH: 0
DIARRHEA: 0
SORE THROAT: 0
COUGH: 0
RHINORRHEA: 0
NAUSEA: 0
VOMITING: 0
ABDOMINAL PAIN: 0

## 2023-01-13 NOTE — PROGRESS NOTES
Name: Haily Kowalski  : 1950         Chief Complaint:     Chief Complaint   Patient presents with    Gout     ED follow up right ankle, went to ER 2023.       History of Present Illness:      Haily Kowalski is a 73 y.o.  female who presents with Gout (ED follow up right ankle, went to ER 2023.)      Haily is here today for an ER follow up visit.    ER course:    Patient was seen in the emergency department a few days ago for complaints of right ankle pain.  She denies any obvious injury.  She does have previous surgery with plates and screws to the ankle.  An x-ray was obtained which showed chronic changes but no acute abnormalities.  There were no labs completed.  She was told she had gout given a prescription for ibuprofen and discharge.  She was asked to follow-up with her PCP.    UPDATE 2023-pain has been consistent with ambulation.  She has been elevating and using ice.    Ankle Pain   The incident occurred 5 to 7 days ago. The incident occurred at home. There was no injury mechanism. The pain is present in the right ankle. The quality of the pain is described as aching and stabbing. The pain is at a severity of 4/10. The pain is moderate. The pain has been Intermittent since onset. Associated symptoms include a loss of motion. Pertinent negatives include no inability to bear weight, loss of sensation, muscle weakness, numbness or tingling. She reports no foreign bodies present. The symptoms are aggravated by movement, palpation and weight bearing. She has tried rest, ice and NSAIDs for the symptoms. The treatment provided mild relief.       Past Medical History:     Past Medical History:   Diagnosis Date    Arthritis     Asthma     Chronic bronchitis (HCC)     Diabetes mellitus (HCC)     GERD (gastroesophageal reflux disease)     H/O echocardiogram 3/9/16    EF >60%. LV wall thickness is mildly increased. Mild mitral and tricuspid regurgitation.  Borderline pulmonary hypertension  estimated right ventricular sysolic pressure of 37JYRP. Mild (grade l) diastolic dysfunction. History of PFTs 3/10/16     Suboptimal effort. Restrictive in nature. clionical correlations is advised. History of stress test 16    Abnoraml. Moderate perfusion defect of mild to moderate intensity in the anterolateral and anteroapical regions during stress imaging. Intermediate risk for CAD. Hyperlipidemia     Hypertension       Reviewed all health maintenance requirements and ordered appropriate tests  Health Maintenance Due   Topic Date Due    Diabetic foot exam  2021    Lipids  2022    Diabetic Alb to Cr ratio (uACR) test  2022    Annual Wellness Visit (AWV)  2023       Past Surgical History:     Past Surgical History:   Procedure Laterality Date    BREAST SURGERY      cyst removed    CARDIAC CATHETERIZATION      CARDIAC CATHETERIZATION Left 2019    [facility]     SECTION      CHOLECYSTECTOMY      COLONOSCOPY  3/23/15    -diverticulosis,hemorrhoids    FOOT SURGERY      rt    HYSTERECTOMY (CERVIX STATUS UNKNOWN)          Medications:       Prior to Admission medications    Medication Sig Start Date End Date Taking?  Authorizing Provider   ibuprofen (IBU) 400 MG tablet Take 1 tablet by mouth every 6 hours as needed for Pain 23  Yes Efren Kamara MD   metFORMIN (GLUCOPHAGE) 500 MG tablet TAKE 1 TABLET BY MOUTH  TWICE DAILY WITH MEALS 22  Yes TARA Rebollar CNP   spironolactone-hydroCHLOROthiazide (ALDACTAZIDE) 25-25 MG per tablet Take 1 tablet by mouth daily 22  Yes Ehsan Alvarez PA-C   aspirin EC 81 MG EC tablet Take 1 tablet by mouth daily 22  Yes Ehsan Alvarez PA-C   atorvastatin (LIPITOR) 40 MG tablet Take 1 tablet by mouth daily 22  Yes TARA Owusu CNP   amLODIPine (NORVASC) 5 MG tablet Take 1 tablet by mouth daily 22  Yes TARA Owusu CNP   atenolol (TENORMIN) 100 MG tablet Take 1 tablet by mouth daily 6/21/22  Yes Gerry Saint, APRN - CNP   hydrALAZINE (APRESOLINE) 50 MG tablet Take 1 tablet by mouth 3 times daily 6/21/22  Yes Gerry Saint, APRN - CNP   potassium chloride (KLOR-CON M) 20 MEQ extended release tablet Take 1 tablet by mouth 2 times daily 6/21/22  Yes Gerry Saint, APRN - CNP   valsartan (DIOVAN) 320 MG tablet Take 1 tablet by mouth daily 6/21/22  Yes Gerry Saint, APRN - CNP   fluticasone [facility]) 50 MCG/ACT nasal spray 2 sprays by Each Nostril route daily 12/20/21  Yes Gerry Saint, APRN - CNP   ascorbic acid (VITAMIN C) 500 MG tablet Take 1 tablet by mouth 2 times daily With iron tablet 4/12/21  Yes Lynnie Lesch Might, APRN - CNP   Calcium Carbonate-Vitamin D (OYSTER SHELL CALCIUM/D) 500-200 MG-UNIT TABS Take 1 tablet by mouth 2 times daily 4/12/21  Yes Lynnie Lesch Might, APRN - CNP   cetirizine (ZYRTEC ALLERGY) 10 MG tablet Take 1 tablet by mouth daily 12/16/20  Yes Lynnie Lesch Might, APRN - CNP   dorzolamide-timolol (COSOPT) 22.3-6.8 MG/ML ophthalmic solution Place 1 drop into both eyes 2 times daily  9/20/20  Yes Historical Provider, MD   latanoprost (XALATAN) 0.005 % ophthalmic solution Place 1 drop into both eyes nightly    Yes Historical Provider, MD   blood glucose test strips (ONE TOUCH ULTRA TEST) strip USE 1 STRIP TO CHECK GLUCOSE DAILY 3/13/19  Yes Lynnie Lesch Might, APRN - CNP   ONE TOUCH LANCETS 8 [facility] for testing daily and as needed.  DX: Diabetes type  E11.9 1/31/19  Yes Lynnie Lesch Might, APRN - CNP   Blood Glucose Monitoring Suppl GENNY 1 Units by Does not apply route 3 times daily What insurance will cover 10/11/16  Yes TARA Rangel CNP   albuterol sulfate HFA (VENTOLIN HFA) 108 (90 Base) MCG/ACT inhaler Inhale 2 puffs into the lungs 4 times daily as needed for Wheezing  Patient not taking: No sig reported 1/19/21   Lynnie Lesch Might, APRN - CNP   Multiple Vitamins-Minerals (THERAPEUTIC MULTIVITAMIN-MINERALS) tablet Take 1 tablet by mouth daily  Patient not taking: Reported on 1/13/2023 8/6/20 1/13/23  Ma Flurry Might, APRN - CNP        Allergies: Iv dye [iodides]    Social History:     Tobacco:    reports that she has never smoked. She has never used smokeless tobacco.  Alcohol:      reports no history of alcohol use. Drug Use:  reports no history of drug use. Family History:     Family History   Problem Relation Age of Onset    Stroke Father     Stroke Sister     Diabetes Sister         x6 sisters    High Blood Pressure Sister     Stroke Brother     Diabetes Brother        Review of Systems:     Positive and Negative as described in HPI    Review of Systems   Constitutional:  Negative for chills, fatigue and fever. HENT:  Negative for congestion, rhinorrhea and sore throat. Respiratory:  Negative for cough and shortness of breath. Cardiovascular:  Negative for chest pain and leg swelling. Gastrointestinal:  Negative for abdominal pain, diarrhea, nausea and vomiting. Musculoskeletal:  Positive for arthralgias, gait problem and joint swelling. Negative for neck pain and neck stiffness. Skin:  Negative for color change, rash and wound. Neurological:  Negative for tingling and numbness. Physical Exam:   Vitals:  /84 (Position: Sitting)   Pulse 72   Temp 97.7 °F (36.5 °C) (Temporal)   Resp 22   Wt 164 lb 9.6 oz (74.7 kg)   SpO2 97%   BMI 28.25 kg/m²     Physical Exam  Vitals and nursing note reviewed. Constitutional:       Appearance: Normal appearance. HENT:      Mouth/Throat:      Mouth: Mucous membranes are moist.   Eyes:      General: No scleral icterus. Conjunctiva/sclera: Conjunctivae normal.   Cardiovascular:      Rate and Rhythm: Normal rate and regular rhythm. Pulses: Normal pulses. Dorsalis pedis pulses are 2+ on the right side. Pulmonary:      Effort: Pulmonary effort is normal.      Breath sounds: Normal breath sounds.  No wheezing. Musculoskeletal:         General: Swelling and tenderness present. Cervical back: Normal range of motion and neck supple. Right ankle: Swelling present. Tenderness present. Decreased range of motion. Feet:    Skin:     General: Skin is warm and dry. Findings: No erythema or rash. Neurological:      Mental Status: She is alert and oriented to person, place, and time. Data:     Lab Results   Component Value Date/Time     10/21/2022 09:25 AM    K 4.0 10/21/2022 09:25 AM     10/21/2022 09:25 AM    CO2 26 10/21/2022 09:25 AM    BUN 8 10/21/2022 09:25 AM    CREATININE 0.94 10/21/2022 09:25 AM    GLUCOSE 93 10/21/2022 09:25 AM    GLUCOSE 110 03/26/2012 11:13 AM    PROT 6.8 04/26/2022 09:31 PM    LABALBU 3.5 04/26/2022 09:31 PM    BILITOT 0.39 04/26/2022 09:31 PM    ALKPHOS 122 04/26/2022 09:31 PM    AST 9 04/26/2022 09:31 PM    ALT <5 04/26/2022 09:31 PM     Lab Results   Component Value Date/Time    WBC 7.5 10/21/2022 09:25 AM    RBC 3.33 10/21/2022 09:25 AM    RBC 3.85 03/26/2012 11:13 AM    HGB 10.0 10/21/2022 09:25 AM    HCT 32.3 10/21/2022 09:25 AM    MCV 97.0 10/21/2022 09:25 AM    MCH 30.0 10/21/2022 09:25 AM    MCHC 31.0 10/21/2022 09:25 AM    RDW 13.2 10/21/2022 09:25 AM     10/21/2022 09:25 AM     03/26/2012 11:13 AM    MPV 9.5 10/21/2022 09:25 AM     Lab Results   Component Value Date/Time    TSH 1.36 03/01/2022 09:39 AM     Lab Results   Component Value Date/Time    CHOL 138 07/27/2021 09:49 AM    HDL 53 07/27/2021 09:49 AM    LABA1C 5.5 10/27/2022 12:22 PM       Assessment/Plan:      Diagnosis Orders   1. Acute right ankle pain  External Referral To Orthopedic Surgery      2. Moderate right ankle sprain, initial encounter  External Referral To Orthopedic Surgery        We did apply a splint to the right ankle. She is to wear this while ambulating. Arrangements have been made for her to follow with orthopedics.     Weightbearing as tolerated but try to stay off. May use NSAIDs as needed. 1.  Leann Meeks received counseling on the following healthy behaviors: medication adherence  2. Patient given educational materials - see patient instructions  3. Was a self-tracking handout given in paper form or via [employer]? No  If yes, see orders or list here. 4.  Discussed use, benefit, and side effects of prescribed medications. Barriers to medication compliance addressed. All patient questions answered. Pt voiced understanding. 5.  Reviewed prior labs and health maintenance  6. Continue current medications, diet and exercise. Completed Refills   Requested Prescriptions      No prescriptions requested or ordered in this encounter         Return if symptoms worsen or fail to improve.

## 2023-02-21 DIAGNOSIS — I10 ESSENTIAL HYPERTENSION: ICD-10-CM

## 2023-02-21 RX ORDER — VALSARTAN 320 MG/1
320 TABLET ORAL DAILY
Qty: 90 TABLET | Refills: 3 | Status: SHIPPED | OUTPATIENT
Start: 2023-02-21

## 2023-02-21 RX ORDER — ATENOLOL 100 MG/1
100 TABLET ORAL DAILY
Qty: 90 TABLET | Refills: 3 | Status: SHIPPED | OUTPATIENT
Start: 2023-02-21

## 2023-02-21 NOTE — TELEPHONE ENCOUNTER
Health Maintenance   Topic Date Due    COVID-19 Vaccine (3 - Booster for Moderna series) 05/22/2021    Diabetic foot exam  07/27/2021    Lipids  07/27/2022    Diabetic Alb to Cr ratio (uACR) test  07/28/2022    Annual Wellness Visit (AWV)  02/01/2023    DTaP/Tdap/Td vaccine (1 - Tdap) 04/27/2023 (Originally 1[birth year]9/1969)    Shingles vaccine (1 of 2) 10/27/2023 (Originally 1/9/2000)    Hepatitis C screen  10/27/2023 (Originally [birth year]9/1968)    Diabetic retinal exam  01/23/2024 (Originally 4/13/2022)    GFR test (Diabetes, CKD 3-4, OR last GFR 15-59)  10/21/2023    A1C test (Diabetic or Prediabetic)  10/27/2023    Depression Screen  01/13/2024    Breast cancer screen  05/10/2024    Colorectal Cancer Screen  03/23/2025    DEXA (modify frequency per FRAX score)  Completed    Flu vaccine  Completed    Pneumococcal 65+ years Vaccine  Completed    Hepatitis A vaccine  Aged Out    Hib vaccine  Aged Out    Meningococcal (ACWY) vaccine  Aged Out             (applicable per patient's age: Cancer Screenings, Depression Screening, Fall Risk Screening, Immunizations)    Hemoglobin A1C (%)   Date Value   10/27/2022 5.5   07/27/2021 5.6   01/19/2021 5.8     Microalb/Crt.  Ratio (mcg/mg creat)   Date Value   07/28/2021 9     LDL Cholesterol (mg/dL)   Date Value   07/27/2021 74     AST (U/L)   Date Value   04/26/2022 9     ALT (U/L)   Date Value   04/26/2022 <5 (L)     BUN (mg/dL)   Date Value   10/21/2022 8      (goal A1C is < 7)   (goal LDL is <100) need 30-50% reduction from baseline     BP Readings from Last 3 Encounters:   01/13/23 134/84   01/11/23 (!) 136/55   10/27/22 110/62    (goal /80)      All Future Testing planned in [facility]:  Lab Frequency Next Occurrence   Stress test (Lexiscan) Once 02/23/2022   CBC with Auto Differential Once 04/25/2023   ALT Once 04/27/2023   AST Once 33/90/5816   Basic Metabolic Panel Once 45/31/3747   Hemoglobin A1C Once 04/25/2023   Lipid Panel Once 04/25/2023   Microalbumin, Ur Once 04/25/2023 Next Visit Date:  Future Appointments   Date Time Provider Tara Melarai   4/6/2023 11:00 AM TARA Tom - CNP [facility]   7/17/2023 11:00 AM Angelena Rubinstein, MD [facility]            Patient Active Problem List:     Hypokalemia     Type 2 diabetes mellitus with diabetic nephropathy, without long-term current use of insulin (HCC)     Essential hypertension     Mixed hyperlipidemia     Lower leg edema     Gastroesophageal reflux disease     Mild intermittent asthma without complication     Postmenopausal     Osteopenia determined by x-ray     Chronic midline low back pain without sciatica     Calcaneal spur of left foot     Iron deficiency anemia     Recurrent UTI     ACS (acute coronary syndrome) ([facility])     Abnormal cardiovascular stress test     CKD (chronic kidney disease), stage II     Resistant hypertension     Hyperkalemia     Mild CAD     Stage 3 chronic kidney disease, unspecified whether stage 3a or 3b CKD ([address]     Chronic renal disease, stage III ([address] [[id number]]     Encounter for surgical wound dressing change

## 2023-02-23 DIAGNOSIS — E78.2 MIXED HYPERLIPIDEMIA: ICD-10-CM

## 2023-02-23 DIAGNOSIS — I10 ESSENTIAL HYPERTENSION: ICD-10-CM

## 2023-02-23 RX ORDER — ATORVASTATIN CALCIUM 40 MG/1
40 TABLET, FILM COATED ORAL DAILY
Qty: 90 TABLET | Refills: 3 | Status: SHIPPED | OUTPATIENT
Start: 2023-02-23

## 2023-02-23 RX ORDER — AMLODIPINE BESYLATE 5 MG/1
5 TABLET ORAL DAILY
Qty: 90 TABLET | Refills: 3 | Status: SHIPPED | OUTPATIENT
Start: 2023-02-23

## 2023-02-23 NOTE — TELEPHONE ENCOUNTER
Health Maintenance   Topic Date Due    COVID-19 Vaccine (3 - Booster for Moderna series) 05/22/2021    Diabetic foot exam  07/27/2021    Lipids  07/27/2022    Diabetic Alb to Cr ratio (uACR) test  07/28/2022    Annual Wellness Visit (AWV)  02/01/2023    DTaP/Tdap/Td vaccine (1 - Tdap) 04/27/2023 (Originally 1/9/1969)    Shingles vaccine (1 of 2) 10/27/2023 (Originally 1/9/2000)    Hepatitis C screen  10/27/2023 (Originally 1/9/1968)    Diabetic retinal exam  01/23/2024 (Originally 4/13/2022)    GFR test (Diabetes, CKD 3-4, OR last GFR 15-59)  10/21/2023    A1C test (Diabetic or Prediabetic)  10/27/2023    Depression Screen  01/13/2024    Breast cancer screen  05/10/2024    Colorectal Cancer Screen  03/23/2025    DEXA (modify frequency per FRAX score)  Completed    Flu vaccine  Completed    Pneumococcal 65+ years Vaccine  Completed    Hepatitis A vaccine  Aged Out    Hib vaccine  Aged Out    Meningococcal (ACWY) vaccine  Aged Out             (applicable per patient's age: Cancer Screenings, Depression Screening, Fall Risk Screening, Immunizations)    Hemoglobin A1C (%)   Date Value   10/27/2022 5.5   07/27/2021 5.6   01/19/2021 5.8     Microalb/Crt.  Ratio (mcg/mg creat)   Date Value   07/28/2021 9     LDL Cholesterol (mg/dL)   Date Value   07/27/2021 74     AST (U/L)   Date Value   04/26/2022 9     ALT (U/L)   Date Value   04/26/2022 <5 (L)     BUN (mg/dL)   Date Value   10/21/2022 8      (goal A1C is < 7)   (goal LDL is <100) need 30-50% reduction from baseline     BP Readings from Last 3 Encounters:   01/13/23 134/84   01/11/23 (!) 136/55   10/27/22 110/62    (goal /80)      All Future Testing planned in [facility]:  Lab Frequency Next Occurrence   Stress test (Lexiscan) Once 02/23/2022   CBC with Auto Differential Once 04/25/2023   ALT Once 04/27/2023   AST Once 81/35/7515   Basic Metabolic Panel Once 99/13/7320   Hemoglobin A1C Once 04/25/2023   Lipid Panel Once 04/25/2023   Microalbumin, Ur Once 04/25/2023 Next Visit Date:  Future Appointments   Date Time Provider Tara Mtz   4/6/2023 11:00 AM TARA Betancur - CNP [facility]   7/17/2023 11:00 AM Raman Rogers MD [facility]            Patient Active Problem List:     Hypokalemia     Type 2 diabetes mellitus with diabetic nephropathy, without long-term current use of insulin (HCC)     Essential hypertension     Mixed hyperlipidemia     Lower leg edema     Gastroesophageal reflux disease     Mild intermittent asthma without complication     Postmenopausal     Osteopenia determined by x-ray     Chronic midline low back pain without sciatica     Calcaneal spur of left foot     Iron deficiency anemia     Recurrent UTI     ACS (acute coronary syndrome) ([facility])     Abnormal cardiovascular stress test     CKD (chronic kidney disease), stage II     Resistant hypertension     Hyperkalemia     Mild CAD     Stage 3 chronic kidney disease, unspecified whether stage 3a or 3b CKD ([address]     Chronic renal disease, stage III ([address] [[id number]]     Encounter for surgical wound dressing change

## 2023-03-21 ENCOUNTER — TELEPHONE (OUTPATIENT)
Dept: PRIMARY CARE CLINIC | Age: 73
End: 2023-03-21

## 2023-03-21 NOTE — TELEPHONE ENCOUNTER
Korey Cam with patient's home care contacted the office to let Nallely Haywood know that patient's legs are both swelling. Both legs are swollen in the shin area but patient does not have any pain, any redness, and it is not warm to the touch. Please advise.

## 2023-03-21 NOTE — TELEPHONE ENCOUNTER
Would recommend she elevate legs, try compression stockings and advise cardiologist if not improving. Thank you.

## 2023-03-23 DIAGNOSIS — R94.39 EQUIVOCAL STRESS TEST: ICD-10-CM

## 2023-03-23 DIAGNOSIS — I25.10 MILD CAD: ICD-10-CM

## 2023-03-23 DIAGNOSIS — E78.2 MIXED HYPERLIPIDEMIA: ICD-10-CM

## 2023-03-23 DIAGNOSIS — I10 ESSENTIAL HYPERTENSION: ICD-10-CM

## 2023-03-23 RX ORDER — SPIRONOLACTONE AND HYDROCHLOROTHIAZIDE 25; 25 MG/1; MG/1
1 TABLET ORAL DAILY
Qty: 30 TABLET | Refills: 11 | Status: SHIPPED | OUTPATIENT
Start: 2023-03-23

## 2023-03-28 ENCOUNTER — TELEPHONE (OUTPATIENT)
Dept: CARDIOLOGY | Age: 73
End: 2023-03-28

## 2023-04-06 ENCOUNTER — OFFICE VISIT (OUTPATIENT)
Dept: PRIMARY CARE CLINIC | Age: 73
End: 2023-04-06

## 2023-04-06 VITALS
HEIGHT: 64 IN | WEIGHT: 177.3 LBS | OXYGEN SATURATION: 97 % | SYSTOLIC BLOOD PRESSURE: 124 MMHG | TEMPERATURE: 97.7 F | DIASTOLIC BLOOD PRESSURE: 78 MMHG | BODY MASS INDEX: 30.27 KG/M2 | RESPIRATION RATE: 22 BRPM | HEART RATE: 68 BPM

## 2023-04-06 DIAGNOSIS — E11.21 TYPE 2 DIABETES MELLITUS WITH DIABETIC NEPHROPATHY, WITHOUT LONG-TERM CURRENT USE OF INSULIN (HCC): ICD-10-CM

## 2023-04-06 DIAGNOSIS — Z00.00 MEDICARE ANNUAL WELLNESS VISIT, SUBSEQUENT: Primary | ICD-10-CM

## 2023-04-06 DIAGNOSIS — I10 ESSENTIAL HYPERTENSION: ICD-10-CM

## 2023-04-06 DIAGNOSIS — N18.31 STAGE 3A CHRONIC KIDNEY DISEASE (HCC): ICD-10-CM

## 2023-04-06 LAB — HBA1C MFR BLD: 5.6 %

## 2023-04-06 RX ORDER — FUROSEMIDE 40 MG/1
40 TABLET ORAL DAILY
Qty: 90 TABLET | Refills: 3 | Status: SHIPPED | OUTPATIENT
Start: 2023-04-06

## 2023-04-06 SDOH — ECONOMIC STABILITY: INCOME INSECURITY: HOW HARD IS IT FOR YOU TO PAY FOR THE VERY BASICS LIKE FOOD, HOUSING, MEDICAL CARE, AND HEATING?: PATIENT DECLINED

## 2023-04-06 SDOH — ECONOMIC STABILITY: FOOD INSECURITY: WITHIN THE PAST 12 MONTHS, YOU WORRIED THAT YOUR FOOD WOULD RUN OUT BEFORE YOU GOT MONEY TO BUY MORE.: PATIENT DECLINED

## 2023-04-06 SDOH — ECONOMIC STABILITY: HOUSING INSECURITY
IN THE LAST 12 MONTHS, WAS THERE A TIME WHEN YOU DID NOT HAVE A STEADY PLACE TO SLEEP OR SLEPT IN A SHELTER (INCLUDING NOW)?: PATIENT REFUSED

## 2023-04-06 SDOH — ECONOMIC STABILITY: FOOD INSECURITY: WITHIN THE PAST 12 MONTHS, THE FOOD YOU BOUGHT JUST DIDN'T LAST AND YOU DIDN'T HAVE MONEY TO GET MORE.: PATIENT DECLINED

## 2023-04-06 ASSESSMENT — PATIENT HEALTH QUESTIONNAIRE - PHQ9
SUM OF ALL RESPONSES TO PHQ QUESTIONS 1-9: 0
SUM OF ALL RESPONSES TO PHQ9 QUESTIONS 1 & 2: 0
SUM OF ALL RESPONSES TO PHQ QUESTIONS 1-9: 0
1. LITTLE INTEREST OR PLEASURE IN DOING THINGS: 0
SUM OF ALL RESPONSES TO PHQ QUESTIONS 1-9: 0
2. FEELING DOWN, DEPRESSED OR HOPELESS: 0
SUM OF ALL RESPONSES TO PHQ QUESTIONS 1-9: 0

## 2023-04-06 ASSESSMENT — LIFESTYLE VARIABLES: HOW MANY STANDARD DRINKS CONTAINING ALCOHOL DO YOU HAVE ON A TYPICAL DAY: PATIENT DOES NOT DRINK

## 2023-04-06 ASSESSMENT — ENCOUNTER SYMPTOMS: SHORTNESS OF BREATH: 0

## 2023-04-06 NOTE — PATIENT INSTRUCTIONS
person is called a health care agent (health care proxy, health care surrogate). The form is also called a durable power of  for health care. If you do not have an advance directive, decisions about your medical care may be made by a family member, or by a doctor or a  who doesn't know you. It may help to think of an advance directive as a gift to the people who care for you. If you have one, they won't have to make tough decisions by themselves. For more information, including forms for your state, see the [address] website (www.caringinfo.org/planning/advance-directives/). Follow-up care is a key part of your treatment and safety. Be sure to make and go to all appointments, and call your doctor if you are having problems. It's also a good idea to know your test results and keep a list of the medicines you take. What should you include in an advance directive? Many states have a unique advance directive form. (It may ask you to address specific issues.) Or you might use a universal form that's approved by many states. If your form doesn't tell you what to address, it may be hard to know what to include in your advance directive. Use the questions below to help you get started. Who do you want to make decisions about your medical care if you are not able to? What life-support measures do you want if you have a serious illness that gets worse over time or can't be cured? What are you most afraid of that might happen? (Maybe you're afraid of having pain, losing your independence, or being kept alive by machines.)  Where would you prefer to die? (Your home? A hospital? A nursing home?)  Do you want to donate your organs when you die? Do you want certain Restorationist practices performed before you die? When should you call for help? Be sure to contact your doctor if you have any questions. Where can you learn more?   Go to http://[email or web address].com/ and enter R264 to learn more about

## 2023-04-06 NOTE — PROGRESS NOTES
dizziness and headaches. Patient's complete Health Risk Assessment and screening values have been reviewed and are found in Flowsheets. The following problems were reviewed today and where indicated follow up appointments were made and/or referrals ordered. Positive Risk Factor Screenings with Interventions:       Cognitive: Words recalled: 3 Words Recalled   Clock Drawing Test (CDT): (!) Abnormal (Refused)   Total Score: 3   Total Score Interpretation: Normal Mini-Cog      Interventions:  Patient declines any further evaluation or treatment            Social and Emotional Support:  Do you get the social and emotional support that you need?: (!) No  Interventions:  Patient advised to follow up in the office for further evaluation and treatment    Weight and Activity:  Physical Activity: Inactive    Days of Exercise per Week: 0 days    Minutes of Exercise per Session: 0 min     On average, how many days per week do you engage in moderate to strenuous exercise (like a brisk walk)?: 0 days  Have you lost any weight without trying in the past 3 months?: No  Body mass index: (!) 30.43    Inactivity Interventions:  Patient declined any further interventions or treatment  Obesity Interventions:  Patient declines any further evaluation or treatment               Advanced Directives:  Do you have a Living Will?: (!) No    Intervention:                         Objective   Vitals:    04/06/23 1105   BP: 124/78   Position: Sitting   Pulse: 68   Resp: 22   Temp: 97.7 °F (36.5 °C)   TempSrc: Temporal   SpO2: 97%   Weight: 177 lb 4.8 oz (80.4 kg)   Height: 5' 4\" (1.626 m)      Body mass index is 30.43 kg/m².       General Appearance: alert and oriented to person, place and time, well-developed and well nourished, in no acute distress, and obese  Skin: warm and dry  Head: normocephalic and atraumatic  Eyes: conjunctivae normal and sclera anicteric  ENT: oropharynx clear and moist with normal mucous membranes  Neck: neck

## 2023-05-04 ENCOUNTER — TELEPHONE (OUTPATIENT)
Dept: PRIMARY CARE CLINIC | Age: 73
End: 2023-05-04

## 2023-05-12 DIAGNOSIS — E11.9 TYPE 2 DIABETES MELLITUS WITHOUT COMPLICATION (HCC): ICD-10-CM

## 2023-05-12 NOTE — TELEPHONE ENCOUNTER
Health Maintenance   Topic Date Due    DTaP/Tdap/Td vaccine (1 - Tdap) Never done    Diabetic foot exam  07/27/2021    Lipids  07/27/2022    Diabetic Alb to Cr ratio (uACR) test  07/28/2022    Shingles vaccine (1 of 2) 10/27/2023 (Originally 1/9/2000)    Hepatitis C screen  10/27/2023 (Originally 1/9/1968)    Diabetic retinal exam  01/23/2024 (Originally 6/15/2018)    COVID-19 Vaccine (3 - Booster for Ozella Talmage series) 04/06/2024 (Originally 5/22/2021)    GFR test (Diabetes, CKD 3-4, OR last GFR 15-59)  10/21/2023    A1C test (Diabetic or Prediabetic)  04/06/2024    Depression Screen  04/06/2024    Annual Wellness Visit (AWV)  04/06/2024    Breast cancer screen  05/10/2024    Colorectal Cancer Screen  03/23/2025    DEXA (modify frequency per FRAX score)  Completed    Flu vaccine  Completed    Pneumococcal 65+ years Vaccine  Completed    Hepatitis A vaccine  Aged Out    Hib vaccine  Aged Out    Meningococcal (ACWY) vaccine  Aged Out             (applicable per patient's age: Cancer Screenings, Depression Screening, Fall Risk Screening, Immunizations)    Hemoglobin A1C (%)   Date Value   04/06/2023 5.6   10/27/2022 5.5   07/27/2021 5.6     Microalb/Crt.  Ratio (mcg/mg creat)   Date Value   07/28/2021 9     LDL Cholesterol (mg/dL)   Date Value   07/27/2021 74     AST (U/L)   Date Value   04/26/2022 9     ALT (U/L)   Date Value   04/26/2022 <5 (L)     BUN (mg/dL)   Date Value   10/21/2022 8      (goal A1C is < 7)   (goal LDL is <100) need 30-50% reduction from baseline     BP Readings from Last 3 Encounters:   04/06/23 124/78   01/13/23 134/84   01/11/23 (!) 136/55    (goal /80)      All Future Testing planned in [facility]:  Lab Frequency Next Occurrence   CBC with Auto Differential Once 04/25/2023   ALT Once 04/27/2023   AST Once 61/97/9635   Basic Metabolic Panel Once 09/79/8447   Hemoglobin A1C Once 04/25/2023   Lipid Panel Once 04/25/2023   Microalbumin, Ur Once 04/25/2023       Next Visit Date:  Future

## 2023-05-30 ENCOUNTER — OFFICE VISIT (OUTPATIENT)
Dept: PRIMARY CARE CLINIC | Age: 73
End: 2023-05-30
Payer: COMMERCIAL

## 2023-05-30 VITALS
WEIGHT: 171.1 LBS | TEMPERATURE: 97.7 F | RESPIRATION RATE: 22 BRPM | HEART RATE: 64 BPM | DIASTOLIC BLOOD PRESSURE: 74 MMHG | BODY MASS INDEX: 29.37 KG/M2 | SYSTOLIC BLOOD PRESSURE: 122 MMHG | OXYGEN SATURATION: 99 %

## 2023-05-30 DIAGNOSIS — J30.1 SEASONAL ALLERGIC RHINITIS DUE TO POLLEN: Primary | ICD-10-CM

## 2023-05-30 DIAGNOSIS — R05.2 SUBACUTE COUGH: ICD-10-CM

## 2023-05-30 PROCEDURE — 3074F SYST BP LT 130 MM HG: CPT | Performed by: NURSE PRACTITIONER

## 2023-05-30 PROCEDURE — 96372 THER/PROPH/DIAG INJ SC/IM: CPT | Performed by: NURSE PRACTITIONER

## 2023-05-30 PROCEDURE — 1123F ACP DISCUSS/DSCN MKR DOCD: CPT | Performed by: NURSE PRACTITIONER

## 2023-05-30 PROCEDURE — 99213 OFFICE O/P EST LOW 20 MIN: CPT | Performed by: NURSE PRACTITIONER

## 2023-05-30 PROCEDURE — 3078F DIAST BP <80 MM HG: CPT | Performed by: NURSE PRACTITIONER

## 2023-05-30 RX ORDER — LEVOCETIRIZINE DIHYDROCHLORIDE 5 MG/1
5 TABLET, FILM COATED ORAL NIGHTLY
Qty: 90 TABLET | Refills: 1 | Status: SHIPPED | OUTPATIENT
Start: 2023-05-30

## 2023-05-30 RX ORDER — TRIAMCINOLONE ACETONIDE 40 MG/ML
40 INJECTION, SUSPENSION INTRA-ARTICULAR; INTRAMUSCULAR ONCE
Status: COMPLETED | OUTPATIENT
Start: 2023-05-30 | End: 2023-05-30

## 2023-05-30 RX ADMIN — TRIAMCINOLONE ACETONIDE 40 MG: 40 INJECTION, SUSPENSION INTRA-ARTICULAR; INTRAMUSCULAR at 13:35

## 2023-05-30 SDOH — ECONOMIC STABILITY: INCOME INSECURITY: HOW HARD IS IT FOR YOU TO PAY FOR THE VERY BASICS LIKE FOOD, HOUSING, MEDICAL CARE, AND HEATING?: PATIENT DECLINED

## 2023-05-30 SDOH — ECONOMIC STABILITY: FOOD INSECURITY: WITHIN THE PAST 12 MONTHS, YOU WORRIED THAT YOUR FOOD WOULD RUN OUT BEFORE YOU GOT MONEY TO BUY MORE.: PATIENT DECLINED

## 2023-05-30 SDOH — ECONOMIC STABILITY: FOOD INSECURITY: WITHIN THE PAST 12 MONTHS, THE FOOD YOU BOUGHT JUST DIDN'T LAST AND YOU DIDN'T HAVE MONEY TO GET MORE.: PATIENT DECLINED

## 2023-05-30 ASSESSMENT — PATIENT HEALTH QUESTIONNAIRE - PHQ9
SUM OF ALL RESPONSES TO PHQ QUESTIONS 1-9: 0
1. LITTLE INTEREST OR PLEASURE IN DOING THINGS: 0
SUM OF ALL RESPONSES TO PHQ QUESTIONS 1-9: 0
SUM OF ALL RESPONSES TO PHQ9 QUESTIONS 1 & 2: 0
2. FEELING DOWN, DEPRESSED OR HOPELESS: 0

## 2023-05-30 ASSESSMENT — ENCOUNTER SYMPTOMS
COUGH: 1
SINUS PRESSURE: 0
RHINORRHEA: 1
NAUSEA: 0
SINUS PAIN: 0
WHEEZING: 0
VOMITING: 0
ABDOMINAL PAIN: 0
DIARRHEA: 0
SHORTNESS OF BREATH: 0
SORE THROAT: 0
HEMOPTYSIS: 0
HEARTBURN: 0

## 2023-05-30 NOTE — PROGRESS NOTES
After obtaining consent, and per orders of Dr. Fatuma Bullock CNP, injection of Kenalog 40 mg given in Left deltoid by Alberta Chavarria LPN. Patient instructed to remain in clinic for 20 minutes afterwards, and to report any adverse reaction to me immediately.

## 2023-05-30 NOTE — PATIENT INSTRUCTIONS
SURVEY:     You may be receiving a survey from [employer] regarding your visit today. Please complete the survey to enable us to provide the highest quality of care to you and your family. If you cannot score us a very good on any question, please call the office to discuss how we could have made your experience a very good one.      Thank you,    Kori Bullock, APRN-CNP  Tonny Smith, APRN-CNP  Aylin Jacobs, N  [facility], CMA  Josefina Burgos, CMA  [place], CMA  [place], PCA  [place], PM

## 2023-05-30 NOTE — PROGRESS NOTES
Name: Christiane Hoffman  : 1950         Chief Complaint:     Chief Complaint   Patient presents with    Cough     X 1 week with shortness of breath. No fever. History of Present Illness:      Christiane Hoffman is a 68 y.o.  female who presents with Cough (X 1 week with shortness of breath. No fever.)      Yuri De Anda is here today for a same day office visit. Cough  This is a recurrent problem. The current episode started 1 to 4 weeks ago. The problem has been waxing and waning. Episode frequency: INTERMITTENTLY, NOCTURNAL. The cough is Non-productive. Associated symptoms include nasal congestion, postnasal drip and rhinorrhea. Pertinent negatives include no chest pain, chills, ear congestion, ear pain, fever, headaches, heartburn, hemoptysis, myalgias, rash, sore throat, shortness of breath, sweats, weight loss or wheezing. The symptoms are aggravated by lying down and pollens. Risk factors: WEATHER CHANGES. She has tried nothing for the symptoms. The treatment provided no relief. Her past medical history is significant for bronchitis and environmental allergies. Past Medical History:     Past Medical History:   Diagnosis Date    Arthritis     Asthma     Chronic bronchitis ([address]     Diabetes mellitus ([address]     GERD (gastroesophageal reflux disease)     H/O echocardiogram 3/9/16    EF >60%. LV wall thickness is mildly increased. Mild mitral and tricuspid regurgitation. Borderline pulmonary hypertension estimated right ventricular sysolic pressure of 80BLDX. Mild (grade l) diastolic dysfunction. History of PFTs 3/10/16     Suboptimal effort. Restrictive in nature. clionical correlations is advised. History of stress test 16    Abnoraml. Moderate perfusion defect of mild to moderate intensity in the anterolateral and anteroapical regions during stress imaging. Intermediate risk for CAD.     Hyperlipidemia     Hypertension       Reviewed all health maintenance requirements and ordered appropriate

## 2023-07-17 ENCOUNTER — OFFICE VISIT (OUTPATIENT)
Dept: CARDIOLOGY | Age: 73
End: 2023-07-17
Payer: COMMERCIAL

## 2023-07-17 VITALS
WEIGHT: 169 LBS | DIASTOLIC BLOOD PRESSURE: 74 MMHG | BODY MASS INDEX: 28.85 KG/M2 | HEIGHT: 64 IN | RESPIRATION RATE: 18 BRPM | HEART RATE: 60 BPM | OXYGEN SATURATION: 96 % | SYSTOLIC BLOOD PRESSURE: 134 MMHG

## 2023-07-17 DIAGNOSIS — I10 ESSENTIAL HYPERTENSION: Primary | ICD-10-CM

## 2023-07-17 DIAGNOSIS — E78.2 MIXED HYPERLIPIDEMIA: ICD-10-CM

## 2023-07-17 DIAGNOSIS — I25.10 MILD CAD: ICD-10-CM

## 2023-07-17 PROCEDURE — 3078F DIAST BP <80 MM HG: CPT | Performed by: FAMILY MEDICINE

## 2023-07-17 PROCEDURE — 99214 OFFICE O/P EST MOD 30 MIN: CPT | Performed by: FAMILY MEDICINE

## 2023-07-17 PROCEDURE — 1123F ACP DISCUSS/DSCN MKR DOCD: CPT | Performed by: FAMILY MEDICINE

## 2023-07-17 PROCEDURE — 93000 ELECTROCARDIOGRAM COMPLETE: CPT | Performed by: FAMILY MEDICINE

## 2023-07-17 PROCEDURE — 3074F SYST BP LT 130 MM HG: CPT | Performed by: FAMILY MEDICINE

## 2023-07-17 RX ORDER — AMLODIPINE BESYLATE 5 MG/1
5 TABLET ORAL DAILY
Qty: 90 TABLET | Refills: 3 | Status: SHIPPED | OUTPATIENT
Start: 2023-07-17

## 2023-07-17 NOTE — PATIENT INSTRUCTIONS
SURVEY:    You may be receiving a survey from [employer] regarding your visit today. Please complete the survey to enable us to provide the highest quality of care to you and your family. If you cannot score us a very good on any question, please call the office to discuss how we could have made your experience a very good one. Thank you.

## 2023-07-18 DIAGNOSIS — I10 ESSENTIAL HYPERTENSION: ICD-10-CM

## 2023-07-18 RX ORDER — HYDRALAZINE HYDROCHLORIDE 50 MG/1
50 TABLET, FILM COATED ORAL 3 TIMES DAILY
Qty: 270 TABLET | Refills: 3 | Status: SHIPPED | OUTPATIENT
Start: 2023-07-18

## 2023-07-18 NOTE — TELEPHONE ENCOUNTER
Health Maintenance   Topic Date Due    Diabetic foot exam  07/27/2021    Lipids  07/27/2022    Diabetic Alb to Cr ratio (uACR) test  07/28/2022    Shingles vaccine (1 of 2) 10/27/2023 (Originally 1/9/2000)    Hepatitis C screen  10/27/2023 (Originally 1/9/1968)    Diabetic retinal exam  01/23/2024 (Originally 6/15/2018)    COVID-19 Vaccine (3 - Booster for Perez Higinio series) 04/06/2024 (Originally 5/22/2021)    DTaP/Tdap/Td vaccine (1 - Tdap) 05/30/2024 (Originally 1/9/1969)    Flu vaccine (1) 08/01/2023    GFR test (Diabetes, CKD 3-4, OR last GFR 15-59)  10/21/2023    A1C test (Diabetic or Prediabetic)  04/06/2024    Annual Wellness Visit (AWV)  04/06/2024    Breast cancer screen  05/10/2024    Depression Screen  05/30/2024    Colorectal Cancer Screen  03/23/2025    DEXA (modify frequency per FRAX score)  Completed    Pneumococcal 65+ years Vaccine  Completed    Hepatitis A vaccine  Aged Out    Hib vaccine  Aged Out    Meningococcal (ACWY) vaccine  Aged Out             (applicable per patient's age: Cancer Screenings, Depression Screening, Fall Risk Screening, Immunizations)    Hemoglobin A1C (%)   Date Value   04/06/2023 5.6   10/27/2022 5.5   07/27/2021 5.6     Microalb/Crt.  Ratio (mcg/mg creat)   Date Value   07/28/2021 9     LDL Cholesterol (mg/dL)   Date Value   07/27/2021 74     AST (U/L)   Date Value   04/26/2022 9     ALT (U/L)   Date Value   04/26/2022 <5 (L)     BUN (mg/dL)   Date Value   10/21/2022 8      (goal A1C is < 7)   (goal LDL is <100) need 30-50% reduction from baseline     BP Readings from Last 3 Encounters:   07/17/23 134/74   05/30/23 122/74   04/06/23 124/78    (goal /80)      All Future Testing planned in [facility]:  Lab Frequency Next Occurrence   CBC with Auto Differential Once 04/25/2023   ALT Once 04/27/2023   AST Once 38/59/2105   Basic Metabolic Panel Once 40/62/9011   Hemoglobin A1C Once 04/25/2023   Lipid Panel Once 04/25/2023   Microalbumin, Ur Once 04/25/2023   Echo 2D w doppler

## 2023-07-20 ENCOUNTER — HOSPITAL ENCOUNTER (EMERGENCY)
Age: 73
Discharge: HOME | End: 2023-07-20
Attending: STUDENT IN AN ORGANIZED HEALTH CARE EDUCATION/TRAINING PROGRAM
Payer: COMMERCIAL

## 2023-07-20 ENCOUNTER — TELEPHONE (OUTPATIENT)
Dept: PRIMARY CARE CLINIC | Age: 73
End: 2023-07-20

## 2023-07-20 VITALS
SYSTOLIC BLOOD PRESSURE: 158 MMHG | HEIGHT: 64 IN | BODY MASS INDEX: 27.31 KG/M2 | RESPIRATION RATE: 18 BRPM | TEMPERATURE: 99 F | OXYGEN SATURATION: 96 % | WEIGHT: 160 LBS | DIASTOLIC BLOOD PRESSURE: 70 MMHG | HEART RATE: 63 BPM

## 2023-07-20 DIAGNOSIS — K62.5 RECTAL BLEEDING: Primary | ICD-10-CM

## 2023-07-20 LAB
ALBUMIN SERPL-MCNC: 3.6 G/DL (ref 3.5–5.2)
ALBUMIN/GLOB SERPL: 1.2 {RATIO} (ref 1–2.5)
ALP SERPL-CCNC: 132 U/L (ref 35–104)
ALT SERPL-CCNC: 6 U/L (ref 5–33)
ANION GAP SERPL CALCULATED.3IONS-SCNC: 10 MMOL/L (ref 9–17)
AST SERPL-CCNC: 13 U/L
BASOPHILS # BLD: 0.03 K/UL (ref 0–0.2)
BASOPHILS NFR BLD: 0 % (ref 0–2)
BILIRUB SERPL-MCNC: 0.3 MG/DL (ref 0.3–1.2)
BUN SERPL-MCNC: 6 MG/DL (ref 8–23)
BUN/CREAT SERPL: 7 (ref 9–20)
CALCIUM SERPL-MCNC: 8.9 MG/DL (ref 8.6–10.4)
CHLORIDE SERPL-SCNC: 107 MMOL/L (ref 98–107)
CO2 SERPL-SCNC: 23 MMOL/L (ref 20–31)
CREAT SERPL-MCNC: 0.9 MG/DL (ref 0.5–0.9)
EOSINOPHIL # BLD: 0.1 K/UL (ref 0–0.44)
EOSINOPHILS RELATIVE PERCENT: 1 % (ref 1–4)
ERYTHROCYTE [DISTWIDTH] IN BLOOD BY AUTOMATED COUNT: 14.1 % (ref 11.8–14.4)
GFR SERPL CREATININE-BSD FRML MDRD: >60 ML/MIN/1.73M2
GLUCOSE SERPL-MCNC: 90 MG/DL (ref 70–99)
HCT VFR BLD AUTO: 32.9 % (ref 36.3–47.1)
HGB BLD-MCNC: 10.6 G/DL (ref 11.9–15.1)
IMM GRANULOCYTES # BLD AUTO: <0.03 K/UL (ref 0–0.3)
IMM GRANULOCYTES NFR BLD: 0 %
LIPASE SERPL-CCNC: 23 U/L (ref 13–60)
LYMPHOCYTES NFR BLD: 2.68 K/UL (ref 1.1–3.7)
LYMPHOCYTES RELATIVE PERCENT: 37 % (ref 24–43)
MCH RBC QN AUTO: 30.6 PG (ref 25.2–33.5)
MCHC RBC AUTO-ENTMCNC: 32.2 G/DL (ref 28.4–34.8)
MCV RBC AUTO: 95.1 FL (ref 82.6–102.9)
MONOCYTES NFR BLD: 0.67 K/UL (ref 0.1–1.2)
MONOCYTES NFR BLD: 9 % (ref 3–12)
NEUTROPHILS NFR BLD: 53 % (ref 36–65)
NEUTS SEG NFR BLD: 3.74 K/UL (ref 1.5–8.1)
NRBC BLD-RTO: 0 PER 100 WBC
PLATELET # BLD AUTO: 350 K/UL (ref 138–453)
PMV BLD AUTO: 8.8 FL (ref 8.1–13.5)
POTASSIUM SERPL-SCNC: 3.7 MMOL/L (ref 3.7–5.3)
PROT SERPL-MCNC: 6.6 G/DL (ref 6.4–8.3)
RBC # BLD AUTO: 3.46 M/UL (ref 3.95–5.11)
SODIUM SERPL-SCNC: 140 MMOL/L (ref 135–144)
WBC OTHER # BLD: 7.2 K/UL (ref 3.5–11.3)

## 2023-07-20 PROCEDURE — 85027 COMPLETE CBC AUTOMATED: CPT

## 2023-07-20 PROCEDURE — 80053 COMPREHEN METABOLIC PANEL: CPT

## 2023-07-20 PROCEDURE — 36415 COLL VENOUS BLD VENIPUNCTURE: CPT

## 2023-07-20 PROCEDURE — 99283 EMERGENCY DEPT VISIT LOW MDM: CPT

## 2023-07-20 PROCEDURE — 83690 ASSAY OF LIPASE: CPT

## 2023-07-20 ASSESSMENT — PAIN - FUNCTIONAL ASSESSMENT: PAIN_FUNCTIONAL_ASSESSMENT: NONE - DENIES PAIN

## 2023-07-20 NOTE — DISCHARGE INSTRUCTIONS
Take your medications as prescribed. Follow-up with your family doctor as well as contacted GI physician referral you have been given next week to set up an appointment. Return to the nearest emergency department if you have more episodes of bleeding. Or if you develop any lightheadedness dizziness or pain anywhere else.

## 2023-07-20 NOTE — ED PROVIDER NOTES
Hemoglobin 10.6 (*)     Hematocrit 32.9 (*)     All other components within normal limits   COMPREHENSIVE METABOLIC PANEL - Abnormal; Notable for the following components:    BUN 6 (*)     Bun/Cre Ratio 7 (*)     Alkaline Phosphatase 132 (*)     All other components within normal limits   LIPASE       (Any cultures that may have been sent were not resulted at the time of this patient visit)    [facility] / ED COURSE:     External Documentation Reviewed:         Previous patient encounter documents & history available on EMR was reviewed: Patient was seen on 7/17/23 for essential hypertension. 1)           Differential Diagnosis includes (but not limited to):  External hemorrhoid, external hemorrhoid, lower GI bleed, upper GI bleed,        Diagnoses Considered but I have low suspicion of:   Diverticular hemorrhage       Decision Rules/Clinical Scores utilized:                 See Formal Diagnostic Results above for the lab and radiology tests and orders. 3)  Treatment and Disposition         ED Reassessment:  See ED course         Case discussed with consulting clinician:           Shared Decision-Making was performed and disposition discussed with the        Patient/Family and questions answered          Social determinants of health impacting treatment or disposition:  none      Summary of Patient Presentation:      ED Course as of 07/20/23 1604   Thu Jul 20, 2023   1540 Hemoglobin Quant(!): 10.6  Baseline for patient  [AL]   1547 Lipase: 23 [AL]   1547 BUN,BUNPL(!): 6 [AL]   1547 Creatinine: 0.9 [AL]   1735 ALT: 6 [AL]   1547 AST: 13 [AL]   9812 She was reevaluated, has not had any episodes of rectal bleeding in the emergency department she still remains asymptomatic.   She was updated on her lab results and feels comfortable with plan to be discharged home. [AL]      ED Course User Index  [AL] Betzaida Smallwood MD       Medical Decision Making  Amount and/or Complexity of Data days        MD Maureen Winkler MD  Resident  07/20/23 0845

## 2023-07-20 NOTE — TELEPHONE ENCOUNTER
Patients  called stating that the patient just told him that she went to the bathroom and everything she put out had blood in it. Writer advised  to take her to the ER for evaluation.  verbalized understanding.

## 2023-07-21 ENCOUNTER — TELEPHONE (OUTPATIENT)
Dept: PRIMARY CARE CLINIC | Age: 73
End: 2023-07-21

## 2023-07-21 NOTE — TELEPHONE ENCOUNTER
[address] [facility],[address] Floor Transitions Initial Follow Up Call    Outreach made within 2 business days of discharge: Yes    Patient: Niru Olivares Patient : 1950   MRN: [id number]  Reason for Admission: There are no discharge diagnoses documented for the most recent discharge. Discharge Date: 23       Spoke with: Sawyer Espitia     Discharge department/facility: [facility]     TCM Interactive Patient Contact:  Was patient able to fill all prescriptions: Yes  Was patient instructed to bring all medications to the follow-up visit: yes  Is patient taking all medications as directed in the discharge summary?  yes  Does patient understand their discharge instructions: yes  Does patient have questions or concerns that need addressed prior to 7-14 day follow up office visit: no     Scheduled appointment with PCP within 7-14 days    Follow Up  Future Appointments   Date Time Provider [address] [place] [address]   10/10/2023 11:00 AM TARA Hernandez CNP [facility]   2024 11:00 AM TARA Hernandez CNP [facility]       Jenice Bence, [place]

## 2023-08-01 ENCOUNTER — TRANSCRIBE ORDERS (OUTPATIENT)
Dept: ADMINISTRATIVE | Age: 73
End: 2023-08-01

## 2023-08-01 DIAGNOSIS — I25.10 ATHEROSCLEROSIS OF NATIVE CORONARY ARTERY, UNSPECIFIED WHETHER ANGINA PRESENT, UNSPECIFIED WHETHER NATIVE OR TRANSPLANTED HEART: ICD-10-CM

## 2023-08-01 DIAGNOSIS — E78.2 MIXED HYPERLIPIDEMIA: Primary | ICD-10-CM

## 2023-08-01 DIAGNOSIS — E78.2 MIXED HYPERLIPIDEMIA: ICD-10-CM

## 2023-08-01 DIAGNOSIS — I10 ESSENTIAL HYPERTENSION, MALIGNANT: Primary | ICD-10-CM

## 2023-08-15 ENCOUNTER — HOSPITAL ENCOUNTER (OUTPATIENT)
Age: 73
Discharge: HOME | End: 2023-08-17
Attending: FAMILY MEDICINE
Payer: COMMERCIAL

## 2023-08-15 VITALS
DIASTOLIC BLOOD PRESSURE: 70 MMHG | HEIGHT: 64 IN | WEIGHT: 160 LBS | BODY MASS INDEX: 27.31 KG/M2 | SYSTOLIC BLOOD PRESSURE: 158 MMHG

## 2023-08-15 DIAGNOSIS — I25.10 ATHEROSCLEROSIS OF NATIVE CORONARY ARTERY, UNSPECIFIED WHETHER ANGINA PRESENT, UNSPECIFIED WHETHER NATIVE OR TRANSPLANTED HEART: ICD-10-CM

## 2023-08-15 DIAGNOSIS — I10 ESSENTIAL HYPERTENSION: ICD-10-CM

## 2023-08-15 DIAGNOSIS — E78.2 MIXED HYPERLIPIDEMIA: Primary | ICD-10-CM

## 2023-08-15 LAB
ECHO AO ROOT DIAM: 3.3 CM
ECHO AO ROOT INDEX: 1.85 CM/M2
ECHO AV ACCELERATION TIME: 111 MS
ECHO AV CUSP MM: 2 CM
ECHO AV MEAN GRADIENT: 4 MMHG
ECHO AV MEAN VELOCITY: 0.9 M/S
ECHO AV MEAN VELOCITY: 1 M/S
ECHO AV PEAK GRADIENT: 8 MMHG
ECHO AV PEAK VELOCITY: 1.4 M/S
ECHO AV VTI: 35.8 CM
ECHO BSA: 1.81 M2
ECHO EST RA PRESSURE: 3 MMHG
ECHO LA MAJOR AXIS: 5 CM
ECHO LA VOL 2C: 53 ML (ref 22–52)
ECHO LA VOL 4C: 45 ML (ref 22–52)
ECHO LA VOL BP: 49 ML (ref 22–52)
ECHO LA VOL/BSA BIPLANE: 28 ML/M2 (ref 16–34)
ECHO LA VOLUME AREA LENGTH: 29 MILLILITERS PER SQUARE METER
ECHO LA VOLUME AREA LENGTH: 51 ML
ECHO LA VOLUME INDEX A2C: 30 ML/M2 (ref 16–34)
ECHO LA VOLUME INDEX A4C: 25 ML/M2 (ref 16–34)
ECHO LV E' LATERAL VELOCITY: 9 CM/S
ECHO LV EDV A2C: 70 ML
ECHO LV EDV A4C: 69 ML
ECHO LV EDV INDEX A4C: 39 ML/M2
ECHO LV EDV NDEX A2C: 39 ML/M2
ECHO LV EJECTION FRACTION BIPLANE: 63 % (ref 55–100)
ECHO LV ESV A2C: 24 ML
ECHO LV ESV A4C: 22 ML
ECHO LV ESV INDEX A2C: 13 ML/M2
ECHO LV ESV INDEX A4C: 12 ML/M2
ECHO LV FRACTIONAL SHORTENING: 26 % (ref 28–44)
ECHO LV INTERNAL DIMENSION DIASTOLE INDEX: 2.81 CM/M2
ECHO LV INTERNAL DIMENSION DIASTOLIC: 5 CM (ref 3.9–5.3)
ECHO LV INTERNAL DIMENSION SYSTOLIC INDEX: 2.08 CM/M2
ECHO LV INTERNAL DIMENSION SYSTOLIC: 3.7 CM
ECHO LV IVSD: 1.2 CM (ref 0.6–0.9)
ECHO LV IVSS: 1.5 CM
ECHO LV MASS 2D: 261.8 G (ref 67–162)
ECHO LV MASS INDEX 2D: 147.1 G/M2 (ref 43–95)
ECHO LV POSTERIOR WALL DIASTOLIC: 1.4 CM (ref 0.6–0.9)
ECHO LV POSTERIOR WALL SYSTOLIC: 1.6 CM
ECHO LV RELATIVE WALL THICKNESS RATIO: 0.56
ECHO LVOT AV VTI INDEX: 0.69
ECHO LVOT MEAN GRADIENT: 1 MMHG
ECHO LVOT VTI: 24.7 CM
ECHO MV A VELOCITY: 0.81 M/S
ECHO MV E DECELERATION TIME (DT): 151.9 MS
ECHO MV E VELOCITY: 1.09 M/S
ECHO MV E/A RATIO: 1.35
ECHO MV E/E' LATERAL: 12.11
ECHO PV MAX VELOCITY: 1 M/S
ECHO PV PEAK GRADIENT: 4 MMHG
ECHO RIGHT VENTRICULAR SYSTOLIC PRESSURE (RVSP): 35 MMHG
ECHO TV REGURGITANT MAX VELOCITY: 2.84 M/S
ECHO TV REGURGITANT PEAK GRADIENT: 32 MMHG

## 2023-08-15 PROCEDURE — 93306 TTE W/DOPPLER COMPLETE: CPT

## 2023-08-16 ENCOUNTER — TELEPHONE (OUTPATIENT)
Dept: CARDIOLOGY | Age: 73
End: 2023-08-16

## 2023-08-16 NOTE — TELEPHONE ENCOUNTER
----- Message from Morena Mckeon MD sent at 8/15/2023 11:31 PM EDT -----  Let MsRyan Austin Antunez know their test result was ok. Will discuss at next visit. Thanks.

## 2023-09-21 ENCOUNTER — HOSPITAL ENCOUNTER (EMERGENCY)
Age: 73
Discharge: HOME | End: 2023-09-21
Attending: EMERGENCY MEDICINE
Payer: COMMERCIAL

## 2023-09-21 VITALS
DIASTOLIC BLOOD PRESSURE: 58 MMHG | SYSTOLIC BLOOD PRESSURE: 130 MMHG | TEMPERATURE: 98.1 F | RESPIRATION RATE: 17 BRPM | HEART RATE: 69 BPM | OXYGEN SATURATION: 98 %

## 2023-09-21 DIAGNOSIS — M79.601 RIGHT ARM PAIN: Primary | ICD-10-CM

## 2023-09-21 PROCEDURE — 99282 EMERGENCY DEPT VISIT SF MDM: CPT

## 2023-09-21 ASSESSMENT — PAIN DESCRIPTION - PAIN TYPE: TYPE: ACUTE PAIN

## 2023-09-21 ASSESSMENT — ENCOUNTER SYMPTOMS
BACK PAIN: 0
SORE THROAT: 0
ABDOMINAL DISTENTION: 0
SHORTNESS OF BREATH: 0

## 2023-09-21 ASSESSMENT — PAIN DESCRIPTION - ORIENTATION: ORIENTATION: RIGHT

## 2023-09-21 ASSESSMENT — PAIN DESCRIPTION - DESCRIPTORS: DESCRIPTORS: SHARP

## 2023-09-21 ASSESSMENT — PAIN - FUNCTIONAL ASSESSMENT: PAIN_FUNCTIONAL_ASSESSMENT: 0-10

## 2023-09-21 ASSESSMENT — PAIN DESCRIPTION - FREQUENCY: FREQUENCY: CONTINUOUS

## 2023-09-21 ASSESSMENT — PAIN DESCRIPTION - LOCATION: LOCATION: ARM

## 2023-09-21 ASSESSMENT — PAIN SCALES - GENERAL: PAINLEVEL_OUTOF10: 10

## 2023-09-21 NOTE — DISCHARGE INSTRUCTIONS
Recommend taking Tylenol 1000 mg every 8 hours as needed. Alternate between ice and heat to the affected area 20 minutes at a time multiple times a day. Return to the emergency department if you notice any swelling or redness to the right arm. Otherwise have a close follow-up with your primary care doctor on Monday.

## 2023-09-22 ENCOUNTER — TELEPHONE (OUTPATIENT)
Dept: PRIMARY CARE CLINIC | Age: 73
End: 2023-09-22

## 2023-09-22 NOTE — TELEPHONE ENCOUNTER
[address] [facility],[address]St Floor Transitions Initial Follow Up Call    Outreach made within 2 business days of discharge: Yes    Patient: Jacinta Salazar Patient : 1950   MRN: [id number]  Reason for Admission: There are no discharge diagnoses documented for the most recent discharge. Discharge Date: 23       Spoke with: cira for patient     Discharge department/facility: [facility]     TCM Interactive Patient Contact:  Was patient able to fill all prescriptions:   Was patient instructed to bring all medications to the follow-up visit:   Is patient taking all medications as directed in the discharge summary?    Does patient understand their discharge instructions:   Does patient have questions or concerns that need addressed prior to 7-14 day follow up office visit:     Scheduled appointment with PCP within 7-14 days    Follow Up  Future Appointments   Date Time Provider [address] [place] [address]   10/10/2023 11:00 AM Tretha Goring Might, APRN - CNP [facility]   2024 11:00 AM [facility]into [facility]       Johnnette Boxer, [place]

## 2023-10-03 ENCOUNTER — TELEPHONE (OUTPATIENT)
Dept: PRIMARY CARE CLINIC | Age: 73
End: 2023-10-03

## 2023-10-09 ENCOUNTER — TELEPHONE (OUTPATIENT)
Dept: PRIMARY CARE CLINIC | Age: 73
End: 2023-10-09

## 2023-10-09 ENCOUNTER — HOSPITAL ENCOUNTER (OUTPATIENT)
Age: 73
Discharge: HOME | End: 2023-10-09
Payer: COMMERCIAL

## 2023-10-09 DIAGNOSIS — K21.00 GASTROESOPHAGEAL REFLUX DISEASE WITH ESOPHAGITIS WITHOUT HEMORRHAGE: Primary | ICD-10-CM

## 2023-10-09 DIAGNOSIS — E11.21 TYPE 2 DIABETES MELLITUS WITH DIABETIC NEPHROPATHY, WITHOUT LONG-TERM CURRENT USE OF INSULIN (HCC): ICD-10-CM

## 2023-10-09 LAB
ALT SERPL-CCNC: 5 U/L (ref 5–33)
ANION GAP SERPL CALCULATED.3IONS-SCNC: 15 MMOL/L (ref 9–17)
AST SERPL-CCNC: 14 U/L
BASOPHILS # BLD: 0.03 K/UL (ref 0–0.2)
BASOPHILS NFR BLD: 0 % (ref 0–2)
BUN SERPL-MCNC: 8 MG/DL (ref 8–23)
BUN/CREAT SERPL: 9 (ref 9–20)
CALCIUM SERPL-MCNC: 8.5 MG/DL (ref 8.6–10.4)
CHLORIDE SERPL-SCNC: 103 MMOL/L (ref 98–107)
CHOLEST SERPL-MCNC: 99 MG/DL
CHOLESTEROL/HDL RATIO: 1.9
CO2 SERPL-SCNC: 23 MMOL/L (ref 20–31)
CREAT SERPL-MCNC: 0.9 MG/DL (ref 0.5–0.9)
CREAT UR-MCNC: 217.7 MG/DL (ref 28–217)
EOSINOPHIL # BLD: 0.09 K/UL (ref 0–0.44)
EOSINOPHILS RELATIVE PERCENT: 1 % (ref 1–4)
ERYTHROCYTE [DISTWIDTH] IN BLOOD BY AUTOMATED COUNT: 13.8 % (ref 11.8–14.4)
GFR SERPL CREATININE-BSD FRML MDRD: >60 ML/MIN/1.73M2
GLUCOSE SERPL-MCNC: 90 MG/DL (ref 70–99)
HCT VFR BLD AUTO: 36 % (ref 36.3–47.1)
HDLC SERPL-MCNC: 53 MG/DL
HGB BLD-MCNC: 11.4 G/DL (ref 11.9–15.1)
IMM GRANULOCYTES # BLD AUTO: <0.03 K/UL (ref 0–0.3)
IMM GRANULOCYTES NFR BLD: 0 %
LDLC SERPL CALC-MCNC: 28 MG/DL (ref 0–130)
LYMPHOCYTES NFR BLD: 3.48 K/UL (ref 1.1–3.7)
LYMPHOCYTES RELATIVE PERCENT: 41 % (ref 24–43)
MCH RBC QN AUTO: 30.3 PG (ref 25.2–33.5)
MCHC RBC AUTO-ENTMCNC: 31.7 G/DL (ref 28.4–34.8)
MCV RBC AUTO: 95.7 FL (ref 82.6–102.9)
MICROALBUMIN UR-MCNC: 40 MG/L
MICROALBUMIN/CREAT UR-RTO: 18 MCG/MG CREAT
MONOCYTES NFR BLD: 0.79 K/UL (ref 0.1–1.2)
MONOCYTES NFR BLD: 9 % (ref 3–12)
NEUTROPHILS NFR BLD: 49 % (ref 36–65)
NEUTS SEG NFR BLD: 4.06 K/UL (ref 1.5–8.1)
NRBC BLD-RTO: 0 PER 100 WBC
PLATELET # BLD AUTO: 389 K/UL (ref 138–453)
PMV BLD AUTO: 8.9 FL (ref 8.1–13.5)
POTASSIUM SERPL-SCNC: 3.2 MMOL/L (ref 3.7–5.3)
RBC # BLD AUTO: 3.76 M/UL (ref 3.95–5.11)
SODIUM SERPL-SCNC: 141 MMOL/L (ref 135–144)
TRIGL SERPL-MCNC: 92 MG/DL
WBC OTHER # BLD: 8.5 K/UL (ref 3.5–11.3)

## 2023-10-09 PROCEDURE — 80048 BASIC METABOLIC PNL TOTAL CA: CPT

## 2023-10-09 PROCEDURE — 85025 COMPLETE CBC W/AUTO DIFF WBC: CPT

## 2023-10-09 PROCEDURE — 84450 TRANSFERASE (AST) (SGOT): CPT

## 2023-10-09 PROCEDURE — 80061 LIPID PANEL: CPT

## 2023-10-09 PROCEDURE — 82043 UR ALBUMIN QUANTITATIVE: CPT

## 2023-10-09 PROCEDURE — 36415 COLL VENOUS BLD VENIPUNCTURE: CPT

## 2023-10-09 PROCEDURE — 84460 ALANINE AMINO (ALT) (SGPT): CPT

## 2023-10-09 PROCEDURE — 82570 ASSAY OF URINE CREATININE: CPT

## 2023-10-09 PROCEDURE — 83036 HEMOGLOBIN GLYCOSYLATED A1C: CPT

## 2023-10-09 RX ORDER — PANTOPRAZOLE SODIUM 40 MG/1
40 TABLET, DELAYED RELEASE ORAL
Qty: 90 TABLET | Refills: 1 | Status: SHIPPED | OUTPATIENT
Start: 2023-10-09

## 2023-10-09 NOTE — TELEPHONE ENCOUNTER
Patients  contacted the office in regards to every time Miya Epps eats she starts hiccupping and can not stop. I asked if there was any other symptoms and he stated no.   Please advise thank you

## 2023-10-09 NOTE — TELEPHONE ENCOUNTER
Have her start pantoprazole 40 mg every day 30 minutes prior to eating. Have her do this for at least 1 week and call the office with progress. Sooner if any problems. Thank you.

## 2023-10-10 ENCOUNTER — OFFICE VISIT (OUTPATIENT)
Dept: PRIMARY CARE CLINIC | Age: 73
End: 2023-10-10
Payer: COMMERCIAL

## 2023-10-10 VITALS
OXYGEN SATURATION: 97 % | BODY MASS INDEX: 26.95 KG/M2 | HEART RATE: 74 BPM | TEMPERATURE: 98.5 F | DIASTOLIC BLOOD PRESSURE: 84 MMHG | SYSTOLIC BLOOD PRESSURE: 132 MMHG | RESPIRATION RATE: 18 BRPM | WEIGHT: 157 LBS

## 2023-10-10 DIAGNOSIS — Z23 NEED FOR INFLUENZA VACCINATION: ICD-10-CM

## 2023-10-10 DIAGNOSIS — I10 ESSENTIAL HYPERTENSION: ICD-10-CM

## 2023-10-10 DIAGNOSIS — E87.6 HYPOKALEMIA: ICD-10-CM

## 2023-10-10 DIAGNOSIS — E11.21 TYPE 2 DIABETES MELLITUS WITH DIABETIC NEPHROPATHY, WITHOUT LONG-TERM CURRENT USE OF INSULIN (HCC): Primary | ICD-10-CM

## 2023-10-10 DIAGNOSIS — E78.2 MIXED HYPERLIPIDEMIA: ICD-10-CM

## 2023-10-10 LAB
EST. AVERAGE GLUCOSE BLD GHB EST-MCNC: 94 MG/DL
HBA1C MFR BLD: 4.9 % (ref 4–6)

## 2023-10-10 PROCEDURE — 3075F SYST BP GE 130 - 139MM HG: CPT | Performed by: NURSE PRACTITIONER

## 2023-10-10 PROCEDURE — 1123F ACP DISCUSS/DSCN MKR DOCD: CPT | Performed by: NURSE PRACTITIONER

## 2023-10-10 PROCEDURE — 90694 VACC AIIV4 NO PRSRV 0.5ML IM: CPT | Performed by: NURSE PRACTITIONER

## 2023-10-10 PROCEDURE — 3044F HG A1C LEVEL LT 7.0%: CPT | Performed by: NURSE PRACTITIONER

## 2023-10-10 PROCEDURE — 3079F DIAST BP 80-89 MM HG: CPT | Performed by: NURSE PRACTITIONER

## 2023-10-10 PROCEDURE — 99214 OFFICE O/P EST MOD 30 MIN: CPT | Performed by: NURSE PRACTITIONER

## 2023-10-10 PROCEDURE — G0008 ADMIN INFLUENZA VIRUS VAC: HCPCS | Performed by: NURSE PRACTITIONER

## 2023-10-10 RX ORDER — POTASSIUM CHLORIDE 20 MEQ/1
20 TABLET, EXTENDED RELEASE ORAL 2 TIMES DAILY
Qty: 180 TABLET | Refills: 3 | Status: SHIPPED | OUTPATIENT
Start: 2023-10-10

## 2023-10-10 SDOH — ECONOMIC STABILITY: FOOD INSECURITY: WITHIN THE PAST 12 MONTHS, YOU WORRIED THAT YOUR FOOD WOULD RUN OUT BEFORE YOU GOT MONEY TO BUY MORE.: PATIENT DECLINED

## 2023-10-10 SDOH — ECONOMIC STABILITY: INCOME INSECURITY: HOW HARD IS IT FOR YOU TO PAY FOR THE VERY BASICS LIKE FOOD, HOUSING, MEDICAL CARE, AND HEATING?: PATIENT DECLINED

## 2023-10-10 SDOH — ECONOMIC STABILITY: FOOD INSECURITY: WITHIN THE PAST 12 MONTHS, THE FOOD YOU BOUGHT JUST DIDN'T LAST AND YOU DIDN'T HAVE MONEY TO GET MORE.: PATIENT DECLINED

## 2023-10-10 ASSESSMENT — PATIENT HEALTH QUESTIONNAIRE - PHQ9
SUM OF ALL RESPONSES TO PHQ QUESTIONS 1-9: 0
SUM OF ALL RESPONSES TO PHQ QUESTIONS 1-9: 0
2. FEELING DOWN, DEPRESSED OR HOPELESS: 0
1. LITTLE INTEREST OR PLEASURE IN DOING THINGS: 0
SUM OF ALL RESPONSES TO PHQ QUESTIONS 1-9: 0
SUM OF ALL RESPONSES TO PHQ QUESTIONS 1-9: 0
SUM OF ALL RESPONSES TO PHQ9 QUESTIONS 1 & 2: 0

## 2023-10-10 ASSESSMENT — ENCOUNTER SYMPTOMS
SORE THROAT: 0
RHINORRHEA: 0
NAUSEA: 0
DIARRHEA: 0
SHORTNESS OF BREATH: 0
COUGH: 0
CONSTIPATION: 0
VOMITING: 0
ABDOMINAL PAIN: 0
WHEEZING: 0

## 2023-10-10 NOTE — PROGRESS NOTES
After obtaining consent, and per orders of Dr. Zhang Bullock CNP, injection of Flu given in Right deltoid by Melly Carr LPN. Patient instructed to remain in clinic for 20 minutes afterwards, and to report any adverse reaction to me immediately. Vaccine Information Sheet, \"Influenza - Inactivated\"  given to Delories Gilford, or parent/legal guardian of  Delories Gilford and verbalized understanding. Patient responses:    Have you ever had a reaction to a flu vaccine? No  Are you able to eat eggs without adverse effects? Yes  Do you have any current illness? No  Have you ever had Guillian Mott Syndrome? No    Flu vaccine given per order. Please see immunization tab.

## 2023-10-10 NOTE — PATIENT INSTRUCTIONS
SURVEY:     You may be receiving a survey from [employer] regarding your visit today. Please complete the survey to enable us to provide the highest quality of care to you and your family. If you cannot score us a very good on any question, please call the office to discuss how we could have made your experience a very good one.      Thank you,    William Bullock, APRN-CNP  Keisha Conner, APRN-CNP  Jean Florian, EVERARDO Kumar, NEY Bee, NEY Marquez, CMA  [place], PCA  [place], PM

## 2023-10-10 NOTE — PROGRESS NOTES
Name: Sam Magallanes  : 1950         Chief Complaint:     Chief Complaint   Patient presents with    Diabetes     Routine check. Review labs. Hypertension       History of Present Illness:      Sam Magallanes is a 68 y.o.  female who presents with Diabetes (Routine check. Review labs.) and Hypertension      Mynor Lindsay is here today for routine office visit. Overall when it is feeling well. She has been compliant with her medications except for potassium. She ran out of potassium probably a few months ago and did not realize it. Potassium was 3.2 on last set of labs (yesterday). A new prescription has been sent. She recently had all of her bottom teeth extracted in preparation for dentures. She is eating a lot of soup and having milkshakes. She has lost a little weight but feels okay. Her A1c is 4.9. See below for further comments. Diabetes  She presents for her follow-up diabetic visit. She has type 2 diabetes mellitus. MedicAlert identification noted. Onset time: YEARS. Her disease course has been improving. There are no hypoglycemic associated symptoms. Pertinent negatives for hypoglycemia include no dizziness or headaches. There are no diabetic associated symptoms. Pertinent negatives for diabetes include no chest pain, no fatigue, no polydipsia, no polyphagia and no polyuria. There are no hypoglycemic complications. Pertinent negatives for hypoglycemia complications include no blackouts and no hospitalization. Symptoms are stable. Diabetic complications include nephropathy. Pertinent negatives for diabetic complications include no CVA, heart disease or peripheral neuropathy. Risk factors for coronary artery disease include diabetes mellitus, dyslipidemia, family history, hypertension, stress and post-menopausal. Current diabetic treatment includes oral agent (monotherapy). She is compliant with treatment all of the time. Her weight is stable. She is following a generally healthy diet.  Meal

## 2023-10-17 ENCOUNTER — HOSPITAL ENCOUNTER (EMERGENCY)
Age: 73
Discharge: HOME | End: 2023-10-17
Attending: EMERGENCY MEDICINE
Payer: COMMERCIAL

## 2023-10-17 ENCOUNTER — TELEPHONE (OUTPATIENT)
Dept: PRIMARY CARE CLINIC | Age: 73
End: 2023-10-17

## 2023-10-17 ENCOUNTER — APPOINTMENT (OUTPATIENT)
Dept: GENERAL RADIOLOGY | Age: 73
End: 2023-10-17
Payer: COMMERCIAL

## 2023-10-17 VITALS
DIASTOLIC BLOOD PRESSURE: 77 MMHG | HEIGHT: 64 IN | BODY MASS INDEX: 27.31 KG/M2 | WEIGHT: 160 LBS | RESPIRATION RATE: 20 BRPM | OXYGEN SATURATION: 98 % | TEMPERATURE: 98.1 F | SYSTOLIC BLOOD PRESSURE: 106 MMHG | HEART RATE: 98 BPM

## 2023-10-17 DIAGNOSIS — N39.0 ACUTE UTI: ICD-10-CM

## 2023-10-17 DIAGNOSIS — I95.1 ORTHOSTASIS: ICD-10-CM

## 2023-10-17 DIAGNOSIS — R42 DIZZINESS: Primary | ICD-10-CM

## 2023-10-17 LAB
ALBUMIN SERPL-MCNC: 3.6 G/DL (ref 3.5–5.2)
ALBUMIN/GLOB SERPL: 1 {RATIO} (ref 1–2.5)
ALP SERPL-CCNC: 161 U/L (ref 35–104)
ALT SERPL-CCNC: 6 U/L (ref 5–33)
ANION GAP SERPL CALCULATED.3IONS-SCNC: 17 MMOL/L (ref 9–17)
AST SERPL-CCNC: 16 U/L
BACTERIA URNS QL MICRO: ABNORMAL
BASOPHILS # BLD: 0.04 K/UL (ref 0–0.2)
BASOPHILS NFR BLD: 1 % (ref 0–2)
BILIRUB SERPL-MCNC: 0.5 MG/DL (ref 0.3–1.2)
BILIRUB UR QL STRIP: NEGATIVE
BNP SERPL-MCNC: 253 PG/ML
BUN SERPL-MCNC: 14 MG/DL (ref 8–23)
BUN/CREAT SERPL: 7 (ref 9–20)
CALCIUM SERPL-MCNC: 8.8 MG/DL (ref 8.6–10.4)
CHLORIDE SERPL-SCNC: 100 MMOL/L (ref 98–107)
CLARITY UR: CLEAR
CO2 SERPL-SCNC: 20 MMOL/L (ref 20–31)
COLOR UR: YELLOW
CREAT SERPL-MCNC: 1.9 MG/DL (ref 0.5–0.9)
EOSINOPHIL # BLD: 0.06 K/UL (ref 0–0.44)
EOSINOPHILS RELATIVE PERCENT: 1 % (ref 1–4)
EPI CELLS #/AREA URNS HPF: ABNORMAL /HPF (ref 0–25)
ERYTHROCYTE [DISTWIDTH] IN BLOOD BY AUTOMATED COUNT: 14 % (ref 11.8–14.4)
GFR SERPL CREATININE-BSD FRML MDRD: 28 ML/MIN/1.73M2
GLUCOSE SERPL-MCNC: 109 MG/DL (ref 70–99)
GLUCOSE UR STRIP-MCNC: NEGATIVE MG/DL
HCT VFR BLD AUTO: 35.1 % (ref 36.3–47.1)
HGB BLD-MCNC: 11.5 G/DL (ref 11.9–15.1)
HGB UR QL STRIP.AUTO: NEGATIVE
IMM GRANULOCYTES # BLD AUTO: 0.04 K/UL (ref 0–0.3)
IMM GRANULOCYTES NFR BLD: 1 %
KETONES UR STRIP-MCNC: NEGATIVE MG/DL
LEUKOCYTE ESTERASE UR QL STRIP: ABNORMAL
LYMPHOCYTES NFR BLD: 3.05 K/UL (ref 1.1–3.7)
LYMPHOCYTES RELATIVE PERCENT: 37 % (ref 24–43)
MCH RBC QN AUTO: 30.9 PG (ref 25.2–33.5)
MCHC RBC AUTO-ENTMCNC: 32.8 G/DL (ref 28.4–34.8)
MCV RBC AUTO: 94.4 FL (ref 82.6–102.9)
MONOCYTES NFR BLD: 0.88 K/UL (ref 0.1–1.2)
MONOCYTES NFR BLD: 11 % (ref 3–12)
NEUTROPHILS NFR BLD: 49 % (ref 36–65)
NEUTS SEG NFR BLD: 4.17 K/UL (ref 1.5–8.1)
NITRITE UR QL STRIP: NEGATIVE
NRBC BLD-RTO: 0 PER 100 WBC
PH UR STRIP: 6 [PH] (ref 5–9)
PLATELET # BLD AUTO: 340 K/UL (ref 138–453)
PMV BLD AUTO: 9 FL (ref 8.1–13.5)
POTASSIUM SERPL-SCNC: 3.7 MMOL/L (ref 3.7–5.3)
PROT SERPL-MCNC: 7.2 G/DL (ref 6.4–8.3)
PROT UR STRIP-MCNC: NEGATIVE MG/DL
RBC # BLD AUTO: 3.72 M/UL (ref 3.95–5.11)
RBC #/AREA URNS HPF: ABNORMAL /HPF (ref 0–2)
SODIUM SERPL-SCNC: 137 MMOL/L (ref 135–144)
SP GR UR STRIP: 1.01 (ref 1.01–1.02)
TROPONIN I SERPL HS-MCNC: 20 NG/L (ref 0–14)
TROPONIN I SERPL HS-MCNC: 22 NG/L (ref 0–14)
UROBILINOGEN UR STRIP-ACNC: NORMAL EU/DL (ref 0–1)
WBC #/AREA URNS HPF: ABNORMAL /HPF (ref 0–5)
WBC OTHER # BLD: 8.2 K/UL (ref 3.5–11.3)

## 2023-10-17 PROCEDURE — 87086 URINE CULTURE/COLONY COUNT: CPT

## 2023-10-17 PROCEDURE — 2580000003 HC RX 258: Performed by: EMERGENCY MEDICINE

## 2023-10-17 PROCEDURE — 87077 CULTURE AEROBIC IDENTIFY: CPT

## 2023-10-17 PROCEDURE — 99285 EMERGENCY DEPT VISIT HI MDM: CPT

## 2023-10-17 PROCEDURE — 93005 ELECTROCARDIOGRAM TRACING: CPT | Performed by: EMERGENCY MEDICINE

## 2023-10-17 PROCEDURE — 83880 ASSAY OF NATRIURETIC PEPTIDE: CPT

## 2023-10-17 PROCEDURE — 85025 COMPLETE CBC W/AUTO DIFF WBC: CPT

## 2023-10-17 PROCEDURE — 71045 X-RAY EXAM CHEST 1 VIEW: CPT

## 2023-10-17 PROCEDURE — 80053 COMPREHEN METABOLIC PANEL: CPT

## 2023-10-17 PROCEDURE — 81001 URINALYSIS AUTO W/SCOPE: CPT

## 2023-10-17 PROCEDURE — 96365 THER/PROPH/DIAG IV INF INIT: CPT

## 2023-10-17 PROCEDURE — 36415 COLL VENOUS BLD VENIPUNCTURE: CPT

## 2023-10-17 PROCEDURE — 6360000002 HC RX W HCPCS: Performed by: EMERGENCY MEDICINE

## 2023-10-17 PROCEDURE — 84484 ASSAY OF TROPONIN QUANT: CPT

## 2023-10-17 PROCEDURE — 87186 SC STD MICRODIL/AGAR DIL: CPT

## 2023-10-17 RX ORDER — CEPHALEXIN 500 MG/1
500 CAPSULE ORAL 3 TIMES DAILY
Qty: 21 CAPSULE | Refills: 0 | Status: SHIPPED | OUTPATIENT
Start: 2023-10-17 | End: 2023-10-24

## 2023-10-17 RX ORDER — 0.9 % SODIUM CHLORIDE 0.9 %
500 INTRAVENOUS SOLUTION INTRAVENOUS ONCE
Status: COMPLETED | OUTPATIENT
Start: 2023-10-17 | End: 2023-10-17

## 2023-10-17 RX ADMIN — SODIUM CHLORIDE 500 ML: 9 INJECTION, SOLUTION INTRAVENOUS at 18:35

## 2023-10-17 RX ADMIN — SODIUM CHLORIDE 500 ML: 9 INJECTION, SOLUTION INTRAVENOUS at 20:47

## 2023-10-17 RX ADMIN — CEFTRIAXONE SODIUM 1000 MG: 1 INJECTION, POWDER, FOR SOLUTION INTRAMUSCULAR; INTRAVENOUS at 20:46

## 2023-10-17 ASSESSMENT — PAIN - FUNCTIONAL ASSESSMENT: PAIN_FUNCTIONAL_ASSESSMENT: NONE - DENIES PAIN

## 2023-10-17 NOTE — TELEPHONE ENCOUNTER
Patients  called in and stated that she keeps getting dizzy today. And I told them to take her blood pressure. They just called back and said her readings have been around 79/65.   Please advise thank you

## 2023-10-18 ENCOUNTER — TELEPHONE (OUTPATIENT)
Dept: PRIMARY CARE CLINIC | Age: 73
End: 2023-10-18

## 2023-10-18 LAB
EKG ATRIAL RATE: 88 BPM
EKG P AXIS: 49 DEGREES
EKG P-R INTERVAL: 142 MS
EKG Q-T INTERVAL: 340 MS
EKG QRS DURATION: 76 MS
EKG QTC CALCULATION (BAZETT): 411 MS
EKG R AXIS: -12 DEGREES
EKG T AXIS: 152 DEGREES
EKG VENTRICULAR RATE: 88 BPM

## 2023-10-18 PROCEDURE — 93010 ELECTROCARDIOGRAM REPORT: CPT | Performed by: INTERNAL MEDICINE

## 2023-10-18 NOTE — DISCHARGE INSTRUCTIONS
Drink at least 60 to 80 ounces of water every day. Take the antibiotic as prescribed for the urinary tract infection. Follow-up with your doctor in the next few days. Return to the emergency department if symptoms get worse.

## 2023-10-18 NOTE — TELEPHONE ENCOUNTER
[address] [facility],[address]St Floor Transitions Initial Follow Up Call    Outreach made within 2 business days of discharge: Yes    Patient: Josh Soto Patient : 1950   MRN: [id number]  Reason for Admission: There are no discharge diagnoses documented for the most recent discharge. Discharge Date: 10/17/23       Spoke with: Robbi()    Discharge department/facility: [facility]     TCM Interactive Patient Contact:  Was patient able to fill all prescriptions: Yes  Was patient instructed to bring all medications to the follow-up visit: Yes  Is patient taking all medications as directed in the discharge summary?  Yes  Does patient understand their discharge instructions: Yes  Does patient have questions or concerns that need addressed prior to 7-14 day follow up office visit: no    Scheduled appointment with PCP within 7-14 days    Follow Up  Future Appointments   Date Time Provider [address] [place] [address]   2024 11:00 AM Melisa Bullock, [address] [place], [place]

## 2023-10-20 LAB
MICROORGANISM SPEC CULT: ABNORMAL
SPECIMEN DESCRIPTION: ABNORMAL

## 2023-11-07 ENCOUNTER — CLINICAL DOCUMENTATION ONLY (OUTPATIENT)
Facility: CLINIC | Age: 73
End: 2023-11-07

## 2023-11-07 ENCOUNTER — TELEPHONE (OUTPATIENT)
Dept: PRIMARY CARE CLINIC | Age: 73
End: 2023-11-07

## 2023-11-07 DIAGNOSIS — N18.31 STAGE 3A CHRONIC KIDNEY DISEASE (HCC): ICD-10-CM

## 2023-11-07 DIAGNOSIS — I10 ESSENTIAL HYPERTENSION: Primary | ICD-10-CM

## 2023-11-07 NOTE — TELEPHONE ENCOUNTER
Alma Madsen from [address] called in regards to Haily's 10/17/23 ED Visit. Alma Madsen is requesting follow up labs & UA be ordered due to Haily's kidney failure & UTI. Will you order?

## 2023-11-08 ENCOUNTER — TELEPHONE (OUTPATIENT)
Dept: PRIMARY CARE CLINIC | Age: 73
End: 2023-11-08

## 2023-11-08 ENCOUNTER — HOSPITAL ENCOUNTER (OUTPATIENT)
Age: 73
Discharge: HOME | End: 2023-11-08
Payer: COMMERCIAL

## 2023-11-08 DIAGNOSIS — I10 ESSENTIAL HYPERTENSION: ICD-10-CM

## 2023-11-08 DIAGNOSIS — N18.31 STAGE 3A CHRONIC KIDNEY DISEASE (HCC): ICD-10-CM

## 2023-11-08 LAB
ANION GAP SERPL CALCULATED.3IONS-SCNC: 11 MMOL/L (ref 9–17)
BACTERIA URNS QL MICRO: ABNORMAL
BILIRUB UR QL STRIP: NEGATIVE
BUN SERPL-MCNC: 10 MG/DL (ref 8–23)
BUN/CREAT SERPL: 9 (ref 9–20)
CALCIUM SERPL-MCNC: 8.3 MG/DL (ref 8.6–10.4)
CHLORIDE SERPL-SCNC: 104 MMOL/L (ref 98–107)
CLARITY UR: CLEAR
CO2 SERPL-SCNC: 22 MMOL/L (ref 20–31)
COLOR UR: YELLOW
CREAT SERPL-MCNC: 1.1 MG/DL (ref 0.5–0.9)
EPI CELLS #/AREA URNS HPF: ABNORMAL /HPF (ref 0–25)
GFR SERPL CREATININE-BSD FRML MDRD: 53 ML/MIN/1.73M2
GLUCOSE SERPL-MCNC: 68 MG/DL (ref 70–99)
GLUCOSE UR STRIP-MCNC: NEGATIVE MG/DL
HGB UR QL STRIP.AUTO: NEGATIVE
KETONES UR STRIP-MCNC: NEGATIVE MG/DL
LEUKOCYTE ESTERASE UR QL STRIP: NEGATIVE
NITRITE UR QL STRIP: NEGATIVE
PH UR STRIP: 6 [PH] (ref 5–9)
POTASSIUM SERPL-SCNC: 4 MMOL/L (ref 3.7–5.3)
PROT UR STRIP-MCNC: NEGATIVE MG/DL
RBC #/AREA URNS HPF: ABNORMAL /HPF (ref 0–2)
SODIUM SERPL-SCNC: 137 MMOL/L (ref 135–144)
SP GR UR STRIP: <1.005 (ref 1.01–1.02)
UROBILINOGEN UR STRIP-ACNC: NORMAL EU/DL (ref 0–1)
WBC #/AREA URNS HPF: ABNORMAL /HPF (ref 0–5)

## 2023-11-08 PROCEDURE — 81001 URINALYSIS AUTO W/SCOPE: CPT

## 2023-11-08 PROCEDURE — 36415 COLL VENOUS BLD VENIPUNCTURE: CPT

## 2023-11-08 PROCEDURE — 80048 BASIC METABOLIC PNL TOTAL CA: CPT

## 2023-11-08 NOTE — TELEPHONE ENCOUNTER
----- Message from [facility] Keri, TARA - CNP sent at 11/8/2023 11:15 AM EST -----  Kidney function and urine results are improved. Continue good hydration.

## 2023-12-12 ENCOUNTER — OFFICE VISIT (OUTPATIENT)
Dept: PRIMARY CARE CLINIC | Age: 73
End: 2023-12-12
Payer: COMMERCIAL

## 2023-12-12 VITALS
TEMPERATURE: 98.7 F | WEIGHT: 152.2 LBS | BODY MASS INDEX: 26.13 KG/M2 | HEART RATE: 59 BPM | RESPIRATION RATE: 16 BRPM | SYSTOLIC BLOOD PRESSURE: 116 MMHG | OXYGEN SATURATION: 96 % | DIASTOLIC BLOOD PRESSURE: 78 MMHG

## 2023-12-12 DIAGNOSIS — J20.9 BRONCHITIS WITH ASTHMA, ACUTE: Primary | ICD-10-CM

## 2023-12-12 DIAGNOSIS — J45.909 BRONCHITIS WITH ASTHMA, ACUTE: Primary | ICD-10-CM

## 2023-12-12 PROCEDURE — 3074F SYST BP LT 130 MM HG: CPT | Performed by: NURSE PRACTITIONER

## 2023-12-12 PROCEDURE — 99214 OFFICE O/P EST MOD 30 MIN: CPT | Performed by: NURSE PRACTITIONER

## 2023-12-12 PROCEDURE — 3078F DIAST BP <80 MM HG: CPT | Performed by: NURSE PRACTITIONER

## 2023-12-12 PROCEDURE — 1123F ACP DISCUSS/DSCN MKR DOCD: CPT | Performed by: NURSE PRACTITIONER

## 2023-12-12 RX ORDER — DOXYCYCLINE HYCLATE 100 MG
100 TABLET ORAL 2 TIMES DAILY
Qty: 20 TABLET | Refills: 0 | Status: SHIPPED | OUTPATIENT
Start: 2023-12-12 | End: 2023-12-22

## 2023-12-12 RX ORDER — GUAIFENESIN 600 MG/1
600 TABLET, EXTENDED RELEASE ORAL 2 TIMES DAILY
Qty: 14 TABLET | Refills: 0 | Status: SHIPPED | OUTPATIENT
Start: 2023-12-12 | End: 2023-12-19

## 2023-12-12 ASSESSMENT — PATIENT HEALTH QUESTIONNAIRE - PHQ9
SUM OF ALL RESPONSES TO PHQ QUESTIONS 1-9: 0
SUM OF ALL RESPONSES TO PHQ9 QUESTIONS 1 & 2: 0
SUM OF ALL RESPONSES TO PHQ QUESTIONS 1-9: 0
SUM OF ALL RESPONSES TO PHQ QUESTIONS 1-9: 0
1. LITTLE INTEREST OR PLEASURE IN DOING THINGS: 0
SUM OF ALL RESPONSES TO PHQ QUESTIONS 1-9: 0
2. FEELING DOWN, DEPRESSED OR HOPELESS: 0

## 2023-12-12 ASSESSMENT — ENCOUNTER SYMPTOMS
WHEEZING: 1
GASTROINTESTINAL NEGATIVE: 1
EYES NEGATIVE: 1
SHORTNESS OF BREATH: 1
RHINORRHEA: 1
ALLERGIC/IMMUNOLOGIC NEGATIVE: 1
SORE THROAT: 0
COUGH: 1

## 2023-12-12 NOTE — PROGRESS NOTES
prescribed medications. Barriers to medication compliance addressed. All patient questions answered. Pt voiced understanding. 5.  Reviewed prior labs and health maintenance  6. Continue current medications, diet and exercise. Completed Refills   Requested Prescriptions     Signed Prescriptions Disp Refills    doxycycline hyclate (VIBRA-TABS) 100 MG tablet 20 tablet 0     Sig: Take 1 tablet by mouth 2 times daily for 10 days    guaiFENesin (MUCINEX) 600 MG extended release tablet 14 tablet 0     Sig: Take 1 tablet by mouth 2 times daily for 7 days         No follow-ups on file.

## 2023-12-17 NOTE — PROGRESS NOTES
Chris Cervantes am scribing for and in the presence of Ranulfo Monroy    Patient: Brittney Villalta  : 1950  Date of Visit: 2022    REASON FOR VISIT / CONSULTATION: Follow-up (HX: Mild CAD, HTN, HLD. Pt states she is doing well. Denies: CP, Palpitaiotns, Lighteaded/dizziness, SOB.)    History of Present Illness:        Dear TARA Bower CNP    I had the pleasure of seeing your patient Brittney Villalta for follow up today. Heart catheterization done on 2019 that showed mild to moderate single vessel disease involving the circumflex coronary artery. Normal left ventricular end diastolic pressure. (LVEDP). Evaluated in the ER on 2022 for shortness of breath and cough. Recent chest x-ray done on showed no acute process. Troponin was not elevated. EKG showed normal sinus rhythm. BMP did show potassium 5.7 mmol/L.    CAM done on 2022: 7 days recorded. Baseline rhythm is sinus with average heart rate of 75 bpm, ranging between 50 and 120 bpm. Sinus tachycardia represented 6% of the study duration. Atrial tachycardia (AT) 1 episode, 9 beats at average 105 bpm up to 180 bpm. Premature atrial contractions 0.02%. Premature ventricular contractions 0.32%. Other than the one episode atrial tachycardia, the study is fairly unremarkable. Stress test done on 2022: Equivocal myocardial perfusion study. There is a small to moderate perfusion defect of mild intensity in the apical, anteroapical and inferoapical regions during stress and rest imaging which is most consistent with artifact but may be due to a small degree of coronary ischemia. EF was 79%. Overall, these results are most consistent with a low to intermediate risk for significant coronary artery disease. Depending on the patient's symptoms and level of clinical suspicion, aggressive medical management versus additional testing by coronary angiography may be indicated.      Ms. Ashley Mooney is here today for follow up. She reports doing much better then her last visit. She does report that she is no longer taking her aldactazide, she is up 7 pounds and blood pressure is elevated today. She denied any chest pain, pressure or tightness. No shortness of breath at this time. No lightheaded or dizziness. No further issues with tiredness or fatigue. She also denied any heart palpitations. She did receive some shots and at this time she is feeling much better at this time. She denied any abdominal pain, nausea or vomiting. No issues moving her bowels. No fever, cough or chills. She does report that her blood pressure at home is usually in the 116 mmHg range systolic. She also denies any increased lower extremity edema, bleeding problems, problems with her medications or any other concerns at this time. PAST MEDICAL HISTORY:        Past Medical History:   Diagnosis Date    Arthritis     Asthma     [facility]     Diabetes mellitus [facility]     GERD (gastroesophageal reflux disease)     H/O echocardiogram 3/9/16    EF >60%. LV wall thickness is mildly increased. Mild mitral and tricuspid regurgitation. Borderline pulmonary hypertension estimated right ventricular sysolic pressure of 12NOSD. Mild (grade l) diastolic dysfunction.  History of PFTs 3/10/16     Suboptimal effort. Restrictive in nature. clionical correlations is advised.  History of stress test 2/18/16    Abnoraml. Moderate perfusion defect of mild to moderate intensity in the anterolateral and anteroapical regions during stress imaging. Intermediate risk for CAD.  Hyperlipidemia     Hypertension      CURRENT ALLERGIES: Iv dye [iodides] REVIEW OF SYSTEMS: 14 systems were reviewed. Pertinent positives and negatives as above, all else negative.      Past Surgical History:   Procedure Laterality Date    BREAST SURGERY      cyst removed    CARDIAC CATHETERIZATION  2014    CARDIAC CATHETERIZATION Left 05/01/2019    [facility] Rad[facility]/     SECTION      CHOLECYSTECTOMY      COLONOSCOPY  3/23/15    -diverticulosis,hemorrhoids    FOOT SURGERY      rt    HYSTERECTOMY (CERVIX STATUS UNKNOWN)      Social History:  Social History     Tobacco Use    Smoking status: Never Smoker    Smokeless tobacco: Never Used   Vaping Use    Vaping Use: Never used   Substance Use Topics    Alcohol use: No     Alcohol/week: 0.0 standard drinks    Drug use: No        CURRENT MEDICATIONS:        Outpatient Medications Marked as Taking for the 22 encounter (Office Visit) with Nila Jones PA-C   Medication Sig Dispense Refill    atorvastatin (LIPITOR) 40 MG tablet Take 1 tablet by mouth daily 90 tablet 3    amLODIPine (NORVASC) 5 MG tablet Take 1 tablet by mouth daily 90 tablet 3    atenolol (TENORMIN) 100 MG tablet Take 1 tablet by mouth daily 90 tablet 3    hydrALAZINE (APRESOLINE) 50 MG tablet Take 1 tablet by mouth 3 times daily 270 tablet 3    potassium chloride (KLOR-CON M) 20 MEQ extended release tablet Take 1 tablet by mouth 2 times daily 180 tablet 3    valsartan (DIOVAN) 320 MG tablet Take 1 tablet by mouth daily 90 tablet 3    metFORMIN (GLUCOPHAGE) 500 MG tablet Take 1 tablet by mouth 2 times daily (with meals) 180 tablet 3    fluticasone (FLONASE) 50 MCG/ACT nasal spray 2 sprays by Each Nostril route daily 48 g 1    ascorbic acid (VITAMIN C) 500 MG tablet Take 1 tablet by mouth 2 times daily With iron tablet 180 tablet 3    Calcium Carbonate-Vitamin D (OYSTER SHELL CALCIUM/D) 500-200 MG-UNIT TABS Take 1 tablet by mouth 2 times daily 180 tablet 3    albuterol sulfate HFA (VENTOLIN HFA) 108 (90 Base) MCG/ACT inhaler Inhale 2 puffs into the lungs 4 times daily as needed for Wheezing 1 Inhaler 0    cetirizine (ZYRTEC ALLERGY) 10 MG tablet Take 1 tablet by mouth daily 30 tablet 3    dorzolamide-timolol (COSOPT) 22.3-6.8 MG/ML ophthalmic solution Place 1 drop into both eyes 2 times daily       Multiple Vitamins-Minerals (THERAPEUTIC MULTIVITAMIN-MINERALS) tablet Take 1 tablet by mouth daily 30 tablet 11    latanoprost (XALATAN) 0.005 % ophthalmic solution Place 1 drop into both eyes nightly       blood glucose test strips (ONE TOUCH ULTRA TEST) strip USE 1 STRIP TO CHECK GLUCOSE DAILY 100 strip 3    ONE TOUCH LANCETS MISC Lancets- One Touch Delica for testing daily and as needed. DX: Diabetes type  E11.9 100 each 3    Blood Glucose Monitoring Suppl GENNY 1 Units by Does not apply route 3 times daily What insurance will cover 1 Device 0       FAMILY HISTORY: family history includes Diabetes in her brother and sister; High Blood Pressure in her sister; Stroke in her brother, father, and sister. Physical Examination:     BP (!) 147/65 (Site: Left Upper Arm, Position: Sitting, Cuff Size: Medium Adult)   Pulse 59   Resp 18   Ht 5' 4\" (1.626 m)   Wt 165 lb (74.8 kg)   SpO2 99%   BMI 28.32 kg/m²  Body mass index is 28.32 kg/m². Constitutional: She is oriented to person, place, and time. She appears well-developed and well-nourished. In no acute distress. HEENT: Normocephalic and atraumatic. No JVD present. Carotid bruit is not present. No mass and no thyromegaly present. No lymphadenopathy present. Cardiovascular: Normal rate, regular rhythm, normal heart sounds. Exam reveals no gallop and no friction rubs. 3/6 systolic murmur, 2nd intercostal space on the RIGHT just lateral to the sternum. Pulmonary/Chest: Effort normal and breath sounds normal. No respiratory distress. She has no wheezes, rhonchi or rales. Abdominal: Soft, non-tender. Bowel sounds and aorta are normal. She exhibits no organomegaly, mass or bruit. Extremities: Trace. No cyanosis or clubbing. 2+ radial and carotid pulses. Distal extremity pulses: 2+ bilaterally. Neurological: She is alert and oriented to person, place, and time. No evidence of gross cranial nerve deficit.  Coordination appeared normal.   Skin: Skin is warm and dry. There is no rash or diaphoresis. Psychiatric: She has a normal mood and affect. Her speech is normal and behavior is normal.      MOST RECENT LABS ON RECORD:   Lab Results   Component Value Date    WBC 8.9 05/17/2022    HGB 10.3 (L) 05/17/2022    HCT 32.1 (L) 05/17/2022     05/17/2022    CHOL 138 07/27/2021    TRIG 55 07/27/2021    HDL 53 07/27/2021    ALT <5 (L) 04/26/2022    AST 9 04/26/2022     05/17/2022    K 4.2 05/17/2022     05/17/2022    CREATININE 1.16 (H) 05/17/2022    BUN 12 05/17/2022    CO2 24 05/17/2022    TSH 1.36 03/01/2022    INR 0.9 04/10/2016    LABA1C 5.6 07/27/2021    LABMICR 9 07/28/2021       ASSESSMENT:     1. Equivocal stress test    2. Mild CAD    3. Essential hypertension    4. Mixed hyperlipidemia       PLAN:        · Equivocal Stress Test: Low Risk  · In spite of her intermittent symptoms, I believe these symptoms which have now resolved were relatively atypical in nature and therefore is likely not due to coronary ischemia. Although her stress test was not perfect, it was relatively unremarkable with a low and at most low-intermediate risk and based on her atypical symptoms and relatively unremarkable echocardiogram, I honestly do not think that further workup for a cardiac source of her symptoms is likely indicated. Having said this, I did reiterate the reality that no test is 100% sensitive and therefore if symptoms persist, worsen and/or clinical suspicion for significant ischemic coronary artery disease remains high, further testing including the possibility of cardiac catheterization may be appropriate to reconsider. In the end, Ms. Lizette Jaime said that she was not really worried about the symptoms and therefore at least for the time being preferred to not go through with anymore diagnostic cardiac testing or treatment unless absolutely necessary.  I do thin this is a reasonable approach, however I told her to be sure to call you or call our office if her symptoms worsened and/or if she changed her mind about this approach and at that point we could reconsider additional testing and/or treatment strategies. · Mild Coronary Artery Disease: Appears stable with no active chest pains at this time. · Beta Blocker: Continue Atenolol (Tenormin) 100 mg daily. Antiplatelet Agent: START Aspirin 81 mg daily. I also reminded them to watch for signs of bloody or black tarry stools and stop the medication immediately if this develops as this could be life threatening. · Anti-anginal medications: Continue amlodipine (Norvasc) 5 mg once daily. · Cholesterol Reduction Therapy: Continue Atorvastatin (Lipitor) 40 mg daily. · Additional counseling: I advised them to call our office or go to the emergency room if they developed worsening or persistent chest pain or increased shortness of breath as this could be life threatening. · Essential Hypertension: Uncontrolled, Echo from 9/2021 showed mild LVH    Beta Blocker: Continue Atenolol (Tenormin) 100 mg daily.  Calcium Channel Blocker: Continue amlodipine (Norvasc) 5 mg once daily.  Diuretics: RESTART Spironolactone-HCTZ (Aldactazide) 25-25 mg daily. I also discussed the potential side effects of this medication including lightheadedness and dizziness and instructed them to stop the medication of this occurs and call our office if this occurs.  I took the liberty of ordering a BMP in 5-7 days to assess their potassium and renal function. I told them that they could get their lab work performed at the location of their choosing, unfortunately, if the lab work was not performed at a [facility]) facility I could not guarantee my ability to follow up with them on their results.      I was also a little bit concerned about her blood pressure which was elevated on 2 checks today but she insisted that her blood pressures at home and in your office are normal and therefore I told her to keep an eye on this and follow up with you as previously scheduled for continued monitoring and treatment of this problem. I also told her to check her blood pressure at home intermittently and call your office if her blood pressure continues to stay up above the 620'B-326'Y systolic. · Hyperlipidemia: Mixed, LDL done on 7/27/2021 was 74 mg/dL   · Cholesterol Reduction Therapy: Continue Atorvastatin (Lipitor) 40 mg daily. · Lipid panel is ordered. I also told Ms. Lizette Jaime to continue her other medications and follow up with you as previously scheduled. FOLLOW UP:   I told Ms. Lizette Jaime to call my office if she had any problems, but otherwise told her to Return in about 1 year (around 6/28/2023). However, I would be happy to see her sooner should the need arise. Once again, thank you for allowing me to participate in this patients care. Please do not hesitate to contact me could I be of further assistance. Sincerely,  Anahi Mariscal PA-C  [facility]     [address]  Phone: [phone], Fax: [phone]     I believe that the risk of significant morbidity and mortality related to the patient's current medical conditions are: Intermediate. Approximately 35 minutes were spent during prep work, discussion and exam of the patient, and follow up documentation and all of their questions were answered. The documentation recorded by the scribe, accurately and completely reflects the services I personally performed and the decisions made by me.  Anahi Mariscal PA-C June 28, 2022 [place]

## 2024-01-11 DIAGNOSIS — E78.2 MIXED HYPERLIPIDEMIA: ICD-10-CM

## 2024-01-11 RX ORDER — ATORVASTATIN CALCIUM 40 MG/1
40 TABLET, FILM COATED ORAL DAILY
Qty: 90 TABLET | Refills: 3 | Status: SHIPPED | OUTPATIENT
Start: 2024-01-11

## 2024-02-05 ENCOUNTER — OFFICE VISIT (OUTPATIENT)
Dept: PRIMARY CARE CLINIC | Age: 74
End: 2024-02-05
Payer: COMMERCIAL

## 2024-02-05 VITALS
SYSTOLIC BLOOD PRESSURE: 122 MMHG | WEIGHT: 144.2 LBS | DIASTOLIC BLOOD PRESSURE: 68 MMHG | BODY MASS INDEX: 24.62 KG/M2 | HEART RATE: 85 BPM | OXYGEN SATURATION: 97 % | TEMPERATURE: 97.5 F | RESPIRATION RATE: 18 BRPM | HEIGHT: 64 IN

## 2024-02-05 DIAGNOSIS — J30.1 SEASONAL ALLERGIC RHINITIS DUE TO POLLEN: ICD-10-CM

## 2024-02-05 DIAGNOSIS — J45.909 BRONCHITIS WITH ASTHMA, ACUTE: Primary | ICD-10-CM

## 2024-02-05 DIAGNOSIS — J20.9 BRONCHITIS WITH ASTHMA, ACUTE: Primary | ICD-10-CM

## 2024-02-05 LAB
INFLUENZA A ANTIGEN, POC: NEGATIVE
INFLUENZA B ANTIGEN, POC: NEGATIVE
LOT NUMBER POC: NORMAL
SARS-COV-2 RNA POC - COV: NORMAL
VALID INTERNAL CONTROL, POC: YES
VENDOR AND KIT NAME POC: NORMAL

## 2024-02-05 PROCEDURE — 99214 OFFICE O/P EST MOD 30 MIN: CPT | Performed by: NURSE PRACTITIONER

## 2024-02-05 PROCEDURE — 1123F ACP DISCUSS/DSCN MKR DOCD: CPT | Performed by: NURSE PRACTITIONER

## 2024-02-05 PROCEDURE — 3074F SYST BP LT 130 MM HG: CPT | Performed by: NURSE PRACTITIONER

## 2024-02-05 PROCEDURE — 3078F DIAST BP <80 MM HG: CPT | Performed by: NURSE PRACTITIONER

## 2024-02-05 RX ORDER — GUAIFENESIN 600 MG/1
600 TABLET, EXTENDED RELEASE ORAL 2 TIMES DAILY
Qty: 30 TABLET | Refills: 0 | Status: SHIPPED | OUTPATIENT
Start: 2024-02-05 | End: 2024-02-20

## 2024-02-05 RX ORDER — DOXYCYCLINE HYCLATE 100 MG
100 TABLET ORAL 2 TIMES DAILY
Qty: 20 TABLET | Refills: 0 | Status: SHIPPED | OUTPATIENT
Start: 2024-02-05 | End: 2024-02-15

## 2024-02-05 RX ORDER — FLUTICASONE PROPIONATE 50 MCG
2 SPRAY, SUSPENSION (ML) NASAL DAILY
Qty: 48 G | Refills: 1 | Status: SHIPPED | OUTPATIENT
Start: 2024-02-05

## 2024-02-05 ASSESSMENT — PATIENT HEALTH QUESTIONNAIRE - PHQ9
SUM OF ALL RESPONSES TO PHQ QUESTIONS 1-9: 0
1. LITTLE INTEREST OR PLEASURE IN DOING THINGS: 0
SUM OF ALL RESPONSES TO PHQ9 QUESTIONS 1 & 2: 0
SUM OF ALL RESPONSES TO PHQ QUESTIONS 1-9: 0
2. FEELING DOWN, DEPRESSED OR HOPELESS: 0

## 2024-02-05 ASSESSMENT — ENCOUNTER SYMPTOMS
SORE THROAT: 0
RHINORRHEA: 1
GASTROINTESTINAL NEGATIVE: 1
COUGH: 1
SINUS PAIN: 0
EYES NEGATIVE: 1
SINUS PRESSURE: 0

## 2024-02-05 NOTE — PATIENT INSTRUCTIONS
SURVEY:     You may be receiving a survey from [facility] regarding your visit today.     Please complete the survey to enable us to provide the highest quality of care to you and your family.     If you cannot score us a very good on any question, please call the office to discuss how we could have made your experience a very good one.     Thank you,    Eusebio Bullock, APRN-CNP  Teresa Henderson, APRN-CNP  Niki, EVERARDO Glaser, NEY Sweeney, CMA  [place], CMA  [place], PCA  [place], PM

## 2024-02-05 NOTE — PROGRESS NOTES
days.  Warm salt water gargles for sore throat  Cool mist humidifier  Hot tea with honey and lemon for cough and sore throat PRN  Patient instructions given for upper respiratory infection.     1.  Haily received counseling on healthy behaviors and Education discussed during visit.  2.  Patient given educational materials - see patient instructions  3.  Patient was provided AVS.  4.  Discussed use, benefit, and side effects of prescribed medications.  Barriers to medication compliance addressed.  All patient questions answered.Pt voiced understanding.   5.  Reviewed prior labs and health maintenance  6.  Continue current medications, diet and exercise.    Completed Refills   Requested Prescriptions     Signed Prescriptions Disp Refills    fluticasone (FLONASE) 50 MCG/ACT nasal spray 48 g 1     Si sprays by Each Nostril route daily    doxycycline hyclate (VIBRA-TABS) 100 MG tablet 20 tablet 0     Sig: Take 1 tablet by mouth 2 times daily for 10 days    guaiFENesin (MUCINEX) 600 MG extended release tablet 30 tablet 0     Sig: Take 1 tablet by mouth 2 times daily for 15 days         No follow-ups on file.

## 2024-02-28 ENCOUNTER — TELEPHONE (OUTPATIENT)
Dept: PRIMARY CARE CLINIC | Age: 74
End: 2024-02-28

## 2024-02-28 NOTE — TELEPHONE ENCOUNTER
----- Message from Anny Parra sent at 2/28/2024 11:38 AM EST -----  Subject: Message to Provider    QUESTIONS  Information for Provider? Per [employer], please reach out to the   patient regarding for medication adherence for losartan 320 MG   ---------------------------------------------------------------------------  --------------  CALL BACK INFO  [phone]; Do not leave any message, patient will call back for answer  ---------------------------------------------------------------------------  --------------  SCRIPT ANSWERS  Relationship to Patient? Covered Entity  Covered Entity Type? Health Insurance?  Representative Name? Darin

## 2024-03-11 DIAGNOSIS — I10 ESSENTIAL HYPERTENSION: ICD-10-CM

## 2024-03-11 RX ORDER — VALSARTAN 320 MG/1
320 TABLET ORAL DAILY
Qty: 90 TABLET | Refills: 3 | Status: SHIPPED | OUTPATIENT
Start: 2024-03-11

## 2024-03-11 NOTE — TELEPHONE ENCOUNTER
Health Maintenance   Topic Date Due    Diabetic retinal exam  06/15/2018    Annual Wellness Visit (Medicare Advantage)  01/01/2024    DTaP/Tdap/Td vaccine (1 - Tdap) 05/30/2024 (Originally 1/9/1969)    Diabetic foot exam  10/10/2024 (Originally 7/27/2021)    Shingles vaccine (1 of 2) 12/12/2024 (Originally 1/9/2000)    Hepatitis C screen  12/12/2024 (Originally [birth year]/1968)    COVID-19 Vaccine (3 - 2023-24 season) 02/05/2025 (Originally 9/1/2023)    Respiratory Syncytial Virus (RSV) Pregnant or age 60 yrs+ (1 - 1-dose 60+ series) 02/05/2025 (Originally 1/9/2010)    Breast cancer screen  05/10/2024    A1C test (Diabetic or Prediabetic)  10/09/2024    Diabetic Alb to Cr ratio (uACR) test  10/09/2024    Lipids  10/09/2024    GFR test (Diabetes, CKD 3-4, OR last GFR 15-59)  11/08/2024    Depression Screen  02/05/2025    Colorectal Cancer Screen  01/23/2034    DEXA (modify frequency per FRAX score)  Completed    Flu vaccine  Completed    Pneumococcal 65+ years Vaccine  Completed    Hepatitis A vaccine  Aged Out    Hepatitis B vaccine  Aged Out    Hib vaccine  Aged Out    Polio vaccine  Aged Out    Meningococcal (ACWY) vaccine  Aged Out             (applicable per patient's age: Cancer Screenings, Depression Screening, Fall Risk Screening, Immunizations)    Hemoglobin A1C (%)   Date Value   10/09/2023 4.9   04/06/2023 5.6   10/27/2022 5.5     LDL Cholesterol (mg/dL)   Date Value   10/09/2023 28     AST (U/L)   Date Value   10/17/2023 16     ALT (U/L)   Date Value   10/17/2023 6     BUN (mg/dL)   Date Value   11/08/2023 10      (goal A1C is < 7)   (goal LDL is <100) need 30-50% reduction from baseline     BP Readings from Last 3 Encounters:   02/05/24 122/68   12/12/23 116/78   10/17/23 106/77    (goal /80)      All Future Testing planned in [facility]:  Lab Frequency Next Occurrence   Echo (TTE) complete with contrast, bubble, strain, and 3D PRN Once 07/31/2023   Basic Metabolic Panel Once 10/10/2023   Hemoglobin A1C

## 2024-03-26 ENCOUNTER — APPOINTMENT (OUTPATIENT)
Dept: VASCULAR LAB | Age: 74
DRG: 206 | End: 2024-03-26
Payer: COMMERCIAL

## 2024-03-26 ENCOUNTER — TELEPHONE (OUTPATIENT)
Dept: PRIMARY CARE CLINIC | Age: 74
End: 2024-03-26

## 2024-03-26 ENCOUNTER — HOSPITAL ENCOUNTER (INPATIENT)
Age: 74
LOS: 1 days | Discharge: HOME | DRG: 206 | End: 2024-03-27
Attending: EMERGENCY MEDICINE | Admitting: STUDENT IN AN ORGANIZED HEALTH CARE EDUCATION/TRAINING PROGRAM
Payer: COMMERCIAL

## 2024-03-26 ENCOUNTER — APPOINTMENT (OUTPATIENT)
Dept: GENERAL RADIOLOGY | Age: 74
DRG: 206 | End: 2024-03-26
Payer: COMMERCIAL

## 2024-03-26 DIAGNOSIS — E83.42 HYPOMAGNESEMIA: ICD-10-CM

## 2024-03-26 DIAGNOSIS — R06.02 SHORTNESS OF BREATH: Primary | ICD-10-CM

## 2024-03-26 PROBLEM — R09.02 HYPOXIA: Status: ACTIVE | Noted: 2024-03-26

## 2024-03-26 LAB
ALBUMIN SERPL-MCNC: 3.3 G/DL (ref 3.5–5.2)
ALBUMIN/GLOB SERPL: 1 {RATIO} (ref 1–2.5)
ALP SERPL-CCNC: 124 U/L (ref 35–104)
ALT SERPL-CCNC: <5 U/L (ref 5–33)
ANION GAP SERPL CALCULATED.3IONS-SCNC: 11 MMOL/L (ref 9–17)
AST SERPL-CCNC: 11 U/L
BASOPHILS # BLD: 0.04 K/UL (ref 0–0.2)
BASOPHILS NFR BLD: 1 % (ref 0–2)
BILIRUB SERPL-MCNC: 0.5 MG/DL (ref 0.3–1.2)
BNP SERPL-MCNC: 238 PG/ML
BUN SERPL-MCNC: 9 MG/DL (ref 8–23)
BUN/CREAT SERPL: 8 (ref 9–20)
CALCIUM SERPL-MCNC: 8.9 MG/DL (ref 8.6–10.4)
CHLORIDE SERPL-SCNC: 103 MMOL/L (ref 98–107)
CO2 SERPL-SCNC: 24 MMOL/L (ref 20–31)
CREAT SERPL-MCNC: 1.2 MG/DL (ref 0.5–0.9)
D DIMER PPP FEU-MCNC: 2.91 UG/ML FEU (ref 0–0.59)
EKG ATRIAL RATE: 104 BPM
EKG P AXIS: 64 DEGREES
EKG P-R INTERVAL: 142 MS
EKG Q-T INTERVAL: 330 MS
EKG QRS DURATION: 76 MS
EKG QTC CALCULATION (BAZETT): 433 MS
EKG R AXIS: 8 DEGREES
EKG T AXIS: 110 DEGREES
EKG VENTRICULAR RATE: 104 BPM
EOSINOPHIL # BLD: 0.04 K/UL (ref 0–0.44)
EOSINOPHILS RELATIVE PERCENT: 1 % (ref 1–4)
ERYTHROCYTE [DISTWIDTH] IN BLOOD BY AUTOMATED COUNT: 12.3 % (ref 11.8–14.4)
GFR SERPL CREATININE-BSD FRML MDRD: 48 ML/MIN/1.73M2
GLUCOSE BLD-MCNC: 75 MG/DL (ref 74–100)
GLUCOSE BLD-MCNC: 75 MG/DL (ref 74–100)
GLUCOSE SERPL-MCNC: 88 MG/DL (ref 70–99)
HCT VFR BLD AUTO: 33.2 % (ref 36.3–47.1)
HGB BLD-MCNC: 10.9 G/DL (ref 11.9–15.1)
IMM GRANULOCYTES # BLD AUTO: <0.03 K/UL (ref 0–0.3)
IMM GRANULOCYTES NFR BLD: 0 %
LYMPHOCYTES NFR BLD: 2.49 K/UL (ref 1.1–3.7)
LYMPHOCYTES RELATIVE PERCENT: 44 % (ref 24–43)
MAGNESIUM SERPL-MCNC: 1.1 MG/DL (ref 1.6–2.6)
MCH RBC QN AUTO: 32.8 PG (ref 25.2–33.5)
MCHC RBC AUTO-ENTMCNC: 32.8 G/DL (ref 28.4–34.8)
MCV RBC AUTO: 100 FL (ref 82.6–102.9)
MONOCYTES NFR BLD: 0.52 K/UL (ref 0.1–1.2)
MONOCYTES NFR BLD: 9 % (ref 3–12)
NEUTROPHILS NFR BLD: 45 % (ref 36–65)
NEUTS SEG NFR BLD: 2.59 K/UL (ref 1.5–8.1)
NRBC BLD-RTO: 0 PER 100 WBC
PLATELET # BLD AUTO: 320 K/UL (ref 138–453)
PMV BLD AUTO: 8.8 FL (ref 8.1–13.5)
POTASSIUM SERPL-SCNC: 3.3 MMOL/L (ref 3.7–5.3)
PROT SERPL-MCNC: 6.5 G/DL (ref 6.4–8.3)
RBC # BLD AUTO: 3.32 M/UL (ref 3.95–5.11)
SARS-COV-2 RDRP RESP QL NAA+PROBE: NOT DETECTED
SODIUM SERPL-SCNC: 138 MMOL/L (ref 135–144)
SPECIMEN DESCRIPTION: NORMAL
TROPONIN I SERPL HS-MCNC: 15 NG/L (ref 0–14)
TROPONIN I SERPL HS-MCNC: 15 NG/L (ref 0–14)
WBC OTHER # BLD: 5.7 K/UL (ref 3.5–11.3)

## 2024-03-26 PROCEDURE — G0378 HOSPITAL OBSERVATION PER HR: HCPCS

## 2024-03-26 PROCEDURE — 83735 ASSAY OF MAGNESIUM: CPT

## 2024-03-26 PROCEDURE — 96372 THER/PROPH/DIAG INJ SC/IM: CPT

## 2024-03-26 PROCEDURE — 6370000000 HC RX 637 (ALT 250 FOR IP): Performed by: NURSE PRACTITIONER

## 2024-03-26 PROCEDURE — 94761 N-INVAS EAR/PLS OXIMETRY MLT: CPT

## 2024-03-26 PROCEDURE — 84484 ASSAY OF TROPONIN QUANT: CPT

## 2024-03-26 PROCEDURE — 96365 THER/PROPH/DIAG IV INF INIT: CPT

## 2024-03-26 PROCEDURE — 71045 X-RAY EXAM CHEST 1 VIEW: CPT

## 2024-03-26 PROCEDURE — 93010 ELECTROCARDIOGRAM REPORT: CPT | Performed by: INTERNAL MEDICINE

## 2024-03-26 PROCEDURE — 82947 ASSAY GLUCOSE BLOOD QUANT: CPT

## 2024-03-26 PROCEDURE — 99285 EMERGENCY DEPT VISIT HI MDM: CPT

## 2024-03-26 PROCEDURE — 85025 COMPLETE CBC W/AUTO DIFF WBC: CPT

## 2024-03-26 PROCEDURE — 85379 FIBRIN DEGRADATION QUANT: CPT

## 2024-03-26 PROCEDURE — 1200000000 HC SEMI PRIVATE

## 2024-03-26 PROCEDURE — 96366 THER/PROPH/DIAG IV INF ADDON: CPT

## 2024-03-26 PROCEDURE — 2580000003 HC RX 258: Performed by: NURSE PRACTITIONER

## 2024-03-26 PROCEDURE — 6360000002 HC RX W HCPCS: Performed by: EMERGENCY MEDICINE

## 2024-03-26 PROCEDURE — 83880 ASSAY OF NATRIURETIC PEPTIDE: CPT

## 2024-03-26 PROCEDURE — 93005 ELECTROCARDIOGRAM TRACING: CPT | Performed by: EMERGENCY MEDICINE

## 2024-03-26 PROCEDURE — 93970 EXTREMITY STUDY: CPT

## 2024-03-26 PROCEDURE — 0202U NFCT DS 22 TRGT SARS-COV-2: CPT

## 2024-03-26 PROCEDURE — 36415 COLL VENOUS BLD VENIPUNCTURE: CPT

## 2024-03-26 PROCEDURE — 80053 COMPREHEN METABOLIC PANEL: CPT

## 2024-03-26 PROCEDURE — 94669 MECHANICAL CHEST WALL OSCILL: CPT

## 2024-03-26 PROCEDURE — 6360000002 HC RX W HCPCS: Performed by: NURSE PRACTITIONER

## 2024-03-26 PROCEDURE — 87635 SARS-COV-2 COVID-19 AMP PRB: CPT

## 2024-03-26 PROCEDURE — 6370000000 HC RX 637 (ALT 250 FOR IP): Performed by: STUDENT IN AN ORGANIZED HEALTH CARE EDUCATION/TRAINING PROGRAM

## 2024-03-26 PROCEDURE — 83036 HEMOGLOBIN GLYCOSYLATED A1C: CPT

## 2024-03-26 RX ORDER — LATANOPROST 50 UG/ML
1 SOLUTION/ DROPS OPHTHALMIC NIGHTLY
Status: DISCONTINUED | OUTPATIENT
Start: 2024-03-26 | End: 2024-03-27 | Stop reason: HOSPADM

## 2024-03-26 RX ORDER — GLUCAGON 1 MG/ML
1 KIT INJECTION PRN
Status: DISCONTINUED | OUTPATIENT
Start: 2024-03-26 | End: 2024-03-27 | Stop reason: HOSPADM

## 2024-03-26 RX ORDER — ONDANSETRON 2 MG/ML
4 INJECTION INTRAMUSCULAR; INTRAVENOUS EVERY 6 HOURS PRN
Status: DISCONTINUED | OUTPATIENT
Start: 2024-03-26 | End: 2024-03-27 | Stop reason: HOSPADM

## 2024-03-26 RX ORDER — FLUTICASONE PROPIONATE 50 MCG
2 SPRAY, SUSPENSION (ML) NASAL DAILY PRN
Status: DISCONTINUED | OUTPATIENT
Start: 2024-03-26 | End: 2024-03-27 | Stop reason: HOSPADM

## 2024-03-26 RX ORDER — DEXTROSE MONOHYDRATE 100 MG/ML
INJECTION, SOLUTION INTRAVENOUS CONTINUOUS PRN
Status: DISCONTINUED | OUTPATIENT
Start: 2024-03-26 | End: 2024-03-27 | Stop reason: HOSPADM

## 2024-03-26 RX ORDER — POLYETHYLENE GLYCOL 3350 17 G/17G
17 POWDER, FOR SOLUTION ORAL DAILY PRN
Status: DISCONTINUED | OUTPATIENT
Start: 2024-03-26 | End: 2024-03-27 | Stop reason: HOSPADM

## 2024-03-26 RX ORDER — TIMOLOL MALEATE 5 MG/ML
1 SOLUTION/ DROPS OPHTHALMIC 2 TIMES DAILY
Status: DISCONTINUED | OUTPATIENT
Start: 2024-03-26 | End: 2024-03-27 | Stop reason: HOSPADM

## 2024-03-26 RX ORDER — SODIUM CHLORIDE 9 MG/ML
INJECTION, SOLUTION INTRAVENOUS PRN
Status: DISCONTINUED | OUTPATIENT
Start: 2024-03-26 | End: 2024-03-27 | Stop reason: HOSPADM

## 2024-03-26 RX ORDER — POTASSIUM CHLORIDE 20 MEQ/1
20 TABLET, EXTENDED RELEASE ORAL 2 TIMES DAILY
Status: DISCONTINUED | OUTPATIENT
Start: 2024-03-26 | End: 2024-03-27 | Stop reason: HOSPADM

## 2024-03-26 RX ORDER — VALSARTAN 80 MG/1
320 TABLET ORAL DAILY
Status: DISCONTINUED | OUTPATIENT
Start: 2024-03-27 | End: 2024-03-27 | Stop reason: HOSPADM

## 2024-03-26 RX ORDER — PANTOPRAZOLE SODIUM 40 MG/1
40 TABLET, DELAYED RELEASE ORAL
Status: DISCONTINUED | OUTPATIENT
Start: 2024-03-27 | End: 2024-03-27 | Stop reason: HOSPADM

## 2024-03-26 RX ORDER — MAGNESIUM SULFATE 1 G/100ML
1000 INJECTION INTRAVENOUS ONCE
Status: COMPLETED | OUTPATIENT
Start: 2024-03-26 | End: 2024-03-26

## 2024-03-26 RX ORDER — HYDRALAZINE HYDROCHLORIDE 25 MG/1
50 TABLET, FILM COATED ORAL 3 TIMES DAILY
Status: DISCONTINUED | OUTPATIENT
Start: 2024-03-26 | End: 2024-03-27 | Stop reason: HOSPADM

## 2024-03-26 RX ORDER — ATORVASTATIN CALCIUM 40 MG/1
40 TABLET, FILM COATED ORAL DAILY
Status: DISCONTINUED | OUTPATIENT
Start: 2024-03-27 | End: 2024-03-27 | Stop reason: HOSPADM

## 2024-03-26 RX ORDER — DORZOLAMIDE HYDROCHLORIDE AND TIMOLOL MALEATE 20; 5 MG/ML; MG/ML
1 SOLUTION/ DROPS OPHTHALMIC 2 TIMES DAILY
Status: DISCONTINUED | OUTPATIENT
Start: 2024-03-26 | End: 2024-03-26 | Stop reason: CLARIF

## 2024-03-26 RX ORDER — INSULIN LISPRO 100 [IU]/ML
0-4 INJECTION, SOLUTION INTRAVENOUS; SUBCUTANEOUS
Status: DISCONTINUED | OUTPATIENT
Start: 2024-03-26 | End: 2024-03-27 | Stop reason: HOSPADM

## 2024-03-26 RX ORDER — DORZOLAMIDE HCL 20 MG/ML
1 SOLUTION/ DROPS OPHTHALMIC 2 TIMES DAILY
Status: DISCONTINUED | OUTPATIENT
Start: 2024-03-26 | End: 2024-03-27 | Stop reason: HOSPADM

## 2024-03-26 RX ORDER — SODIUM CHLORIDE 0.9 % (FLUSH) 0.9 %
10 SYRINGE (ML) INJECTION PRN
Status: DISCONTINUED | OUTPATIENT
Start: 2024-03-26 | End: 2024-03-27 | Stop reason: HOSPADM

## 2024-03-26 RX ORDER — AMLODIPINE BESYLATE 5 MG/1
5 TABLET ORAL DAILY
Status: DISCONTINUED | OUTPATIENT
Start: 2024-03-27 | End: 2024-03-27 | Stop reason: HOSPADM

## 2024-03-26 RX ORDER — ACETAMINOPHEN 650 MG/1
650 SUPPOSITORY RECTAL EVERY 6 HOURS PRN
Status: DISCONTINUED | OUTPATIENT
Start: 2024-03-26 | End: 2024-03-27 | Stop reason: HOSPADM

## 2024-03-26 RX ORDER — ASPIRIN 81 MG/1
81 TABLET ORAL DAILY
Status: DISCONTINUED | OUTPATIENT
Start: 2024-03-27 | End: 2024-03-27 | Stop reason: HOSPADM

## 2024-03-26 RX ORDER — INSULIN LISPRO 100 [IU]/ML
0-4 INJECTION, SOLUTION INTRAVENOUS; SUBCUTANEOUS NIGHTLY
Status: DISCONTINUED | OUTPATIENT
Start: 2024-03-26 | End: 2024-03-27 | Stop reason: HOSPADM

## 2024-03-26 RX ORDER — ATENOLOL 25 MG/1
100 TABLET ORAL DAILY
Status: DISCONTINUED | OUTPATIENT
Start: 2024-03-27 | End: 2024-03-27 | Stop reason: HOSPADM

## 2024-03-26 RX ORDER — POTASSIUM CHLORIDE 7.45 MG/ML
10 INJECTION INTRAVENOUS PRN
Status: DISCONTINUED | OUTPATIENT
Start: 2024-03-26 | End: 2024-03-27 | Stop reason: HOSPADM

## 2024-03-26 RX ORDER — ENOXAPARIN SODIUM 100 MG/ML
1 INJECTION SUBCUTANEOUS 2 TIMES DAILY
Status: DISCONTINUED | OUTPATIENT
Start: 2024-03-26 | End: 2024-03-27 | Stop reason: HOSPADM

## 2024-03-26 RX ORDER — POTASSIUM CHLORIDE 20 MEQ/1
40 TABLET, EXTENDED RELEASE ORAL PRN
Status: DISCONTINUED | OUTPATIENT
Start: 2024-03-26 | End: 2024-03-27 | Stop reason: HOSPADM

## 2024-03-26 RX ORDER — ONDANSETRON 4 MG/1
4 TABLET, ORALLY DISINTEGRATING ORAL EVERY 8 HOURS PRN
Status: DISCONTINUED | OUTPATIENT
Start: 2024-03-26 | End: 2024-03-27 | Stop reason: HOSPADM

## 2024-03-26 RX ORDER — ACETAMINOPHEN 325 MG/1
650 TABLET ORAL EVERY 6 HOURS PRN
Status: DISCONTINUED | OUTPATIENT
Start: 2024-03-26 | End: 2024-03-27 | Stop reason: HOSPADM

## 2024-03-26 RX ORDER — CETIRIZINE HYDROCHLORIDE 10 MG/1
5 TABLET ORAL
Status: DISCONTINUED | OUTPATIENT
Start: 2024-03-26 | End: 2024-03-27 | Stop reason: HOSPADM

## 2024-03-26 RX ORDER — SODIUM CHLORIDE 0.9 % (FLUSH) 0.9 %
10 SYRINGE (ML) INJECTION EVERY 12 HOURS SCHEDULED
Status: DISCONTINUED | OUTPATIENT
Start: 2024-03-26 | End: 2024-03-27 | Stop reason: HOSPADM

## 2024-03-26 RX ORDER — FUROSEMIDE 40 MG/1
40 TABLET ORAL DAILY
Status: DISCONTINUED | OUTPATIENT
Start: 2024-03-26 | End: 2024-03-27 | Stop reason: HOSPADM

## 2024-03-26 RX ADMIN — CALCIUM CARBONATE-VITAMIN D TAB 500 MG-200 UNIT 1 TABLET: 500-200 TAB at 15:21

## 2024-03-26 RX ADMIN — METFORMIN HYDROCHLORIDE 500 MG: 500 TABLET ORAL at 17:23

## 2024-03-26 RX ADMIN — FUROSEMIDE 40 MG: 40 TABLET ORAL at 15:21

## 2024-03-26 RX ADMIN — SODIUM CHLORIDE, PRESERVATIVE FREE 10 ML: 5 INJECTION INTRAVENOUS at 21:12

## 2024-03-26 RX ADMIN — DORZOLAMIDE HYDROCHLORIDE 1 DROP: 20 SOLUTION/ DROPS OPHTHALMIC at 21:05

## 2024-03-26 RX ADMIN — MAGNESIUM SULFATE HEPTAHYDRATE 1000 MG: 1 INJECTION, SOLUTION INTRAVENOUS at 16:03

## 2024-03-26 RX ADMIN — LATANOPROST 1 DROP: 50 SOLUTION/ DROPS OPHTHALMIC at 21:05

## 2024-03-26 RX ADMIN — TIMOLOL MALEATE 1 DROP: 5 SOLUTION OPHTHALMIC at 21:05

## 2024-03-26 RX ADMIN — HYDRALAZINE HYDROCHLORIDE 50 MG: 25 TABLET, FILM COATED ORAL at 15:21

## 2024-03-26 RX ADMIN — HYDRALAZINE HYDROCHLORIDE 50 MG: 25 TABLET, FILM COATED ORAL at 21:04

## 2024-03-26 RX ADMIN — POTASSIUM CHLORIDE 20 MEQ: 1500 TABLET, EXTENDED RELEASE ORAL at 15:21

## 2024-03-26 RX ADMIN — ENOXAPARIN SODIUM 60 MG: 100 INJECTION SUBCUTANEOUS at 17:23

## 2024-03-26 RX ADMIN — POTASSIUM CHLORIDE 20 MEQ: 1500 TABLET, EXTENDED RELEASE ORAL at 21:04

## 2024-03-26 RX ADMIN — CETIRIZINE HYDROCHLORIDE 5 MG: 10 TABLET, FILM COATED ORAL at 21:04

## 2024-03-26 RX ADMIN — MAGNESIUM SULFATE HEPTAHYDRATE 1000 MG: 1 INJECTION, SOLUTION INTRAVENOUS at 13:57

## 2024-03-26 ASSESSMENT — PAIN - FUNCTIONAL ASSESSMENT: PAIN_FUNCTIONAL_ASSESSMENT: NONE - DENIES PAIN

## 2024-03-26 NOTE — ED PROVIDER NOTES
[facility]  EMERGENCY DEPARTMENT ENCOUNTER      Pt Name: Haily Kowalski  MRN: [id number]  Birthdate 1950  Date of evaluation: 3/26/2024  Provider: Ector Avila MD    CHIEF COMPLAINT       Chief Complaint   Patient presents with    Hypertension     Started this morning has hx of HTN         HISTORY OF PRESENT ILLNESS      Haily Kowalski is a 74 y.o. female who presents to the emergency department because she is worried about her blood pressure.  Her  reports that her blood pressure was actually normal this morning, though they are both concerned about her heart rate, which was 120.  Patient routinely takes her blood pressure and typically has numbers that are within normal range but is not typically tachycardic.  Today, she notes no palpitations or chest pain.  However, she does have some dyspnea with exertion which she notes is new.     No recent fever or illness.  No nausea or vomiting.  No abdominal pain.  No HEENT complaints.    She does admit to some lower extremity swelling which is mild.    No other complaints at this time.      PAST MEDICAL HISTORY     Past Medical History:   Diagnosis Date    Arthritis     Asthma     Chronic bronchitis (HCC)     Diabetes mellitus (HCC)     GERD (gastroesophageal reflux disease)     H/O echocardiogram 3/9/16    EF >60%. LV wall thickness is mildly increased. Mild mitral and tricuspid regurgitation.  Borderline pulmonary hypertension estimated right ventricular sysolic pressure of 30mmHg. Mild (grade l) diastolic dysfunction.    History of PFTs 3/10/16     Suboptimal effort. Restrictive in nature. clionical correlations is advised.    History of stress test 2/18/16    Abnoraml. Moderate perfusion defect of mild to moderate intensity in the anterolateral and anteroapical regions during stress imaging.  Intermediate risk for CAD.    Hyperlipidemia     Hypertension          SURGICAL HISTORY       Past Surgical History:   Procedure Laterality Date    BREAST SURGERY    mouth daily       ALLERGIES       Iv dye [iodides]    FAMILY HISTORY       Family History   Problem Relation Age of Onset    Stroke Father     Stroke Sister     Diabetes Sister         x6 sisters    High Blood Pressure Sister     Stroke Brother     Diabetes Brother           SOCIAL HISTORY       Social History     Tobacco Use    Smoking status: Never    Smokeless tobacco: Never   Vaping Use    Vaping Use: Never used   Substance Use Topics    Alcohol use: No     Alcohol/week: 0.0 standard drinks of alcohol    Drug use: No         PHYSICAL EXAM       ED Triage Vitals   BP Temp Temp Source Pulse Respirations SpO2 Height Weight   03/26/24 1120 03/26/24 1124 03/26/24 1124 03/26/24 1120 03/26/24 1120 03/26/24 1120 03/26/24 1120 --   (!) 145/57 98.2 °F (36.8 °C) Oral (!) 114 18 100 % 1.626 m (5' 4\")        Physical Exam  Vitals reviewed.   Constitutional:       General: She is not in acute distress.     Appearance: She is not ill-appearing, toxic-appearing or diaphoretic.   HENT:      Head: Normocephalic and atraumatic.      Nose: Nose normal.      Mouth/Throat:      Mouth: Mucous membranes are moist.      Pharynx: Oropharynx is clear. No oropharyngeal exudate or posterior oropharyngeal erythema.   Eyes:      General: No scleral icterus.  Neck:      Vascular: No JVD.   Cardiovascular:      Rate and Rhythm: Normal rate and regular rhythm.      Heart sounds: No murmur heard.  Pulmonary:      Effort: Pulmonary effort is normal. No respiratory distress.      Breath sounds: Normal breath sounds. No stridor. No wheezing, rhonchi or rales.   Abdominal:      General: There is no distension.      Palpations: Abdomen is soft. There is no mass.      Tenderness: There is no abdominal tenderness. There is no guarding or rebound.   Musculoskeletal:      Cervical back: Neck supple.      Comments: No LE swelling or calf TTP.   Skin:     General: Skin is warm and dry.      Coloration: Skin is not jaundiced or pale.   Neurological:

## 2024-03-26 NOTE — H&P
wheezing  Cardiovascular: negative for chest pain, negative for palpitations, and negative for syncope  Gastrointestinal: negative for abdominal pain, negative for nausea,negative for vomiting, negative for diarrhea, negative for constipation, and negative for hematochezia or melena  Genitourinary: negative for dysuria, negative for urinary urgency, negative for urinary frequency, and negative for hematuria  Skin: negative for skin rash, and negative for skin lesions  Neurological: negative for unilateral weakness, numbness or tingling.    Physical Exam:    Vitals:   Temp: 98.3 °F (36.8 °C)  BP: (!) 149/77  Respirations: 18  Pulse: 87  SpO2: 97 %  24HR INTAKE/OUTPUT:  No intake or output data in the 24 hours ending 03/26/24 1524    Weight      Body mass index is 24.07 kg/m².    Exam:  GEN:    Awake, alert and oriented x3.   EYES:  EOMI, pupils equal   NECK: Supple. No lymphadenopathy.  No carotid bruit  CVS:    regular rate and rhythm, no audible murmur  PULM:  CTA, no wheezes, rales or rhonchi, no acute respiratory distress  ABD:    Bowels sounds normal.  Abdomen is soft.  No distention.  no tenderness to palpation.   EXT:   no edema bilaterally .  Right calf tenderness.   NEURO: Moves all extremities.  Motor and sensory are grossly intact  SKIN:  No rashes.  No skin lesions.    -----------------------------------------------------------------  Diagnostic Data:     DATA:    CBC:   Lab Results   Component Value Date    WBC 5.7 03/26/2024    RBC 3.32 (L) 03/26/2024    HGB 10.9 (L) 03/26/2024    HCT 33.2 (L) 03/26/2024    .0 03/26/2024     03/26/2024        CMP:   Lab Results   Component Value Date    GLUCOSE 88 03/26/2024    BUN 9 03/26/2024    CREATININE 1.2 (H) 03/26/2024     03/26/2024    K 3.3 (L) 03/26/2024    CALCIUM 8.9 03/26/2024     03/26/2024    CO2 24 03/26/2024    PROT 6.5 03/26/2024    LABALBU 3.3 (L) 03/26/2024    BILITOT 0.5 03/26/2024    ALKPHOS 124 (H) 03/26/2024    ALT <5  \"yes\", STOP HERE]     [] The patient's Advance Care Plan is NOT present because:    []  I confirmed today that the patient does not wish or was not able to name a   surrogate decision maker or provide and advance care plan.    [] Hospice care is currently being provided or has been provided within the   calendar year.    []  I did NOT confirm today the presence of an Advance Care Plan or surrogate   decision maker documented within the patient's medical record.   [DOES NOT SATISFY MIPS PERFORMANCE]    CORE MEASURES  DVT prophylaxis: Lovenox  Decubitus ulcer present on admission: No  CODE STATUS: FULL CODE  Nutrition Status: good   Physical therapy: Yes   Old Charts reviewed: Yes  EKG Reviewed: Yes  Advance Directive Addressed: Yes    TARA Gtz - CNP, TARA, NP-C  3/26/2024, 3:24 PM

## 2024-03-26 NOTE — PROGRESS NOTES
The patient arrived to room 322 at this time, vitals and assessment completed. The patients has no complaints of sob or pain. The patient is placed on telemetry and the respiratory panel swab was collected and sent to lab. The patient will be stand by assist with no assistive devices. Neisha DOWELL RN in room completing navigator, will continue to monitor.

## 2024-03-26 NOTE — ED TRIAGE NOTES
Pt in via private auto for c/o \"high Blood pressure\" advised by pcp to come to ER for evaluation, pt v/s brought in BP-124/62 HR-121, pt also reports finding difficulty in catching breath, has hx of HTN, unaware of irregular/fast HR, denies chest pain/dizziness

## 2024-03-27 ENCOUNTER — APPOINTMENT (OUTPATIENT)
Dept: NUCLEAR MEDICINE | Age: 74
DRG: 206 | End: 2024-03-27
Payer: COMMERCIAL

## 2024-03-27 VITALS
HEART RATE: 70 BPM | BODY MASS INDEX: 24.07 KG/M2 | TEMPERATURE: 96.9 F | OXYGEN SATURATION: 97 % | DIASTOLIC BLOOD PRESSURE: 49 MMHG | SYSTOLIC BLOOD PRESSURE: 91 MMHG | WEIGHT: 141 LBS | HEIGHT: 64 IN | RESPIRATION RATE: 16 BRPM

## 2024-03-27 PROBLEM — E44.1 MILD MALNUTRITION (HCC): Status: ACTIVE | Noted: 2024-03-27

## 2024-03-27 LAB
ANION GAP SERPL CALCULATED.3IONS-SCNC: 10 MMOL/L (ref 9–17)
B PARAP IS1001 DNA NPH QL NAA+NON-PROBE: NOT DETECTED
B PERT DNA SPEC QL NAA+PROBE: NOT DETECTED
BASOPHILS # BLD: 0.03 K/UL (ref 0–0.2)
BASOPHILS NFR BLD: 1 % (ref 0–2)
BUN SERPL-MCNC: 12 MG/DL (ref 8–23)
BUN/CREAT SERPL: 13 (ref 9–20)
C PNEUM DNA NPH QL NAA+NON-PROBE: NOT DETECTED
CALCIUM SERPL-MCNC: 8.8 MG/DL (ref 8.6–10.4)
CHLORIDE SERPL-SCNC: 105 MMOL/L (ref 98–107)
CO2 SERPL-SCNC: 25 MMOL/L (ref 20–31)
CREAT SERPL-MCNC: 0.9 MG/DL (ref 0.5–0.9)
ECHO BSA: 1.69 M2
EOSINOPHIL # BLD: 0.13 K/UL (ref 0–0.44)
EOSINOPHILS RELATIVE PERCENT: 3 % (ref 1–4)
ERYTHROCYTE [DISTWIDTH] IN BLOOD BY AUTOMATED COUNT: 12.5 % (ref 11.8–14.4)
EST. AVERAGE GLUCOSE BLD GHB EST-MCNC: 91 MG/DL
FLUAV RNA NPH QL NAA+NON-PROBE: NOT DETECTED
FLUBV RNA NPH QL NAA+NON-PROBE: NOT DETECTED
GFR SERPL CREATININE-BSD FRML MDRD: 67 ML/MIN/1.73M2
GLUCOSE BLD-MCNC: 64 MG/DL (ref 74–100)
GLUCOSE BLD-MCNC: 85 MG/DL (ref 74–100)
GLUCOSE BLD-MCNC: 87 MG/DL (ref 74–100)
GLUCOSE SERPL-MCNC: 76 MG/DL (ref 70–99)
HADV DNA NPH QL NAA+NON-PROBE: NOT DETECTED
HBA1C MFR BLD: 4.8 % (ref 4–6)
HCOV 229E RNA NPH QL NAA+NON-PROBE: NOT DETECTED
HCOV HKU1 RNA NPH QL NAA+NON-PROBE: NOT DETECTED
HCOV NL63 RNA NPH QL NAA+NON-PROBE: NOT DETECTED
HCOV OC43 RNA NPH QL NAA+NON-PROBE: NOT DETECTED
HCT VFR BLD AUTO: 31.3 % (ref 36.3–47.1)
HGB BLD-MCNC: 10.4 G/DL (ref 11.9–15.1)
HMPV RNA NPH QL NAA+NON-PROBE: NOT DETECTED
HPIV1 RNA NPH QL NAA+NON-PROBE: NOT DETECTED
HPIV2 RNA NPH QL NAA+NON-PROBE: NOT DETECTED
HPIV3 RNA NPH QL NAA+NON-PROBE: NOT DETECTED
HPIV4 RNA NPH QL NAA+NON-PROBE: NOT DETECTED
IMM GRANULOCYTES # BLD AUTO: <0.03 K/UL (ref 0–0.3)
IMM GRANULOCYTES NFR BLD: 0 %
LYMPHOCYTES NFR BLD: 2.17 K/UL (ref 1.1–3.7)
LYMPHOCYTES RELATIVE PERCENT: 42 % (ref 24–43)
M PNEUMO DNA NPH QL NAA+NON-PROBE: NOT DETECTED
MCH RBC QN AUTO: 33 PG (ref 25.2–33.5)
MCHC RBC AUTO-ENTMCNC: 33.2 G/DL (ref 28.4–34.8)
MCV RBC AUTO: 99.4 FL (ref 82.6–102.9)
MONOCYTES NFR BLD: 0.56 K/UL (ref 0.1–1.2)
MONOCYTES NFR BLD: 11 % (ref 3–12)
NEUTROPHILS NFR BLD: 43 % (ref 36–65)
NEUTS SEG NFR BLD: 2.21 K/UL (ref 1.5–8.1)
NRBC BLD-RTO: 0 PER 100 WBC
PLATELET # BLD AUTO: 302 K/UL (ref 138–453)
PMV BLD AUTO: 9.2 FL (ref 8.1–13.5)
POTASSIUM SERPL-SCNC: 4.2 MMOL/L (ref 3.7–5.3)
RBC # BLD AUTO: 3.15 M/UL (ref 3.95–5.11)
RSV RNA NPH QL NAA+NON-PROBE: NOT DETECTED
RV+EV RNA NPH QL NAA+NON-PROBE: NOT DETECTED
SARS-COV-2 RNA NPH QL NAA+NON-PROBE: NOT DETECTED
SODIUM SERPL-SCNC: 140 MMOL/L (ref 135–144)
SPECIMEN DESCRIPTION: NORMAL
WBC OTHER # BLD: 5.1 K/UL (ref 3.5–11.3)

## 2024-03-27 PROCEDURE — 85025 COMPLETE CBC W/AUTO DIFF WBC: CPT

## 2024-03-27 PROCEDURE — G0378 HOSPITAL OBSERVATION PER HR: HCPCS

## 2024-03-27 PROCEDURE — 93970 EXTREMITY STUDY: CPT | Performed by: INTERNAL MEDICINE

## 2024-03-27 PROCEDURE — 6370000000 HC RX 637 (ALT 250 FOR IP): Performed by: NURSE PRACTITIONER

## 2024-03-27 PROCEDURE — 94761 N-INVAS EAR/PLS OXIMETRY MLT: CPT

## 2024-03-27 PROCEDURE — 6360000002 HC RX W HCPCS: Performed by: NURSE PRACTITIONER

## 2024-03-27 PROCEDURE — A9539 TC99M PENTETATE: HCPCS | Performed by: STUDENT IN AN ORGANIZED HEALTH CARE EDUCATION/TRAINING PROGRAM

## 2024-03-27 PROCEDURE — 2580000003 HC RX 258: Performed by: NURSE PRACTITIONER

## 2024-03-27 PROCEDURE — 3430000000 HC RX DIAGNOSTIC RADIOPHARMACEUTICAL: Performed by: STUDENT IN AN ORGANIZED HEALTH CARE EDUCATION/TRAINING PROGRAM

## 2024-03-27 PROCEDURE — 96372 THER/PROPH/DIAG INJ SC/IM: CPT

## 2024-03-27 PROCEDURE — 82947 ASSAY GLUCOSE BLOOD QUANT: CPT

## 2024-03-27 PROCEDURE — 80048 BASIC METABOLIC PNL TOTAL CA: CPT

## 2024-03-27 PROCEDURE — 78582 LUNG VENTILAT&PERFUS IMAGING: CPT

## 2024-03-27 PROCEDURE — A9540 TC99M MAA: HCPCS | Performed by: STUDENT IN AN ORGANIZED HEALTH CARE EDUCATION/TRAINING PROGRAM

## 2024-03-27 RX ORDER — KIT FOR THE PREPARATION OF TECHNETIUM TC 99M PENTETATE 20 MG/1
35 INJECTION, POWDER, LYOPHILIZED, FOR SOLUTION INTRAVENOUS; RESPIRATORY (INHALATION)
Status: COMPLETED | OUTPATIENT
Start: 2024-03-27 | End: 2024-03-27

## 2024-03-27 RX ADMIN — KIT FOR THE PREPARATION OF TECHNETIUM TC 99M PENTETATE 35 MILLICURIE: 20 INJECTION, POWDER, LYOPHILIZED, FOR SOLUTION INTRAVENOUS; RESPIRATORY (INHALATION) at 09:27

## 2024-03-27 RX ADMIN — SODIUM CHLORIDE, PRESERVATIVE FREE 10 ML: 5 INJECTION INTRAVENOUS at 09:07

## 2024-03-27 RX ADMIN — DORZOLAMIDE HYDROCHLORIDE 1 DROP: 20 SOLUTION/ DROPS OPHTHALMIC at 09:09

## 2024-03-27 RX ADMIN — POTASSIUM CHLORIDE 20 MEQ: 1500 TABLET, EXTENDED RELEASE ORAL at 09:07

## 2024-03-27 RX ADMIN — ASPIRIN 81 MG: 81 TABLET, COATED ORAL at 09:08

## 2024-03-27 RX ADMIN — Medication 5 MILLICURIE: at 09:27

## 2024-03-27 RX ADMIN — ENOXAPARIN SODIUM 60 MG: 100 INJECTION SUBCUTANEOUS at 09:09

## 2024-03-27 RX ADMIN — PANTOPRAZOLE SODIUM 40 MG: 40 TABLET, DELAYED RELEASE ORAL at 07:08

## 2024-03-27 RX ADMIN — FUROSEMIDE 40 MG: 40 TABLET ORAL at 09:08

## 2024-03-27 RX ADMIN — ATENOLOL 100 MG: 25 TABLET ORAL at 09:07

## 2024-03-27 RX ADMIN — AMLODIPINE BESYLATE 5 MG: 5 TABLET ORAL at 09:08

## 2024-03-27 RX ADMIN — CALCIUM CARBONATE-VITAMIN D TAB 500 MG-200 UNIT 1 TABLET: 500-200 TAB at 07:08

## 2024-03-27 RX ADMIN — TIMOLOL MALEATE 1 DROP: 5 SOLUTION OPHTHALMIC at 09:09

## 2024-03-27 RX ADMIN — ATORVASTATIN CALCIUM 40 MG: 40 TABLET, FILM COATED ORAL at 09:08

## 2024-03-27 RX ADMIN — HYDRALAZINE HYDROCHLORIDE 50 MG: 25 TABLET, FILM COATED ORAL at 09:08

## 2024-03-27 RX ADMIN — VALSARTAN 320 MG: 80 TABLET, COATED ORAL at 09:07

## 2024-03-27 RX ADMIN — CALCIUM CARBONATE-VITAMIN D TAB 500 MG-200 UNIT 1 TABLET: 500-200 TAB at 16:05

## 2024-03-27 NOTE — PROGRESS NOTES
Pt is A/O x 4, calm and cooperative. Assessment and vital signs completed, see flow sheet.  Pt resting in bed and call light within reach. Pt denies pain. Denies further needs and will continue to monitor.

## 2024-03-27 NOTE — PLAN OF CARE
Problem: Discharge Planning  Goal: Discharge to home or other facility with appropriate resources  Outcome: Progressing  Flowsheets (Taken 3/26/2024 1934)  Discharge to home or other facility with appropriate resources:   Identify barriers to discharge with patient and caregiver   Arrange for needed discharge resources and transportation as appropriate   Identify discharge learning needs (meds, wound care, etc)   Refer to discharge planning if patient needs post-hospital services based on physician order or complex needs related to functional status, cognitive ability or social support system     Problem: Safety - Adult  Goal: Free from fall injury  Outcome: Progressing  Flowsheets (Taken 3/27/2024 0125)  Free From Fall Injury: Instruct family/caregiver on patient safety

## 2024-03-27 NOTE — PROGRESS NOTES
Vitals and assessment were completed at this time. Lung sounds are clear, diminished in the bases. Patient denies SOB.   Writer walked patient through the medications that would be administered tonight and encouraged patient to ask questions about the medications and therapies. Patient denies questions.  Patient is visiting with family at this time.   Call light and bedside table remain within reach. Patient denies needs at this time. Care ongoing.

## 2024-03-27 NOTE — PLAN OF CARE
Problem: Discharge Planning  Goal: Discharge to home or other facility with appropriate resources  3/27/2024 0930 by Beth Hurley, RN  Outcome: Progressing     Problem: Safety - Adult  Goal: Free from fall injury  3/27/2024 0930 by Beth Hurley, RN  Outcome: Progressing     Problem: Chronic Conditions and Co-morbidities  Goal: Patient's chronic conditions and co-morbidity symptoms are monitored and maintained or improved  Outcome: Progressing     Problem: Nutrition Deficit:  Goal: Optimize nutritional status  3/27/2024 0930 by Beth Hurley, RN  Outcome: Progressing

## 2024-03-27 NOTE — PROGRESS NOTES
Comprehensive Nutrition Assessment    Type and Reason for Visit:  Initial    Nutrition Recommendations/Plan:   Encourage protein foods at meals  Glucerna tid meals     Malnutrition Assessment:  Malnutrition Status:  Mild malnutrition (03/27/24 0846)    Context:  Acute Illness     Findings of the 6 clinical characteristics of malnutrition:  Energy Intake:  75% or less of estimated energy requirements for 7 or more days (with lower teeth pulled)  Weight Loss:  No significant weight loss     Body Fat Loss:  No significant body fat loss     Muscle Mass Loss:  No significant muscle mass loss    Fluid Accumulation:  No significant fluid accumulation     Strength:  Not Performed    Nutrition Assessment:    Mild malnutrition r/t inadequate nutrient intakes, AEB low PO over last week with lower teeth being pulled.  Additionally has had signifcant weight declines in last 6 months of unintentional and unexplained nature. Mild hypoglycemia this AM, only on 500 metformin bid at home. Must question with weight losses, improved insulin senstitivity. Taking softer foods well this AM. Will add Glucerna to aid nutrition adequacy and glucose support.    Nutrition Related Findings:    no malnutrition indices. Wound Type: None       Current Nutrition Intake & Therapies:    Average Meal Intake: % (observed)  Average Supplements Intake: None Ordered  ADULT DIET; Easy to Chew; 4 carb choices (60 gm/meal)    Anthropometric Measures:  Height: 162.6 cm (5' 4\")  Ideal Body Weight (IBW): 120 lbs (55 kg)    Admission Body Weight: 63.6 kg (140 lb 3.2 oz)  Current Body Weight: 64 kg (141 lb), 117.5 % IBW. Weight Source: Bed Scale  Current BMI (kg/m2): 24.2  Usual Body Weight: 72.6 kg (160 lb) (< 6 months ago)  % Weight Change (Calculated): -11.9  Weight Adjustment For: No Adjustment                 BMI Categories: Normal Weight (BMI 18.5-24.9)    Estimated Daily Nutrient Needs:  Energy Requirements Based On: Kcal/kg  Weight Used for

## 2024-03-27 NOTE — DISCHARGE SUMMARY
Discharge Summary    Haily Kowalski  :  1950  MRN:  [id number]    Admit date:  3/26/2024      Discharge date: 3/27/2024     Admitting Physician:  Jose Tubbs MD    Discharge Diagnoses:    Principal Problem:    Hypoxia  Active Problems:    Type 2 diabetes mellitus without complication, without long-term current use of insulin (HCC)    Essential hypertension    Lower leg edema    Stage 3 chronic kidney disease, unspecified whether stage 3a or 3b CKD (HCC)    Mild malnutrition (HCC)  Resolved Problems:    * No resolved hospital problems. *      Hospital Course:   Haily Kowalski is a 74 y.o. female admitted with hypoxia.  She  presented to the emergency room due to concerns about her blood pressure.  Her  reported that it was normal in the morning but had concerns about her heart rate that was 120.  Patient routinely takes her blood pressure and her normals are normally within normal range but not normally does she have a fast heart rate.  She denied chest pain or palpitations.  She denied recent illness including fever or chills.  She does complain of shortness of breath on exertion.  She does report occasional leg swelling but does take an occasional Lasix.   Hemoglobin was 10 9.  Creatinine was 1.2 with history of CKD.  Troponins were 15 and 15.  D-dimer was elevated at 2.91.  Venous Dopplers were completed but not resulted at this time.  Patient does have allergies to dye and renal disease so a VQ scan is planned for tomorrow.  During patient's admission she was placed on therapeutic Lovenox while testing was being completed.  VQ scan was completed today that was low probability of pulmonary embolism.  Patient had bilateral venous Dopplers done which showed chronic superficial thrombus but no DVT on either extremity.  Patient's hypoxia is resolved.  Plan will be to discharge home she will follow-up with primary care as an outpatient.  I did stop patient's Glucophage as her A1c came back at 4.8.  Blood  Possible PE TECHNOLOGIST PROVIDED HISTORY: Order materials for study to be performed 3/27 Possible PE Decision Support Exception - unselect if not a suspected or confirmed emergency medical condition->Emergency Medical Condition (MA) Reason for Exam: sob Additional signs and symptoms: elevated d-dimer History of asthma and bronchitis.  Anemia.  D-dimer 2.91.  Chronic renal disease. FINDINGS: PERFUSION: Distribution of radiotracer is homogeneous.  No ventilation perfusion mismatches.  No segmental defects noted. VENTILATION: Accumulation of radiotracer within the central airways may be related to poor inspiratory effort. CHEST RADIOGRAPH: No focal areas of consolidation or significant effusions on recent chest radiograph.     Low probability for pulmonary embolus.     XR CHEST PORTABLE    Result Date: 3/26/2024  EXAMINATION: ONE XRAY VIEW OF THE CHEST 3/26/2024 12:02 pm COMPARISON: 10/17/2023 HISTORY: ORDERING SYSTEM PROVIDED HISTORY: Dyspnea TECHNOLOGIST PROVIDED HISTORY: Dyspnea FINDINGS: The lungs are without acute focal process.  There is no effusion or pneumothorax. The cardiomediastinal silhouette is stable. The osseous structures are stable.     No acute process.       Assessment and Plan:  Patient Active Problem List    Diagnosis Date Noted    Hyperkalemia 08/23/2021    Resistant hypertension 10/21/2020    Abnormal cardiovascular stress test     Encounter for surgical wound dressing change 08/16/2022    Chronic renal disease, stage III ([facility]) [[id number]] 04/27/2022    Mild malnutrition ([facility]) 03/27/2024    Hypoxia 03/26/2024    Stage 3 chronic kidney disease, unspecified whether stage 3a or 3b CKD ([facility]) 01/31/2022    Mild CAD 10/21/2021    CKD (chronic kidney disease), stage II 08/24/2020    ACS (acute coronary syndrome) ([facility]) 04/30/2019    Recurrent UTI 06/05/2018    Iron deficiency anemia 03/07/2018    Calcaneal spur of left foot 03/06/2018    Chronic midline low back pain without sciatica 06/07/2017

## 2024-03-27 NOTE — PROGRESS NOTES
Reviewed discharge instructions with patient and spouse.  Patient aware of no new medications.  Aware to stop metformin.  Reviewed each medication and when next dose is due.  Patient aware of date/time of follow up appointment.  Instructed to follow a regular diet.  Educational handout given on hypoxia.  Patient denies questions.  Verbalizes understanding.  Copy of discharge instructions given to patient.

## 2024-03-27 NOTE — PROGRESS NOTES
Progress Note    SUBJECTIVE:    Patient seen for f/u of Hypoxia.  She resting in bed no distress. Slept well.  No complaints     ROS:   Constitutional: negative  for fevers, and negative for chills.  Respiratory: negative for shortness of breath, negative for cough, and negative for wheezing  Cardiovascular: negative for chest pain, and negative for palpitations  Gastrointestinal: negative for abdominal pain, negative for nausea,negative for vomiting, negative for diarrhea, and negative for constipation     All other systems were reviewed with the patient and are negative unless otherwise stated in HPI      OBJECTIVE:      Vitals:   Vitals:    03/26/24 1934   BP: 125/70   Pulse: 83   Resp: 16   Temp: 98.1 °F (36.7 °C)   SpO2: 98%     Weight - Scale: 64 kg (141 lb)   Height: 162.6 cm (5' 4\")     Weight  Wt Readings from Last 3 Encounters:   03/27/24 64 kg (141 lb)   02/05/24 65.4 kg (144 lb 3.2 oz)   12/12/23 69 kg (152 lb 3.2 oz)     Body mass index is 24.2 kg/m².    24HR INTAKE/OUTPUT:      Intake/Output Summary (Last 24 hours) at 3/27/2024 0683  Last data filed at 3/27/2024 0424  Gross per 24 hour   Intake 240 ml   Output 1000 ml   Net -760 ml     -----------------------------------------------------------------  Exam:    GEN:    Awake, alert and oriented x3.   EYES:  EOMI, pupils equal   NECK: Supple. No lymphadenopathy.  No carotid bruit  CVS:    regular rate and rhythm, no audible murmur  PULM:  CTA, no wheezes, rales or rhonchi, no acute respiratory distress  ABD:    Bowels sounds normal.  Abdomen is soft.  No distention.  no tenderness to palpation.   EXT:   no edema bilaterally .  No calf tenderness.   NEURO: Moves all extremities.  Motor and sensory are grossly intact  SKIN:  No rashes.  No skin lesions.    -----------------------------------------------------------------    Diagnostic Data:      Complete Blood Count:   Recent Labs     03/26/24  1129 03/27/24  0540   WBC 5.7 5.1   RBC 3.32* 3.15*   HGB  10.9* 10.4*   HCT 33.2* 31.3*   .0 99.4   MCH 32.8 33.0   MCHC 32.8 33.2   RDW 12.3 12.5    302   MPV 8.8 9.2        Last 3 Blood Glucose:   Recent Labs     03/26/24  1129 03/27/24  0540   GLUCOSE 88 76        Comprehensive Metabolic Profile:   Recent Labs     03/26/24  1129 03/27/24  0540    140   K 3.3* 4.2    105   CO2 24 25   BUN 9 12   CREATININE 1.2* 0.9   GLUCOSE 88 76   CALCIUM 8.9 8.8   PROT 6.5  --    LABALBU 3.3*  --    BILITOT 0.5  --    ALKPHOS 124*  --    AST 11  --    ALT <5*  --         Urinalysis:   Lab Results   Component Value Date/Time    NITRU NEGATIVE 11/08/2023 09:42 AM    COLORU Yellow 11/08/2023 09:42 AM    PHUR 6.0 11/08/2023 09:42 AM    WBCUA None 11/08/2023 09:42 AM    RBCUA None 11/08/2023 09:42 AM    MUCUS NOT REPORTED 10/26/2020 08:41 PM    TRICHOMONAS NOT REPORTED 10/26/2020 08:41 PM    YEAST NOT REPORTED 10/26/2020 08:41 PM    BACTERIA TRACE 11/08/2023 09:42 AM    SPECGRAV <1.005 11/08/2023 09:42 AM    LEUKOCYTESUR NEGATIVE 11/08/2023 09:42 AM    UROBILINOGEN Normal 11/08/2023 09:42 AM    BILIRUBINUR NEGATIVE 11/08/2023 09:42 AM    GLUCOSEU NEGATIVE 11/08/2023 09:42 AM    KETUA NEGATIVE 11/08/2023 09:42 AM    AMORPHOUS NOT REPORTED 10/26/2020 08:41 PM       HgBA1c:    Lab Results   Component Value Date/Time    LABA1C 4.9 10/09/2023 03:16 PM       Lactic Acid:   Lab Results   Component Value Date/Time    LACTA 1.6 10/26/2020 07:35 PM        Troponin: No results for input(s): \"TROPONINI\" in the last 72 hours.    CRP:  No results for input(s): \"CRP\" in the last 72 hours.      Radiology/Imaging:  Vascular duplex lower extremity venous bilateral         XR CHEST PORTABLE   Final Result   No acute process.         NM LUNG VENT/PERFUSION (VQ)    (Results Pending)         ASSESSMENT / PLAN:    MEDICAL DECISION MAKING:    Primary Problem(s): Hypoxia  Differential diagnoses: Pulmonary embolism, viral illness, CHF  Condition is an undiagnosed new problem with uncertain

## 2024-03-27 NOTE — DISCHARGE INSTR - DIET

## 2024-03-27 NOTE — PLAN OF CARE
Problem: Discharge Planning  Goal: Discharge to home or other facility with appropriate resources  3/27/2024 1555 by Beth Hurley RN  Outcome: Completed  3/27/2024 0930 by Beth Hurley RN  Outcome: Progressing     Problem: Safety - Adult  Goal: Free from fall injury  3/27/2024 1555 by Beth Hurley RN  Outcome: Completed  3/27/2024 0930 by Beth Hurley RN  Outcome: Progressing     Problem: Chronic Conditions and Co-morbidities  Goal: Patient's chronic conditions and co-morbidity symptoms are monitored and maintained or improved  3/27/2024 1555 by Beth Hurley RN  Outcome: Completed  3/27/2024 0930 by Beth Hurley RN  Outcome: Progressing     Problem: Nutrition Deficit:  Goal: Optimize nutritional status  3/27/2024 1555 by Beth Hurley RN  Outcome: Completed  3/27/2024 0930 by Beth Hurley RN  Outcome: Progressing  3/27/2024 0901 by Huan Franz, RD, LD  Outcome: Progressing  Flowsheets (Taken 3/27/2024 0901)  Nutrient intake appropriate for improving, restoring, or maintaining nutritional needs:   Monitor oral intake, labs, and treatment plans   Recommend appropriate diets, oral nutritional supplements, and vitamin/mineral supplements  Note: Nutrition Problem #1: Other (Comment) (mild malnutrition)  Intervention: Food and/or Nutrient Delivery: Continue Current Diet, Start Oral Nutrition Supplement

## 2024-03-28 ENCOUNTER — TELEPHONE (OUTPATIENT)
Dept: PRIMARY CARE CLINIC | Age: 74
End: 2024-03-28

## 2024-03-28 ENCOUNTER — APPOINTMENT (OUTPATIENT)
Dept: GENERAL RADIOLOGY | Age: 74
DRG: 315 | End: 2024-03-28
Payer: COMMERCIAL

## 2024-03-28 ENCOUNTER — APPOINTMENT (OUTPATIENT)
Dept: CT IMAGING | Age: 74
DRG: 315 | End: 2024-03-28
Payer: COMMERCIAL

## 2024-03-28 ENCOUNTER — HOSPITAL ENCOUNTER (INPATIENT)
Age: 74
LOS: 2 days | Discharge: HOME | DRG: 315 | End: 2024-03-30
Attending: EMERGENCY MEDICINE | Admitting: STUDENT IN AN ORGANIZED HEALTH CARE EDUCATION/TRAINING PROGRAM
Payer: COMMERCIAL

## 2024-03-28 DIAGNOSIS — N18.9 ACUTE KIDNEY INJURY SUPERIMPOSED ON CKD (HCC): ICD-10-CM

## 2024-03-28 DIAGNOSIS — I95.9 HYPOTENSION, UNSPECIFIED HYPOTENSION TYPE: Primary | ICD-10-CM

## 2024-03-28 DIAGNOSIS — N17.9 ACUTE KIDNEY INJURY SUPERIMPOSED ON CKD (HCC): ICD-10-CM

## 2024-03-28 DIAGNOSIS — I10 ESSENTIAL HYPERTENSION: ICD-10-CM

## 2024-03-28 DIAGNOSIS — E83.42 HYPOMAGNESEMIA: ICD-10-CM

## 2024-03-28 DIAGNOSIS — N39.0 URINARY TRACT INFECTION WITHOUT HEMATURIA, SITE UNSPECIFIED: ICD-10-CM

## 2024-03-28 LAB
ALBUMIN SERPL-MCNC: 3.4 G/DL (ref 3.5–5.2)
ALBUMIN/GLOB SERPL: 1 {RATIO} (ref 1–2.5)
ALP SERPL-CCNC: 133 U/L (ref 35–104)
ALT SERPL-CCNC: <5 U/L (ref 5–33)
ANION GAP SERPL CALCULATED.3IONS-SCNC: 10 MMOL/L (ref 9–17)
AST SERPL-CCNC: 11 U/L
BACTERIA URNS QL MICRO: ABNORMAL
BASOPHILS # BLD: <0.03 K/UL (ref 0–0.2)
BASOPHILS NFR BLD: 0 % (ref 0–2)
BILIRUB SERPL-MCNC: 0.6 MG/DL (ref 0.3–1.2)
BILIRUB UR QL STRIP: NEGATIVE
BUN SERPL-MCNC: 17 MG/DL (ref 8–23)
BUN/CREAT SERPL: 11 (ref 9–20)
CALCIUM SERPL-MCNC: 9.2 MG/DL (ref 8.6–10.4)
CHLORIDE SERPL-SCNC: 95 MMOL/L (ref 98–107)
CLARITY UR: CLEAR
CO2 SERPL-SCNC: 28 MMOL/L (ref 20–31)
COLOR UR: YELLOW
CREAT SERPL-MCNC: 1.6 MG/DL (ref 0.5–0.9)
EKG ATRIAL RATE: 56 BPM
EKG P AXIS: 42 DEGREES
EKG P-R INTERVAL: 162 MS
EKG Q-T INTERVAL: 444 MS
EKG QRS DURATION: 86 MS
EKG QTC CALCULATION (BAZETT): 428 MS
EKG R AXIS: 26 DEGREES
EKG T AXIS: 65 DEGREES
EKG VENTRICULAR RATE: 56 BPM
EOSINOPHIL # BLD: 0.12 K/UL (ref 0–0.44)
EOSINOPHILS RELATIVE PERCENT: 2 % (ref 1–4)
EPI CELLS #/AREA URNS HPF: ABNORMAL /HPF (ref 0–25)
ERYTHROCYTE [DISTWIDTH] IN BLOOD BY AUTOMATED COUNT: 12.5 % (ref 11.8–14.4)
GFR SERPL CREATININE-BSD FRML MDRD: 34 ML/MIN/1.73M2
GLUCOSE SERPL-MCNC: 89 MG/DL (ref 70–99)
GLUCOSE UR STRIP-MCNC: NEGATIVE MG/DL
HCT VFR BLD AUTO: 34.2 % (ref 36.3–47.1)
HGB BLD-MCNC: 11.3 G/DL (ref 11.9–15.1)
HGB UR QL STRIP.AUTO: NEGATIVE
IMM GRANULOCYTES # BLD AUTO: <0.03 K/UL (ref 0–0.3)
IMM GRANULOCYTES NFR BLD: 0 %
KETONES UR STRIP-MCNC: NEGATIVE MG/DL
LACTATE BLDV-SCNC: 1.7 MMOL/L (ref 0.5–1.9)
LEUKOCYTE ESTERASE UR QL STRIP: ABNORMAL
LYMPHOCYTES NFR BLD: 3.02 K/UL (ref 1.1–3.7)
LYMPHOCYTES RELATIVE PERCENT: 40 % (ref 24–43)
MAGNESIUM SERPL-MCNC: 1.3 MG/DL (ref 1.6–2.6)
MCH RBC QN AUTO: 32.5 PG (ref 25.2–33.5)
MCHC RBC AUTO-ENTMCNC: 33 G/DL (ref 28.4–34.8)
MCV RBC AUTO: 98.3 FL (ref 82.6–102.9)
MONOCYTES NFR BLD: 0.6 K/UL (ref 0.1–1.2)
MONOCYTES NFR BLD: 8 % (ref 3–12)
NEUTROPHILS NFR BLD: 50 % (ref 36–65)
NEUTS SEG NFR BLD: 3.81 K/UL (ref 1.5–8.1)
NITRITE UR QL STRIP: NEGATIVE
NRBC BLD-RTO: 0 PER 100 WBC
PH UR STRIP: 7 [PH] (ref 5–9)
PLATELET # BLD AUTO: 338 K/UL (ref 138–453)
PMV BLD AUTO: 8.6 FL (ref 8.1–13.5)
POTASSIUM SERPL-SCNC: 4.6 MMOL/L (ref 3.7–5.3)
PROT SERPL-MCNC: 6.9 G/DL (ref 6.4–8.3)
PROT UR STRIP-MCNC: NEGATIVE MG/DL
RBC # BLD AUTO: 3.48 M/UL (ref 3.95–5.11)
RBC #/AREA URNS HPF: ABNORMAL /HPF (ref 0–2)
SODIUM SERPL-SCNC: 133 MMOL/L (ref 135–144)
SP GR UR STRIP: 1.01 (ref 1.01–1.02)
TROPONIN I SERPL HS-MCNC: 15 NG/L (ref 0–14)
TROPONIN I SERPL HS-MCNC: 19 NG/L (ref 0–14)
UROBILINOGEN UR STRIP-ACNC: NORMAL EU/DL (ref 0–1)
WBC #/AREA URNS HPF: ABNORMAL /HPF (ref 0–5)
WBC OTHER # BLD: 7.6 K/UL (ref 3.5–11.3)

## 2024-03-28 PROCEDURE — 83605 ASSAY OF LACTIC ACID: CPT

## 2024-03-28 PROCEDURE — 74176 CT ABD & PELVIS W/O CONTRAST: CPT

## 2024-03-28 PROCEDURE — 87086 URINE CULTURE/COLONY COUNT: CPT

## 2024-03-28 PROCEDURE — 2580000003 HC RX 258: Performed by: EMERGENCY MEDICINE

## 2024-03-28 PROCEDURE — 93005 ELECTROCARDIOGRAM TRACING: CPT | Performed by: EMERGENCY MEDICINE

## 2024-03-28 PROCEDURE — 6360000002 HC RX W HCPCS: Performed by: STUDENT IN AN ORGANIZED HEALTH CARE EDUCATION/TRAINING PROGRAM

## 2024-03-28 PROCEDURE — 93010 ELECTROCARDIOGRAM REPORT: CPT | Performed by: INTERNAL MEDICINE

## 2024-03-28 PROCEDURE — 6360000002 HC RX W HCPCS: Performed by: EMERGENCY MEDICINE

## 2024-03-28 PROCEDURE — 84484 ASSAY OF TROPONIN QUANT: CPT

## 2024-03-28 PROCEDURE — 81001 URINALYSIS AUTO W/SCOPE: CPT

## 2024-03-28 PROCEDURE — 85025 COMPLETE CBC W/AUTO DIFF WBC: CPT

## 2024-03-28 PROCEDURE — 99285 EMERGENCY DEPT VISIT HI MDM: CPT

## 2024-03-28 PROCEDURE — 1200000000 HC SEMI PRIVATE

## 2024-03-28 PROCEDURE — 80053 COMPREHEN METABOLIC PANEL: CPT

## 2024-03-28 PROCEDURE — 94761 N-INVAS EAR/PLS OXIMETRY MLT: CPT

## 2024-03-28 PROCEDURE — 2580000003 HC RX 258: Performed by: STUDENT IN AN ORGANIZED HEALTH CARE EDUCATION/TRAINING PROGRAM

## 2024-03-28 PROCEDURE — 83735 ASSAY OF MAGNESIUM: CPT

## 2024-03-28 PROCEDURE — 97162 PT EVAL MOD COMPLEX 30 MIN: CPT

## 2024-03-28 PROCEDURE — 70450 CT HEAD/BRAIN W/O DYE: CPT

## 2024-03-28 PROCEDURE — 87186 SC STD MICRODIL/AGAR DIL: CPT

## 2024-03-28 PROCEDURE — 73502 X-RAY EXAM HIP UNI 2-3 VIEWS: CPT

## 2024-03-28 PROCEDURE — 96374 THER/PROPH/DIAG INJ IV PUSH: CPT

## 2024-03-28 PROCEDURE — 6370000000 HC RX 637 (ALT 250 FOR IP): Performed by: STUDENT IN AN ORGANIZED HEALTH CARE EDUCATION/TRAINING PROGRAM

## 2024-03-28 PROCEDURE — 87088 URINE BACTERIA CULTURE: CPT

## 2024-03-28 PROCEDURE — 71045 X-RAY EXAM CHEST 1 VIEW: CPT

## 2024-03-28 RX ORDER — POLYETHYLENE GLYCOL 3350 17 G/17G
17 POWDER, FOR SOLUTION ORAL DAILY PRN
Status: DISCONTINUED | OUTPATIENT
Start: 2024-03-28 | End: 2024-03-30 | Stop reason: HOSPADM

## 2024-03-28 RX ORDER — ASPIRIN 81 MG/1
81 TABLET ORAL DAILY
Status: DISCONTINUED | OUTPATIENT
Start: 2024-03-29 | End: 2024-03-30 | Stop reason: HOSPADM

## 2024-03-28 RX ORDER — FLUTICASONE PROPIONATE 50 MCG
2 SPRAY, SUSPENSION (ML) NASAL DAILY PRN
Status: DISCONTINUED | OUTPATIENT
Start: 2024-03-28 | End: 2024-03-30 | Stop reason: HOSPADM

## 2024-03-28 RX ORDER — SODIUM CHLORIDE 0.9 % (FLUSH) 0.9 %
10 SYRINGE (ML) INJECTION EVERY 12 HOURS SCHEDULED
Status: DISCONTINUED | OUTPATIENT
Start: 2024-03-28 | End: 2024-03-30 | Stop reason: HOSPADM

## 2024-03-28 RX ORDER — MAGNESIUM SULFATE IN WATER 40 MG/ML
2000 INJECTION, SOLUTION INTRAVENOUS PRN
Status: DISCONTINUED | OUTPATIENT
Start: 2024-03-28 | End: 2024-03-28

## 2024-03-28 RX ORDER — ONDANSETRON 2 MG/ML
4 INJECTION INTRAMUSCULAR; INTRAVENOUS EVERY 6 HOURS PRN
Status: DISCONTINUED | OUTPATIENT
Start: 2024-03-28 | End: 2024-03-30 | Stop reason: HOSPADM

## 2024-03-28 RX ORDER — DORZOLAMIDE HCL 20 MG/ML
1 SOLUTION/ DROPS OPHTHALMIC 2 TIMES DAILY
Status: DISCONTINUED | OUTPATIENT
Start: 2024-03-28 | End: 2024-03-30 | Stop reason: HOSPADM

## 2024-03-28 RX ORDER — MAGNESIUM SULFATE IN WATER 40 MG/ML
2000 INJECTION, SOLUTION INTRAVENOUS ONCE
Status: COMPLETED | OUTPATIENT
Start: 2024-03-28 | End: 2024-03-28

## 2024-03-28 RX ORDER — POTASSIUM CHLORIDE 20 MEQ/1
40 TABLET, EXTENDED RELEASE ORAL PRN
Status: DISCONTINUED | OUTPATIENT
Start: 2024-03-28 | End: 2024-03-28

## 2024-03-28 RX ORDER — ENOXAPARIN SODIUM 100 MG/ML
30 INJECTION SUBCUTANEOUS DAILY
Status: DISCONTINUED | OUTPATIENT
Start: 2024-03-28 | End: 2024-03-30

## 2024-03-28 RX ORDER — VALSARTAN 80 MG/1
320 TABLET ORAL DAILY
Status: DISCONTINUED | OUTPATIENT
Start: 2024-03-28 | End: 2024-03-30 | Stop reason: HOSPADM

## 2024-03-28 RX ORDER — FUROSEMIDE 40 MG/1
40 TABLET ORAL DAILY
Status: DISCONTINUED | OUTPATIENT
Start: 2024-03-28 | End: 2024-03-30 | Stop reason: HOSPADM

## 2024-03-28 RX ORDER — HYDRALAZINE HYDROCHLORIDE 50 MG/1
50 TABLET, FILM COATED ORAL 3 TIMES DAILY
Status: DISCONTINUED | OUTPATIENT
Start: 2024-03-28 | End: 2024-03-30 | Stop reason: HOSPADM

## 2024-03-28 RX ORDER — ATENOLOL 50 MG/1
100 TABLET ORAL DAILY
Status: DISCONTINUED | OUTPATIENT
Start: 2024-03-28 | End: 2024-03-30 | Stop reason: HOSPADM

## 2024-03-28 RX ORDER — CETIRIZINE HYDROCHLORIDE 10 MG/1
5 TABLET ORAL DAILY
Status: DISCONTINUED | OUTPATIENT
Start: 2024-03-28 | End: 2024-03-30 | Stop reason: HOSPADM

## 2024-03-28 RX ORDER — ACETAMINOPHEN 325 MG/1
650 TABLET ORAL EVERY 6 HOURS PRN
Status: DISCONTINUED | OUTPATIENT
Start: 2024-03-28 | End: 2024-03-30 | Stop reason: HOSPADM

## 2024-03-28 RX ORDER — PANTOPRAZOLE SODIUM 40 MG/1
40 TABLET, DELAYED RELEASE ORAL
Status: DISCONTINUED | OUTPATIENT
Start: 2024-03-29 | End: 2024-03-30 | Stop reason: HOSPADM

## 2024-03-28 RX ORDER — ACETAMINOPHEN 650 MG/1
650 SUPPOSITORY RECTAL EVERY 6 HOURS PRN
Status: DISCONTINUED | OUTPATIENT
Start: 2024-03-28 | End: 2024-03-30 | Stop reason: HOSPADM

## 2024-03-28 RX ORDER — POTASSIUM CHLORIDE 20 MEQ/1
20 TABLET, EXTENDED RELEASE ORAL 2 TIMES DAILY
Status: DISCONTINUED | OUTPATIENT
Start: 2024-03-28 | End: 2024-03-30 | Stop reason: HOSPADM

## 2024-03-28 RX ORDER — 0.9 % SODIUM CHLORIDE 0.9 %
500 INTRAVENOUS SOLUTION INTRAVENOUS ONCE
Status: COMPLETED | OUTPATIENT
Start: 2024-03-28 | End: 2024-03-28

## 2024-03-28 RX ORDER — SODIUM CHLORIDE, SODIUM LACTATE, POTASSIUM CHLORIDE, CALCIUM CHLORIDE 600; 310; 30; 20 MG/100ML; MG/100ML; MG/100ML; MG/100ML
INJECTION, SOLUTION INTRAVENOUS CONTINUOUS
Status: DISCONTINUED | OUTPATIENT
Start: 2024-03-28 | End: 2024-03-30

## 2024-03-28 RX ORDER — ONDANSETRON 4 MG/1
4 TABLET, ORALLY DISINTEGRATING ORAL EVERY 8 HOURS PRN
Status: DISCONTINUED | OUTPATIENT
Start: 2024-03-28 | End: 2024-03-30 | Stop reason: HOSPADM

## 2024-03-28 RX ORDER — DORZOLAMIDE HYDROCHLORIDE AND TIMOLOL MALEATE 20; 5 MG/ML; MG/ML
1 SOLUTION/ DROPS OPHTHALMIC 2 TIMES DAILY
Status: DISCONTINUED | OUTPATIENT
Start: 2024-03-28 | End: 2024-03-28

## 2024-03-28 RX ORDER — TIMOLOL MALEATE 5 MG/ML
1 SOLUTION/ DROPS OPHTHALMIC 2 TIMES DAILY
Status: DISCONTINUED | OUTPATIENT
Start: 2024-03-28 | End: 2024-03-30 | Stop reason: HOSPADM

## 2024-03-28 RX ORDER — AMLODIPINE BESYLATE 5 MG/1
5 TABLET ORAL DAILY
Status: DISCONTINUED | OUTPATIENT
Start: 2024-03-28 | End: 2024-03-30 | Stop reason: HOSPADM

## 2024-03-28 RX ORDER — SODIUM CHLORIDE 9 MG/ML
INJECTION, SOLUTION INTRAVENOUS PRN
Status: DISCONTINUED | OUTPATIENT
Start: 2024-03-28 | End: 2024-03-30 | Stop reason: HOSPADM

## 2024-03-28 RX ORDER — POTASSIUM CHLORIDE 7.45 MG/ML
10 INJECTION INTRAVENOUS PRN
Status: DISCONTINUED | OUTPATIENT
Start: 2024-03-28 | End: 2024-03-28

## 2024-03-28 RX ORDER — ATORVASTATIN CALCIUM 40 MG/1
40 TABLET, FILM COATED ORAL DAILY
Status: DISCONTINUED | OUTPATIENT
Start: 2024-03-29 | End: 2024-03-30 | Stop reason: HOSPADM

## 2024-03-28 RX ORDER — SODIUM CHLORIDE 0.9 % (FLUSH) 0.9 %
10 SYRINGE (ML) INJECTION PRN
Status: DISCONTINUED | OUTPATIENT
Start: 2024-03-28 | End: 2024-03-30 | Stop reason: HOSPADM

## 2024-03-28 RX ORDER — LATANOPROST 50 UG/ML
1 SOLUTION/ DROPS OPHTHALMIC NIGHTLY
Status: DISCONTINUED | OUTPATIENT
Start: 2024-03-28 | End: 2024-03-30 | Stop reason: HOSPADM

## 2024-03-28 RX ADMIN — DORZOLAMIDE HYDROCHLORIDE 1 DROP: 20 SOLUTION/ DROPS OPHTHALMIC at 19:49

## 2024-03-28 RX ADMIN — SODIUM CHLORIDE 500 ML: 9 INJECTION, SOLUTION INTRAVENOUS at 13:30

## 2024-03-28 RX ADMIN — POTASSIUM CHLORIDE 20 MEQ: 1500 TABLET, EXTENDED RELEASE ORAL at 19:49

## 2024-03-28 RX ADMIN — MAGNESIUM SULFATE HEPTAHYDRATE 2000 MG: 40 INJECTION, SOLUTION INTRAVENOUS at 15:44

## 2024-03-28 RX ADMIN — TIMOLOL MALEATE 1 DROP: 5 SOLUTION OPHTHALMIC at 19:49

## 2024-03-28 RX ADMIN — SODIUM CHLORIDE, POTASSIUM CHLORIDE, SODIUM LACTATE AND CALCIUM CHLORIDE: 600; 310; 30; 20 INJECTION, SOLUTION INTRAVENOUS at 17:54

## 2024-03-28 RX ADMIN — ENOXAPARIN SODIUM 30 MG: 100 INJECTION SUBCUTANEOUS at 17:54

## 2024-03-28 RX ADMIN — SODIUM CHLORIDE, PRESERVATIVE FREE 10 ML: 5 INJECTION INTRAVENOUS at 19:49

## 2024-03-28 RX ADMIN — MAGNESIUM SULFATE HEPTAHYDRATE 2000 MG: 40 INJECTION, SOLUTION INTRAVENOUS at 17:53

## 2024-03-28 RX ADMIN — LATANOPROST 1 DROP: 50 SOLUTION/ DROPS OPHTHALMIC at 19:49

## 2024-03-28 ASSESSMENT — PAIN - FUNCTIONAL ASSESSMENT
PAIN_FUNCTIONAL_ASSESSMENT: NONE - DENIES PAIN
PAIN_FUNCTIONAL_ASSESSMENT: 0-10

## 2024-03-28 ASSESSMENT — PAIN DESCRIPTION - ORIENTATION: ORIENTATION: RIGHT

## 2024-03-28 ASSESSMENT — PAIN DESCRIPTION - LOCATION: LOCATION: HIP

## 2024-03-28 NOTE — TELEPHONE ENCOUNTER
Haily's , Robbi, called as Haily's BP was 70/47 with a HR 70.    Writer spoke to JOE Bob who advised Haily to go to ED for assessment.    Writer advised Robbi to take Haily to ED, agreed.

## 2024-03-28 NOTE — TELEPHONE ENCOUNTER
[facility] Transitions Initial Follow Up Call    Outreach made within 2 business days of discharge: Yes    Patient: Haily Kowalski Patient : 1950   MRN: [id number]  Reason for Admission: There are no discharge diagnoses documented for the most recent discharge.  Discharge Date: 3/27/24       Spoke with: Haily    Discharge department/facility: [facility]     TCM Interactive Patient Contact:  Was patient able to fill all prescriptions: Yes  Was patient instructed to bring all medications to the follow-up visit: Yes  Is patient taking all medications as directed in the discharge summary? Yes  Does patient understand their discharge instructions: Yes  Does patient have questions or concerns that need addressed prior to 7-14 day follow up office visit: no    Scheduled appointment with PCP within 7-14 days    Follow Up  Future Appointments   Date Time Provider Department Center   2024 11:00 AM Eusebio Bullock, APRN - CNP [facility]       Alma Avila MA

## 2024-03-28 NOTE — PROGRESS NOTES
Physical Therapy  Facility/Department: [facility] MED SURG  Physical Therapy Initial Assessment    Name: Haily Kowalski  : 1950  MRN: [id number]  Date of Service: 3/28/2024    Discharge Recommendations:  Continue to assess pending progress, Home with Home health PT, 24 hour supervision or assist   PT Equipment Recommendations  Equipment Needed: Yes  Mobility Devices: Walker  Walker: Rollator (4 Wheeled);Rolling      Patient Diagnosis(es): The primary encounter diagnosis was Hypotension, unspecified hypotension type. A diagnosis of Hypomagnesemia was also pertinent to this visit.  Past Medical History:  has a past medical history of Arthritis, Asthma, Chronic bronchitis (HCC), Diabetes mellitus (HCC), GERD (gastroesophageal reflux disease), H/O echocardiogram, History of PFTs, History of stress test, Hyperlipidemia, and Hypertension.  Past Surgical History:  has a past surgical history that includes Hysterectomy; Breast surgery; Foot surgery;  section; Cardiac catheterization (); Colonoscopy (3/23/15); Cholecystectomy; and Cardiac catheterization (Left, 2019).    Assessment   Body Structures, Functions, Activity Limitations Requiring Skilled Therapeutic Intervention: Decreased functional mobility ;Decreased strength;Decreased high-level IADLs;Decreased safe awareness;Decreased endurance;Decreased balance;Decreased tolerance to work activity;Increased pain  Assessment: Pt is a 74 year old female being evaluated for skilled acute care physical therapy following admission to hospital. Pt with recent fall and noted increased confusion. Pt denies hitting head during fall. Pt able to complete bed mobility at stand by assist to supervision. Therapist provided contact guard for ambulation in room with pt getting dizzy following 15ft of ambulation. Pt currently presenting below functional baseline. Pt to benefit from skilled acute care physical therapy in order to facilitate safety and endurance as needed  times  Hearing  Hearing: Within functional limits    Cognition   Orientation  Overall Orientation Status: Within Functional Limits  Cognition  Overall Cognitive Status: Exceptions  Following Commands: Follows multistep commands with increased time;Follows multistep commands with repitition  Attention Span: Attends with cues to redirect  Safety Judgement: Decreased awareness of need for safety  Problem Solving: Assistance required to correct errors made     Objective   Pulse: 66  Heart Rate Source: Monitor;Apical  BP: (!) 109/57  BP Location: Right upper arm  BP Method: Automatic  Patient Position: Semi fowlers  MAP (Calculated): 74  Respirations: 16  SpO2: 97 %  O2 Device: None (Room air)  Temp: 97.5 °F (36.4 °C)                             Bed mobility  Rolling to Left: Supervision  Rolling to Right: Supervision  Supine to Sit: Supervision  Sit to Supine: Supervision  Scooting: Moderate assistance  Transfers  Sit to Stand: Contact guard assistance  Stand to Sit: Contact guard assistance  Stand Pivot Transfers: Contact guard assistance  Ambulation  WB Status: FWB  Ambulation  Surface: Level tile  Device: No Device  Assistance: Contact guard assistance  Gait Deviations: Slow Mami;Deviated path;Decreased step length;Decreased step height  Distance: 15ft x 2     Balance  Posture: Fair  Sitting - Static: Good  Sitting - Dynamic: Good  Standing - Static: Fair  Standing - Dynamic: Fair                                                           AM-PAC - Mobility         AM-PAC Mobility without Stair Climbing Inpatient   How much difficulty turning over in bed?: A Little  How much difficulty sitting down on / standing up from a chair with arms?: A Little  How much difficulty moving from lying on back to sitting on side of bed?: A Little  How much help from another person moving to and from a bed to a chair?: A Little  How much help from another person needed to walk in hospital room?: A Little  AM-PAC Inpatient Mobility

## 2024-03-28 NOTE — ED PROVIDER NOTES
[facility]  EMERGENCY DEPARTMENT ENCOUNTER      Pt Name: Haily Kowalski  MRN: [id number]  Birthdate 1950  Date of evaluation: 3/28/2024  Provider: Ector Avila MD    CHIEF COMPLAINT       Chief Complaint   Patient presents with    Hypotension     Patient states her blood pressure was 104 systolic this morning but has since \"kept going down\" into about 70s systolic.   Patient was advised by her PCP to come in and be seen.          HISTORY OF PRESENT ILLNESS      Haily Kowalski is a 74 y.o. female who presents to the emergency department for ration of low blood pressure.  He was discharged from the hospital yesterday after an admission for evaluation of shortness of breath and possible PE.  No PE noted on V/q. scan and patient was reportedly stable at discharge.  Last night, patient reports that she was standing near a counter when she became lightheaded and fell.  No LOC.  Landed on her buttocks and is complaining of right posterior hip pain.  Had multiple episodes of hypotension at home this morning and was advised by her PCP to come to the ED for reevaluation.    With respect to the fall, denies any headache, head impact, nausea, vomiting or change in strength or sensation.  She does continue to have some lightheadedness.  No episodes of syncope.    Denies any other MSK complaints.    No fever. No chest pain.  Notes that the previously endorsed shortness of breath has resolved.      PAST MEDICAL HISTORY     Past Medical History:   Diagnosis Date    Arthritis     Asthma     Chronic bronchitis (HCC)     Diabetes mellitus (HCC)     GERD (gastroesophageal reflux disease)     H/O echocardiogram 3/9/16    EF >60%. LV wall thickness is mildly increased. Mild mitral and tricuspid regurgitation.  Borderline pulmonary hypertension estimated right ventricular sysolic pressure of 30mmHg. Mild (grade l) diastolic dysfunction.    History of PFTs 3/10/16     Suboptimal effort. Restrictive in nature. clionical correlations  Mental Status: She is alert and oriented to person, place, and time.      GCS: GCS eye subscore is 4. GCS verbal subscore is 5. GCS motor subscore is 6.      Cranial Nerves: No dysarthria or facial asymmetry.         DIAGNOSTIC RESULTS     RADIOLOGY:       Interpretation per the Radiologist below, if available at the time of this note:    CT Head WO Contrast   Final Result   1.  No acute intracranial bleed.   2. Senescent changes.   3. White matter disease described is typical of microvascular ischemic   disease or as sequela of dysmyelinating/demyelinating processes.         CT ABDOMEN PELVIS WO CONTRAST Additional Contrast? None   Final Result   1. No acute intra-abdominal or pelvic process.   2. Hepatic cysts.   3. Cholecystectomy.   4. Hysterectomy.   5. Partially collapsed nature of the urinary bladder may account for anterior   wall thickening.         XR HIP 2-3 VW W PELVIS RIGHT   Final Result   Right hip/pelvis: No acute osseous abnormality.      Chest x-ray: No focal consolidation.  No acute process.         XR CHEST PORTABLE   Final Result   Right hip/pelvis: No acute osseous abnormality.      Chest x-ray: No focal consolidation.  No acute process.               LABS:  Labs Reviewed   CBC WITH AUTO DIFFERENTIAL - Abnormal; Notable for the following components:       Result Value    RBC 3.48 (*)     Hemoglobin 11.3 (*)     Hematocrit 34.2 (*)     All other components within normal limits   COMPREHENSIVE METABOLIC PANEL - Abnormal; Notable for the following components:    Sodium 133 (*)     Chloride 95 (*)     Creatinine 1.6 (*)     Est, Glom Filt Rate 34 (*)     Albumin 3.4 (*)     Alkaline Phosphatase 133 (*)     ALT <5 (*)     All other components within normal limits   TROPONIN - Abnormal; Notable for the following components:    Troponin, High Sensitivity 19 (*)     All other components within normal limits   URINALYSIS WITH MICROSCOPIC - Abnormal; Notable for the following components:    Leukocyte

## 2024-03-29 ENCOUNTER — APPOINTMENT (OUTPATIENT)
Age: 74
DRG: 315 | End: 2024-03-29
Attending: STUDENT IN AN ORGANIZED HEALTH CARE EDUCATION/TRAINING PROGRAM
Payer: COMMERCIAL

## 2024-03-29 PROBLEM — E83.42 HYPOMAGNESEMIA: Status: ACTIVE | Noted: 2024-03-29

## 2024-03-29 PROBLEM — E44.1 MILD MALNUTRITION (HCC): Status: RESOLVED | Noted: 2024-03-27 | Resolved: 2024-03-29

## 2024-03-29 PROBLEM — E44.0 MODERATE MALNUTRITION (HCC): Status: ACTIVE | Noted: 2024-03-29

## 2024-03-29 LAB
ALBUMIN SERPL-MCNC: 2.8 G/DL (ref 3.5–5.2)
ALBUMIN/GLOB SERPL: 1 {RATIO} (ref 1–2.5)
ALP SERPL-CCNC: 111 U/L (ref 35–104)
ALT SERPL-CCNC: <5 U/L (ref 5–33)
ANION GAP SERPL CALCULATED.3IONS-SCNC: 10 MMOL/L (ref 9–17)
AST SERPL-CCNC: 10 U/L
BASOPHILS # BLD: 0.04 K/UL (ref 0–0.2)
BASOPHILS NFR BLD: 1 % (ref 0–2)
BILIRUB SERPL-MCNC: 0.3 MG/DL (ref 0.3–1.2)
BUN SERPL-MCNC: 15 MG/DL (ref 8–23)
BUN/CREAT SERPL: 13 (ref 9–20)
CALCIUM SERPL-MCNC: 8.9 MG/DL (ref 8.6–10.4)
CHLORIDE SERPL-SCNC: 102 MMOL/L (ref 98–107)
CO2 SERPL-SCNC: 26 MMOL/L (ref 20–31)
CREAT SERPL-MCNC: 1.2 MG/DL (ref 0.5–0.9)
ECHO AV CUSP MM: 1.6 CM
ECHO AV MEAN GRADIENT: 4 MMHG
ECHO AV MEAN VELOCITY: 0.9 M/S
ECHO AV PEAK GRADIENT: 7 MMHG
ECHO AV PEAK VELOCITY: 1.3 M/S
ECHO AV VELOCITY RATIO: 0.69
ECHO AV VTI: 30.2 CM
ECHO BSA: 1.65 M2
ECHO EST RA PRESSURE: 3 MMHG
ECHO LA AREA 2C: 18.5 CM2
ECHO LA AREA 4C: 20.1 CM2
ECHO LA MAJOR AXIS: 5.6 CM
ECHO LA MINOR AXIS: 5 CM
ECHO LA VOL BP: 58 ML (ref 22–52)
ECHO LA VOL MOD A2C: 55 ML (ref 22–52)
ECHO LA VOL MOD A4C: 54 ML (ref 22–52)
ECHO LA VOL/BSA BIPLANE: 35 ML/M2 (ref 16–34)
ECHO LA VOLUME INDEX MOD A2C: 34 ML/M2 (ref 16–34)
ECHO LA VOLUME INDEX MOD A4C: 33 ML/M2 (ref 16–34)
ECHO LV E' LATERAL VELOCITY: 9 CM/S
ECHO LV EDV A2C: 48 ML
ECHO LV EDV A4C: 55 ML
ECHO LV EDV INDEX A4C: 34 ML/M2
ECHO LV EDV NDEX A2C: 29 ML/M2
ECHO LV EJECTION FRACTION A2C: 57 %
ECHO LV EJECTION FRACTION A4C: 56 %
ECHO LV EJECTION FRACTION BIPLANE: 56 % (ref 55–100)
ECHO LV ESV A2C: 21 ML
ECHO LV ESV A4C: 24 ML
ECHO LV ESV INDEX A2C: 13 ML/M2
ECHO LV ESV INDEX A4C: 15 ML/M2
ECHO LV FRACTIONAL SHORTENING: 35 % (ref 28–44)
ECHO LV INTERNAL DIMENSION DIASTOLE INDEX: 2.44 CM/M2
ECHO LV INTERNAL DIMENSION DIASTOLIC: 4 CM (ref 3.9–5.3)
ECHO LV INTERNAL DIMENSION SYSTOLIC INDEX: 1.59 CM/M2
ECHO LV INTERNAL DIMENSION SYSTOLIC: 2.6 CM
ECHO LV IVSD: 1.2 CM (ref 0.6–0.9)
ECHO LV MASS 2D: 155.4 G (ref 67–162)
ECHO LV MASS INDEX 2D: 94.8 G/M2 (ref 43–95)
ECHO LV POSTERIOR WALL DIASTOLIC: 1.1 CM (ref 0.6–0.9)
ECHO LV RELATIVE WALL THICKNESS RATIO: 0.55
ECHO LVOT AV VTI INDEX: 0.74
ECHO LVOT MEAN GRADIENT: 2 MMHG
ECHO LVOT PEAK GRADIENT: 3 MMHG
ECHO LVOT PEAK VELOCITY: 0.9 M/S
ECHO LVOT VTI: 22.3 CM
ECHO MV A VELOCITY: 0.87 M/S
ECHO MV E DECELERATION TIME (DT): 190 MS
ECHO MV E VELOCITY: 0.77 M/S
ECHO MV E/A RATIO: 0.89
ECHO MV E/E' LATERAL: 8.56
ECHO PV MAX VELOCITY: 0.9 M/S
ECHO PV PEAK GRADIENT: 3 MMHG
ECHO RIGHT VENTRICULAR SYSTOLIC PRESSURE (RVSP): 37 MMHG
ECHO TV REGURGITANT MAX VELOCITY: 2.9 M/S
ECHO TV REGURGITANT PEAK GRADIENT: 22 MMHG
EOSINOPHIL # BLD: 0.15 K/UL (ref 0–0.44)
EOSINOPHILS RELATIVE PERCENT: 3 % (ref 1–4)
ERYTHROCYTE [DISTWIDTH] IN BLOOD BY AUTOMATED COUNT: 12.3 % (ref 11.8–14.4)
GFR SERPL CREATININE-BSD FRML MDRD: 48 ML/MIN/1.73M2
GLUCOSE SERPL-MCNC: 82 MG/DL (ref 70–99)
HCT VFR BLD AUTO: 29.8 % (ref 36.3–47.1)
HGB BLD-MCNC: 10 G/DL (ref 11.9–15.1)
IMM GRANULOCYTES # BLD AUTO: <0.03 K/UL (ref 0–0.3)
IMM GRANULOCYTES NFR BLD: 0 %
LYMPHOCYTES NFR BLD: 2.63 K/UL (ref 1.1–3.7)
LYMPHOCYTES RELATIVE PERCENT: 53 % (ref 24–43)
MAGNESIUM SERPL-MCNC: 2.5 MG/DL (ref 1.6–2.6)
MCH RBC QN AUTO: 32.8 PG (ref 25.2–33.5)
MCHC RBC AUTO-ENTMCNC: 33.6 G/DL (ref 28.4–34.8)
MCV RBC AUTO: 97.7 FL (ref 82.6–102.9)
MONOCYTES NFR BLD: 0.51 K/UL (ref 0.1–1.2)
MONOCYTES NFR BLD: 10 % (ref 3–12)
NEUTROPHILS NFR BLD: 33 % (ref 36–65)
NEUTS SEG NFR BLD: 1.65 K/UL (ref 1.5–8.1)
NRBC BLD-RTO: 0 PER 100 WBC
PLATELET # BLD AUTO: 310 K/UL (ref 138–453)
PMV BLD AUTO: 8.9 FL (ref 8.1–13.5)
POTASSIUM SERPL-SCNC: 4.3 MMOL/L (ref 3.7–5.3)
PROT SERPL-MCNC: 5.5 G/DL (ref 6.4–8.3)
RBC # BLD AUTO: 3.05 M/UL (ref 3.95–5.11)
SODIUM SERPL-SCNC: 138 MMOL/L (ref 135–144)
WBC OTHER # BLD: 5 K/UL (ref 3.5–11.3)

## 2024-03-29 PROCEDURE — 85025 COMPLETE CBC W/AUTO DIFF WBC: CPT

## 2024-03-29 PROCEDURE — 6360000002 HC RX W HCPCS: Performed by: STUDENT IN AN ORGANIZED HEALTH CARE EDUCATION/TRAINING PROGRAM

## 2024-03-29 PROCEDURE — 97166 OT EVAL MOD COMPLEX 45 MIN: CPT

## 2024-03-29 PROCEDURE — 97116 GAIT TRAINING THERAPY: CPT

## 2024-03-29 PROCEDURE — 83735 ASSAY OF MAGNESIUM: CPT

## 2024-03-29 PROCEDURE — 97110 THERAPEUTIC EXERCISES: CPT

## 2024-03-29 PROCEDURE — 36415 COLL VENOUS BLD VENIPUNCTURE: CPT

## 2024-03-29 PROCEDURE — 93306 TTE W/DOPPLER COMPLETE: CPT

## 2024-03-29 PROCEDURE — 93306 TTE W/DOPPLER COMPLETE: CPT | Performed by: INTERNAL MEDICINE

## 2024-03-29 PROCEDURE — 1200000000 HC SEMI PRIVATE

## 2024-03-29 PROCEDURE — 80053 COMPREHEN METABOLIC PANEL: CPT

## 2024-03-29 PROCEDURE — 2580000003 HC RX 258: Performed by: STUDENT IN AN ORGANIZED HEALTH CARE EDUCATION/TRAINING PROGRAM

## 2024-03-29 PROCEDURE — 6370000000 HC RX 637 (ALT 250 FOR IP): Performed by: STUDENT IN AN ORGANIZED HEALTH CARE EDUCATION/TRAINING PROGRAM

## 2024-03-29 RX ADMIN — SODIUM CHLORIDE, POTASSIUM CHLORIDE, SODIUM LACTATE AND CALCIUM CHLORIDE: 600; 310; 30; 20 INJECTION, SOLUTION INTRAVENOUS at 06:52

## 2024-03-29 RX ADMIN — ENOXAPARIN SODIUM 30 MG: 100 INJECTION SUBCUTANEOUS at 08:29

## 2024-03-29 RX ADMIN — TIMOLOL MALEATE 1 DROP: 5 SOLUTION OPHTHALMIC at 08:30

## 2024-03-29 RX ADMIN — DORZOLAMIDE HYDROCHLORIDE 1 DROP: 20 SOLUTION/ DROPS OPHTHALMIC at 20:17

## 2024-03-29 RX ADMIN — CETIRIZINE HYDROCHLORIDE 5 MG: 10 TABLET, FILM COATED ORAL at 08:29

## 2024-03-29 RX ADMIN — TIMOLOL MALEATE 1 DROP: 5 SOLUTION OPHTHALMIC at 20:17

## 2024-03-29 RX ADMIN — SODIUM CHLORIDE, PRESERVATIVE FREE 10 ML: 5 INJECTION INTRAVENOUS at 20:16

## 2024-03-29 RX ADMIN — DORZOLAMIDE HYDROCHLORIDE 1 DROP: 20 SOLUTION/ DROPS OPHTHALMIC at 08:30

## 2024-03-29 RX ADMIN — ASPIRIN 81 MG: 81 TABLET, COATED ORAL at 08:29

## 2024-03-29 RX ADMIN — ATORVASTATIN CALCIUM 40 MG: 40 TABLET, FILM COATED ORAL at 08:29

## 2024-03-29 RX ADMIN — LATANOPROST 1 DROP: 50 SOLUTION/ DROPS OPHTHALMIC at 20:17

## 2024-03-29 RX ADMIN — POTASSIUM CHLORIDE 20 MEQ: 1500 TABLET, EXTENDED RELEASE ORAL at 20:16

## 2024-03-29 RX ADMIN — POTASSIUM CHLORIDE 20 MEQ: 1500 TABLET, EXTENDED RELEASE ORAL at 08:29

## 2024-03-29 RX ADMIN — SODIUM CHLORIDE, POTASSIUM CHLORIDE, SODIUM LACTATE AND CALCIUM CHLORIDE: 600; 310; 30; 20 INJECTION, SOLUTION INTRAVENOUS at 20:15

## 2024-03-29 RX ADMIN — PANTOPRAZOLE SODIUM 40 MG: 40 TABLET, DELAYED RELEASE ORAL at 06:50

## 2024-03-29 NOTE — H&P
History and Physical    Patient:  Haily Kowalski  MRN: [id number]    Chief Complaint: Dizziness    History Obtained From:  patient, electronic medical record    PCP: Eusebio Bullock APRN - CNP    History of Present Illness:   The patient is a 74 y.o. female who presents with hypotension and dizziness.  Patient was discharged from the hospital yesterday after being admitted with hypoxia.  She had a negative VQ scan bilateral venous Dopplers showed no DVT during admission.  Her hypoxia resolved and she was discharged home.  She states after going home she was standing in her kitchen and became dizzy.  She fell to the floor, landing on her coccyx.  She denies any loss of consciousness.  She has been hypotensive at home with stated blood pressures as low as 70 systolic.  On arrival to the emergency department, her blood pressure was 85/49.  She was given IV fluids and her blood pressure improved.  She denies any chest pain, shortness of breath, headache, nausea or vomiting.  Past medical history includes arthritis, asthma, diabetes, hyperlipidemia, hypertension and chronic kidney disease.  Troponin 19, magnesium 1.3, creatinine 1.6 with a baseline of 0.9-1.1.  Hemoglobin 11.3, hematocrit 34.2.  CT head and CT abdomen pelvis negative for any acute findings.  Right hip and pelvis x-ray negative for any acute findings.  EKG showed sinus bradycardia with a rate of 56.    Past Medical History:        Diagnosis Date    Arthritis     Asthma     Chronic bronchitis (HCC)     Diabetes mellitus (HCC)     GERD (gastroesophageal reflux disease)     H/O echocardiogram 3/9/16    EF >60%. LV wall thickness is mildly increased. Mild mitral and tricuspid regurgitation.  Borderline pulmonary hypertension estimated right ventricular sysolic pressure of 30mmHg. Mild (grade l) diastolic dysfunction.    History of PFTs 3/10/16     Suboptimal effort. Restrictive in nature. clionical correlations is advised.    History of stress test 2/18/16     an undiagnosed new problem with uncertain prognosis  Condition is stable  Treatment plan:   Cardiology consult  Telemetry monitoring  PT OT eval  CBC, CMP, magnesium  Orthostatic vital signs  Imaging: Echo ordered  Medications:   IV fluids and replace electrolytes as needed  Hold amlodipine  Hold atenolol  Hold furosemide  Hold hydralazine  Hold valsartan  Medication Monitoring / High Risk Medications: none      GERALDINE superimposed on CKD  Condition is stable  Treatment plan:   Telemetry monitoring  CBC, CMP, magnesium  Creatinine 1.6 today, 0.9 yesterday  Imaging: no further imaging studies ordered today  Medications:   IV fluids  Hold furosemide  Hold valsartan      Type 2 diabetes  Condition is a chronic stable condition  Treatment plan:   CBC, CMP  Diabetic diet  Imaging: no further imaging studies ordered today  Medications: Medications not indicated at this time    Nutrition status:   at risk for malnutrition  Dietician consult initiated    Hospital Prophylaxis:   DVT: Lovenox   Stress Ulcer: PPI     MDM Data:   Test interpretation:  My independent EKG interpretation: sinus bradycardia  My independent X-ray interpretation: CT head negative for any acute process.  Right hip and pelvis x-ray negative for any acute findings  Management and/or test interpretation discussed with ER MD at time of admission  Consults and Nursing notes were personally reviewed, all current labs and imaging were personally reviewed, tests ordered: CBC, BMP, and history obtained by independent historian       Disposition:  Shared decision making: All test results, treatment options and disposition options were discussed with the patient today  Social determinants of health that may impact management: none  Code status: Full Code   Disposition: Discharge plan is home        Critical Care Time:  Total critical care time caring for this patient with life threatening, unstable organ failure, including direct patient contact, management of life

## 2024-03-29 NOTE — PROGRESS NOTES
mg  5 mg Oral Daily Jose Tubbs MD        pantoprazole (PROTONIX) tablet 40 mg  40 mg Oral QAM AC Jose Tubbs MD        potassium chloride (KLOR-CON M) extended release tablet 20 mEq  20 mEq Oral BID Jose Tubbs MD   20 mEq at 03/28/24 1949    [Held by provider] valsartan (DIOVAN) tablet 320 mg  320 mg Oral Daily Jose Tubbs MD        sodium chloride flush 0.9 % injection 10 mL  10 mL IntraVENous 2 times per day Jose Tubbs MD   10 mL at 03/28/24 1949    sodium chloride flush 0.9 % injection 10 mL  10 mL IntraVENous PRN Jose Tubbs MD        0.9 % sodium chloride infusion   IntraVENous PRN Jose Tubbs MD        enoxaparin Sodium (LOVENOX) injection 30 mg  30 mg SubCUTAneous Daily Jose Tubbs MD   30 mg at 03/28/24 1754    ondansetron (ZOFRAN-ODT) disintegrating tablet 4 mg  4 mg Oral Q8H PRN Jose Tubbs MD        Or    ondansetron (ZOFRAN) injection 4 mg  4 mg IntraVENous Q6H PRN Jose Tubbs MD        polyethylene glycol (GLYCOLAX) packet 17 g  17 g Oral Daily PRN Jose Tubbs MD        acetaminophen (TYLENOL) tablet 650 mg  650 mg Oral Q6H PRN Jose Tubbs MD        Or    acetaminophen (TYLENOL) suppository 650 mg  650 mg Rectal Q6H PRN Jose Tubbs MD        lactated ringers IV soln infusion   IntraVENous Continuous Jose Tubbs MD 75 mL/hr at 03/28/24 1754 New Bag at 03/28/24 1754    dorzolamide (TRUSOPT) 2 % ophthalmic solution 1 drop  1 drop Both Eyes BID Jose Tubbs MD   1 drop at 03/28/24 1949    And    timolol (TIMOPTIC) 0.5 % ophthalmic solution 1 drop  1 drop Both Eyes BID Jose Tubbs MD   1 drop at 03/28/24 1949       ASSESSMENT:     Principal Problem:    Hypotension  Active Problems:    Chronic renal disease, stage III ([facility]) [044747]    Type 2 diabetes mellitus without complication, without long-term current use of insulin ([facility])    Essential hypertension    Acute kidney injury superimposed on CKD ([facility])    Hypomagnesemia  Resolved Problems:    * No resolved  hospital problems. *      PLAN:     Primary Problem(s): Hypotension  Condition is stable  Treatment plan: Continue current treatment  Imaging: Echo ordered  Medications: Continue current medications  Medication Monitoring / High Risk Medications: none   Cardiology consultation  Hold Current BP medications  IVF  Monitor labs, renal function  Mag replacement  Dispo -pending clinical status vital signs being stable    DVT prophylaxis:  Lovenox  GI prophylaxis:  PPI    Above plan discussed with the patient who agreed to the above plan     Discussed care plan with nurse after getting their input.    Please note that this chart was generated using voice recognition Dragon dictation software. Although every effort was made to ensure the accuracy of this automated transcription, some errors in transcription may have occurred.    Jose Tubbs MD  3/29/2024 4:43 AM

## 2024-03-29 NOTE — PROGRESS NOTES
Pt in bed watching television. VS and assessment as charted. Pt is A&Ox4. Denies any pain. Pt repositioned per comfort. Denies any other needs at this time and has call light within reach, will continue to monitor.

## 2024-03-29 NOTE — PROGRESS NOTES
Occupational Therapy  Facility/Department: [facility] MED SURG  Occupational Therapy Initial Assessment    Name: Haily Kowalski  : 1950  MRN: [id number]  Date of Service: 3/29/2024    Discharge Recommendations:  Continue to assess pending progress, Home with Home health OT     Patient Diagnosis(es): The primary encounter diagnosis was Hypotension, unspecified hypotension type. A diagnosis of Hypomagnesemia was also pertinent to this visit.  Past Medical History:  has a past medical history of Arthritis, Asthma, Chronic bronchitis (HCC), Diabetes mellitus (HCC), GERD (gastroesophageal reflux disease), H/O echocardiogram, History of PFTs, History of stress test, Hyperlipidemia, and Hypertension.  Past Surgical History:  has a past surgical history that includes Hysterectomy; Breast surgery; Foot surgery;  section; Cardiac catheterization (); Colonoscopy (3/23/15); Cholecystectomy; and Cardiac catheterization (Left, 2019).    Treatment Diagnosis: M62.81    Assessment   Performance deficits / Impairments: Decreased functional mobility ;Decreased ADL status;Decreased strength;Decreased endurance;Decreased balance;Decreased high-level IADLs  Assessment: Pt is 75 y/o female with admitting dx of hypotension presentint with decreased strength, balance, and endurance impacting pt's ability to complete ADLs and functional mobility at PLOF. Pt would benefit from skilled OT intervention to address current functional deficits to safely return to PLOF.  Treatment Diagnosis: M62.81  Prognosis: Good  Decision Making: Medium Complexity  REQUIRES OT FOLLOW-UP: Yes  Activity Tolerance  Activity Tolerance: Patient Tolerated treatment well        Plan   Occupational Therapy Plan  Times Per Day: Once a day  Days Per Week: 7 Days  Current Treatment Recommendations: Strengthening, ROM, Balance training, Functional mobility training, Endurance training, Patient/Caregiver education & training, Safety education & training,

## 2024-03-29 NOTE — PLAN OF CARE
Problem: Discharge Planning  Goal: Discharge to home or other facility with appropriate resources  Outcome: Progressing     Problem: Pain  Goal: Verbalizes/displays adequate comfort level or baseline comfort level  Outcome: Progressing     Problem: Safety - Adult  Goal: Free from fall injury  Outcome: Progressing     Problem: Nutrition Deficit:  Goal: Optimize nutritional status  3/29/2024 1556 by La Bocanegra RN  Outcome: Progressing     Problem: Chronic Conditions and Co-morbidities  Goal: Patient's chronic conditions and co-morbidity symptoms are monitored and maintained or improved  Outcome: Progressing

## 2024-03-29 NOTE — PROGRESS NOTES
Writer to bedside to complete morning assessment. Upon entry to room, pt resting in bed, respirations even while on room air. Vitals obtained and assessment completed, see flow sheet for details. Pt denies needs from writer at this time. Call light in reach. Care ongoing.

## 2024-03-29 NOTE — PROGRESS NOTES
Physical Therapy  Facility/Department: [facility] MED SURG  Daily Treatment Note  NAME: Haily Kowalski  : 1950  MRN: [id number]    Date of Service: 3/29/2024    Discharge Recommendations:  Continue to assess pending progress, Home with Home health PT, 24 hour supervision or assist        Patient Diagnosis(es): The primary encounter diagnosis was Hypotension, unspecified hypotension type. A diagnosis of Hypomagnesemia was also pertinent to this visit.    Assessment   Assessment: Transfers--CGA/SBA, gait without AD with CGA/SBA 160ft. Patient with quick pace, cues to slow down especially with directional changes. Seated B LE therex at EOB x 20 in all available planes of motion. Patient with good tolerance and technique with therex. Will continue to progress as tolerated. Patient noted to have lost her teeth at end of tx, writer notified all necessary staff members and assisted in attempting to locate dentures.  Activity Tolerance: Patient tolerated treatment well     Plan    Physical Therapy Plan  General Plan: 2 times a day 7 days a week (1x per day on weekends.)  Current Treatment Recommendations: Strengthening;Balance training;Functional mobility training;Transfer training;Gait training;Stair training;Neuromuscular re-education;Home exercise program;Positioning;Therapeutic activities;Patient/Caregiver education & training;Safety education & training;Endurance training     Restrictions  Restrictions/Precautions  Restrictions/Precautions: Fall Risk, General Precautions     Subjective    Subjective  Subjective: pt in bathroom with OT upon writers entry, pleasant and agreeable to complete PT treatment.  Pain: denied  Orientation  Overall Orientation Status: Within Functional Limits     Objective     Bed Mobility Training  Bed Mobility Training: No  Transfer Training  Transfer Training: Yes  Overall Level of Assistance: Assist X1;Stand-by assistance  Interventions: Demonstration;Verbal cues (Cues for slow positional

## 2024-03-29 NOTE — PLAN OF CARE
Problem: Discharge Planning  Goal: Discharge to home or other facility with appropriate resources  3/29/2024 1859 by Jaylin Mcneil RN  Outcome: Progressing     Problem: Pain  Goal: Verbalizes/displays adequate comfort level or baseline comfort level  3/29/2024 1859 by Jaylin Mcneil RN  Outcome: Progressing  Flowsheets (Taken 3/29/2024 1859)  Verbalizes/displays adequate comfort level or baseline comfort level:   Assess pain using appropriate pain scale   Administer analgesics based on type and severity of pain and evaluate response   Encourage patient to monitor pain and request assistance   Implement non-pharmacological measures as appropriate and evaluate response     Problem: Safety - Adult  Goal: Free from fall injury  3/29/2024 1859 by Jaylin Mcneil RN  Outcome: Progressing     Problem: Nutrition Deficit:  Goal: Optimize nutritional status  3/29/2024 1859 by Jaylin Mcneil RN  Outcome: Progressing     Problem: Chronic Conditions and Co-morbidities  Goal: Patient's chronic conditions and co-morbidity symptoms are monitored and maintained or improved  3/29/2024 1859 by Jaylin Mcneil RN  Outcome: Progressing     Problem: Cardiovascular - Adult  Goal: Maintains optimal cardiac output and hemodynamic stability  Outcome: Progressing  Flowsheets (Taken 3/29/2024 1859)  Maintains optimal cardiac output and hemodynamic stability:   Monitor blood pressure and heart rate   Monitor urine output and notify Licensed Independent Practitioner for values outside of normal range     Problem: Cardiovascular - Adult  Goal: Absence of cardiac dysrhythmias or at baseline  Outcome: Progressing  Flowsheets (Taken 3/29/2024 1859)  Absence of cardiac dysrhythmias or at baseline:   Monitor cardiac rate and rhythm   Administer antiarrhythmia medication and electrolyte replacement as ordered   Assess for signs of decreased cardiac output

## 2024-03-29 NOTE — PROGRESS NOTES
Physical Therapy  Facility/Department: [facility] MED SURG  Daily Treatment Note  NAME: Haily Kowalski  : 1950  MRN: [id number]    Date of Service: 3/29/2024    Discharge Recommendations:  Continue to assess pending progress, Home with Home health PT, 24 hour supervision or assist      Patient Diagnosis(es): The primary encounter diagnosis was Hypotension, unspecified hypotension type. A diagnosis of Hypomagnesemia was also pertinent to this visit.    Assessment   Assessment: Seated B LE exercise x20 at EOB. Transfers: CGA/SBA. Gait traininft without AD, CGA/SBA with gait belt. Pt educated on slow and steady pace during ambulation to prevent falls.  Activity Tolerance: Patient tolerated treatment well     Plan    Physical Therapy Plan  General Plan: 2 times a day 7 days a week  Specific Instructions for Next Treatment: 1x daily on weekends  Current Treatment Recommendations: Strengthening;Balance training;Functional mobility training;Transfer training;Gait training;Stair training;Neuromuscular re-education;Home exercise program;Positioning;Therapeutic activities;Patient/Caregiver education & training;Safety education & training;Endurance training     Restrictions  Restrictions/Precautions  Restrictions/Precautions: Fall Risk, General Precautions     Subjective    Subjective  Subjective: Pt resting in bed on arrival to her room, agreeable to therapy. Pt states she is hoping to go home this afternoon but has yet to be seen by her physician.  Pain: Pt denies pain  Orientation  Overall Orientation Status: Within Functional Limits  Cognition  Overall Cognitive Status: Exceptions  Following Commands: Follows multistep commands with increased time;Follows multistep commands with repitition  Attention Span: Attends with cues to redirect  Safety Judgement: Decreased awareness of need for safety  Problem Solving: Assistance required to correct errors made     Objective   Bed Mobility Training  Bed Mobility Training:      Minutes 26            DIVINE VELAZCO, PTA

## 2024-03-29 NOTE — PROGRESS NOTES
Comprehensive Nutrition Assessment    Type and Reason for Visit:  Initial    Nutrition Recommendations/Plan:   Glucerna tid meals  Monitor weight (significant losses)     Malnutrition Assessment:  Malnutrition Status:  Moderate malnutrition (03/29/24 0737)    Context:  Acute Illness     Findings of the 6 clinical characteristics of malnutrition:  Energy Intake:  75% or less of estimated energy requirements for 7 or more days (with lower teeth pulled)  Weight Loss:  Greater than 2% over 1 week     Body Fat Loss:  No significant body fat loss     Muscle Mass Loss:  No significant muscle mass loss    Fluid Accumulation:  Mild Extremities   Strength:  Not Performed    Nutrition Assessment:    Moderate (acute) malnutrition r/t inadequate intakes and weight losses >2% in last week and >10% in last 6 months.  Returned to hospital with n/v and low Mg++ (corrected), denying any vomiting after eating supper of spaghetti and hamburger yesterday. Weight declines substantial and span beyond recent pulling of lower teeth. No hypoglycemia this admission thus far. Will resume use of Glucerna to supplement and recommend recheck vit D level.    Nutrition Related Findings:    +1 BLE edema. Wound Type: None       Current Nutrition Intake & Therapies:    Average Meal Intake: Unable to assess  Average Supplements Intake: None Ordered  ADULT DIET; Regular; 5 carb choices (75 gm/meal)  ADULT ORAL NUTRITION SUPPLEMENT; Breakfast, Lunch, Dinner; Diabetic Oral Supplement    Anthropometric Measures:  Height: 162.6 cm (5' 4\")  Ideal Body Weight (IBW): 120 lbs (55 kg)    Admission Body Weight: 60.3 kg (132 lb 15 oz)  Current Body Weight: 60.3 kg (132 lb 15 oz), 110.8 % IBW. Weight Source: Bed Scale  Current BMI (kg/m2): 22.8  Usual Body Weight: 64 kg (141 lb) (< 1 week ago and 160# < 6 months ago)  % Weight Change (Calculated): -5.7  Weight Adjustment For: No Adjustment                 BMI Categories: Normal Weight (BMI

## 2024-03-29 NOTE — CARE COORDINATION
Case Management Assessment  Initial Evaluation    Date/Time of Evaluation: 3/29/2024 1:21 PM  Assessment Completed by: Susannah Alanis RN    If patient is discharged prior to next notation, then this note serves as note for discharge by case management.    Patient Name: Haily Kowalski                   YOB: 1950  Diagnosis: Hypomagnesemia [E83.42]  Hypotension [I95.9]  Hypotension, unspecified hypotension type [I95.9]                   Date / Time: 3/28/2024 12:57 PM    Patient Admission Status: Inpatient   Readmission Risk (Low < 19, Mod (19-27), High > 27): Readmission Risk Score: 14.5    Current PCP: Eusebio Bullock APRN - CNP  PCP verified by CM? Yes    Chart Reviewed: Yes      History Provided by: Patient  Patient Orientation: Alert and Oriented    Patient Cognition: Alert    Hospitalization in the last 30 days (Readmission):  Yes    If yes, Readmission Assessment in CM Navigator will be completed.    Advance Directives:      Code Status: Full Code   Patient's Primary Decision Maker is: Legal Next of Kin    Primary Decision Maker: Robbi Kowalski - Spouse - [phone]    Discharge Planning:    Patient lives with: Spouse/Significant Other Type of Home: House  Primary Care Giver: Spouse  Patient Support Systems include: Spouse/Significant Other, Children, Family Members, [employer]   Current Financial resources: Medicare  Current community resources: None  Current services prior to admission: None            Current DME:              Type of Home Care services:  None    ADLS  Prior functional level: Assistance with the following:, Cooking, Housework, Shopping  Current functional level: Independent in ADLs/IADLs, Bathing, Dressing, Toileting, Feeding    PT AM-PAC:   /24  OT AM-PAC: 21 /24    Family can provide assistance at DC: Yes  Would you like Case Management to discuss the discharge plan with any other family members/significant others, and if so, who? No  Plans to Return to Present

## 2024-03-30 VITALS
HEART RATE: 64 BPM | OXYGEN SATURATION: 94 % | WEIGHT: 136.24 LBS | TEMPERATURE: 97.2 F | BODY MASS INDEX: 23.26 KG/M2 | DIASTOLIC BLOOD PRESSURE: 67 MMHG | HEIGHT: 64 IN | SYSTOLIC BLOOD PRESSURE: 137 MMHG | RESPIRATION RATE: 18 BRPM

## 2024-03-30 LAB
25(OH)D3 SERPL-MCNC: 10.5 NG/ML (ref 30–100)
ALBUMIN SERPL-MCNC: 2.6 G/DL (ref 3.5–5.2)
ALBUMIN/GLOB SERPL: 1 {RATIO} (ref 1–2.5)
ALP SERPL-CCNC: 99 U/L (ref 35–104)
ALT SERPL-CCNC: <5 U/L (ref 5–33)
ANION GAP SERPL CALCULATED.3IONS-SCNC: 8 MMOL/L (ref 9–17)
AST SERPL-CCNC: 12 U/L
BASOPHILS # BLD: <0.03 K/UL (ref 0–0.2)
BASOPHILS NFR BLD: 0 % (ref 0–2)
BILIRUB SERPL-MCNC: 0.2 MG/DL (ref 0.3–1.2)
BUN SERPL-MCNC: 18 MG/DL (ref 8–23)
BUN/CREAT SERPL: 16 (ref 9–20)
CALCIUM SERPL-MCNC: 8.6 MG/DL (ref 8.6–10.4)
CHLORIDE SERPL-SCNC: 105 MMOL/L (ref 98–107)
CO2 SERPL-SCNC: 26 MMOL/L (ref 20–31)
CREAT SERPL-MCNC: 1.1 MG/DL (ref 0.5–0.9)
EOSINOPHIL # BLD: 0.15 K/UL (ref 0–0.44)
EOSINOPHILS RELATIVE PERCENT: 3 % (ref 1–4)
ERYTHROCYTE [DISTWIDTH] IN BLOOD BY AUTOMATED COUNT: 12.4 % (ref 11.8–14.4)
FERRITIN SERPL-MCNC: 79 NG/ML (ref 13–150)
FOLATE SERPL-MCNC: 7.2 NG/ML (ref 4.8–24.2)
GFR SERPL CREATININE-BSD FRML MDRD: 53 ML/MIN/1.73M2
GLUCOSE SERPL-MCNC: 92 MG/DL (ref 70–99)
HCT VFR BLD AUTO: 28.9 % (ref 36.3–47.1)
HGB BLD-MCNC: 9.4 G/DL (ref 11.9–15.1)
IMM GRANULOCYTES # BLD AUTO: <0.03 K/UL (ref 0–0.3)
IMM GRANULOCYTES NFR BLD: 0 %
IMM RETICS NFR: 17.5 % (ref 2.7–18.3)
IRON SATN MFR SERPL: 17 % (ref 20–55)
IRON SERPL-MCNC: 30 UG/DL (ref 37–145)
LYMPHOCYTES NFR BLD: 2.72 K/UL (ref 1.1–3.7)
LYMPHOCYTES RELATIVE PERCENT: 47 % (ref 24–43)
MAGNESIUM SERPL-MCNC: 1.8 MG/DL (ref 1.6–2.6)
MCH RBC QN AUTO: 32.4 PG (ref 25.2–33.5)
MCHC RBC AUTO-ENTMCNC: 32.5 G/DL (ref 28.4–34.8)
MCV RBC AUTO: 99.7 FL (ref 82.6–102.9)
MONOCYTES NFR BLD: 0.68 K/UL (ref 0.1–1.2)
MONOCYTES NFR BLD: 12 % (ref 3–12)
NEUTROPHILS NFR BLD: 38 % (ref 36–65)
NEUTS SEG NFR BLD: 2.23 K/UL (ref 1.5–8.1)
NRBC BLD-RTO: 0 PER 100 WBC
PLATELET # BLD AUTO: 287 K/UL (ref 138–453)
PMV BLD AUTO: 8.9 FL (ref 8.1–13.5)
POTASSIUM SERPL-SCNC: 4.6 MMOL/L (ref 3.7–5.3)
PROT SERPL-MCNC: 5.3 G/DL (ref 6.4–8.3)
RBC # BLD AUTO: 2.9 M/UL (ref 3.95–5.11)
RETIC HEMOGLOBIN: 36.9 PG (ref 28.2–35.7)
RETICS # AUTO: 0.05 M/UL (ref 0.03–0.08)
RETICS/RBC NFR AUTO: 1.8 % (ref 0.5–1.9)
SODIUM SERPL-SCNC: 139 MMOL/L (ref 135–144)
TIBC SERPL-MCNC: 174 UG/DL (ref 250–450)
UNSATURATED IRON BINDING CAPACITY: 144 UG/DL (ref 112–347)
VIT B12 SERPL-MCNC: 250 PG/ML (ref 232–1245)
WBC OTHER # BLD: 5.8 K/UL (ref 3.5–11.3)

## 2024-03-30 PROCEDURE — 97116 GAIT TRAINING THERAPY: CPT

## 2024-03-30 PROCEDURE — 85025 COMPLETE CBC W/AUTO DIFF WBC: CPT

## 2024-03-30 PROCEDURE — 94761 N-INVAS EAR/PLS OXIMETRY MLT: CPT

## 2024-03-30 PROCEDURE — 82607 VITAMIN B-12: CPT

## 2024-03-30 PROCEDURE — 83735 ASSAY OF MAGNESIUM: CPT

## 2024-03-30 PROCEDURE — 97110 THERAPEUTIC EXERCISES: CPT

## 2024-03-30 PROCEDURE — 85045 AUTOMATED RETICULOCYTE COUNT: CPT

## 2024-03-30 PROCEDURE — 82306 VITAMIN D 25 HYDROXY: CPT

## 2024-03-30 PROCEDURE — 97535 SELF CARE MNGMENT TRAINING: CPT

## 2024-03-30 PROCEDURE — 83540 ASSAY OF IRON: CPT

## 2024-03-30 PROCEDURE — 83550 IRON BINDING TEST: CPT

## 2024-03-30 PROCEDURE — 6360000002 HC RX W HCPCS: Performed by: STUDENT IN AN ORGANIZED HEALTH CARE EDUCATION/TRAINING PROGRAM

## 2024-03-30 PROCEDURE — 80053 COMPREHEN METABOLIC PANEL: CPT

## 2024-03-30 PROCEDURE — 6370000000 HC RX 637 (ALT 250 FOR IP): Performed by: STUDENT IN AN ORGANIZED HEALTH CARE EDUCATION/TRAINING PROGRAM

## 2024-03-30 PROCEDURE — 82728 ASSAY OF FERRITIN: CPT

## 2024-03-30 PROCEDURE — 36415 COLL VENOUS BLD VENIPUNCTURE: CPT

## 2024-03-30 PROCEDURE — 82746 ASSAY OF FOLIC ACID SERUM: CPT

## 2024-03-30 RX ORDER — AMLODIPINE BESYLATE 2.5 MG/1
2.5 TABLET ORAL DAILY
Qty: 30 TABLET | Refills: 0 | Status: SHIPPED | OUTPATIENT
Start: 2024-03-30

## 2024-03-30 RX ORDER — ERGOCALCIFEROL 1.25 MG/1
50000 CAPSULE ORAL WEEKLY
Qty: 12 CAPSULE | Refills: 1 | Status: SHIPPED | OUTPATIENT
Start: 2024-03-30

## 2024-03-30 RX ORDER — CEPHALEXIN 500 MG/1
500 CAPSULE ORAL EVERY 6 HOURS SCHEDULED
Status: DISCONTINUED | OUTPATIENT
Start: 2024-03-30 | End: 2024-03-30 | Stop reason: HOSPADM

## 2024-03-30 RX ORDER — ENOXAPARIN SODIUM 100 MG/ML
40 INJECTION SUBCUTANEOUS DAILY
Status: DISCONTINUED | OUTPATIENT
Start: 2024-03-31 | End: 2024-03-30 | Stop reason: HOSPADM

## 2024-03-30 RX ORDER — CEPHALEXIN 500 MG/1
500 CAPSULE ORAL 4 TIMES DAILY
Qty: 28 CAPSULE | Refills: 0 | Status: SHIPPED | OUTPATIENT
Start: 2024-03-30 | End: 2024-04-06

## 2024-03-30 RX ORDER — VALSARTAN 80 MG/1
80 TABLET ORAL DAILY
Qty: 30 TABLET | Refills: 0 | Status: SHIPPED | OUTPATIENT
Start: 2024-03-30

## 2024-03-30 RX ORDER — MULTIVIT-MIN/IRON FUM/FOLIC AC 7.5 MG-4
1 TABLET ORAL DAILY
Qty: 90 TABLET | Refills: 0 | Status: SHIPPED | OUTPATIENT
Start: 2024-03-30

## 2024-03-30 RX ORDER — MELATONIN
1000 DAILY
Qty: 90 TABLET | Refills: 0 | Status: SHIPPED | OUTPATIENT
Start: 2024-03-30

## 2024-03-30 RX ADMIN — ASPIRIN 81 MG: 81 TABLET, COATED ORAL at 08:57

## 2024-03-30 RX ADMIN — POTASSIUM CHLORIDE 20 MEQ: 1500 TABLET, EXTENDED RELEASE ORAL at 08:57

## 2024-03-30 RX ADMIN — DORZOLAMIDE HYDROCHLORIDE 1 DROP: 20 SOLUTION/ DROPS OPHTHALMIC at 08:58

## 2024-03-30 RX ADMIN — CEPHALEXIN 500 MG: 500 CAPSULE ORAL at 11:52

## 2024-03-30 RX ADMIN — TIMOLOL MALEATE 1 DROP: 5 SOLUTION OPHTHALMIC at 08:58

## 2024-03-30 RX ADMIN — ATORVASTATIN CALCIUM 40 MG: 40 TABLET, FILM COATED ORAL at 08:57

## 2024-03-30 RX ADMIN — ENOXAPARIN SODIUM 30 MG: 100 INJECTION SUBCUTANEOUS at 08:57

## 2024-03-30 RX ADMIN — CEPHALEXIN 500 MG: 500 CAPSULE ORAL at 05:34

## 2024-03-30 RX ADMIN — PANTOPRAZOLE SODIUM 40 MG: 40 TABLET, DELAYED RELEASE ORAL at 06:41

## 2024-03-30 RX ADMIN — CETIRIZINE HYDROCHLORIDE 5 MG: 10 TABLET, FILM COATED ORAL at 08:57

## 2024-03-30 NOTE — DISCHARGE INSTRUCTIONS
Repeat labs in about 3 days (Monday, April 1st)  Repeat urine in 1 week    If there are any worsening or concerning signs or symptoms, report to the ED and/or contact EMS-911 for immediate evaluation.

## 2024-03-30 NOTE — PROGRESS NOTES
[address] , [place], [place], [address]    Progress Note    Date:   3/30/2024  Patient name:  Haily Kowalski  Date of admission:  3/28/2024 12:57 PM  MRN:   [id number]  YOB: 1950    SUBJECTIVE/Last 24 hours update:     Patient seen and examined at the bed side , no new acute events overnight and no new complains. Notes from nursing staff and Consults had been reviewed, and the overnight progress had been checked with the nursing staff as well. VSS, case had been discussed with .    REVIEW OF SYSTEMS:      CONSTITUTIONAL:  no fevers, no headcahes  EYES: negative for blury vision  HEENT: No headaches, No nasal congestion, no difficulty swallowing  RESPIRATORY:negative for dyspnea, no wheezing, no Cough  CARDIOVASCULAR: negative for chest pain, no palpitations  GASTROINTESTINAL: no nausea, no vomiting, no change in bowel habits, no abdominal pain   GENITOURINARY: negative for dysuria, no hematuria   MUSCULOSKELETAL: no joint pains, no muscle aches, no swelling of joints or extremities  NEUROLOGICAL: No  Weakness or numbness      PAST MEDICAL HISTORY:      has a past medical history of Arthritis, Asthma, Chronic bronchitis (HCC), Diabetes mellitus (HCC), GERD (gastroesophageal reflux disease), H/O echocardiogram, History of PFTs, History of stress test, Hyperlipidemia, and Hypertension.    PAST SURGICAL HISTORY:      has a past surgical history that includes Hysterectomy; Breast surgery; Foot surgery;  section; Cardiac catheterization (); Colonoscopy (3/23/15); Cholecystectomy; and Cardiac catheterization (Left, 2019).       SOCIAL HISTORY:      reports that she has never smoked. She has never used smokeless tobacco. She reports that she does not drink alcohol and does not use drugs.    TOBACCO:   reports that she has never smoked. She has never used smokeless tobacco.  ETOH:   reports no history of alcohol use.  Reviewed and non-contributory or as  noted above and/or in the HPI    FAMILY HISTORY:     family history includes Diabetes in her brother and sister; High Blood Pressure in her sister; Stroke in her brother, father, and sister.      Problem Relation Age of Onset    Stroke Father     Stroke Sister     Diabetes Sister         x6 sisters    High Blood Pressure Sister     Stroke Brother     Diabetes Brother      Reviewed and non-contributory or as noted above and/or in the HPI    HOME MEDICATIONS:      Prior to Admission medications    Medication Sig Start Date End Date Taking? Authorizing Provider   valsartan (DIOVAN) 320 MG tablet Take 1 tablet by mouth daily 3/11/24   Eusebio Bullock APRN - CNP   fluticasone (FLONASE) 50 MCG/ACT nasal spray 2 sprays by Each Nostril route daily  Patient taking differently: 2 sprays by Each Nostril route daily as needed for Allergies 2/5/24   Teresa Henderson APRN - CNP   atorvastatin (LIPITOR) 40 MG tablet Take 1 tablet by mouth once daily 1/11/24   Eusebio Bullock APRN - CNP   potassium chloride (KLOR-CON M) 20 MEQ extended release tablet Take 1 tablet by mouth 2 times daily 10/10/23   Eusebio Bullock APRN - CNP   pantoprazole (PROTONIX) 40 MG tablet Take 1 tablet by mouth every morning (before breakfast) 10/9/23   Eusebio Bullock APRN - CNP   hydrALAZINE (APRESOLINE) 50 MG tablet Take 1 tablet by mouth 3 times daily 7/18/23   Eusebio Bullock APRN - CNP   amLODIPine (NORVASC) 5 MG tablet Take 1 tablet by mouth daily 7/17/23   Juan Luis Lovett MD   levocetirizine (XYZAL) 5 MG tablet Take 1 tablet by mouth nightly 5/30/23   Eusebio Bullock APRN - CNP   furosemide (LASIX) 40 MG tablet Take 1 tablet by mouth daily 4/6/23   Eusebio Bullock APRN - CNP   atenolol (TENORMIN) 100 MG tablet Take 1 tablet by mouth daily 2/21/23   Eusebio Bullock APRN - CNP   aspirin EC 81 MG EC tablet Take 1 tablet by mouth daily 6/28/22   Margarita Kumar PA-C   Calcium Carbonate-Vitamin D (OYSTER SHELL CALCIUM/D) 500-200 MG-UNIT TABS Take 1

## 2024-03-30 NOTE — PROGRESS NOTES
Occupational Therapy  Facility/Department: [facility] MED SURG  Daily Treatment Note  NAME: Haily Kowalski  : 1950  MRN: [id number]    Date of Service: 3/30/2024    Discharge Recommendations:  Continue to assess pending progress, Home with Home health OT         Patient Diagnosis(es): The primary encounter diagnosis was Hypotension, unspecified hypotension type. A diagnosis of Hypomagnesemia was also pertinent to this visit.     Assessment    Activity Tolerance: Patient tolerated treatment well  Discharge Recommendations: Continue to assess pending progress;Home with Home health OT      Plan   Occupational Therapy Plan  Times Per Day: Once a day  Days Per Week: 7 Days  Current Treatment Recommendations: Strengthening;ROM;Balance training;Functional mobility training;Endurance training;Patient/Caregiver education & training;Safety education & training;Self-Care / ADL;Home management training     Restrictions  Restrictions/Precautions  Restrictions/Precautions: Fall Risk;General Precautions    Subjective   Subjective  Subjective: Pt lying in bed upon arrival. pt agreed to participate in therapy session.  Pain: Pt had no complaints of pain.          Objective    Vitals          ADL  Functional Mobility: Contact guard assistance;Stand by assistance  Functional Mobility Skilled Clinical Factors: CGA/SBA to complete func mob with no AD in hallway.  Additional Comments: SBA to complete bed mob from supine to sit EOB. SBA to complete sit to stand from EOB.  OT Exercises  Exercise Treatment: Pt completed BUE ther ex with 1# dumbbell x 5 planes x 15 reps x 1 set to increase UE strength and endurance in order to ease completion of ADL tasks. Pt required occ RB secondary to fatigue.     Safety Devices  Type of Devices: All fall risk precautions in place;Call light within reach;Nurse notified;Chair alarm in place;Left in chair     Patient Education  Education Given To: Patient  Education Provided: Role of Therapy;Plan of

## 2024-03-30 NOTE — DISCHARGE SUMMARY
[address] , [place], [place], [address]    Discharge Summary      NAME:  Haily Kowalski  :  1950  MRN:  [id number]    Admit date:  3/28/2024  Discharge date:  24      Admitting Physician:  Jose Tubbs MD    Primary Diagnosis on Admission:   Present on Admission:   Hypotension   Essential hypertension   Acute kidney injury superimposed on CKD (HCC)   Chronic renal disease, stage III ([facility]) [207042]   Type 2 diabetes mellitus without complication, without long-term current use of insulin ([facility])   Hypomagnesemia   Moderate malnutrition ([facility])      Secondary Diagnoses:  has Abnormal cardiovascular stress test; Resistant hypertension; and Hyperkalemia on their pertinent problem list.    Admission Condition:  stable     Discharged Condition: stable    Hospital Course:   The patient was admitted for the management of hypotension. The patient had recently been admitted and returned to the ED regarding concerns for low blood pressure readings at home. She states that she has been tolerating her PO intake without any n/v/d. No other/recent medications changes were noted by the patient. No fevers/chills and she denies any GI/ symptoms. All home blood pressure medications were held. Today on day of discharge pt feels better with no further complaints. Vitals and Labs are stable. She did have E.Coli in the Urinalysis and was started on Keflex. Case was discussed with her Cardiology team. All consultants involved during this admission are agreeable to d/c with close follow-up within 1-2 days. The patient is adamant on being discharged home today such that she can attend [facility] tomorrow. She states that she can monitor her BP closely at home and plans to come back if needed. Medications will be adjusted after consulting with Cardiology. Pending labs for anemia will be followed-up by the patient's PCP. She will also have repeat labs, including Mg in about 3 days' time. Repeat UA in  TOUCH ULTRA TEST  USE 1 STRIP TO CHECK GLUCOSE DAILY     dorzolamide-timolol 2-0.5 % ophthalmic solution  Commonly known as: COSOPT     latanoprost 0.005 % ophthalmic solution  Commonly known as: XALATAN     levocetirizine 5 MG tablet  Commonly known as: XYZAL  Take 1 tablet by mouth nightly     ONE TOUCH LANCETS Misc  Lancets- One Touch Delica for testing daily and as needed. DX: Diabetes type  E11.9     Oyster Shell Calcium/D 500-200 MG-UNIT Tabs  Take 1 tablet by mouth 2 times daily     pantoprazole 40 MG tablet  Commonly known as: PROTONIX  Take 1 tablet by mouth every morning (before breakfast)     potassium chloride 20 MEQ extended release tablet  Commonly known as: KLOR-CON M  Take 1 tablet by mouth 2 times daily            STOP taking these medications      atenolol 100 MG tablet  Commonly known as: Tenormin     furosemide 40 MG tablet  Commonly known as: Lasix     hydrALAZINE 50 MG tablet  Commonly known as: APRESOLINE               Where to Get Your Medications        These medications were sent to [facility] [address] [place], [place] [address] [facility] - P [phone] - F [phone]  [address] [place] [id number]      Phone: [phone]   amLODIPine 2.5 MG tablet  cephALEXin 500 MG capsule  multivitamin with minerals tablet  valsartan 80 MG tablet  vitamin D 1.25 MG (08019 UT) Caps capsule  vitamin D3 25 MCG (1000 UT) Tabs tablet         Send Copies to: Eusebio Bullock APRN - CNP    Patient Instructions:   Activity: activity as tolerated  Diet: cardiac diet  Wound Care: none needed  Follow up with Eusebio Blulock APRN - CNP in 1-2 weeks   Follow-up with Cardiology in several days    CORE MEASURES on Discharge (if applicable)  ACE/ARB in CHF: Yes  ASA - Yes  Statin - Yes    Total time spent on discharge services: 45 minutes  Including the following activities:  Evaluation and Management of patient  Discussion with patient and/or surrogate about current care plan  Coordination with

## 2024-03-30 NOTE — PROGRESS NOTES
Entered patient's room for morning vital signs and head to toe assessment. Patient resting in the bed at this time. A&O x4, calm, and cooperative. Patient denies of pain at this time. Vital signs and head to toe assessment completed at this time, see flowsheets for more details. Patient ambulated to the bathroom at this time an=d returned to the bed. Patient denies no more needs at this time. Call light within reach. Bed alarm on. Bed wheels locked. Bed in lowest position.

## 2024-03-30 NOTE — PROGRESS NOTES
Discharge education completed at this time. Follow-up appointments reviewed. Patient reminded to call cardiology office for follow-up appointment on Monday. Outpatient follow-up lab work reviewed. New and current medications reviewed at this time, with next due time noted. Patient reminded to monitor BP and not take BP medications if top number (systolic) is below 100. BP medication changes noted and educated on which medications need stopped. Worsening symptoms or condition reviewed and when to call 911 or come to the ER. Signs and symptoms of a stroke and heart attack reviewed. Importance of taking full course of antibiotic until prescription is gone educated on. No further questions or concerns at this time.

## 2024-03-30 NOTE — PROGRESS NOTES
Patient discharged at this time. Patient being discharged home. Patient left floor via wheelchair.

## 2024-03-30 NOTE — PROGRESS NOTES
Physical Therapy  Facility/Department: [facility] MED SURG  Daily Treatment Note  NAME: Haily Kowalski  : 1950  MRN: [id number]    Date of Service: 3/30/2024    Discharge Recommendations:  Continue to assess pending progress, Home with Home health PT, 24 hour supervision or assist   PT Equipment Recommendations  Equipment Needed: Yes  Mobility Devices: Walker  Walker: Rollator (4 Wheeled), Rolling    Patient Diagnosis(es): The primary encounter diagnosis was Hypotension, unspecified hypotension type. A diagnosis of Hypomagnesemia was also pertinent to this visit.    Assessment   Assessment: Pt in bed upon therapist arrival. Agreeable to therapy. Denied pain. Pt completed bed mobility at stand by assist this date without need for therapist intervention. Pt ambulated 250 feet without assistive device, pushing own IV pole. Therapist provided contact guard throughout and moderate vebal cuing for gait speed. Pt often accelerated gait speed and wanted to walk faster with forward trunk lean. Pt able to correct with cuing but would quickly return to forward trunk lean and accelerated gait. Pt return to chair and completed bilaterl seated therapeutic exercise in all available planes in order to facilitate lower extremity strength and endurance. Pt left in chair, chair alarm on, call light in reach, all needs met.  Activity Tolerance: Patient tolerated treatment well  Equipment Needed: Yes     Plan    Physical Therapy Plan  General Plan: 2 times a day 7 days a week  Specific Instructions for Next Treatment: 1x daily on weekends  Current Treatment Recommendations: Strengthening;Balance training;Functional mobility training;Transfer training;Gait training;Stair training;Neuromuscular re-education;Home exercise program;Positioning;Therapeutic activities;Patient/Caregiver education & training;Safety education & training;Endurance training     Restrictions  Restrictions/Precautions  Restrictions/Precautions: Fall Risk, General  Precautions     Subjective    Subjective  Subjective: Pt asleep in bed upon therapist arrival. Easy to arouse and agreeable to therapy.  Pain: Pt denies pain  Orientation  Overall Orientation Status: Within Functional Limits  Cognition  Overall Cognitive Status: WFL     Objective   Vitals     Bed Mobility Training  Bed Mobility Training: Yes  Overall Level of Assistance: Stand-by assistance;Assist X1  Rolling: Stand-by assistance;Assist X1  Supine to Sit: Stand-by assistance;Assist X1  Scooting: Stand-by assistance;Assist X1  Balance  Sitting: Intact  Standing: With support  Transfer Training  Transfer Training: Yes  Overall Level of Assistance: Stand-by assistance;Assist X1  Interventions: Safety awareness training  Sit to Stand: Stand-by assistance;Assist X1  Stand to Sit: Stand-by assistance;Assist X1  Stand Pivot Transfers: Contact-guard assistance;Assist X1  Gait Training  Gait Training: Yes  Right Side Weight Bearing: Full  Left Side Weight Bearing: Full  Gait  Gait Training: Yes  Overall Level of Assistance: Contact-guard assistance;Assist X1  Distance (ft): 250 Feet  Assistive Device: Gait belt;None (pushing own IV pole)  Interventions: Safety awareness training  Speed/Mami: Accelerated;Fluctuations  Step Length: Left shortened;Right shortened  Gait Abnormalities: Path deviations;Decreased step clearance     PT Exercises  Exercise Treatment: Seated B LE exercises x20 in all available planes     Safety Devices  Type of Devices: All fall risk precautions in place;Call light within reach;Nurse notified;Chair alarm in place;Left in chair;Gait belt       Goals  Short Term Goals  Time Frame for Short Term Goals: 20 days  Short Term Goal 1: Pt will perform sit to and from stand with good initial standing balance at stand by assist at most in order to improve safety with transfers  Short Term Goal 2: Pt will ambulate 80 feet with least restrictive assistive device at stand by assist at most in order to facilitate

## 2024-03-31 LAB
MICROORGANISM SPEC CULT: ABNORMAL
SPECIMEN DESCRIPTION: ABNORMAL

## 2024-04-01 ENCOUNTER — TELEPHONE (OUTPATIENT)
Dept: PRIMARY CARE CLINIC | Age: 74
End: 2024-04-01

## 2024-04-01 ENCOUNTER — HOSPITAL ENCOUNTER (OUTPATIENT)
Age: 74
Discharge: HOME | End: 2024-04-01
Payer: COMMERCIAL

## 2024-04-01 DIAGNOSIS — N17.9 ACUTE KIDNEY INJURY SUPERIMPOSED ON CKD (HCC): ICD-10-CM

## 2024-04-01 DIAGNOSIS — I10 ESSENTIAL HYPERTENSION: ICD-10-CM

## 2024-04-01 DIAGNOSIS — N39.0 URINARY TRACT INFECTION WITHOUT HEMATURIA, SITE UNSPECIFIED: ICD-10-CM

## 2024-04-01 DIAGNOSIS — D64.9 NORMOCYTIC ANEMIA: Primary | ICD-10-CM

## 2024-04-01 DIAGNOSIS — E83.42 HYPOMAGNESEMIA: ICD-10-CM

## 2024-04-01 DIAGNOSIS — N18.9 ACUTE KIDNEY INJURY SUPERIMPOSED ON CKD (HCC): ICD-10-CM

## 2024-04-01 LAB
ALBUMIN SERPL-MCNC: 3.2 G/DL (ref 3.5–5.2)
ALBUMIN/GLOB SERPL: 1 {RATIO} (ref 1–2.5)
ALP SERPL-CCNC: 114 U/L (ref 35–104)
ALT SERPL-CCNC: 8 U/L (ref 5–33)
ANION GAP SERPL CALCULATED.3IONS-SCNC: 8 MMOL/L (ref 9–17)
AST SERPL-CCNC: 16 U/L
BACTERIA URNS QL MICRO: ABNORMAL
BASOPHILS # BLD: 0.04 K/UL (ref 0–0.2)
BASOPHILS NFR BLD: 1 % (ref 0–2)
BILIRUB SERPL-MCNC: 0.2 MG/DL (ref 0.3–1.2)
BILIRUB UR QL STRIP: NEGATIVE
BUN SERPL-MCNC: 12 MG/DL (ref 8–23)
BUN/CREAT SERPL: 13 (ref 9–20)
CALCIUM SERPL-MCNC: 9.1 MG/DL (ref 8.6–10.4)
CHLORIDE SERPL-SCNC: 105 MMOL/L (ref 98–107)
CLARITY UR: CLEAR
CO2 SERPL-SCNC: 26 MMOL/L (ref 20–31)
COLOR UR: YELLOW
CREAT SERPL-MCNC: 0.9 MG/DL (ref 0.5–0.9)
EOSINOPHIL # BLD: 0.15 K/UL (ref 0–0.44)
EOSINOPHILS RELATIVE PERCENT: 3 % (ref 1–4)
EPI CELLS #/AREA URNS HPF: ABNORMAL /HPF (ref 0–25)
ERYTHROCYTE [DISTWIDTH] IN BLOOD BY AUTOMATED COUNT: 12.4 % (ref 11.8–14.4)
GFR SERPL CREATININE-BSD FRML MDRD: 67 ML/MIN/1.73M2
GLUCOSE SERPL-MCNC: 94 MG/DL (ref 70–99)
GLUCOSE UR STRIP-MCNC: NEGATIVE MG/DL
HCT VFR BLD AUTO: 30.4 % (ref 36.3–47.1)
HGB BLD-MCNC: 9.8 G/DL (ref 11.9–15.1)
HGB UR QL STRIP.AUTO: NEGATIVE
IMM GRANULOCYTES # BLD AUTO: <0.03 K/UL (ref 0–0.3)
IMM GRANULOCYTES NFR BLD: 0 %
KETONES UR STRIP-MCNC: NEGATIVE MG/DL
LEUKOCYTE ESTERASE UR QL STRIP: NEGATIVE
LYMPHOCYTES NFR BLD: 2.07 K/UL (ref 1.1–3.7)
LYMPHOCYTES RELATIVE PERCENT: 39 % (ref 24–43)
MAGNESIUM SERPL-MCNC: 1.7 MG/DL (ref 1.6–2.6)
MCH RBC QN AUTO: 32.9 PG (ref 25.2–33.5)
MCHC RBC AUTO-ENTMCNC: 32.2 G/DL (ref 28.4–34.8)
MCV RBC AUTO: 102 FL (ref 82.6–102.9)
MONOCYTES NFR BLD: 0.54 K/UL (ref 0.1–1.2)
MONOCYTES NFR BLD: 10 % (ref 3–12)
NEUTROPHILS NFR BLD: 47 % (ref 36–65)
NEUTS SEG NFR BLD: 2.5 K/UL (ref 1.5–8.1)
NITRITE UR QL STRIP: NEGATIVE
NRBC BLD-RTO: 0 PER 100 WBC
PATH REV BLD -IMP: NORMAL
PH UR STRIP: 7.5 [PH] (ref 5–9)
PLATELET # BLD AUTO: 323 K/UL (ref 138–453)
PMV BLD AUTO: 9.1 FL (ref 8.1–13.5)
POTASSIUM SERPL-SCNC: 5.2 MMOL/L (ref 3.7–5.3)
PROT SERPL-MCNC: 6.4 G/DL (ref 6.4–8.3)
PROT UR STRIP-MCNC: NEGATIVE MG/DL
RBC # BLD AUTO: 2.98 M/UL (ref 3.95–5.11)
RBC #/AREA URNS HPF: ABNORMAL /HPF (ref 0–2)
SODIUM SERPL-SCNC: 139 MMOL/L (ref 135–144)
SP GR UR STRIP: 1.01 (ref 1.01–1.02)
SURGICAL PATHOLOGY REPORT: NORMAL
UROBILINOGEN UR STRIP-ACNC: NORMAL EU/DL (ref 0–1)
WBC #/AREA URNS HPF: ABNORMAL /HPF (ref 0–5)
WBC OTHER # BLD: 5.3 K/UL (ref 3.5–11.3)

## 2024-04-01 PROCEDURE — 85025 COMPLETE CBC W/AUTO DIFF WBC: CPT

## 2024-04-01 PROCEDURE — 83735 ASSAY OF MAGNESIUM: CPT

## 2024-04-01 PROCEDURE — 36415 COLL VENOUS BLD VENIPUNCTURE: CPT

## 2024-04-01 PROCEDURE — 80053 COMPREHEN METABOLIC PANEL: CPT

## 2024-04-01 PROCEDURE — 81001 URINALYSIS AUTO W/SCOPE: CPT

## 2024-04-01 NOTE — TELEPHONE ENCOUNTER
[facility] Transitions Initial Follow Up Call    Outreach made within 2 business days of discharge: Yes    Patient: Haily Kowalski Patient : 1950   MRN: [id number]  Reason for Admission: There are no discharge diagnoses documented for the most recent discharge.  Discharge Date: 3/30/24       Spoke with: Haily     Discharge department/facility: [facility]     TCM Interactive Patient Contact:  Was patient able to fill all prescriptions: Yes  Was patient instructed to bring all medications to the follow-up visit: Yes  Is patient taking all medications as directed in the discharge summary? Yes  Does patient understand their discharge instructions: Yes  Does patient have questions or concerns that need addressed prior to 7-14 day follow up office visit: no    Scheduled appointment with PCP within 7-14 days    Follow Up  Future Appointments   Date Time Provider Department Center   2024  8:20 AM Jesus Pink MD [facility]   2024 11:00 AM Eusebio Bullock APRN - CNP [facility]       Alma Avila MA

## 2024-04-02 ENCOUNTER — TELEPHONE (OUTPATIENT)
Dept: PRIMARY CARE CLINIC | Age: 74
End: 2024-04-02

## 2024-04-02 NOTE — TELEPHONE ENCOUNTER
----- Message from TARA Jamison CNP sent at 4/1/2024 10:18 PM EDT -----  I am going to have her see hematology for evaluation of anemia. Referral placed. Thank you.

## 2024-04-05 ENCOUNTER — OFFICE VISIT (OUTPATIENT)
Dept: CARDIOLOGY | Age: 74
End: 2024-04-05
Payer: COMMERCIAL

## 2024-04-05 VITALS
SYSTOLIC BLOOD PRESSURE: 134 MMHG | HEIGHT: 64 IN | BODY MASS INDEX: 24.86 KG/M2 | RESPIRATION RATE: 16 BRPM | OXYGEN SATURATION: 98 % | DIASTOLIC BLOOD PRESSURE: 69 MMHG | HEART RATE: 73 BPM | WEIGHT: 145.6 LBS

## 2024-04-05 DIAGNOSIS — E78.2 MIXED HYPERLIPIDEMIA: ICD-10-CM

## 2024-04-05 DIAGNOSIS — I25.10 MILD CAD: Primary | ICD-10-CM

## 2024-04-05 DIAGNOSIS — I10 ESSENTIAL HYPERTENSION: ICD-10-CM

## 2024-04-05 PROCEDURE — 3075F SYST BP GE 130 - 139MM HG: CPT | Performed by: PHYSICIAN ASSISTANT

## 2024-04-05 PROCEDURE — 3078F DIAST BP <80 MM HG: CPT | Performed by: PHYSICIAN ASSISTANT

## 2024-04-05 PROCEDURE — 99213 OFFICE O/P EST LOW 20 MIN: CPT | Performed by: PHYSICIAN ASSISTANT

## 2024-04-05 PROCEDURE — 1123F ACP DISCUSS/DSCN MKR DOCD: CPT | Performed by: PHYSICIAN ASSISTANT

## 2024-04-05 NOTE — PROGRESS NOTES
conditions are: low-intermediate.  25 minutes    The documentation recorded by the scribe, accurately and completely reflects the services I personally performed and the decisions made by me. Margarita Kumar PA-C April 5, 2024

## 2024-04-08 ENCOUNTER — OFFICE VISIT (OUTPATIENT)
Dept: ONCOLOGY | Age: 74
End: 2024-04-08
Payer: COMMERCIAL

## 2024-04-08 VITALS
HEIGHT: 64 IN | TEMPERATURE: 96.6 F | WEIGHT: 149 LBS | SYSTOLIC BLOOD PRESSURE: 151 MMHG | HEART RATE: 73 BPM | RESPIRATION RATE: 18 BRPM | BODY MASS INDEX: 25.44 KG/M2 | DIASTOLIC BLOOD PRESSURE: 74 MMHG

## 2024-04-08 DIAGNOSIS — D64.9 NORMOCYTIC ANEMIA: Primary | ICD-10-CM

## 2024-04-08 DIAGNOSIS — E88.09 HYPOALBUMINEMIA: ICD-10-CM

## 2024-04-08 DIAGNOSIS — D75.89 MACROCYTOSIS: ICD-10-CM

## 2024-04-08 PROCEDURE — 3078F DIAST BP <80 MM HG: CPT | Performed by: INTERNAL MEDICINE

## 2024-04-08 PROCEDURE — 99205 OFFICE O/P NEW HI 60 MIN: CPT | Performed by: INTERNAL MEDICINE

## 2024-04-08 PROCEDURE — 3077F SYST BP >= 140 MM HG: CPT | Performed by: INTERNAL MEDICINE

## 2024-04-08 NOTE — PROGRESS NOTES
Patient ID: Haily Kowalski, 1950, [id number], 74 y.o.  Referred by :  No ref. provider found   Reason for consultation: Normocytic anemia      HISTORY OF PRESENT ILLNESS:    Oncologic History:    Haily Kowalski is a very pleasant 74 y.o. female.  With history of diabetes chronic bronchitis referred for progressive normocytic anemia, albumin 3.2, hemoglobin 9.8 .0 and platelet counts 3 2 actually  Has borderline pulmonary hypertension  Colonoscopy more than 5 years    Past Medical History:   Diagnosis Date    Arthritis     Asthma     Chronic bronchitis (HCC)     Diabetes mellitus (HCC)     GERD (gastroesophageal reflux disease)     H/O echocardiogram 3/9/16    EF >60%. LV wall thickness is mildly increased. Mild mitral and tricuspid regurgitation.  Borderline pulmonary hypertension estimated right ventricular sysolic pressure of 30mmHg. Mild (grade l) diastolic dysfunction.    History of PFTs 3/10/16     Suboptimal effort. Restrictive in nature. clionical correlations is advised.    History of stress test 16    Abnoraml. Moderate perfusion defect of mild to moderate intensity in the anterolateral and anteroapical regions during stress imaging.  Intermediate risk for CAD.    Hyperlipidemia     Hypertension        Past Surgical History:   Procedure Laterality Date    BREAST SURGERY      cyst removed    CARDIAC CATHETERIZATION      CARDIAC CATHETERIZATION Left 2019    Right Radial/[facility]/     SECTION      CHOLECYSTECTOMY      COLONOSCOPY  3/23/15    -diverticulosis,hemorrhoids    FOOT SURGERY      rt    HYSTERECTOMY (CERVIX STATUS UNKNOWN)         Allergies   Allergen Reactions    Iv Dye [Iodides] Nausea And Vomiting       Current Outpatient Medications   Medication Sig Dispense Refill    amLODIPine (NORVASC) 2.5 MG tablet Take 1 tablet by mouth daily 30 tablet 0    valsartan (DIOVAN) 80 MG tablet Take 1 tablet by mouth daily 30 tablet 0    Multiple

## 2024-04-09 ENCOUNTER — HOSPITAL ENCOUNTER (OUTPATIENT)
Age: 74
Discharge: HOME | End: 2024-04-09
Payer: COMMERCIAL

## 2024-04-09 DIAGNOSIS — D64.9 NORMOCYTIC ANEMIA: ICD-10-CM

## 2024-04-09 LAB
ALBUMIN SERPL-MCNC: 3.1 G/DL (ref 3.5–5.2)
ALBUMIN/GLOB SERPL: 1 {RATIO} (ref 1–2.5)
ALP SERPL-CCNC: 104 U/L (ref 35–104)
ALT SERPL-CCNC: 7 U/L (ref 5–33)
ANION GAP SERPL CALCULATED.3IONS-SCNC: 7 MMOL/L (ref 9–17)
AST SERPL-CCNC: 13 U/L
BASOPHILS # BLD: 0.03 K/UL (ref 0–0.2)
BASOPHILS NFR BLD: 1 % (ref 0–2)
BILIRUB SERPL-MCNC: <0.1 MG/DL (ref 0.3–1.2)
BUN SERPL-MCNC: 13 MG/DL (ref 8–23)
BUN/CREAT SERPL: 14 (ref 9–20)
CALCIUM SERPL-MCNC: 8.9 MG/DL (ref 8.6–10.4)
CHLORIDE SERPL-SCNC: 112 MMOL/L (ref 98–107)
CO2 SERPL-SCNC: 24 MMOL/L (ref 20–31)
CREAT SERPL-MCNC: 0.9 MG/DL (ref 0.5–0.9)
EOSINOPHIL # BLD: 0.19 K/UL (ref 0–0.44)
EOSINOPHILS RELATIVE PERCENT: 4 % (ref 1–4)
ERYTHROCYTE [DISTWIDTH] IN BLOOD BY AUTOMATED COUNT: 12.5 % (ref 11.8–14.4)
FERRITIN SERPL-MCNC: 48 NG/ML (ref 13–150)
FOLATE SERPL-MCNC: 11.8 NG/ML (ref 4.8–24.2)
FREE KAPPA/LAMBDA RATIO: 1.46 (ref 0.22–1.74)
GFR SERPL CREATININE-BSD FRML MDRD: 67 ML/MIN/1.73M2
GLUCOSE SERPL-MCNC: 120 MG/DL (ref 70–99)
HAPTOGLOB SERPL-MCNC: 172 MG/DL (ref 30–200)
HCT VFR BLD AUTO: 29.9 % (ref 36.3–47.1)
HGB BLD-MCNC: 9.4 G/DL (ref 11.9–15.1)
IGA SERPL-MCNC: 425 MG/DL (ref 70–400)
IGG SERPL-MCNC: 991 MG/DL (ref 700–1600)
IGM SERPL-MCNC: 75 MG/DL (ref 40–230)
IMM GRANULOCYTES # BLD AUTO: <0.03 K/UL (ref 0–0.3)
IMM GRANULOCYTES NFR BLD: 0 %
IMM RETICS NFR: 23.2 % (ref 2.7–18.3)
IRON SATN MFR SERPL: 13 % (ref 20–55)
IRON SERPL-MCNC: 28 UG/DL (ref 37–145)
KAPPA LC FREE SER-MCNC: 46.4 MG/L
LAMBDA LC FREE SERPL-MCNC: 31.7 MG/L (ref 4.2–27.7)
LDH SERPL-CCNC: 164 U/L (ref 135–214)
LYMPHOCYTES NFR BLD: 1.86 K/UL (ref 1.1–3.7)
LYMPHOCYTES RELATIVE PERCENT: 36 % (ref 24–43)
MCH RBC QN AUTO: 32.6 PG (ref 25.2–33.5)
MCHC RBC AUTO-ENTMCNC: 31.4 G/DL (ref 28.4–34.8)
MCV RBC AUTO: 103.8 FL (ref 82.6–102.9)
MONOCYTES NFR BLD: 0.41 K/UL (ref 0.1–1.2)
MONOCYTES NFR BLD: 8 % (ref 3–12)
NEUTROPHILS NFR BLD: 51 % (ref 36–65)
NEUTS SEG NFR BLD: 2.72 K/UL (ref 1.5–8.1)
NRBC BLD-RTO: 0 PER 100 WBC
PLATELET # BLD AUTO: 383 K/UL (ref 138–453)
PMV BLD AUTO: 8.4 FL (ref 8.1–13.5)
POTASSIUM SERPL-SCNC: 4.5 MMOL/L (ref 3.7–5.3)
PROT SERPL-MCNC: 6.2 G/DL (ref 6.4–8.3)
RBC # BLD AUTO: 2.88 M/UL (ref 3.95–5.11)
RETIC HEMOGLOBIN: 33.2 PG (ref 28.2–35.7)
RETICS # AUTO: 0.06 M/UL (ref 0.03–0.08)
RETICS/RBC NFR AUTO: 2.1 % (ref 0.5–1.9)
SODIUM SERPL-SCNC: 143 MMOL/L (ref 135–144)
TIBC SERPL-MCNC: 223 UG/DL (ref 250–450)
UNSATURATED IRON BINDING CAPACITY: 195 UG/DL (ref 112–347)
VIT B12 SERPL-MCNC: 309 PG/ML (ref 232–1245)
WBC OTHER # BLD: 5.2 K/UL (ref 3.5–11.3)

## 2024-04-09 PROCEDURE — 84155 ASSAY OF PROTEIN SERUM: CPT

## 2024-04-09 PROCEDURE — 84166 PROTEIN E-PHORESIS/URINE/CSF: CPT

## 2024-04-09 PROCEDURE — 82784 ASSAY IGA/IGD/IGG/IGM EACH: CPT

## 2024-04-09 PROCEDURE — 82746 ASSAY OF FOLIC ACID SERUM: CPT

## 2024-04-09 PROCEDURE — 84156 ASSAY OF PROTEIN URINE: CPT

## 2024-04-09 PROCEDURE — 36415 COLL VENOUS BLD VENIPUNCTURE: CPT

## 2024-04-09 PROCEDURE — 85045 AUTOMATED RETICULOCYTE COUNT: CPT

## 2024-04-09 PROCEDURE — 83521 IG LIGHT CHAINS FREE EACH: CPT

## 2024-04-09 PROCEDURE — 83540 ASSAY OF IRON: CPT

## 2024-04-09 PROCEDURE — 82728 ASSAY OF FERRITIN: CPT

## 2024-04-09 PROCEDURE — 85025 COMPLETE CBC W/AUTO DIFF WBC: CPT

## 2024-04-09 PROCEDURE — 83010 ASSAY OF HAPTOGLOBIN QUANT: CPT

## 2024-04-09 PROCEDURE — 80053 COMPREHEN METABOLIC PANEL: CPT

## 2024-04-09 PROCEDURE — 83615 LACTATE (LD) (LDH) ENZYME: CPT

## 2024-04-09 PROCEDURE — 84165 PROTEIN E-PHORESIS SERUM: CPT

## 2024-04-09 PROCEDURE — 83550 IRON BINDING TEST: CPT

## 2024-04-09 PROCEDURE — 82607 VITAMIN B-12: CPT

## 2024-04-10 LAB
PATH REV BLD -IMP: NORMAL
SURGICAL PATHOLOGY REPORT: NORMAL

## 2024-04-11 ENCOUNTER — OFFICE VISIT (OUTPATIENT)
Dept: PRIMARY CARE CLINIC | Age: 74
End: 2024-04-11
Payer: COMMERCIAL

## 2024-04-11 VITALS
TEMPERATURE: 97.2 F | DIASTOLIC BLOOD PRESSURE: 68 MMHG | OXYGEN SATURATION: 98 % | SYSTOLIC BLOOD PRESSURE: 138 MMHG | HEIGHT: 64 IN | HEART RATE: 72 BPM | WEIGHT: 142.9 LBS | RESPIRATION RATE: 18 BRPM | BODY MASS INDEX: 24.4 KG/M2

## 2024-04-11 DIAGNOSIS — I10 ESSENTIAL HYPERTENSION: ICD-10-CM

## 2024-04-11 DIAGNOSIS — Z91.81 AT HIGH RISK FOR FALLS: ICD-10-CM

## 2024-04-11 LAB
ALBUMIN PERCENT: 56 % (ref 45–65)
ALBUMIN SERPL-MCNC: 3.2 G/DL (ref 3.2–5.2)
ALPHA 2 PERCENT: 13 % (ref 6–13)
ALPHA1 GLOB SERPL ELPH-MCNC: 0.2 G/DL (ref 0.1–0.4)
ALPHA1 GLOB SERPL ELPH-MCNC: 3 % (ref 3–6)
ALPHA2 GLOB SERPL ELPH-MCNC: 0.8 G/DL (ref 0.5–0.9)
B-GLOBULIN SERPL ELPH-MCNC: 0.8 G/DL (ref 0.7–1.4)
B-GLOBULIN SERPL ELPH-MCNC: 14 % (ref 11–19)
GAMMA GLOB SERPL ELPH-MCNC: 0.9 G/DL (ref 0.5–1.5)
GAMMA GLOBULIN %: 15 % (ref 9–20)
P E INTERPRETATION, U: NORMAL
PATHOLOGIST: ABNORMAL
PATHOLOGIST: NORMAL
PROT PATTERN SERPL ELPH-IMP: ABNORMAL
PROT SERPL-MCNC: 5.7 G/DL (ref 6.6–8.7)
SPECIMEN TYPE: NORMAL
TOTAL PROT. SUM,%: 101 % (ref 98–102)
TOTAL PROT. SUM: 5.9 G/DL (ref 6.3–8.2)
URINE TOTAL PROTEIN: 16 MG/DL

## 2024-04-11 PROCEDURE — 3078F DIAST BP <80 MM HG: CPT | Performed by: NURSE PRACTITIONER

## 2024-04-11 PROCEDURE — 99214 OFFICE O/P EST MOD 30 MIN: CPT | Performed by: NURSE PRACTITIONER

## 2024-04-11 PROCEDURE — 1123F ACP DISCUSS/DSCN MKR DOCD: CPT | Performed by: NURSE PRACTITIONER

## 2024-04-11 PROCEDURE — 3075F SYST BP GE 130 - 139MM HG: CPT | Performed by: NURSE PRACTITIONER

## 2024-04-11 RX ORDER — AMLODIPINE BESYLATE 2.5 MG/1
2.5 TABLET ORAL DAILY
Qty: 90 TABLET | Refills: 3 | Status: SHIPPED | OUTPATIENT
Start: 2024-04-11

## 2024-04-11 RX ORDER — CEPHALEXIN 500 MG/1
500 CAPSULE ORAL 4 TIMES DAILY
COMMUNITY
End: 2024-04-11 | Stop reason: ALTCHOICE

## 2024-04-11 RX ORDER — VALSARTAN 80 MG/1
80 TABLET ORAL DAILY
Qty: 90 TABLET | Refills: 3 | Status: SHIPPED | OUTPATIENT
Start: 2024-04-11

## 2024-04-11 ASSESSMENT — ENCOUNTER SYMPTOMS
SHORTNESS OF BREATH: 0
ORTHOPNEA: 0
BLURRED VISION: 0

## 2024-04-11 NOTE — PATIENT INSTRUCTIONS
SURVEY:     You may be receiving a survey from [facility] [employer] regarding your visit today.     Please complete the survey to enable us to provide the highest quality of care to you and your family.     If you cannot score us a very good on any question, please call the office to discuss how we could have made your experience a very good one.     Thank you,    Eusebio Bullock, APRN-CNP  Teresa Henderson, APRN-CNP  Niki, LPN  Jailyn, CMA  Patel, CMA  Alma, CMA  [place], PCA  Norma, CMA  Vilma, PM

## 2024-04-11 NOTE — PROGRESS NOTES
Name: Haily Kowalski  : 1950         Chief Complaint:     Chief Complaint   Patient presents with    Hypertension     Patient is here for hospital f/u. Patient was seen 3/28. Patient was seen because bp was too low. Patient states she has got it under control.        History of Present Illness:      Haily Kowalski is a 74 y.o.  female who presents with Hypertension (Patient is here for hospital f/u. Patient was seen 3/28. Patient was seen because bp was too low. Patient states she has got it under control. )      Haily is here today for a hospital follow-up visit.    Hospital Course:   The patient was admitted for the management of hypotension. The patient had recently been admitted and returned to the ED regarding concerns for low blood pressure readings at home. She states that she has been tolerating her PO intake without any n/v/d. No other/recent medications changes were noted by the patient. No fevers/chills and she denies any GI/ symptoms. All home blood pressure medications were held. Today on day of discharge pt feels better with no further complaints. Vitals and Labs are stable. She did have E.Coli in the Urinalysis and was started on Keflex. Case was discussed with her Cardiology team. All consultants involved during this admission are agreeable to d/c with close follow-up within 1-2 days. The patient is adamant on being discharged home today such that she can attend [facility] tomorrow. She states that she can monitor her BP closely at home and plans to come back if needed. Medications will be adjusted after consulting with Cardiology. Pending labs for anemia will be followed-up by the patient's PCP. She will also have repeat labs, including Mg in about 3 days' time. Repeat UA in 1 week. Patient will also start a MV and vitamin D supplementation. If there are any worsening or concerning signs or symptoms, patient will report to the ED and/or contact EMS-[id number] for immediate evaluation. Teach

## 2024-04-13 NOTE — ED PROVIDER NOTES
[address] [facility] ED  EMERGENCY DEPARTMENT ENCOUNTER      Pt Name: Aniya Tawnya  MRN: [id number]  [address] [facility] [place] 1950  Date of evaluation: 10/17/2023  Provider: Rayray Juárez       Chief Complaint   Patient presents with    Dizziness     Patient presents to the emergency department with complaint of dizziness and weakness that began yesterday        Took over care of the patient at 1900 pending blood work and test results. DIAGNOSTIC RESULTS     EKG: All EKG's are interpreted by the Emergency Department Physician who either signs or Co-signs this chart in the absence of a cardiologist.        RADIOLOGY:   Non-plain film images such as CT, Ultrasound and MRI are read by the radiologist. Plain radiographic images are visualized and preliminarily interpreted by the emergency physician with the below findings:        Interpretation per the Radiologist below, if available at the time of this note:    XR CHEST PORTABLE   Final Result   No acute pulmonary disease identified. Borderline enlargement of the heart.                ED BEDSIDE ULTRASOUND:   Performed by ED Physician - none    LABS:  Labs Reviewed   CBC WITH AUTO DIFFERENTIAL - Abnormal; Notable for the following components:       Result Value    RBC 3.72 (*)     Hemoglobin 11.5 (*)     Hematocrit 35.1 (*)     Immature Granulocytes 1 (*)     All other components within normal limits   COMPREHENSIVE METABOLIC PANEL - Abnormal; Notable for the following components:    Glucose 109 (*)     Creatinine 1.9 (*)     Est, Glom Filt Rate 28 (*)     Bun/Cre Ratio 7 (*)     Alkaline Phosphatase 161 (*)     All other components within normal limits   TROPONIN - Abnormal; Notable for the following components:    Troponin, High Sensitivity 22 (*)     All other components within normal limits   TROPONIN - Abnormal; Notable for the following components:    Troponin, High Sensitivity 20 (*)     All other components within normal limits   URINALYSIS
specified.     DISCHARGE MEDICATIONS:  New Prescriptions    No medications on file       David Nelson DO  Emergency Medicine Resident    (Please note that portions of thisnote were completed with a voice recognition program.  Efforts were made to edit the dictations but occasionally words are mis-transcribed.)        Ck Paniagua DO  10/18/23 2292
Impaired gait

## 2024-04-25 ENCOUNTER — HOSPITAL ENCOUNTER (OUTPATIENT)
Dept: ULTRASOUND IMAGING | Age: 74
Discharge: HOME | End: 2024-04-27
Payer: COMMERCIAL

## 2024-04-25 DIAGNOSIS — E88.09 HYPOALBUMINEMIA: ICD-10-CM

## 2024-04-25 PROCEDURE — 76705 ECHO EXAM OF ABDOMEN: CPT

## 2024-05-13 ENCOUNTER — OFFICE VISIT (OUTPATIENT)
Dept: ONCOLOGY | Age: 74
End: 2024-05-13
Payer: COMMERCIAL

## 2024-05-13 VITALS
BODY MASS INDEX: 27.46 KG/M2 | DIASTOLIC BLOOD PRESSURE: 73 MMHG | TEMPERATURE: 98 F | WEIGHT: 160 LBS | HEART RATE: 81 BPM | SYSTOLIC BLOOD PRESSURE: 144 MMHG | RESPIRATION RATE: 18 BRPM

## 2024-05-13 DIAGNOSIS — D50.9 IRON DEFICIENCY ANEMIA, UNSPECIFIED IRON DEFICIENCY ANEMIA TYPE: Primary | ICD-10-CM

## 2024-05-13 DIAGNOSIS — N18.30 STAGE 3 CHRONIC KIDNEY DISEASE, UNSPECIFIED WHETHER STAGE 3A OR 3B CKD (HCC): ICD-10-CM

## 2024-05-13 PROCEDURE — 3078F DIAST BP <80 MM HG: CPT | Performed by: INTERNAL MEDICINE

## 2024-05-13 PROCEDURE — 99214 OFFICE O/P EST MOD 30 MIN: CPT | Performed by: INTERNAL MEDICINE

## 2024-05-13 PROCEDURE — 1123F ACP DISCUSS/DSCN MKR DOCD: CPT | Performed by: INTERNAL MEDICINE

## 2024-05-13 PROCEDURE — 3077F SYST BP >= 140 MM HG: CPT | Performed by: INTERNAL MEDICINE

## 2024-05-13 RX ORDER — FERROUS SULFATE 325(65) MG
325 TABLET ORAL
Qty: 90 TABLET | Refills: 1 | Status: SHIPPED | OUTPATIENT
Start: 2024-05-13

## 2024-05-13 NOTE — PROGRESS NOTES
Patient ID: Haily Kowalski, 1950, [id number], 74 y.o.  Referred by :  No ref. provider found   Reason for consultation: Normocytic anemia      HISTORY OF PRESENT ILLNESS:    Oncologic History:    Haily Kowalski is a very pleasant 74 y.o. female.  With history of diabetes chronic bronchitis referred for progressive normocytic anemia, albumin 3.2, hemoglobin 9.8 .0 and platelet counts 3 2 actually  Has borderline pulmonary hypertension  Colonoscopy more than 5 years    Interval history  Hemolysis panel negative  Anemia workup compatible of combination of iron deficient and anemia of chronic illness with borderline high reticulocyte and borderline low ferritin/TIBC and low iron saturation  Ultrasound of the liver no abnormality    Past Medical History:   Diagnosis Date    Arthritis     Asthma     Chronic bronchitis (HCC)     Diabetes mellitus (HCC)     GERD (gastroesophageal reflux disease)     H/O echocardiogram 3/9/16    EF >60%. LV wall thickness is mildly increased. Mild mitral and tricuspid regurgitation.  Borderline pulmonary hypertension estimated right ventricular sysolic pressure of 30mmHg. Mild (grade l) diastolic dysfunction.    History of PFTs 3/10/16     Suboptimal effort. Restrictive in nature. clionical correlations is advised.    History of stress test 16    Abnoraml. Moderate perfusion defect of mild to moderate intensity in the anterolateral and anteroapical regions during stress imaging.  Intermediate risk for CAD.    Hyperlipidemia     Hypertension        Past Surgical History:   Procedure Laterality Date    BREAST SURGERY      cyst removed    CARDIAC CATHETERIZATION      CARDIAC CATHETERIZATION Left 2019    Right Radial/[facility] Anupam/     SECTION      CHOLECYSTECTOMY      COLONOSCOPY  3/23/15    -diverticulosis,hemorrhoids    FOOT SURGERY      rt    HYSTERECTOMY (CERVIX STATUS UNKNOWN)         Allergies   Allergen Reactions    Iv Dye

## 2024-05-21 ENCOUNTER — HOSPITAL ENCOUNTER (EMERGENCY)
Age: 74
Discharge: HOME | End: 2024-05-22
Attending: STUDENT IN AN ORGANIZED HEALTH CARE EDUCATION/TRAINING PROGRAM
Payer: COMMERCIAL

## 2024-05-21 ENCOUNTER — NURSE ONLY (OUTPATIENT)
Dept: PRIMARY CARE CLINIC | Age: 74
End: 2024-05-21

## 2024-05-21 VITALS
BODY MASS INDEX: 27.15 KG/M2 | RESPIRATION RATE: 18 BRPM | HEART RATE: 76 BPM | OXYGEN SATURATION: 96 % | TEMPERATURE: 98.2 F | WEIGHT: 158.2 LBS | DIASTOLIC BLOOD PRESSURE: 78 MMHG | SYSTOLIC BLOOD PRESSURE: 162 MMHG

## 2024-05-21 VITALS
WEIGHT: 150 LBS | DIASTOLIC BLOOD PRESSURE: 91 MMHG | SYSTOLIC BLOOD PRESSURE: 175 MMHG | TEMPERATURE: 97.9 F | BODY MASS INDEX: 25.61 KG/M2 | HEART RATE: 76 BPM | RESPIRATION RATE: 18 BRPM | OXYGEN SATURATION: 97 % | HEIGHT: 64 IN

## 2024-05-21 DIAGNOSIS — I10 ESSENTIAL HYPERTENSION: ICD-10-CM

## 2024-05-21 DIAGNOSIS — I10 HYPERTENSION, UNSPECIFIED TYPE: Primary | ICD-10-CM

## 2024-05-21 PROCEDURE — 99282 EMERGENCY DEPT VISIT SF MDM: CPT

## 2024-05-21 RX ORDER — AMLODIPINE BESYLATE 5 MG/1
5 TABLET ORAL DAILY
Qty: 90 TABLET | Refills: 1 | Status: SHIPPED | OUTPATIENT
Start: 2024-05-21

## 2024-05-21 ASSESSMENT — LIFESTYLE VARIABLES
HOW OFTEN DO YOU HAVE A DRINK CONTAINING ALCOHOL: NEVER
HOW MANY STANDARD DRINKS CONTAINING ALCOHOL DO YOU HAVE ON A TYPICAL DAY: PATIENT DOES NOT DRINK

## 2024-05-21 ASSESSMENT — PAIN - FUNCTIONAL ASSESSMENT: PAIN_FUNCTIONAL_ASSESSMENT: NONE - DENIES PAIN

## 2024-05-22 ENCOUNTER — TELEPHONE (OUTPATIENT)
Dept: PRIMARY CARE CLINIC | Age: 74
End: 2024-05-22

## 2024-05-22 ASSESSMENT — ENCOUNTER SYMPTOMS
BACK PAIN: 0
ABDOMINAL PAIN: 0
SHORTNESS OF BREATH: 0
NAUSEA: 0
VOMITING: 0

## 2024-05-22 ASSESSMENT — PAIN - FUNCTIONAL ASSESSMENT: PAIN_FUNCTIONAL_ASSESSMENT: NONE - DENIES PAIN

## 2024-05-22 NOTE — ED PROVIDER NOTES
Patient states that all week her blood pressures have been elevated.  States that she was seen in her primary care physician's office today and her blood pressure medication was changed.  Upon chart review it does appear the patient was seen at her primary care physician's appointment.  It appears that patient's Norvasc was increased.  Patient states that she took her Norvasc this morning as prescribed but has not taken any other medications today.  Denies any symptoms in the emergency department.  Patient's blood pressure is 175/91 but patient is asymptomatic at this time.  Patient's physical examination is nonfocal.  No neurodeficits.  Patient states that intermittently she gets headaches but denies any headaches in the emergency department.  Had a lengthy discussion with patient regarding high blood pressure.  I encouraged patient to follow-up with her primary care physician and encouraged her to take her medications as prescribed.  Given strict return precautions.  Patient discharged home.  Patient understands and agrees the plan      FINAL IMPRESSION      1. Hypertension, unspecified type          DISPOSITION / PLAN     DISPOSITION Decision To Discharge 05/21/2024 11:55:32 PM      PATIENT REFERRED TO:  Eusebio Bullock, APRN - CNP  [address] [id number] [phone]    Schedule an appointment as soon as possible for a visit   As needed    [facility] ED  [address] [address]  [phone]  Go to   If symptoms worsen      DISCHARGE MEDICATIONS:  Discharge Medication List as of 5/21/2024 11:56 PM          Erasmo Umana DO  Emergency Medicine Resident    (Please note that portions of this note were completed with a voice recognition program.  Efforts were made to edit the dictations but occasionally words are mis-transcribed.)       Erasmo Umana DO  Resident  05/22/24 2017

## 2024-05-22 NOTE — TELEPHONE ENCOUNTER
[facility] Transitions Initial Follow Up Call    Outreach made within 2 business days of discharge: Yes    Patient: Haily Kowalski Patient : 1950   MRN: [id number]  Reason for Admission: There are no discharge diagnoses documented for the most recent discharge.  Discharge Date: 24       Spoke with: Haily    Discharge department/facility: [facility]     TCM Interactive Patient Contact:  Was patient able to fill all prescriptions: Yes  Was patient instructed to bring all medications to the follow-up visit: Yes  Is patient taking all medications as directed in the discharge summary? Yes  Does patient understand their discharge instructions: Yes  Does patient have questions or concerns that need addressed prior to 7-14 day follow up office visit: no    Scheduled appointment with PCP within 7-14 days    Follow Up  Future Appointments   Date Time Provider Department Center   2024 10:30 AM RORO, CINTHIAPX [facility]FF PRIMARY CARE [facility]   2024 12:00 PM Madelyn Alberto, APRN - CNP TI[facility] GI [facility]   2024 11:00 AM Eusebio Bullock APRN - CNP [facility]   2024  1:45 PM [facility]   2025  1:20 PM Juan Luis Lovett [facility]       Alma Avila MA

## 2024-05-22 NOTE — DISCHARGE INSTRUCTIONS
You were evaluated the emergency department for high blood pressure.  You are not having any symptoms in the emergency department.  You were seen by your primary care physician this morning and they made changes to your high blood pressure medication.  Please take your blood pressure medication as prescribed.  Please call your primary care physician tomorrow.  Please return to the emergency department for any worsening symptoms, questions or concerns

## 2024-05-26 ENCOUNTER — HOSPITAL ENCOUNTER (EMERGENCY)
Age: 74
Discharge: HOME | End: 2024-05-26
Attending: EMERGENCY MEDICINE
Payer: COMMERCIAL

## 2024-05-26 VITALS
DIASTOLIC BLOOD PRESSURE: 62 MMHG | HEART RATE: 85 BPM | RESPIRATION RATE: 16 BRPM | SYSTOLIC BLOOD PRESSURE: 168 MMHG | TEMPERATURE: 98.1 F | OXYGEN SATURATION: 98 %

## 2024-05-26 DIAGNOSIS — I10 ASYMPTOMATIC HYPERTENSION: Primary | ICD-10-CM

## 2024-05-26 PROCEDURE — 99284 EMERGENCY DEPT VISIT MOD MDM: CPT

## 2024-05-26 PROCEDURE — 93005 ELECTROCARDIOGRAM TRACING: CPT | Performed by: EMERGENCY MEDICINE

## 2024-05-26 ASSESSMENT — PAIN - FUNCTIONAL ASSESSMENT: PAIN_FUNCTIONAL_ASSESSMENT: NONE - DENIES PAIN

## 2024-05-26 NOTE — ED PROVIDER NOTES
[facility] ED  Emergency Department Encounter  Emergency Medicine      Pt Name:Haily Kowalski  MRN: [id number]  Birthdate 1950  Date of evaluation: 24  PCP:  Eusebio Bullock APRN - CNP  7:43 PM EDT      CHIEF COMPLAINT       Chief Complaint   Patient presents with    Hypertension     Pt has hx of hypertension. Takes medications at home, usually well controlled, but have been elevated this past week.   Pt denies chest pain or SOB.        HISTORY OF PRESENT ILLNESS  (Location/Symptom, Timing/Onset, Context/Setting, Quality, Duration, Modifying Factors, Severity.)      Haily Kowalski is a 74 y.o. female who presents with h/o htn, on amlodipine. Increased dosage to 5 mg on .  Patient with no chest pain, no shortness of breath, no vision changes, no headache, no numbness or tingling, or weakness, she is checking blood pressures 3x a today and has follow up with pcp ,  Also described dark colored stools for the past 1 week. No abdominal pain.   Chart shows h/o iron deficiency anemia on iron supplements since  when saw hem onc.      PAST MEDICAL / SURGICAL / SOCIAL / FAMILY HISTORY      has a past medical history of Arthritis, Asthma, Chronic bronchitis (HCC), Diabetes mellitus (HCC), GERD (gastroesophageal reflux disease), H/O echocardiogram, History of PFTs, History of stress test, Hyperlipidemia, and Hypertension.       has a past surgical history that includes Hysterectomy; Breast surgery; Foot surgery;  section; Cardiac catheterization (); Colonoscopy (3/23/15); Cholecystectomy; and Cardiac catheterization (Left, 2019).      Social History     Socioeconomic History    Marital status:      Spouse name: Not on file    Number of children: Not on file    Years of education: Not on file    Highest education level: Not on file   Occupational History    Not on file   Tobacco Use    Smoking status: Never    Smokeless tobacco: Never   Vaping Use    Vaping Use: Never used

## 2024-05-27 LAB
EKG ATRIAL RATE: 71 BPM
EKG P AXIS: 38 DEGREES
EKG P-R INTERVAL: 154 MS
EKG Q-T INTERVAL: 416 MS
EKG QRS DURATION: 114 MS
EKG QTC CALCULATION (BAZETT): 452 MS
EKG R AXIS: -6 DEGREES
EKG T AXIS: 15 DEGREES
EKG VENTRICULAR RATE: 71 BPM

## 2024-05-27 PROCEDURE — 93010 ELECTROCARDIOGRAM REPORT: CPT | Performed by: FAMILY MEDICINE

## 2024-05-27 NOTE — DISCHARGE INSTRUCTIONS
Please continue to keep a log of your blood pressures, return to the ER for chest pain shortness of breath numbness, tingling or weakness vision change.

## 2024-05-28 ENCOUNTER — TELEPHONE (OUTPATIENT)
Dept: PRIMARY CARE CLINIC | Age: 74
End: 2024-05-28

## 2024-05-28 ENCOUNTER — NURSE ONLY (OUTPATIENT)
Dept: PRIMARY CARE CLINIC | Age: 74
End: 2024-05-28

## 2024-05-28 VITALS
BODY MASS INDEX: 27.12 KG/M2 | OXYGEN SATURATION: 99 % | HEART RATE: 66 BPM | DIASTOLIC BLOOD PRESSURE: 84 MMHG | SYSTOLIC BLOOD PRESSURE: 152 MMHG | WEIGHT: 158 LBS | RESPIRATION RATE: 20 BRPM | TEMPERATURE: 98.5 F

## 2024-05-28 DIAGNOSIS — I10 ESSENTIAL HYPERTENSION: Primary | ICD-10-CM

## 2024-05-28 RX ORDER — AMLODIPINE BESYLATE 5 MG/1
5 TABLET ORAL DAILY
Qty: 90 TABLET | Refills: 1 | Status: SHIPPED | OUTPATIENT
Start: 2024-05-28

## 2024-05-28 ASSESSMENT — PATIENT HEALTH QUESTIONNAIRE - PHQ9
1. LITTLE INTEREST OR PLEASURE IN DOING THINGS: NOT AT ALL
SUM OF ALL RESPONSES TO PHQ QUESTIONS 1-9: 0
SUM OF ALL RESPONSES TO PHQ QUESTIONS 1-9: 0
SUM OF ALL RESPONSES TO PHQ9 QUESTIONS 1 & 2: 0
2. FEELING DOWN, DEPRESSED OR HOPELESS: NOT AT ALL
SUM OF ALL RESPONSES TO PHQ QUESTIONS 1-9: 0
SUM OF ALL RESPONSES TO PHQ QUESTIONS 1-9: 0

## 2024-05-28 NOTE — PATIENT INSTRUCTIONS
SURVEY:     You may be receiving a survey from [facility] [employer] regarding your visit today.     Please complete the survey to enable us to provide the highest quality of care to you and your family.     If you cannot score us a very good on any question, please call the office to discuss how we could have made your experience a very good one.     Thank you,    Eusebio Bullock, APRN-CNP  Teresa Henderson, APRN-CNP  Niki, LPN  Jailyn, CMA  Patel, CMA  Alma, CMA  [place], PCA  Norma, CMA  Vilma, PM

## 2024-05-28 NOTE — TELEPHONE ENCOUNTER
CR scanned. Patient  contacted the office in regards to her BP issues. Patient  is noticing that her blood pressure is still elevated. Patient went to ED on 5/26/24 and they did not make any changes to her medications.  is wondering if patient should be seen or what they should do?    Recent BP readings were: 170/80 and 190/84.    Please advise.

## 2024-05-28 NOTE — PROGRESS NOTES
Blood pressure and HR reported to Eusebio Bullock CNP,Informed PCP patient was taking only Norvasc 2.5 mg, 5 mg was ordered 2/21/2024. New script for Norvasc 5 mg sent to pharmacy. Follow up appointment made for 1 week.

## 2024-05-28 NOTE — TELEPHONE ENCOUNTER
[facility] Transitions Initial Follow Up Call    Outreach made within 2 business days of discharge: Yes    Patient: Haily Kowalski Patient : 1950   MRN: [id number]  Reason for Admission: There are no discharge diagnoses documented for the most recent discharge.  Discharge Date: 24       Spoke with: Haily    Discharge department/facility: [facility]     TCM Interactive Patient Contact:  Was patient able to fill all prescriptions: Yes  Was patient instructed to bring all medications to the follow-up visit: Yes  Is patient taking all medications as directed in the discharge summary? Yes  Does patient understand their discharge instructions: Yes  Does patient have questions or concerns that need addressed prior to 7-14 day follow up office visit: no    Scheduled appointment with PCP within 7-14 days    Follow Up  Future Appointments   Date Time Provider Department Center   2024 11:30 AM RORO, CINTHIAPX [facility]   2024 12:00 PM Madelyn Alberto, APRN - CNP TIFF GI TPP   2024 11:00 AM Eusebio Bullock APRN - CNP [facility]   2024  1:45 PM [facility]   2025  1:20 PM Juan Luis Lovett [facility]       Alma Avila MA

## 2024-06-04 ENCOUNTER — NURSE ONLY (OUTPATIENT)
Dept: PRIMARY CARE CLINIC | Age: 74
End: 2024-06-04

## 2024-06-04 VITALS
DIASTOLIC BLOOD PRESSURE: 82 MMHG | HEART RATE: 71 BPM | BODY MASS INDEX: 26.93 KG/M2 | WEIGHT: 156.9 LBS | OXYGEN SATURATION: 97 % | SYSTOLIC BLOOD PRESSURE: 162 MMHG | TEMPERATURE: 98 F

## 2024-06-04 DIAGNOSIS — I10 ESSENTIAL HYPERTENSION: ICD-10-CM

## 2024-06-04 RX ORDER — VALSARTAN 160 MG/1
160 TABLET ORAL DAILY
Qty: 90 TABLET | Refills: 3 | Status: SHIPPED | OUTPATIENT
Start: 2024-06-04

## 2024-06-04 NOTE — PROGRESS NOTES
Patient is here for bp check. Amlodopine was increased last visit.    Bp: 160/80  pulse: 71  right upper arm    Bp: 162/82 pulse: 71   left upper arm

## 2024-06-04 NOTE — PROGRESS NOTES
In crease valsartan to 160 mg daily.  And make sure that she is taking 5 mg of amlodipine.  Recheck blood pressure in 1 week.

## 2024-06-04 NOTE — PATIENT INSTRUCTIONS
SURVEY:     You may be receiving a survey from [facility] [employer] regarding your visit today.     Please complete the survey to enable us to provide the highest quality of care to you and your family.     If you cannot score us a very good on any question, please call the office to discuss how we could have made your experience a very good one.     Thank you,    Eusebio Bullock, APRN-CNP  Teresa Henderson, APRN-CNP  Niki, LPN  Jailyn, CMA  Patel, CMA  Alma, CMA  [place], PCA  Norma, CMA  Vilma, PM

## 2024-06-13 ENCOUNTER — HOSPITAL ENCOUNTER (OUTPATIENT)
Age: 74
Discharge: HOME | End: 2024-06-13
Payer: COMMERCIAL

## 2024-06-13 DIAGNOSIS — D50.9 IRON DEFICIENCY ANEMIA, UNSPECIFIED IRON DEFICIENCY ANEMIA TYPE: ICD-10-CM

## 2024-06-13 LAB
ALBUMIN SERPL-MCNC: 3.5 G/DL (ref 3.5–5.2)
ALBUMIN/GLOB SERPL: 1.2 {RATIO} (ref 1–2.5)
ALP SERPL-CCNC: 126 U/L (ref 35–104)
ALT SERPL-CCNC: 6 U/L (ref 5–33)
ANION GAP SERPL CALCULATED.3IONS-SCNC: 9 MMOL/L (ref 9–17)
AST SERPL-CCNC: 11 U/L
BASOPHILS # BLD: 0.03 K/UL (ref 0–0.2)
BASOPHILS NFR BLD: 1 % (ref 0–2)
BILIRUB SERPL-MCNC: 0.3 MG/DL (ref 0.3–1.2)
BUN SERPL-MCNC: 14 MG/DL (ref 8–23)
BUN/CREAT SERPL: 12 (ref 9–20)
CALCIUM SERPL-MCNC: 8.8 MG/DL (ref 8.6–10.4)
CHLORIDE SERPL-SCNC: 107 MMOL/L (ref 98–107)
CO2 SERPL-SCNC: 29 MMOL/L (ref 20–31)
CREAT SERPL-MCNC: 1.2 MG/DL (ref 0.5–0.9)
EOSINOPHIL # BLD: 0.08 K/UL (ref 0–0.44)
EOSINOPHILS RELATIVE PERCENT: 1 % (ref 1–4)
ERYTHROCYTE [DISTWIDTH] IN BLOOD BY AUTOMATED COUNT: 12.3 % (ref 11.8–14.4)
FOLATE SERPL-MCNC: 10.1 NG/ML (ref 4.8–24.2)
GFR, ESTIMATED: 48 ML/MIN/1.73M2
GLUCOSE SERPL-MCNC: 113 MG/DL (ref 70–99)
HCT VFR BLD AUTO: 33.2 % (ref 36.3–47.1)
HGB BLD-MCNC: 10.8 G/DL (ref 11.9–15.1)
IMM GRANULOCYTES # BLD AUTO: <0.03 K/UL (ref 0–0.3)
IMM GRANULOCYTES NFR BLD: 0 %
IMM RETICS NFR: 22.1 % (ref 2.7–18.3)
LDH SERPL-CCNC: 170 U/L (ref 135–214)
LYMPHOCYTES NFR BLD: 2.68 K/UL (ref 1.1–3.7)
LYMPHOCYTES RELATIVE PERCENT: 47 % (ref 24–43)
MCH RBC QN AUTO: 30.4 PG (ref 25.2–33.5)
MCHC RBC AUTO-ENTMCNC: 32.5 G/DL (ref 28.4–34.8)
MCV RBC AUTO: 93.5 FL (ref 82.6–102.9)
MONOCYTES NFR BLD: 0.48 K/UL (ref 0.1–1.2)
MONOCYTES NFR BLD: 8 % (ref 3–12)
NEUTROPHILS NFR BLD: 43 % (ref 36–65)
NRBC BLD-RTO: 0 PER 100 WBC
PLATELET # BLD AUTO: 323 K/UL (ref 138–453)
PMV BLD AUTO: 8.9 FL (ref 8.1–13.5)
POTASSIUM SERPL-SCNC: 3.2 MMOL/L (ref 3.7–5.3)
PROT SERPL-MCNC: 6.5 G/DL (ref 6.4–8.3)
RBC # BLD AUTO: 3.55 M/UL (ref 3.95–5.11)
RETIC HEMOGLOBIN: 33.1 PG (ref 28.2–35.7)
RETICS # AUTO: 0.06 M/UL (ref 0.03–0.08)
RETICS/RBC NFR AUTO: 1.6 % (ref 0.5–1.9)
SODIUM SERPL-SCNC: 145 MMOL/L (ref 135–144)
VIT B12 SERPL-MCNC: 227 PG/ML (ref 232–1245)
WBC OTHER # BLD: 5.8 K/UL (ref 3.5–11.3)

## 2024-06-13 PROCEDURE — 83550 IRON BINDING TEST: CPT

## 2024-06-13 PROCEDURE — 83615 LACTATE (LD) (LDH) ENZYME: CPT

## 2024-06-13 PROCEDURE — 82728 ASSAY OF FERRITIN: CPT

## 2024-06-13 PROCEDURE — 85045 AUTOMATED RETICULOCYTE COUNT: CPT

## 2024-06-13 PROCEDURE — 83540 ASSAY OF IRON: CPT

## 2024-06-13 PROCEDURE — 85025 COMPLETE CBC W/AUTO DIFF WBC: CPT

## 2024-06-13 PROCEDURE — 80053 COMPREHEN METABOLIC PANEL: CPT

## 2024-06-13 PROCEDURE — 36415 COLL VENOUS BLD VENIPUNCTURE: CPT

## 2024-06-13 PROCEDURE — 82607 VITAMIN B-12: CPT

## 2024-06-13 PROCEDURE — 82746 ASSAY OF FOLIC ACID SERUM: CPT

## 2024-06-14 LAB
FERRITIN SERPL-MCNC: 64 NG/ML (ref 13–150)
IRON SATN MFR SERPL: 60 % (ref 20–55)
IRON SERPL-MCNC: 153 UG/DL (ref 37–145)
TIBC SERPL-MCNC: 253 UG/DL (ref 250–450)
UNSATURATED IRON BINDING CAPACITY: 100 UG/DL (ref 112–347)

## 2024-06-18 ENCOUNTER — OFFICE VISIT (OUTPATIENT)
Dept: GASTROENTEROLOGY | Age: 74
End: 2024-06-18
Payer: COMMERCIAL

## 2024-06-18 ENCOUNTER — NURSE ONLY (OUTPATIENT)
Dept: PRIMARY CARE CLINIC | Age: 74
End: 2024-06-18

## 2024-06-18 VITALS
TEMPERATURE: 98 F | HEART RATE: 73 BPM | DIASTOLIC BLOOD PRESSURE: 84 MMHG | OXYGEN SATURATION: 96 % | WEIGHT: 160.3 LBS | SYSTOLIC BLOOD PRESSURE: 160 MMHG | BODY MASS INDEX: 27.52 KG/M2

## 2024-06-18 VITALS
BODY MASS INDEX: 27.64 KG/M2 | SYSTOLIC BLOOD PRESSURE: 138 MMHG | OXYGEN SATURATION: 97 % | DIASTOLIC BLOOD PRESSURE: 72 MMHG | HEART RATE: 63 BPM | WEIGHT: 161 LBS | RESPIRATION RATE: 18 BRPM

## 2024-06-18 DIAGNOSIS — R14.1 FLATULENCE, ERUCTATION AND GAS PAIN: ICD-10-CM

## 2024-06-18 DIAGNOSIS — R14.2 FLATULENCE, ERUCTATION AND GAS PAIN: ICD-10-CM

## 2024-06-18 DIAGNOSIS — R63.4 UNINTENTIONAL WEIGHT LOSS: ICD-10-CM

## 2024-06-18 DIAGNOSIS — D64.9 ANEMIA, UNSPECIFIED TYPE: Primary | ICD-10-CM

## 2024-06-18 DIAGNOSIS — R14.3 FLATULENCE, ERUCTATION AND GAS PAIN: ICD-10-CM

## 2024-06-18 DIAGNOSIS — K21.9 CHRONIC GERD: ICD-10-CM

## 2024-06-18 DIAGNOSIS — I10 ESSENTIAL HYPERTENSION: ICD-10-CM

## 2024-06-18 PROCEDURE — 1123F ACP DISCUSS/DSCN MKR DOCD: CPT | Performed by: NURSE PRACTITIONER

## 2024-06-18 PROCEDURE — 99203 OFFICE O/P NEW LOW 30 MIN: CPT | Performed by: NURSE PRACTITIONER

## 2024-06-18 PROCEDURE — 3078F DIAST BP <80 MM HG: CPT | Performed by: NURSE PRACTITIONER

## 2024-06-18 PROCEDURE — 3075F SYST BP GE 130 - 139MM HG: CPT | Performed by: NURSE PRACTITIONER

## 2024-06-18 RX ORDER — AMLODIPINE BESYLATE 10 MG/1
10 TABLET ORAL DAILY
Qty: 90 TABLET | Refills: 3 | Status: SHIPPED | OUTPATIENT
Start: 2024-06-18

## 2024-06-18 ASSESSMENT — ENCOUNTER SYMPTOMS
CONSTIPATION: 0
RESPIRATORY NEGATIVE: 1
ANAL BLEEDING: 0
TROUBLE SWALLOWING: 0
ALLERGIC/IMMUNOLOGIC NEGATIVE: 1
BLOOD IN STOOL: 0

## 2024-06-18 NOTE — PATIENT INSTRUCTIONS
SURVEY:     You may be receiving a survey from [facility] [employer] regarding your visit today.     Please complete the survey to enable us to provide the highest quality of care to you and your family.     If you cannot score us a very good on any question, please call the office to discuss how we could have made your experience a very good one.     Thank you,    Eusebio Bullock, APRN-CNP  Teresa Henderson, APRN-CNP  Niki, LPN  Jailyn, CMA  Patel, CMA  Alma, CMA  [place], PCA  Norma, CMA  Vilma, PM

## 2024-06-18 NOTE — PROGRESS NOTES
Increase amlodipine to 10 mg daily.  Continue all other medications.  Recheck blood pressure in 2 weeks.

## 2024-06-18 NOTE — PATIENT INSTRUCTIONS
SURVEY:    You may be receiving a survey from [facility] regarding your visit today.    You may get this in the mail, through your MyChart, or in your email.     Please complete the survey to enable us to provide the highest quality of care to you and your family.    If you cannot score us a very good (5 Stars) on any question, please call the office to discuss how we could of made your experience exceptional.    Thank you!    MD Madelyn Chow, APRN-SHELL De Anda, EVERARDO Santo LPN Brenda Boehler, LPN Jena Adams, MA    Phone: [phone]  Fax: [phone]    Office Hours:   M-TH 8-5, F: 8-12

## 2024-06-18 NOTE — PROGRESS NOTES
Overall Financial Resource Strain (CARDIA)     Difficulty of Paying Living Expenses: Patient declined   Food Insecurity: No Food Insecurity (3/28/2024)    Hunger Vital Sign     Worried About Running Out of Food in the Last Year: Never true     Ran Out of Food in the Last Year: Never true   Transportation Needs: No Transportation Needs (3/28/2024)    PRAPARE - Transportation     Lack of Transportation (Medical): No     Lack of Transportation (Non-Medical): No   Physical Activity: Inactive (4/6/2023)    Exercise Vital Sign     Days of Exercise per Week: 0 days     Minutes of Exercise per Session: 0 min   Stress: No Stress Concern Present (8/7/2020)    [employer] - Occupational Stress Questionnaire     Feeling of Stress : Not at all   Social Connections: Not on file   Intimate Partner Violence: Not on file   Depression: Not at risk (5/28/2024)    PHQ-2     PHQ-2 Score: 0   Housing Stability: Low Risk  (3/28/2024)    Housing Stability Vital Sign     Unable to Pay for Housing in the Last Year: No     Number of Places Lived in the Last Year: 1     Unstable Housing in the Last Year: No   Interpersonal Safety: Not At Risk (5/21/2024)    Interpersonal Safety Domain Source: IP Abuse Screening     Physical abuse: Denies     Verbal abuse: Denies     Emotional abuse: Denies     Financial abuse: Denies     Sexual abuse: Denies   Utilities: Not At Risk (3/28/2024)    [facility] Utilities     Threatened with loss of utilities: No       Review of Systems   Constitutional:  Positive for unexpected weight change. Negative for fever.   HENT: Negative.  Negative for trouble swallowing.    Respiratory: Negative.     Cardiovascular: Negative.    Gastrointestinal:  Negative for anal bleeding, blood in stool and constipation.        GERD, increased belching    Endocrine: Negative.    Musculoskeletal: Negative.    Skin: Negative.    Allergic/Immunologic: Negative.    Neurological: Negative.    Hematological: Negative.

## 2024-06-20 ENCOUNTER — HOSPITAL ENCOUNTER (OUTPATIENT)
Age: 74
Setting detail: SPECIMEN
Discharge: HOME | End: 2024-06-20
Payer: COMMERCIAL

## 2024-06-20 DIAGNOSIS — R14.1 FLATULENCE, ERUCTATION AND GAS PAIN: ICD-10-CM

## 2024-06-20 DIAGNOSIS — R14.3 FLATULENCE, ERUCTATION AND GAS PAIN: ICD-10-CM

## 2024-06-20 DIAGNOSIS — R14.2 FLATULENCE, ERUCTATION AND GAS PAIN: ICD-10-CM

## 2024-06-20 PROCEDURE — 87338 HPYLORI STOOL AG IA: CPT

## 2024-06-21 DIAGNOSIS — A04.8 BACTERIAL INFECTION DUE TO H. PYLORI: Primary | ICD-10-CM

## 2024-06-21 LAB
MICROORGANISM/AGENT SPEC: POSITIVE
SPECIMEN DESCRIPTION: ABNORMAL

## 2024-06-21 RX ORDER — TETRACYCLINE HYDROCHLORIDE 500 MG/1
500 CAPSULE ORAL 2 TIMES DAILY
Qty: 28 CAPSULE | Refills: 0 | Status: SHIPPED | OUTPATIENT
Start: 2024-06-21 | End: 2024-07-05

## 2024-06-21 RX ORDER — METRONIDAZOLE 500 MG/1
500 TABLET ORAL 4 TIMES DAILY
Qty: 56 TABLET | Refills: 0 | Status: SHIPPED | OUTPATIENT
Start: 2024-06-21 | End: 2024-07-05

## 2024-06-21 RX ORDER — OMEPRAZOLE 20 MG/1
20 CAPSULE, DELAYED RELEASE ORAL 2 TIMES DAILY
Qty: 28 CAPSULE | Refills: 0 | Status: SHIPPED | OUTPATIENT
Start: 2024-06-21 | End: 2024-07-05

## 2024-06-24 ENCOUNTER — TELEPHONE (OUTPATIENT)
Dept: SURGERY | Age: 74
End: 2024-06-24

## 2024-06-24 NOTE — TELEPHONE ENCOUNTER
made aware of patient's lab results and to  prescriptions. Patient advised that he already spoke with the pharmacy and will need to wait until Friday due to the cost. Patient also says that the Tetracycline needs a PA. Writer will look into this.

## 2024-06-24 NOTE — TELEPHONE ENCOUNTER
----- Message from TARA Botello - CNP sent at 6/21/2024  3:04 PM EDT -----  Please let Haily know her labs returned positive for H. pylori infection.  Helicobacter pylori (H. pylori) is a bacterial infection and is associated with peptic ulcer disease, chronic gastritis, gastric adenocarcinoma that can be treated with oral   medications.   Bismuth subsalicylate, metronidazole, omeprazole, tetracycline have been sent to the pharmacy last listed.  Treatment is for 14 days with retesting 8 weeks after she completes the regimen.  Orders have been placed for repeat testing o  n her EMR.  Avoid alcohol while taking the medication.  Tetracycline renally dosed.  Thanks, Madelyn

## 2024-07-02 ENCOUNTER — NURSE ONLY (OUTPATIENT)
Dept: PRIMARY CARE CLINIC | Age: 74
End: 2024-07-02

## 2024-07-02 VITALS
SYSTOLIC BLOOD PRESSURE: 128 MMHG | TEMPERATURE: 98.5 F | BODY MASS INDEX: 28 KG/M2 | WEIGHT: 163.1 LBS | HEART RATE: 77 BPM | DIASTOLIC BLOOD PRESSURE: 72 MMHG | RESPIRATION RATE: 18 BRPM | OXYGEN SATURATION: 98 %

## 2024-07-02 DIAGNOSIS — Z12.31 OTHER SCREENING MAMMOGRAM: Primary | ICD-10-CM

## 2024-07-02 ASSESSMENT — PATIENT HEALTH QUESTIONNAIRE - PHQ9
SUM OF ALL RESPONSES TO PHQ9 QUESTIONS 1 & 2: 0
1. LITTLE INTEREST OR PLEASURE IN DOING THINGS: NOT AT ALL
SUM OF ALL RESPONSES TO PHQ QUESTIONS 1-9: 0
2. FEELING DOWN, DEPRESSED OR HOPELESS: NOT AT ALL

## 2024-07-02 NOTE — PATIENT INSTRUCTIONS
SURVEY:     You may be receiving a survey from [facility] [employer] regarding your visit today.     Please complete the survey to enable us to provide the highest quality of care to you and your family.     If you cannot score us a very good on any question, please call the office to discuss how we could have made your experience a very good one.     Thank you,    Eusebio Bullock, APRN-CNP  Teresa Henderson, APRN-CNP  Niki, LPN  Jailyn, CMA  Patel, CMA  Alma, CMA  [place], PCA  Norma, CMA  Vilma, PM

## 2024-07-02 NOTE — PROGRESS NOTES
Eusebio daley CNP, informed of patient BP /HR. No changes made. Notify office of any increase in BP.  Informed patient and  of Low Sodium diet.  Verbalizes understanding.

## 2024-07-08 ENCOUNTER — HOSPITAL ENCOUNTER (OUTPATIENT)
Age: 74
Setting detail: OBSERVATION
Discharge: HOME | End: 2024-07-09
Attending: EMERGENCY MEDICINE | Admitting: STUDENT IN AN ORGANIZED HEALTH CARE EDUCATION/TRAINING PROGRAM
Payer: COMMERCIAL

## 2024-07-08 ENCOUNTER — APPOINTMENT (OUTPATIENT)
Dept: GENERAL RADIOLOGY | Age: 74
End: 2024-07-08
Payer: COMMERCIAL

## 2024-07-08 DIAGNOSIS — R94.31 PROLONGED Q-T INTERVAL ON ECG: ICD-10-CM

## 2024-07-08 DIAGNOSIS — E87.6 HYPOKALEMIA: ICD-10-CM

## 2024-07-08 DIAGNOSIS — E83.42 HYPOMAGNESEMIA: ICD-10-CM

## 2024-07-08 DIAGNOSIS — B02.9 HERPES ZOSTER WITHOUT COMPLICATION: Primary | ICD-10-CM

## 2024-07-08 DIAGNOSIS — R53.1 GENERALIZED WEAKNESS: ICD-10-CM

## 2024-07-08 LAB
ALBUMIN SERPL-MCNC: 3.6 G/DL (ref 3.5–5.2)
ALBUMIN/GLOB SERPL: 1.1 {RATIO} (ref 1–2.5)
ALP SERPL-CCNC: 163 U/L (ref 35–104)
ALT SERPL-CCNC: <5 U/L (ref 5–33)
ANION GAP SERPL CALCULATED.3IONS-SCNC: 9 MMOL/L (ref 9–17)
AST SERPL-CCNC: 14 U/L
BACTERIA URNS QL MICRO: ABNORMAL
BASOPHILS # BLD: 0.04 K/UL (ref 0–0.2)
BASOPHILS NFR BLD: 1 % (ref 0–2)
BILIRUB SERPL-MCNC: 0.4 MG/DL (ref 0.3–1.2)
BILIRUB UR QL STRIP: NEGATIVE
BUN SERPL-MCNC: 8 MG/DL (ref 8–23)
BUN/CREAT SERPL: 8 (ref 9–20)
CALCIUM SERPL-MCNC: 8.9 MG/DL (ref 8.6–10.4)
CHLORIDE SERPL-SCNC: 102 MMOL/L (ref 98–107)
CLARITY UR: CLEAR
CO2 SERPL-SCNC: 26 MMOL/L (ref 20–31)
COLOR UR: YELLOW
CREAT SERPL-MCNC: 1 MG/DL (ref 0.5–0.9)
EKG ATRIAL RATE: 82 BPM
EKG P AXIS: 47 DEGREES
EKG P-R INTERVAL: 160 MS
EKG Q-T INTERVAL: 414 MS
EKG QRS DURATION: 108 MS
EKG QTC CALCULATION (BAZETT): 483 MS
EKG R AXIS: -60 DEGREES
EKG T AXIS: 7 DEGREES
EKG VENTRICULAR RATE: 82 BPM
EOSINOPHIL # BLD: 0.15 K/UL (ref 0–0.44)
EOSINOPHILS RELATIVE PERCENT: 4 % (ref 1–4)
EPI CELLS #/AREA URNS HPF: ABNORMAL /HPF (ref 0–25)
ERYTHROCYTE [DISTWIDTH] IN BLOOD BY AUTOMATED COUNT: 12.5 % (ref 11.8–14.4)
GFR, ESTIMATED: 59 ML/MIN/1.73M2
GLUCOSE SERPL-MCNC: 110 MG/DL (ref 70–99)
GLUCOSE UR STRIP-MCNC: NEGATIVE MG/DL
HCT VFR BLD AUTO: 35.2 % (ref 36.3–47.1)
HGB BLD-MCNC: 12 G/DL (ref 11.9–15.1)
HGB UR QL STRIP.AUTO: NEGATIVE
IMM GRANULOCYTES # BLD AUTO: <0.03 K/UL (ref 0–0.3)
IMM GRANULOCYTES NFR BLD: 0 %
KETONES UR STRIP-MCNC: NEGATIVE MG/DL
LEUKOCYTE ESTERASE UR QL STRIP: ABNORMAL
LYMPHOCYTES NFR BLD: 1.32 K/UL (ref 1.1–3.7)
LYMPHOCYTES RELATIVE PERCENT: 31 % (ref 24–43)
MAGNESIUM SERPL-MCNC: 1.2 MG/DL (ref 1.6–2.6)
MAGNESIUM SERPL-MCNC: 1.7 MG/DL (ref 1.6–2.6)
MCH RBC QN AUTO: 30.4 PG (ref 25.2–33.5)
MCHC RBC AUTO-ENTMCNC: 34.1 G/DL (ref 28.4–34.8)
MCV RBC AUTO: 89.1 FL (ref 82.6–102.9)
MONOCYTES NFR BLD: 0.51 K/UL (ref 0.1–1.2)
MONOCYTES NFR BLD: 12 % (ref 3–12)
NEUTROPHILS NFR BLD: 52 % (ref 36–65)
NEUTS SEG NFR BLD: 2.24 K/UL (ref 1.5–8.1)
NITRITE UR QL STRIP: NEGATIVE
NRBC BLD-RTO: 0 PER 100 WBC
PH UR STRIP: 6 [PH] (ref 5–9)
PLATELET # BLD AUTO: 300 K/UL (ref 138–453)
PMV BLD AUTO: 9.4 FL (ref 8.1–13.5)
POTASSIUM SERPL-SCNC: 2.8 MMOL/L (ref 3.7–5.3)
POTASSIUM SERPL-SCNC: 3.1 MMOL/L (ref 3.7–5.3)
PROT SERPL-MCNC: 6.8 G/DL (ref 6.4–8.3)
PROT UR STRIP-MCNC: ABNORMAL MG/DL
RBC # BLD AUTO: 3.95 M/UL (ref 3.95–5.11)
RBC #/AREA URNS HPF: ABNORMAL /HPF (ref 0–2)
SARS-COV-2 RDRP RESP QL NAA+PROBE: NOT DETECTED
SODIUM SERPL-SCNC: 137 MMOL/L (ref 135–144)
SP GR UR STRIP: 1.01 (ref 1.01–1.02)
SPECIMEN DESCRIPTION: NORMAL
TROPONIN I SERPL HS-MCNC: 14 NG/L (ref 0–14)
TROPONIN I SERPL HS-MCNC: 16 NG/L (ref 0–14)
UROBILINOGEN UR STRIP-ACNC: NORMAL EU/DL (ref 0–1)
WBC #/AREA URNS HPF: ABNORMAL /HPF (ref 0–5)
WBC OTHER # BLD: 4.3 K/UL (ref 3.5–11.3)

## 2024-07-08 PROCEDURE — 93005 ELECTROCARDIOGRAM TRACING: CPT | Performed by: EMERGENCY MEDICINE

## 2024-07-08 PROCEDURE — 2580000003 HC RX 258: Performed by: NURSE PRACTITIONER

## 2024-07-08 PROCEDURE — G0378 HOSPITAL OBSERVATION PER HR: HCPCS

## 2024-07-08 PROCEDURE — 6360000002 HC RX W HCPCS: Performed by: EMERGENCY MEDICINE

## 2024-07-08 PROCEDURE — 6360000002 HC RX W HCPCS: Performed by: NURSE PRACTITIONER

## 2024-07-08 PROCEDURE — 94761 N-INVAS EAR/PLS OXIMETRY MLT: CPT

## 2024-07-08 PROCEDURE — 6370000000 HC RX 637 (ALT 250 FOR IP): Performed by: EMERGENCY MEDICINE

## 2024-07-08 PROCEDURE — 80053 COMPREHEN METABOLIC PANEL: CPT

## 2024-07-08 PROCEDURE — 36415 COLL VENOUS BLD VENIPUNCTURE: CPT

## 2024-07-08 PROCEDURE — 71045 X-RAY EXAM CHEST 1 VIEW: CPT

## 2024-07-08 PROCEDURE — 6370000000 HC RX 637 (ALT 250 FOR IP): Performed by: STUDENT IN AN ORGANIZED HEALTH CARE EDUCATION/TRAINING PROGRAM

## 2024-07-08 PROCEDURE — 84132 ASSAY OF SERUM POTASSIUM: CPT

## 2024-07-08 PROCEDURE — 84484 ASSAY OF TROPONIN QUANT: CPT

## 2024-07-08 PROCEDURE — 83735 ASSAY OF MAGNESIUM: CPT

## 2024-07-08 PROCEDURE — 81001 URINALYSIS AUTO W/SCOPE: CPT

## 2024-07-08 PROCEDURE — 96366 THER/PROPH/DIAG IV INF ADDON: CPT

## 2024-07-08 PROCEDURE — 87635 SARS-COV-2 COVID-19 AMP PRB: CPT

## 2024-07-08 PROCEDURE — 99285 EMERGENCY DEPT VISIT HI MDM: CPT

## 2024-07-08 PROCEDURE — 87086 URINE CULTURE/COLONY COUNT: CPT

## 2024-07-08 PROCEDURE — 85025 COMPLETE CBC W/AUTO DIFF WBC: CPT

## 2024-07-08 PROCEDURE — 93010 ELECTROCARDIOGRAM REPORT: CPT | Performed by: INTERNAL MEDICINE

## 2024-07-08 PROCEDURE — 6370000000 HC RX 637 (ALT 250 FOR IP): Performed by: NURSE PRACTITIONER

## 2024-07-08 PROCEDURE — 96372 THER/PROPH/DIAG INJ SC/IM: CPT

## 2024-07-08 PROCEDURE — 96365 THER/PROPH/DIAG IV INF INIT: CPT

## 2024-07-08 RX ORDER — POTASSIUM CHLORIDE 20 MEQ/1
40 TABLET, EXTENDED RELEASE ORAL ONCE
Status: COMPLETED | OUTPATIENT
Start: 2024-07-08 | End: 2024-07-08

## 2024-07-08 RX ORDER — SODIUM CHLORIDE 0.9 % (FLUSH) 0.9 %
10 SYRINGE (ML) INJECTION EVERY 12 HOURS SCHEDULED
Status: DISCONTINUED | OUTPATIENT
Start: 2024-07-08 | End: 2024-07-09 | Stop reason: HOSPADM

## 2024-07-08 RX ORDER — MAGNESIUM SULFATE IN WATER 40 MG/ML
2000 INJECTION, SOLUTION INTRAVENOUS PRN
Status: DISCONTINUED | OUTPATIENT
Start: 2024-07-08 | End: 2024-07-09 | Stop reason: HOSPADM

## 2024-07-08 RX ORDER — POTASSIUM CHLORIDE 7.45 MG/ML
10 INJECTION INTRAVENOUS PRN
Status: DISCONTINUED | OUTPATIENT
Start: 2024-07-08 | End: 2024-07-09 | Stop reason: HOSPADM

## 2024-07-08 RX ORDER — VALACYCLOVIR HYDROCHLORIDE 500 MG/1
1000 TABLET, FILM COATED ORAL 2 TIMES DAILY
Status: DISCONTINUED | OUTPATIENT
Start: 2024-07-08 | End: 2024-07-09 | Stop reason: HOSPADM

## 2024-07-08 RX ORDER — SODIUM CHLORIDE 9 MG/ML
INJECTION, SOLUTION INTRAVENOUS PRN
Status: DISCONTINUED | OUTPATIENT
Start: 2024-07-08 | End: 2024-07-09 | Stop reason: HOSPADM

## 2024-07-08 RX ORDER — POTASSIUM CHLORIDE 20 MEQ/1
40 TABLET, EXTENDED RELEASE ORAL PRN
Status: DISCONTINUED | OUTPATIENT
Start: 2024-07-08 | End: 2024-07-09 | Stop reason: HOSPADM

## 2024-07-08 RX ORDER — ACETAMINOPHEN 325 MG/1
650 TABLET ORAL EVERY 6 HOURS PRN
Status: DISCONTINUED | OUTPATIENT
Start: 2024-07-08 | End: 2024-07-09 | Stop reason: HOSPADM

## 2024-07-08 RX ORDER — VALACYCLOVIR HYDROCHLORIDE 500 MG/1
1000 TABLET, FILM COATED ORAL 2 TIMES DAILY
Status: DISCONTINUED | OUTPATIENT
Start: 2024-07-08 | End: 2024-07-08

## 2024-07-08 RX ORDER — VALACYCLOVIR HYDROCHLORIDE 1 G/1
1000 TABLET, FILM COATED ORAL 2 TIMES DAILY
Qty: 14 TABLET | Refills: 0 | Status: SHIPPED | OUTPATIENT
Start: 2024-07-08 | End: 2024-07-15

## 2024-07-08 RX ORDER — ATORVASTATIN CALCIUM 40 MG/1
40 TABLET, FILM COATED ORAL DAILY
Status: DISCONTINUED | OUTPATIENT
Start: 2024-07-08 | End: 2024-07-09 | Stop reason: HOSPADM

## 2024-07-08 RX ORDER — DORZOLAMIDE HYDROCHLORIDE AND TIMOLOL MALEATE 20; 5 MG/ML; MG/ML
1 SOLUTION/ DROPS OPHTHALMIC 2 TIMES DAILY
Status: DISCONTINUED | OUTPATIENT
Start: 2024-07-08 | End: 2024-07-08 | Stop reason: CLARIF

## 2024-07-08 RX ORDER — ASPIRIN 81 MG/1
81 TABLET ORAL DAILY
Status: DISCONTINUED | OUTPATIENT
Start: 2024-07-08 | End: 2024-07-09 | Stop reason: HOSPADM

## 2024-07-08 RX ORDER — CETIRIZINE HYDROCHLORIDE 10 MG/1
5 TABLET ORAL DAILY
Status: DISCONTINUED | OUTPATIENT
Start: 2024-07-08 | End: 2024-07-09 | Stop reason: HOSPADM

## 2024-07-08 RX ORDER — ACETAMINOPHEN 500 MG
1000 TABLET ORAL EVERY 6 HOURS PRN
Qty: 60 TABLET | Refills: 0 | Status: SHIPPED | OUTPATIENT
Start: 2024-07-08

## 2024-07-08 RX ORDER — SODIUM CHLORIDE 0.9 % (FLUSH) 0.9 %
10 SYRINGE (ML) INJECTION PRN
Status: DISCONTINUED | OUTPATIENT
Start: 2024-07-08 | End: 2024-07-09 | Stop reason: HOSPADM

## 2024-07-08 RX ORDER — VALACYCLOVIR HYDROCHLORIDE 500 MG/1
1000 TABLET, FILM COATED ORAL ONCE
Status: COMPLETED | OUTPATIENT
Start: 2024-07-08 | End: 2024-07-08

## 2024-07-08 RX ORDER — MAGNESIUM SULFATE IN WATER 40 MG/ML
2000 INJECTION, SOLUTION INTRAVENOUS ONCE
Status: COMPLETED | OUTPATIENT
Start: 2024-07-08 | End: 2024-07-08

## 2024-07-08 RX ORDER — VITAMIN B COMPLEX
1000 TABLET ORAL DAILY
Status: DISCONTINUED | OUTPATIENT
Start: 2024-07-08 | End: 2024-07-09 | Stop reason: HOSPADM

## 2024-07-08 RX ORDER — FERROUS SULFATE 325(65) MG
325 TABLET ORAL
Status: DISCONTINUED | OUTPATIENT
Start: 2024-07-09 | End: 2024-07-09 | Stop reason: HOSPADM

## 2024-07-08 RX ORDER — LATANOPROST 50 UG/ML
1 SOLUTION/ DROPS OPHTHALMIC NIGHTLY
Status: DISCONTINUED | OUTPATIENT
Start: 2024-07-08 | End: 2024-07-09 | Stop reason: HOSPADM

## 2024-07-08 RX ORDER — ONDANSETRON 2 MG/ML
4 INJECTION INTRAMUSCULAR; INTRAVENOUS EVERY 6 HOURS PRN
Status: DISCONTINUED | OUTPATIENT
Start: 2024-07-08 | End: 2024-07-09 | Stop reason: HOSPADM

## 2024-07-08 RX ORDER — POLYETHYLENE GLYCOL 3350 17 G/17G
17 POWDER, FOR SOLUTION ORAL DAILY PRN
Status: DISCONTINUED | OUTPATIENT
Start: 2024-07-08 | End: 2024-07-09 | Stop reason: HOSPADM

## 2024-07-08 RX ORDER — ACETAMINOPHEN 650 MG/1
650 SUPPOSITORY RECTAL EVERY 6 HOURS PRN
Status: DISCONTINUED | OUTPATIENT
Start: 2024-07-08 | End: 2024-07-09 | Stop reason: HOSPADM

## 2024-07-08 RX ORDER — TRAMADOL HYDROCHLORIDE 50 MG/1
25 TABLET ORAL EVERY 6 HOURS PRN
Status: DISCONTINUED | OUTPATIENT
Start: 2024-07-08 | End: 2024-07-09 | Stop reason: HOSPADM

## 2024-07-08 RX ORDER — VALSARTAN 80 MG/1
160 TABLET ORAL DAILY
Status: DISCONTINUED | OUTPATIENT
Start: 2024-07-09 | End: 2024-07-09 | Stop reason: HOSPADM

## 2024-07-08 RX ORDER — LANOLIN ALCOHOL/MO/W.PET/CERES
400 CREAM (GRAM) TOPICAL DAILY
Status: DISCONTINUED | OUTPATIENT
Start: 2024-07-09 | End: 2024-07-09 | Stop reason: HOSPADM

## 2024-07-08 RX ORDER — TIMOLOL MALEATE 5 MG/ML
1 SOLUTION/ DROPS OPHTHALMIC 2 TIMES DAILY
Status: DISCONTINUED | OUTPATIENT
Start: 2024-07-08 | End: 2024-07-09 | Stop reason: HOSPADM

## 2024-07-08 RX ORDER — ONDANSETRON 4 MG/1
4 TABLET, ORALLY DISINTEGRATING ORAL EVERY 8 HOURS PRN
Status: DISCONTINUED | OUTPATIENT
Start: 2024-07-08 | End: 2024-07-09 | Stop reason: HOSPADM

## 2024-07-08 RX ORDER — DORZOLAMIDE HCL 20 MG/ML
1 SOLUTION/ DROPS OPHTHALMIC 2 TIMES DAILY
Status: DISCONTINUED | OUTPATIENT
Start: 2024-07-08 | End: 2024-07-09 | Stop reason: HOSPADM

## 2024-07-08 RX ORDER — ERGOCALCIFEROL 1.25 MG/1
50000 CAPSULE ORAL WEEKLY
Status: DISCONTINUED | OUTPATIENT
Start: 2024-07-14 | End: 2024-07-09 | Stop reason: HOSPADM

## 2024-07-08 RX ORDER — PANTOPRAZOLE SODIUM 40 MG/1
40 TABLET, DELAYED RELEASE ORAL
Status: DISCONTINUED | OUTPATIENT
Start: 2024-07-09 | End: 2024-07-09 | Stop reason: HOSPADM

## 2024-07-08 RX ORDER — TRAMADOL HYDROCHLORIDE 50 MG/1
50 TABLET ORAL EVERY 6 HOURS PRN
Status: DISCONTINUED | OUTPATIENT
Start: 2024-07-08 | End: 2024-07-09 | Stop reason: HOSPADM

## 2024-07-08 RX ORDER — ENOXAPARIN SODIUM 100 MG/ML
40 INJECTION SUBCUTANEOUS DAILY
Status: DISCONTINUED | OUTPATIENT
Start: 2024-07-08 | End: 2024-07-09 | Stop reason: HOSPADM

## 2024-07-08 RX ORDER — GABAPENTIN 100 MG/1
100 CAPSULE ORAL 3 TIMES DAILY
Status: DISCONTINUED | OUTPATIENT
Start: 2024-07-08 | End: 2024-07-09 | Stop reason: HOSPADM

## 2024-07-08 RX ORDER — AMLODIPINE BESYLATE 10 MG/1
10 TABLET ORAL DAILY
Status: DISCONTINUED | OUTPATIENT
Start: 2024-07-09 | End: 2024-07-09 | Stop reason: HOSPADM

## 2024-07-08 RX ORDER — M-VIT,TX,IRON,MINS/CALC/FOLIC 27MG-0.4MG
1 TABLET ORAL DAILY
Status: DISCONTINUED | OUTPATIENT
Start: 2024-07-08 | End: 2024-07-09 | Stop reason: HOSPADM

## 2024-07-08 RX ORDER — MAGNESIUM SULFATE 1 G/100ML
1000 INJECTION INTRAVENOUS ONCE
Status: COMPLETED | OUTPATIENT
Start: 2024-07-08 | End: 2024-07-08

## 2024-07-08 RX ADMIN — ASPIRIN 81 MG: 81 TABLET, COATED ORAL at 11:58

## 2024-07-08 RX ADMIN — VALACYCLOVIR 1000 MG: 500 TABLET, FILM COATED ORAL at 20:39

## 2024-07-08 RX ADMIN — POTASSIUM CHLORIDE 40 MEQ: 1500 TABLET, EXTENDED RELEASE ORAL at 14:13

## 2024-07-08 RX ADMIN — CALCIUM CARBONATE-VITAMIN D TAB 500 MG-200 UNIT 1 TABLET: 500-200 TAB at 18:59

## 2024-07-08 RX ADMIN — TIMOLOL MALEATE 1 DROP: 5 SOLUTION OPHTHALMIC at 20:41

## 2024-07-08 RX ADMIN — ENOXAPARIN SODIUM 40 MG: 100 INJECTION SUBCUTANEOUS at 11:58

## 2024-07-08 RX ADMIN — GABAPENTIN 100 MG: 100 CAPSULE ORAL at 20:39

## 2024-07-08 RX ADMIN — DORZOLAMIDE HYDROCHLORIDE 1 DROP: 20 SOLUTION/ DROPS OPHTHALMIC at 11:59

## 2024-07-08 RX ADMIN — MAGNESIUM SULFATE HEPTAHYDRATE 1000 MG: 1 INJECTION, SOLUTION INTRAVENOUS at 15:42

## 2024-07-08 RX ADMIN — POTASSIUM CHLORIDE 40 MEQ: 1500 TABLET, EXTENDED RELEASE ORAL at 08:11

## 2024-07-08 RX ADMIN — TIMOLOL MALEATE 1 DROP: 5 SOLUTION OPHTHALMIC at 11:59

## 2024-07-08 RX ADMIN — CETIRIZINE HYDROCHLORIDE 5 MG: 10 TABLET, FILM COATED ORAL at 11:58

## 2024-07-08 RX ADMIN — LATANOPROST 1 DROP: 50 SOLUTION/ DROPS OPHTHALMIC at 20:46

## 2024-07-08 RX ADMIN — DORZOLAMIDE HYDROCHLORIDE 1 DROP: 20 SOLUTION/ DROPS OPHTHALMIC at 20:40

## 2024-07-08 RX ADMIN — ACETAMINOPHEN 650 MG: 325 TABLET ORAL at 13:23

## 2024-07-08 RX ADMIN — MAGNESIUM SULFATE HEPTAHYDRATE 2000 MG: 40 INJECTION, SOLUTION INTRAVENOUS at 08:13

## 2024-07-08 RX ADMIN — GABAPENTIN 100 MG: 100 CAPSULE ORAL at 11:58

## 2024-07-08 RX ADMIN — SODIUM CHLORIDE, PRESERVATIVE FREE 10 ML: 5 INJECTION INTRAVENOUS at 12:00

## 2024-07-08 RX ADMIN — TRAMADOL HYDROCHLORIDE 50 MG: 50 TABLET, COATED ORAL at 18:59

## 2024-07-08 RX ADMIN — Medication 1000 UNITS: at 11:58

## 2024-07-08 RX ADMIN — SODIUM CHLORIDE, PRESERVATIVE FREE 10 ML: 5 INJECTION INTRAVENOUS at 20:39

## 2024-07-08 RX ADMIN — GABAPENTIN 100 MG: 100 CAPSULE ORAL at 13:23

## 2024-07-08 RX ADMIN — VALACYCLOVIR HYDROCHLORIDE 1000 MG: 500 TABLET, FILM COATED ORAL at 07:59

## 2024-07-08 RX ADMIN — ATORVASTATIN CALCIUM 40 MG: 40 TABLET, FILM COATED ORAL at 11:58

## 2024-07-08 RX ADMIN — Medication 1 TABLET: at 11:58

## 2024-07-08 ASSESSMENT — PAIN DESCRIPTION - LOCATION
LOCATION: GENERALIZED;BACK
LOCATION: LEG
LOCATION: KNEE

## 2024-07-08 ASSESSMENT — PAIN DESCRIPTION - PAIN TYPE: TYPE: ACUTE PAIN

## 2024-07-08 ASSESSMENT — PAIN DESCRIPTION - FREQUENCY: FREQUENCY: CONTINUOUS

## 2024-07-08 ASSESSMENT — PAIN DESCRIPTION - ORIENTATION
ORIENTATION: RIGHT;LEFT
ORIENTATION: RIGHT;LEFT

## 2024-07-08 ASSESSMENT — PAIN SCALES - GENERAL
PAINLEVEL_OUTOF10: 10
PAINLEVEL_OUTOF10: 3
PAINLEVEL_OUTOF10: 9
PAINLEVEL_OUTOF10: 2
PAINLEVEL_OUTOF10: 1

## 2024-07-08 ASSESSMENT — PAIN - FUNCTIONAL ASSESSMENT
PAIN_FUNCTIONAL_ASSESSMENT: ACTIVITIES ARE NOT PREVENTED
PAIN_FUNCTIONAL_ASSESSMENT: ACTIVITIES ARE NOT PREVENTED
PAIN_FUNCTIONAL_ASSESSMENT: 0-10

## 2024-07-08 ASSESSMENT — PAIN DESCRIPTION - DESCRIPTORS
DESCRIPTORS: ACHING
DESCRIPTORS: ACHING

## 2024-07-08 ASSESSMENT — PAIN DESCRIPTION - ONSET: ONSET: ON-GOING

## 2024-07-08 ASSESSMENT — PAIN SCALES - WONG BAKER: WONGBAKER_NUMERICALRESPONSE: NO HURT

## 2024-07-08 NOTE — H&P
History and Physical    Patient:  Haily Kowalski  MRN: [id number]    Chief Complaint:  left chest pain and fatigue    History Obtained From:  patient, electronic medical record    PCP: Eusebio Bullock APRN - CNP    History of Present Illness:   The patient is a 74 y.o. female who presented to the emergency room with complaints of fatigue and left-sided chest pain.  Symptom onset 2 days.  Patient reported rash-like appearance to left breast.  She denied ill contacts.  She complained of feeling chills and myalgias.  She denied any GI or  symptoms.  She denied dizziness or syncope.  During patient's exam she was found to have shingles rash from left breast radiating under the breast until the lateral posterior back following the dermatome.  Patient was found to have magnesium of 1.2 and a potassium of 2.8.  Remainder of workup was negative.  Patient was given 2 g of magnesium and 40 mEq of oral potassium while in the emergency room.  She had no EKG changes at this time.    Past Medical History:        Diagnosis Date    Arthritis     Asthma     Chronic bronchitis (HCC)     Diabetes mellitus (HCC)     GERD (gastroesophageal reflux disease)     H/O echocardiogram 03/09/2016    EF >60%. LV wall thickness is mildly increased. Mild mitral and tricuspid regurgitation.  Borderline pulmonary hypertension estimated right ventricular sysolic pressure of 30mmHg. Mild (grade l) diastolic dysfunction.    Herpes zoster without complication 07/08/2024    History of PFTs 03/10/2016     Suboptimal effort. Restrictive in nature. clionical correlations is advised.    History of stress test 02/18/2016    Abnoraml. Moderate perfusion defect of mild to moderate intensity in the anterolateral and anteroapical regions during stress imaging.  Intermediate risk for CAD.    Hyperlipidemia     Hypertension        Past Surgical History:        Procedure Laterality Date    BREAST SURGERY      cyst removed    CARDIAC CATHETERIZATION  2014    CARDIAC

## 2024-07-08 NOTE — PLAN OF CARE
Problem: Discharge Planning  Goal: Discharge to home or other facility with appropriate resources  Outcome: Progressing     Problem: Pain  Goal: Verbalizes/displays adequate comfort level or baseline comfort level  Outcome: Progressing     Problem: Safety - Adult  Goal: Free from fall injury  Outcome: Progressing     Problem: Cardiovascular - Adult  Goal: Maintains optimal cardiac output and hemodynamic stability  Outcome: Progressing     Problem: Cardiovascular - Adult  Goal: Absence of cardiac dysrhythmias or at baseline  Outcome: Progressing     Problem: Skin/Tissue Integrity - Adult  Goal: Skin integrity remains intact  Outcome: Progressing     Problem: Skin/Tissue Integrity - Adult  Goal: Incisions, wounds, or drain sites healing without S/S of infection  Outcome: Progressing     Problem: Musculoskeletal - Adult  Goal: Return mobility to safest level of function  Outcome: Progressing     Problem: Musculoskeletal - Adult  Goal: Maintain proper alignment of affected body part  Outcome: Progressing     Problem: Metabolic/Fluid and Electrolytes - Adult  Goal: Electrolytes maintained within normal limits  Outcome: Progressing     Problem: Metabolic/Fluid and Electrolytes - Adult  Goal: Hemodynamic stability and optimal renal function maintained  Outcome: Progressing     Problem: Metabolic/Fluid and Electrolytes - Adult  Goal: Glucose maintained within prescribed range  Outcome: Progressing     Problem: Hematologic - Adult  Goal: Maintains hematologic stability  Outcome: Progressing

## 2024-07-08 NOTE — CARE COORDINATION
Yes  Other Identified Issues/Barriers to RETURNING to current housing: none  Potential Assistance needed at discharge: N/A            Potential DME:    Patient expects to discharge to: House  Plan for transportation at discharge: Self    Financial    Payor: [employer] / Plan: [employer]ANS / Product Type: *No Product type* /     Does insurance require precert for SNF: Yes    Potential assistance Purchasing Medications: No  Meds-to-Beds request:        [facility] - [facility] [address] - P [phone] - F [phone]  [address] [address]  Phone: [phone] Fax: [phone]    [facility] - [place], [place] - [address] [phone] - F [phone]  [address]  [facility] [address]  Phone: [phone] Fax: [phone]    [facility] - [place], [place] - [address] [place] [address] - P [phone] - F [phone]  [address]  [address]  [facility] [address]  Phone: [phone] Fax: [phone]    [id number] - [place], [place] - [address] - P [phone] - F [phone]  [address] [facility] [address]  Phone: [phone] Fax: [phone]      Notes:    Factors facilitating achievement of predicted outcomes: Family support, Cooperative, and Pleasant    Barriers to discharge: Pain    Additional Case Management Notes: Discussed discharge plans with the patient. Patient is  and lives at home with her .  She uses no medical equipment.  Both her and her  share in there household chores.  She is independent with her ADL's. Patient manages her own medication and her  provides the transportation.  She has no outside services in the home.  The discharge plan is home with no services at this time.    The Plan for Transition of Care is related to the following treatment goals of Hypokalemia [E87.6]  Hypomagnesemia

## 2024-07-08 NOTE — ED PROVIDER NOTES
EMERGENCY DEPARTMENT ENCOUNTER    Pt Name: Haily Kowalski  MRN: [id number]  Birthdate 1950  Date of evaluation: 7/8/24  CHIEF COMPLAINT       Chief Complaint   Patient presents with    Illness     Patient complains of generalized soreness and pain all over her body.     HISTORY OF PRESENT ILLNESS   HPI  Chills, body aches all over.  Having some significant discomfort and pain over her left breast.  She sees a wound there.  Mild cough over the weekend rhinorrhea congestion.  No nausea vomiting diarrhea.  Able to ambulate.  Has chronic kidney disease not on dialysis.  Denies any chest pressure or diaphoresis or nausea.  She is having some discomfort over the skin of her left breast and outside part of her chest wall.  Wrapping around to the left side.      REVIEW OF SYSTEMS     Review of Systems   All other systems reviewed and are negative.    PASTMEDICAL HISTORY     Past Medical History:   Diagnosis Date    Arthritis     Asthma     Chronic bronchitis (HCC)     Diabetes mellitus (HCC)     GERD (gastroesophageal reflux disease)     H/O echocardiogram 3/9/16    EF >60%. LV wall thickness is mildly increased. Mild mitral and tricuspid regurgitation.  Borderline pulmonary hypertension estimated right ventricular sysolic pressure of 30mmHg. Mild (grade l) diastolic dysfunction.    History of PFTs 3/10/16     Suboptimal effort. Restrictive in nature. clionical correlations is advised.    History of stress test 2/18/16    Abnoraml. Moderate perfusion defect of mild to moderate intensity in the anterolateral and anteroapical regions during stress imaging.  Intermediate risk for CAD.    Hyperlipidemia     Hypertension      Past Problem List  Patient Active Problem List   Diagnosis Code    Hypokalemia E87.6    Type 2 diabetes mellitus without complication, without long-term current use of insulin ([facility]) E11.9    Essential hypertension I10    Mixed hyperlipidemia E78.2    Lower leg edema R60.0    Gastroesophageal reflux disease

## 2024-07-09 VITALS
DIASTOLIC BLOOD PRESSURE: 77 MMHG | TEMPERATURE: 96.9 F | SYSTOLIC BLOOD PRESSURE: 135 MMHG | RESPIRATION RATE: 16 BRPM | HEART RATE: 81 BPM | WEIGHT: 167.3 LBS | OXYGEN SATURATION: 97 % | HEIGHT: 64 IN | BODY MASS INDEX: 28.56 KG/M2

## 2024-07-09 LAB
ANION GAP SERPL CALCULATED.3IONS-SCNC: 8 MMOL/L (ref 9–17)
BUN SERPL-MCNC: 9 MG/DL (ref 8–23)
BUN/CREAT SERPL: 9 (ref 9–20)
CALCIUM SERPL-MCNC: 8.7 MG/DL (ref 8.6–10.4)
CHLORIDE SERPL-SCNC: 106 MMOL/L (ref 98–107)
CO2 SERPL-SCNC: 26 MMOL/L (ref 20–31)
CREAT SERPL-MCNC: 1 MG/DL (ref 0.5–0.9)
EKG ATRIAL RATE: 87 BPM
EKG P AXIS: 71 DEGREES
EKG P-R INTERVAL: 164 MS
EKG Q-T INTERVAL: 384 MS
EKG QRS DURATION: 70 MS
EKG QTC CALCULATION (BAZETT): 462 MS
EKG R AXIS: 5 DEGREES
EKG T AXIS: 16 DEGREES
EKG VENTRICULAR RATE: 87 BPM
GFR, ESTIMATED: 59 ML/MIN/1.73M2
GLUCOSE SERPL-MCNC: 90 MG/DL (ref 70–99)
MAGNESIUM SERPL-MCNC: 1.7 MG/DL (ref 1.6–2.6)
MICROORGANISM SPEC CULT: NORMAL
POTASSIUM SERPL-SCNC: 3.9 MMOL/L (ref 3.7–5.3)
SODIUM SERPL-SCNC: 140 MMOL/L (ref 135–144)
SPECIMEN DESCRIPTION: NORMAL

## 2024-07-09 PROCEDURE — 2580000003 HC RX 258: Performed by: NURSE PRACTITIONER

## 2024-07-09 PROCEDURE — 83735 ASSAY OF MAGNESIUM: CPT

## 2024-07-09 PROCEDURE — 93005 ELECTROCARDIOGRAM TRACING: CPT | Performed by: NURSE PRACTITIONER

## 2024-07-09 PROCEDURE — 6370000000 HC RX 637 (ALT 250 FOR IP): Performed by: NURSE PRACTITIONER

## 2024-07-09 PROCEDURE — 80048 BASIC METABOLIC PNL TOTAL CA: CPT

## 2024-07-09 PROCEDURE — 6370000000 HC RX 637 (ALT 250 FOR IP): Performed by: STUDENT IN AN ORGANIZED HEALTH CARE EDUCATION/TRAINING PROGRAM

## 2024-07-09 PROCEDURE — 93010 ELECTROCARDIOGRAM REPORT: CPT | Performed by: INTERNAL MEDICINE

## 2024-07-09 PROCEDURE — 36415 COLL VENOUS BLD VENIPUNCTURE: CPT

## 2024-07-09 PROCEDURE — 6360000002 HC RX W HCPCS: Performed by: NURSE PRACTITIONER

## 2024-07-09 PROCEDURE — G0378 HOSPITAL OBSERVATION PER HR: HCPCS

## 2024-07-09 PROCEDURE — 94761 N-INVAS EAR/PLS OXIMETRY MLT: CPT

## 2024-07-09 PROCEDURE — 96372 THER/PROPH/DIAG INJ SC/IM: CPT

## 2024-07-09 RX ORDER — GABAPENTIN 100 MG/1
100 CAPSULE ORAL 3 TIMES DAILY
Qty: 30 CAPSULE | Refills: 0 | Status: SHIPPED | OUTPATIENT
Start: 2024-07-09 | End: 2024-07-19

## 2024-07-09 RX ORDER — LANOLIN ALCOHOL/MO/W.PET/CERES
400 CREAM (GRAM) TOPICAL DAILY
Qty: 30 TABLET | Refills: 5 | Status: SHIPPED | OUTPATIENT
Start: 2024-07-10

## 2024-07-09 RX ORDER — TRAMADOL HYDROCHLORIDE 50 MG/1
25 TABLET ORAL EVERY 6 HOURS PRN
Qty: 10 TABLET | Refills: 0 | Status: SHIPPED | OUTPATIENT
Start: 2024-07-09 | End: 2024-07-14

## 2024-07-09 RX ADMIN — Medication 400 MG: at 08:21

## 2024-07-09 RX ADMIN — PANTOPRAZOLE SODIUM 40 MG: 40 TABLET, DELAYED RELEASE ORAL at 08:21

## 2024-07-09 RX ADMIN — FERROUS SULFATE TAB 325 MG (65 MG ELEMENTAL FE) 325 MG: 325 (65 FE) TAB at 08:21

## 2024-07-09 RX ADMIN — DORZOLAMIDE HYDROCHLORIDE 1 DROP: 20 SOLUTION/ DROPS OPHTHALMIC at 08:21

## 2024-07-09 RX ADMIN — GABAPENTIN 100 MG: 100 CAPSULE ORAL at 08:21

## 2024-07-09 RX ADMIN — AMLODIPINE BESYLATE 10 MG: 10 TABLET ORAL at 08:21

## 2024-07-09 RX ADMIN — ENOXAPARIN SODIUM 40 MG: 100 INJECTION SUBCUTANEOUS at 08:22

## 2024-07-09 RX ADMIN — CALCIUM CARBONATE-VITAMIN D TAB 500 MG-200 UNIT 1 TABLET: 500-200 TAB at 08:21

## 2024-07-09 RX ADMIN — VALSARTAN 160 MG: 80 TABLET, COATED ORAL at 08:21

## 2024-07-09 RX ADMIN — Medication 1000 UNITS: at 08:21

## 2024-07-09 RX ADMIN — SODIUM CHLORIDE, PRESERVATIVE FREE 10 ML: 5 INJECTION INTRAVENOUS at 08:23

## 2024-07-09 RX ADMIN — Medication 1 TABLET: at 08:21

## 2024-07-09 RX ADMIN — CETIRIZINE HYDROCHLORIDE 5 MG: 10 TABLET, FILM COATED ORAL at 08:21

## 2024-07-09 RX ADMIN — ATORVASTATIN CALCIUM 40 MG: 40 TABLET, FILM COATED ORAL at 08:21

## 2024-07-09 RX ADMIN — VALACYCLOVIR 1000 MG: 500 TABLET, FILM COATED ORAL at 08:21

## 2024-07-09 RX ADMIN — TIMOLOL MALEATE 1 DROP: 5 SOLUTION OPHTHALMIC at 08:21

## 2024-07-09 RX ADMIN — ASPIRIN 81 MG: 81 TABLET, COATED ORAL at 08:21

## 2024-07-09 NOTE — DISCHARGE SUMMARY
rhythm, no audible murmur  PULM:  CTA, no wheezes, rales or rhonchi, no acute respiratory distress  ABD:     Bowels sounds normal.  Abdomen is soft.  No distention.  no tenderness to palpation.   EXT:     no edema bilaterally .  No calf tenderness.   NEURO: Moves all extremities.  Motor and sensory are grossly intact  SKIN:    No rashes.  No skin lesions.  Intact vesicular rash from medial left breast radiating under breast and tracts the dermatome to lateral chest wall and posterior back and stop to lateral aspect of back.  No crusting of lesions at this time    Significant Diagnostic Studies:   Lab Results   Component Value Date    WBC 4.3 07/08/2024    HGB 12.0 07/08/2024     07/08/2024       Lab Results   Component Value Date    BUN 9 07/09/2024    CREATININE 1.0 (H) 07/09/2024     07/09/2024    K 3.9 07/09/2024    CALCIUM 8.7 07/09/2024     07/09/2024    CO2 26 07/09/2024    LABGLOM 59 (L) 07/09/2024       Lab Results   Component Value Date    WBCUA 0 TO 2 07/08/2024    RBCUA 0 TO 2 07/08/2024    LEUKOCYTESUR TRACE (A) 07/08/2024    GLUCOSEU NEGATIVE 07/08/2024    KETUA NEGATIVE 07/08/2024    PROTEINU 1+ (A) 07/08/2024    HGBUR NEGATIVE 07/08/2024    CASTUA NOT REPORTED 10/26/2020    BACTERIA TRACE (A) 07/08/2024    YEAST NOT REPORTED 10/26/2020       XR CHEST PORTABLE    Result Date: 7/8/2024  EXAMINATION: ONE XRAY VIEW OF THE CHEST 7/8/2024 7:27 am COMPARISON: 03/28/2024 HISTORY: ORDERING SYSTEM PROVIDED HISTORY: cough, chills TECHNOLOGIST PROVIDED HISTORY: cough, chills FINDINGS: The heart is mildly enlarged.  The pulmonary vascularity is within normal limits.  There are no objective signs of focal infiltrate or pleural effusion.  The visualized osseous structures are intact.     Mild cardiomegaly       Assessment and Plan:  Patient Active Problem List    Diagnosis Date Noted    Hyperkalemia 08/23/2021    Resistant hypertension 10/21/2020    Abnormal cardiovascular stress test

## 2024-07-09 NOTE — DISCHARGE INSTR - DIET

## 2024-07-09 NOTE — PROGRESS NOTES
Comprehensive Nutrition Assessment    Type and Reason for Visit:  Initial    Nutrition Recommendations/Plan:   Continue current diet.      Malnutrition Assessment:  Malnutrition Status:  No malnutrition (07/09/24 0740)    Context:  Acute Illness     Findings of the 6 clinical characteristics of malnutrition:  Energy Intake:  No significant decrease in energy intake  Weight Loss:  No significant weight loss     Body Fat Loss:  No significant body fat loss     Muscle Mass Loss:  No significant muscle mass loss    Fluid Accumulation:  Mild Extremities (+ 2 pitting BLE)   Strength:  Not Performed    Nutrition Assessment:    No significant PES. Weight gain trend of 18.7% x 3 months, Pt states due to good PO intakes. Hx CKD. Magnesium now wnl. Pt admitted with hypomagnesemia and herpes zoster. Encouraged to eat 3 meals per day.    Nutrition Related Findings:    + 2 pitting BLE, active bowel sounds x 4 Wound Type:  (shingles rash)       Current Nutrition Intake & Therapies:    Average Meal Intake: %  Average Supplements Intake: None Ordered  ADULT DIET; Regular    Anthropometric Measures:  Height: 162.6 cm (5' 4\")  Ideal Body Weight (IBW): 120 lbs (55 kg)    Admission Body Weight: 75.9 kg (167 lb 5 oz)  Current Body Weight: 75.9 kg (167 lb 5.3 oz), 139.4 % IBW. Weight Source: Bed Scale  Current BMI (kg/m2): 28.7  Usual Body Weight: 64 kg (141 lb)  % Weight Change (Calculated): 18.7  Weight Adjustment For: No Adjustment                 BMI Categories: Overweight (BMI 25.0-29.9)    Estimated Daily Nutrient Needs:  Energy Requirements Based On: Kcal/kg  Weight Used for Energy Requirements: Current  Energy (kcal/day): 3059-8526 (20-23/kg)  Weight Used for Protein Requirements: Ideal  Protein (g/day): 72-83g (1.3-1.5g/kg)  Method Used for Fluid Requirements: 1 ml/kcal  Fluid (ml/day): 1,800 ml  Hematology:  Recent Labs     07/08/24 0723   WBC 4.3   HGB 12.0   HCT 35.2*     Chemistry:  Recent Labs     07/08/24 0723 
Patient admitted to [facility] Pt arrived to unit from ER to room 321 for hypomagnesemia/Shingles. Pt moved to bed 1 assist from cot. Vitals obtained. Admission assessment completed. Admission navigator completed. Pt noted to have rash under left breast to left side rib area, patient states is painful at times.Pt oriented to room. Reviewed orders. Pt denies further needs at this time. Call light in reach. Care ongoing.   
Pt resting in bed with eyes closed when writer entered the room. Pt awakens easily. Pt is A&O x4. Vitals and assessment as charted. Pt rated her pain a 9 out of 10 in bilat knees. Pt given Tramadol 50mg PO, will continue to monitor. Pt denies any further needs at this time. Call light within reach. Bed alarm on.    
Pt walked out to personal vehicle with  at this time. Pt denied any further needs.   
Reviewed discharge instructions with patient.   Patient aware of need to  prescriptions.   Reviewed new medications and side effects to monitor for.  Reviewed home medications and when next dose is due.  Patient aware of date/time of follow up appointment.  Aware of need for repeat labs on 7/16/24.   Educational handout given on Shingles and Magnesium Oxide.   Questions answered. Verbalizes understanding.   Copy of discharge instructions given to patient.  
Vitals and assessment done at this time. See flow sheet for more details. Pt resting in bed at this time. Pt denied any shortness of breath, numbness or tingling in hands or feet, and pain. Pts lungs are clear. Pt stated she is feeling much better today. Pt denied any further needs at this time. Call light within reach, will continue to monitor.   
  [DOES NOT SATISFY MIPS PERFORMANCE]    Kristin De Santiago, TARA - CNP , TARA, NP-C  Hospital[facility] Medicine        7/9/2024, 9:24 AM

## 2024-07-09 NOTE — PLAN OF CARE
Problem: Discharge Planning  Goal: Discharge to home or other facility with appropriate resources  Outcome: Progressing  Flowsheets (Taken 7/9/2024 0819)  Discharge to home or other facility with appropriate resources: Identify barriers to discharge with patient and caregiver     Problem: Pain  Goal: Verbalizes/displays adequate comfort level or baseline comfort level  7/9/2024 0940 by Anton Pierce RN  Outcome: Progressing  Flowsheets (Taken 7/9/2024 0819)  Verbalizes/displays adequate comfort level or baseline comfort level: Encourage patient to monitor pain and request assistance  7/8/2024 2116 by Brenda Evans RN  Outcome: Progressing  Note: Pt is able to verbalize when in pain. Pt given pain medications as needed. Will continue to monitor.      Problem: Safety - Adult  Goal: Free from fall injury  7/9/2024 0940 by nAton Pierce RN  Outcome: Progressing  Flowsheets (Taken 7/9/2024 0940)  Free From Fall Injury: Instruct family/caregiver on patient safety  7/8/2024 2116 by Brenda Evans RN  Outcome: Progressing  Note: Bed in low position. Wheels locked. Bed alarm on. 2/4 side rails are up. Non-skid socks on. Fall band on. Call light within reach.     Problem: Cardiovascular - Adult  Goal: Maintains optimal cardiac output and hemodynamic stability  7/9/2024 0940 by Anton Pierce RN  Outcome: Progressing  Flowsheets (Taken 7/9/2024 0819)  Maintains optimal cardiac output and hemodynamic stability: Monitor blood pressure and heart rate  7/8/2024 2116 by Brenda Evans RN  Outcome: Progressing  Note: Vitals are stable, will continue to monitor.   Goal: Absence of cardiac dysrhythmias or at baseline  Outcome: Progressing  Flowsheets (Taken 7/9/2024 0819)  Absence of cardiac dysrhythmias or at baseline: Monitor cardiac rate and rhythm     Problem: Skin/Tissue Integrity - Adult  Goal: Skin integrity remains intact  Outcome: Progressing  Flowsheets  Taken 7/9/2024 0940  Skin Integrity

## 2024-07-09 NOTE — PLAN OF CARE
Problem: Pain  Goal: Verbalizes/displays adequate comfort level or baseline comfort level  7/8/2024 2116 by Brenda Evans RN  Outcome: Progressing  Note: Pt is able to verbalize when in pain. Pt given pain medications as needed. Will continue to monitor.      Problem: Safety - Adult  Goal: Free from fall injury  7/8/2024 2116 by Brenda Evans RN  Outcome: Progressing  Note: Bed in low position. Wheels locked. Bed alarm on. 2/4 side rails are up. Non-skid socks on. Fall band on. Call light within reach.     Problem: Cardiovascular - Adult  Goal: Maintains optimal cardiac output and hemodynamic stability  7/8/2024 2116 by Brenda Evans RN  Outcome: Progressing  Note: Vitals are stable, will continue to monitor.      Problem: Metabolic/Fluid and Electrolytes - Adult  Goal: Electrolytes maintained within normal limits  7/8/2024 2116 by Brenda Evans RN  Outcome: Progressing  Note: Monitoring labs.

## 2024-07-09 NOTE — PLAN OF CARE
Problem: Discharge Planning  Goal: Discharge to home or other facility with appropriate resources  7/9/2024 1039 by Idania Pardo RN  Outcome: Adequate for Discharge  7/9/2024 0940 by Anton Pierce RN  Outcome: Progressing  Flowsheets (Taken 7/9/2024 0819)  Discharge to home or other facility with appropriate resources: Identify barriers to discharge with patient and caregiver     Problem: Pain  Goal: Verbalizes/displays adequate comfort level or baseline comfort level  7/9/2024 1039 by Idania Pardo RN  Outcome: Adequate for Discharge  7/9/2024 0940 by Anton Pierce RN  Outcome: Progressing  Flowsheets (Taken 7/9/2024 0819)  Verbalizes/displays adequate comfort level or baseline comfort level: Encourage patient to monitor pain and request assistance  7/8/2024 2116 by Brenda Evans RN  Outcome: Progressing  Note: Pt is able to verbalize when in pain. Pt given pain medications as needed. Will continue to monitor.      Problem: Safety - Adult  Goal: Free from fall injury  7/9/2024 1039 by Idania Pardo RN  Outcome: Adequate for Discharge  7/9/2024 0940 by Anton Pierce RN  Outcome: Progressing  Flowsheets (Taken 7/9/2024 0940)  Free From Fall Injury: Instruct family/caregiver on patient safety  7/8/2024 2116 by Brenda Evans RN  Outcome: Progressing  Note: Bed in low position. Wheels locked. Bed alarm on. 2/4 side rails are up. Non-skid socks on. Fall band on. Call light within reach.     Problem: Cardiovascular - Adult  Goal: Maintains optimal cardiac output and hemodynamic stability  7/9/2024 1039 by Idania Pardo RN  Outcome: Adequate for Discharge  7/9/2024 0940 by Anton Pierce RN  Outcome: Progressing  Flowsheets (Taken 7/9/2024 0819)  Maintains optimal cardiac output and hemodynamic stability: Monitor blood pressure and heart rate  7/8/2024 2116 by Brenda Evans RN  Outcome: Progressing  Note: Vitals are stable, will continue to monitor.   Goal: Absence

## 2024-07-10 ENCOUNTER — TELEPHONE (OUTPATIENT)
Dept: PRIMARY CARE CLINIC | Age: 74
End: 2024-07-10

## 2024-07-10 NOTE — TELEPHONE ENCOUNTER
[facility] Transitions Initial Follow Up Call    Outreach made within 2 business days of discharge: Yes    Patient: Haily Kowalski Patient : 1950   MRN: [id number]  Reason for Admission: There are no discharge diagnoses documented for the most recent discharge.  Discharge Date: 24       Spoke with: Haily     Discharge department/facility: [facility] Interactive Patient Contact:  Was patient able to fill all prescriptions: Yes  Was patient instructed to bring all medications to the follow-up visit: Yes  Is patient taking all medications as directed in the discharge summary? Yes  Does patient understand their discharge instructions: Yes  Does patient have questions or concerns that need addressed prior to 7-14 day follow up office visit: no    Scheduled appointment with PCP within 7-14 days    Follow Up  Future Appointments   Date Time Provider Department Center   2024 11:00 AM Eusebio Bullock APRN - CNP [facility]   2024  1:45 PM Bry Khan MD [facility]   2024  2:00 PM [facility] MAMMOGRAPHY ROOM AT [facility]   2024 11:00 AM Madelyn Alberto APRN - CNP [facility]   2025  1:20 PM Juan Luis Lovett MD [facility]       Alma Avila MA

## 2024-07-11 ENCOUNTER — OFFICE VISIT (OUTPATIENT)
Dept: PRIMARY CARE CLINIC | Age: 74
End: 2024-07-11

## 2024-07-11 VITALS
DIASTOLIC BLOOD PRESSURE: 82 MMHG | SYSTOLIC BLOOD PRESSURE: 134 MMHG | HEART RATE: 67 BPM | RESPIRATION RATE: 22 BRPM | OXYGEN SATURATION: 97 % | WEIGHT: 168.4 LBS | TEMPERATURE: 98.5 F | BODY MASS INDEX: 28.91 KG/M2

## 2024-07-11 DIAGNOSIS — Z09 HOSPITAL DISCHARGE FOLLOW-UP: ICD-10-CM

## 2024-07-11 DIAGNOSIS — B02.29 POSTHERPETIC NEURALGIA: Primary | ICD-10-CM

## 2024-07-11 DIAGNOSIS — B02.8 HERPES ZOSTER WITH OTHER COMPLICATION: ICD-10-CM

## 2024-07-11 RX ORDER — OXYCODONE HYDROCHLORIDE AND ACETAMINOPHEN 5; 325 MG/1; MG/1
1 TABLET ORAL EVERY 6 HOURS PRN
Qty: 28 TABLET | Refills: 0 | Status: SHIPPED | OUTPATIENT
Start: 2024-07-11 | End: 2024-07-18

## 2024-07-11 ASSESSMENT — PATIENT HEALTH QUESTIONNAIRE - PHQ9
SUM OF ALL RESPONSES TO PHQ QUESTIONS 1-9: 0
SUM OF ALL RESPONSES TO PHQ9 QUESTIONS 1 & 2: 0
SUM OF ALL RESPONSES TO PHQ QUESTIONS 1-9: 0
1. LITTLE INTEREST OR PLEASURE IN DOING THINGS: NOT AT ALL
2. FEELING DOWN, DEPRESSED OR HOPELESS: NOT AT ALL
SUM OF ALL RESPONSES TO PHQ QUESTIONS 1-9: 0
SUM OF ALL RESPONSES TO PHQ QUESTIONS 1-9: 0

## 2024-07-11 NOTE — PATIENT INSTRUCTIONS
SURVEY:     You may be receiving a survey from [facility] [employer] regarding your visit today.     Please complete the survey to enable us to provide the highest quality of care to you and your family.     If you cannot score us a very good on any question, please call the office to discuss how we could have made your experience a very good one.     Thank you,    Eusebio Bullock, APRN-CNP  Teresa Henderson, APRN-CNP  Niki, LPN  Jailyn, CMA  Patel, CMA  Alma, CMA  [place], PCA  Norma, CMA  Vilma, PM

## 2024-07-11 NOTE — PROGRESS NOTES
Post-Discharge Transitional Care  Follow Up      Haily Kowalski   YOB: 1950    Date of Office Visit:  7/11/2024  Date of Hospital Admission: 7/8/24  Date of Hospital Discharge: 7/9/24  Risk of hospital readmission (high >=14%. Medium >=10%) :Readmission Risk Score: 13.7      Care management risk score Rising risk (score 2-5) and Complex Care (Scores >=6): No Risk Score On File     Non face to face  following discharge, date last encounter closed (first attempt may have been earlier): 07/10/2024    Call initiated 2 business days of discharge: Yes    ASSESSMENT/PLAN:   Postherpetic neuralgia  -     oxyCODONE-acetaminophen (PERCOCET) 5-325 MG per tablet; Take 1 tablet by mouth every 6 hours as needed for Pain for up to 7 days. Intended supply: 7 days. Take lowest dose possible to manage pain Max Daily Amount: 4 tablets, Disp-28 tablet, R-0Normal  Herpes zoster with other complication  -     oxyCODONE-acetaminophen (PERCOCET) 5-325 MG per tablet; Take 1 tablet by mouth every 6 hours as needed for Pain for up to 7 days. Intended supply: 7 days. Take lowest dose possible to manage pain Max Daily Amount: 4 tablets, Disp-28 tablet, R-0Normal  Hospital discharge follow-up  -     FL DISCHARGE MEDS RECONCILED W/ CURRENT OUTPATIENT MED LIST      Medical Decision Making: moderate complexity  Return if symptoms worsen or fail to improve.    On this date 7/11/2024 I have spent 44 minutes reviewing previous notes, test results and face to face with the patient discussing the diagnosis and importance of compliance with the treatment plan as well as documenting on the day of the visit.       Subjective:   HPI:  Follow up of Hospital problems/diagnosis(es):       Hospital Course:   Haily Kowalski is a 74 y.o. female admitted with hypomagnesemia.  She presented to the emergency room with complaints of fatigue and left-sided chest pain. Symptom onset 2 days. Patient reported rash-like appearance to left breast. She denied

## 2024-07-12 ENCOUNTER — HOSPITAL ENCOUNTER (OUTPATIENT)
Age: 74
Discharge: HOME | End: 2024-07-12
Payer: COMMERCIAL

## 2024-07-12 DIAGNOSIS — B02.9 HERPES ZOSTER WITHOUT COMPLICATION: ICD-10-CM

## 2024-07-12 LAB
ANION GAP SERPL CALCULATED.3IONS-SCNC: 7 MMOL/L (ref 9–17)
BUN SERPL-MCNC: 12 MG/DL (ref 8–23)
BUN/CREAT SERPL: 10 (ref 9–20)
CALCIUM SERPL-MCNC: 8.9 MG/DL (ref 8.6–10.4)
CHLORIDE SERPL-SCNC: 105 MMOL/L (ref 98–107)
CO2 SERPL-SCNC: 29 MMOL/L (ref 20–31)
CREAT SERPL-MCNC: 1.2 MG/DL (ref 0.5–0.9)
GFR, ESTIMATED: 48 ML/MIN/1.73M2
GLUCOSE SERPL-MCNC: 119 MG/DL (ref 70–99)
MAGNESIUM SERPL-MCNC: 1.6 MG/DL (ref 1.6–2.6)
POTASSIUM SERPL-SCNC: 4.5 MMOL/L (ref 3.7–5.3)
SODIUM SERPL-SCNC: 141 MMOL/L (ref 135–144)

## 2024-07-12 PROCEDURE — 83735 ASSAY OF MAGNESIUM: CPT

## 2024-07-12 PROCEDURE — 36415 COLL VENOUS BLD VENIPUNCTURE: CPT

## 2024-07-12 PROCEDURE — 80048 BASIC METABOLIC PNL TOTAL CA: CPT

## 2024-07-19 ENCOUNTER — OFFICE VISIT (OUTPATIENT)
Dept: ONCOLOGY | Age: 74
End: 2024-07-19
Payer: COMMERCIAL

## 2024-07-19 VITALS
DIASTOLIC BLOOD PRESSURE: 76 MMHG | SYSTOLIC BLOOD PRESSURE: 149 MMHG | HEART RATE: 90 BPM | TEMPERATURE: 96.7 F | BODY MASS INDEX: 28.15 KG/M2 | WEIGHT: 164 LBS | RESPIRATION RATE: 20 BRPM

## 2024-07-19 DIAGNOSIS — D50.9 IRON DEFICIENCY ANEMIA, UNSPECIFIED IRON DEFICIENCY ANEMIA TYPE: ICD-10-CM

## 2024-07-19 DIAGNOSIS — D64.9 NORMOCYTIC ANEMIA: Primary | ICD-10-CM

## 2024-07-19 DIAGNOSIS — D75.89 MACROCYTOSIS: ICD-10-CM

## 2024-07-19 PROCEDURE — 99214 OFFICE O/P EST MOD 30 MIN: CPT | Performed by: INTERNAL MEDICINE

## 2024-07-19 PROCEDURE — 1123F ACP DISCUSS/DSCN MKR DOCD: CPT | Performed by: INTERNAL MEDICINE

## 2024-07-19 PROCEDURE — 3078F DIAST BP <80 MM HG: CPT | Performed by: INTERNAL MEDICINE

## 2024-07-19 PROCEDURE — 3077F SYST BP >= 140 MM HG: CPT | Performed by: INTERNAL MEDICINE

## 2024-07-19 NOTE — PROGRESS NOTES
Not at all   Social Connections: Not on file   Intimate Partner Violence: Not on file   Housing Stability: Low Risk  (3/28/2024)    Housing Stability Vital Sign     Unable to Pay for Housing in the Last Year: No     Number of Places Lived in the Last Year: 1     Unstable Housing in the Last Year: No       Family History   Problem Relation Age of Onset    Stroke Father     Stroke Sister     Diabetes Sister         x6 sisters    High Blood Pressure Sister     Stroke Brother     Diabetes Brother         REVIEW OF SYSTEM:     Constitutional: No fever or chills. No night sweats, no weight loss   Eyes: No eye discharge, double vision, or eye pain   HEENT: negative for sore mouth, sore throat, hoarseness and voice change   Respiratory: negative for cough , sputum, dyspnea, wheezing, hemoptysis, chest pain   Cardiovascular: negative for chest pain, dyspnea, palpitations, orthopnea, PND   Gastrointestinal: negative for nausea, vomiting, diarrhea, constipation, abdominal pain, Dysphagia, hematemesis and hematochezia   Genitourinary: negative for frequency, dysuria, nocturia, urinary incontinence, and hematuria   Integument: negative for rash, skin lesions, bruises.   Hematologic/Lymphatic: negative for easy bruising, bleeding, lymphadenopathy, petechiae and swelling/edema   Endocrine: negative for heat or cold intolerance, tremor, weight changes, change in bowel habits and hair loss   Musculoskeletal: negative for myalgias, arthralgias, pain, joint swelling,and bone pain   Neurological: negative for headaches, dizziness, seizures, weakness, numbness       OBJECTIVE:         Vitals:    07/19/24 1347   BP: (!) 149/76   Pulse: 90   Resp: 20   Temp: (!) 96.7 °F (35.9 °C)       PHYSICAL EXAM:   General appearance - well appearing, no in pain or distress   Mental status - alert and cooperative   Eyes - pupils equal and reactive, extraocular eye movements intact   Ears - bilateral TM's and external ear canals normal   Mouth - mucous

## 2024-07-22 DIAGNOSIS — J30.1 SEASONAL ALLERGIC RHINITIS DUE TO POLLEN: ICD-10-CM

## 2024-07-22 RX ORDER — FLUTICASONE PROPIONATE 50 MCG
2 SPRAY, SUSPENSION (ML) NASAL DAILY
Qty: 48 G | Refills: 1 | Status: SHIPPED | OUTPATIENT
Start: 2024-07-22

## 2024-07-22 RX ORDER — LEVOCETIRIZINE DIHYDROCHLORIDE 5 MG/1
5 TABLET, FILM COATED ORAL NIGHTLY
Qty: 90 TABLET | Refills: 1 | Status: SHIPPED | OUTPATIENT
Start: 2024-07-22

## 2024-07-22 NOTE — TELEPHONE ENCOUNTER
Health Maintenance   Topic Date Due    Annual Wellness Visit (Medicare Advantage)  01/01/2024    Breast cancer screen  05/10/2024    Diabetic foot exam  10/10/2024 (Originally 7/27/2021)    Shingles vaccine (1 of 2) 12/12/2024 (Originally 1/9/2000)    Hepatitis C screen  12/12/2024 (Originally 1/9/1968)    COVID-19 Vaccine (3 - 2023-24 season) 02/05/2025 (Originally 9/1/2023)    Respiratory Syncytial Virus (RSV) Pregnant or age 60 yrs+ (1 - 1-dose 60+ series) 02/05/2025 (Originally 1/9/2010)    Diabetic retinal exam  06/07/2025 (Originally 6/15/2018)    DTaP/Tdap/Td vaccine (1 - Tdap) 06/18/2025 (Originally 1/9/1969)    Flu vaccine (1) 08/01/2024    Diabetic Alb to Cr ratio (uACR) test  10/09/2024    Lipids  10/09/2024    A1C test (Diabetic or Prediabetic)  03/26/2025    Depression Screen  07/11/2025    GFR test (Diabetes, CKD 3-4, OR last GFR 15-59)  07/12/2025    Colorectal Cancer Screen  01/23/2034    DEXA (modify frequency per FRAX score)  Completed    Pneumococcal 65+ years Vaccine  Completed    Hepatitis A vaccine  Aged Out    Hepatitis B vaccine  Aged Out    Hib vaccine  Aged Out    Polio vaccine  Aged Out    Meningococcal (ACWY) vaccine  Aged Out             (applicable per patient's age: Cancer Screenings, Depression Screening, Fall Risk Screening, Immunizations)    Hemoglobin A1C (%)   Date Value   03/26/2024 4.8   10/09/2023 4.9   04/06/2023 5.6     AST (U/L)   Date Value   07/08/2024 14     ALT (U/L)   Date Value   07/08/2024 <5 (L)     BUN (mg/dL)   Date Value   07/12/2024 12      (goal A1C is < 7)   (goal LDL is <100) need 30-50% reduction from baseline     BP Readings from Last 3 Encounters:   07/19/24 (!) 149/76   07/11/24 134/82   07/09/24 135/77    (goal /80)      All Future Testing planned in [facility]:  Lab Frequency Next Occurrence   Hemoglobin A1C Once 04/07/2024   COLONOSCOPY W/ OR W/O BIOPSY Once 06/25/2024   H. Pylori Antigen, Stool Once 08/30/2024   [facility] GOERGES DIGITAL SCREEN BILATERAL

## 2024-08-02 ENCOUNTER — OFFICE VISIT (OUTPATIENT)
Dept: PRIMARY CARE CLINIC | Age: 74
End: 2024-08-02
Payer: COMMERCIAL

## 2024-08-02 VITALS
WEIGHT: 158.7 LBS | OXYGEN SATURATION: 96 % | HEART RATE: 74 BPM | TEMPERATURE: 98.8 F | BODY MASS INDEX: 28.12 KG/M2 | SYSTOLIC BLOOD PRESSURE: 124 MMHG | HEIGHT: 63 IN | DIASTOLIC BLOOD PRESSURE: 70 MMHG

## 2024-08-02 DIAGNOSIS — I10 PRIMARY HYPERTENSION: ICD-10-CM

## 2024-08-02 DIAGNOSIS — M43.6 TORTICOLLIS: ICD-10-CM

## 2024-08-02 DIAGNOSIS — Z00.00 MEDICARE ANNUAL WELLNESS VISIT, SUBSEQUENT: Primary | ICD-10-CM

## 2024-08-02 PROCEDURE — G0439 PPPS, SUBSEQ VISIT: HCPCS | Performed by: NURSE PRACTITIONER

## 2024-08-02 PROCEDURE — 3078F DIAST BP <80 MM HG: CPT | Performed by: NURSE PRACTITIONER

## 2024-08-02 PROCEDURE — 1123F ACP DISCUSS/DSCN MKR DOCD: CPT | Performed by: NURSE PRACTITIONER

## 2024-08-02 PROCEDURE — 3074F SYST BP LT 130 MM HG: CPT | Performed by: NURSE PRACTITIONER

## 2024-08-02 RX ORDER — BACLOFEN 10 MG/1
10 TABLET ORAL 3 TIMES DAILY
Qty: 45 TABLET | Refills: 0 | Status: SHIPPED | OUTPATIENT
Start: 2024-08-02 | End: 2024-08-17

## 2024-08-02 ASSESSMENT — ENCOUNTER SYMPTOMS
RHINORRHEA: 0
ORTHOPNEA: 0
COUGH: 0
DIARRHEA: 0
NAUSEA: 0
VISUAL CHANGE: 0
TROUBLE SWALLOWING: 0
PHOTOPHOBIA: 0
SORE THROAT: 0
SHORTNESS OF BREATH: 0
CONSTIPATION: 0
BLURRED VISION: 0
ABDOMINAL PAIN: 0
VOMITING: 0
WHEEZING: 0

## 2024-08-02 ASSESSMENT — PATIENT HEALTH QUESTIONNAIRE - PHQ9
SUM OF ALL RESPONSES TO PHQ QUESTIONS 1-9: 0
2. FEELING DOWN, DEPRESSED OR HOPELESS: NOT AT ALL
SUM OF ALL RESPONSES TO PHQ QUESTIONS 1-9: 0
SUM OF ALL RESPONSES TO PHQ QUESTIONS 1-9: 0
1. LITTLE INTEREST OR PLEASURE IN DOING THINGS: NOT AT ALL
SUM OF ALL RESPONSES TO PHQ QUESTIONS 1-9: 0
SUM OF ALL RESPONSES TO PHQ9 QUESTIONS 1 & 2: 0

## 2024-08-02 ASSESSMENT — LIFESTYLE VARIABLES
HOW MANY STANDARD DRINKS CONTAINING ALCOHOL DO YOU HAVE ON A TYPICAL DAY: PATIENT DOES NOT DRINK
HOW OFTEN DO YOU HAVE A DRINK CONTAINING ALCOHOL: NEVER

## 2024-08-02 NOTE — PATIENT INSTRUCTIONS
SURVEY:     You may be receiving a survey from [employer] regarding your visit today.     Please complete the survey to enable us to provide the highest quality of care to you and your family.     If you cannot score us a very good on any question, please call the office to discuss how we could have made your experience a very good one.     Thank you,    Eusebio Bullock, APRN-CNP  Teresa Henderson, APRN-CNP  Niki, LPN  Jailyn, CMA  Patel, CMA  Alma, CMA  [place], PCA  Norma, CMA  Vilma, PM        Preventing Falls: Care Instructions  Injuries and health problems such as trouble walking or poor eyesight can increase your risk of falling. So can some medicines. But there are things you can do to help prevent falls. You can exercise to get stronger. You can also arrange your home to make it safer.    Talk to your doctor about the medicines you take. Ask if any of them increase the risk of falls and whether they can be changed or stopped.   Try to exercise regularly. It can help improve your strength and balance. This can help lower your risk of falling.         Practice fall safety and prevention.   Wear low-heeled shoes that fit well and give your feet good support. Talk to your doctor if you have foot problems that make this hard.  Carry a cellphone or wear a medical alert device that you can use to call for help.  Use stepladders instead of chairs to reach high objects. Don't climb if you're at risk for falls. Ask for help, if needed.  Wear the correct eyeglasses, if you need them.        Make your home safer.   Remove rugs, cords, clutter, and furniture from walkways.  Keep your house well lit. Use night-lights in hallways and bathrooms.  Install and use sturdy handrails on stairways.  Wear nonskid footwear, even inside. Don't walk barefoot or in socks without shoes.        Be safe outside.   Use handrails, curb cuts, and ramps whenever possible.  Keep your hands free by using a shoulder bag or backpack.  Try to walk

## 2024-08-02 NOTE — PROGRESS NOTES
Medicare Annual Wellness Visit    Haily Kowalski is here for Medicare AWV (Patient is here for awv and routine check. Neck pain that goes all through head. ) and Hypertension    Assessment & Plan   Medicare annual wellness visit, subsequent  Primary hypertension  -     ALT; Future  -     AST; Future  -     CBC with Auto Differential; Future  -     Basic Metabolic Panel; Future  -     Lipid Panel; Future  -     Blood Pressure Monitor GENNY; DAILY Starting Fri 8/2/2024, Disp-1 each, R-0, Print  Torticollis  -     baclofen (LIORESAL) 10 MG tablet; Take 1 tablet by mouth 3 times daily for 15 days, Disp-45 tablet, R-0Normal    Recommendations for Preventive Services Due: see orders and patient instructions/AVS.  Recommended screening schedule for the next 5-10 years is provided to the patient in written form: see Patient Instructions/AVS.     Return in 6 months (on 2/2/2025).     Subjective   The following acute and/or chronic problems were also addressed today:  Haily is here today for a Medicare annual wellness visit and routine office visit.    Hypertension  This is a chronic problem. The current episode started more than 1 year ago. The problem is unchanged. The problem is controlled. Associated symptoms include neck pain. Pertinent negatives include no anxiety, blurred vision, chest pain, headaches, malaise/fatigue, orthopnea, palpitations, peripheral edema, PND or shortness of breath. There are no associated agents to hypertension. Risk factors for coronary artery disease include stress, post-menopausal state and family history. Past treatments include calcium channel blockers and angiotensin blockers. The current treatment provides significant improvement. There are no compliance problems.  There is no history of kidney disease, CAD/MI, CVA or heart failure. There is no history of chronic renal disease.   Torticollis   This is a recurrent problem. The current episode started 1 to 4 weeks ago. The problem occurs

## 2024-09-08 DIAGNOSIS — I10 ESSENTIAL HYPERTENSION: ICD-10-CM

## 2024-09-09 RX ORDER — VALSARTAN 320 MG/1
320 TABLET ORAL DAILY
Qty: 30 TABLET | Refills: 0 | OUTPATIENT
Start: 2024-09-09

## 2024-10-15 ENCOUNTER — OFFICE VISIT (OUTPATIENT)
Dept: PRIMARY CARE CLINIC | Age: 74
End: 2024-10-15
Payer: COMMERCIAL

## 2024-10-15 VITALS
HEART RATE: 76 BPM | BODY MASS INDEX: 29.09 KG/M2 | OXYGEN SATURATION: 97 % | TEMPERATURE: 98.5 F | RESPIRATION RATE: 22 BRPM | DIASTOLIC BLOOD PRESSURE: 78 MMHG | SYSTOLIC BLOOD PRESSURE: 128 MMHG | WEIGHT: 164.2 LBS

## 2024-10-15 DIAGNOSIS — J20.9 ACUTE BRONCHITIS WITH ASTHMA: Primary | ICD-10-CM

## 2024-10-15 DIAGNOSIS — J45.909 ACUTE BRONCHITIS WITH ASTHMA: Primary | ICD-10-CM

## 2024-10-15 LAB
INFLUENZA A ANTIGEN, POC: NEGATIVE
INFLUENZA B ANTIGEN, POC: NEGATIVE
LOT NUMBER POC: NORMAL
SARS-COV-2 RNA POC - COV: NORMAL
VALID INTERNAL CONTROL, POC: PRESENT
VENDOR AND KIT NAME POC: NORMAL

## 2024-10-15 PROCEDURE — 99213 OFFICE O/P EST LOW 20 MIN: CPT | Performed by: NURSE PRACTITIONER

## 2024-10-15 PROCEDURE — 3078F DIAST BP <80 MM HG: CPT | Performed by: NURSE PRACTITIONER

## 2024-10-15 PROCEDURE — 1123F ACP DISCUSS/DSCN MKR DOCD: CPT | Performed by: NURSE PRACTITIONER

## 2024-10-15 PROCEDURE — 3074F SYST BP LT 130 MM HG: CPT | Performed by: NURSE PRACTITIONER

## 2024-10-15 RX ORDER — AZITHROMYCIN 250 MG/1
TABLET, FILM COATED ORAL
Qty: 6 TABLET | Refills: 0 | Status: SHIPPED | OUTPATIENT
Start: 2024-10-15 | End: 2024-10-25

## 2024-10-15 RX ORDER — PREDNISONE 20 MG/1
40 TABLET ORAL DAILY
Qty: 10 TABLET | Refills: 0 | Status: SHIPPED | OUTPATIENT
Start: 2024-10-15 | End: 2024-10-20

## 2024-10-15 SDOH — ECONOMIC STABILITY: FOOD INSECURITY: WITHIN THE PAST 12 MONTHS, THE FOOD YOU BOUGHT JUST DIDN'T LAST AND YOU DIDN'T HAVE MONEY TO GET MORE.: PATIENT DECLINED

## 2024-10-15 SDOH — ECONOMIC STABILITY: INCOME INSECURITY: HOW HARD IS IT FOR YOU TO PAY FOR THE VERY BASICS LIKE FOOD, HOUSING, MEDICAL CARE, AND HEATING?: PATIENT DECLINED

## 2024-10-15 SDOH — ECONOMIC STABILITY: FOOD INSECURITY: WITHIN THE PAST 12 MONTHS, YOU WORRIED THAT YOUR FOOD WOULD RUN OUT BEFORE YOU GOT MONEY TO BUY MORE.: PATIENT DECLINED

## 2024-10-15 ASSESSMENT — ENCOUNTER SYMPTOMS
WHEEZING: 1
SHORTNESS OF BREATH: 0
HEARTBURN: 0
RHINORRHEA: 0
COUGH: 1
HEMOPTYSIS: 0
SORE THROAT: 1

## 2024-10-15 ASSESSMENT — PATIENT HEALTH QUESTIONNAIRE - PHQ9
SUM OF ALL RESPONSES TO PHQ QUESTIONS 1-9: 0
SUM OF ALL RESPONSES TO PHQ9 QUESTIONS 1 & 2: 0
SUM OF ALL RESPONSES TO PHQ QUESTIONS 1-9: 0
2. FEELING DOWN, DEPRESSED OR HOPELESS: NOT AT ALL
1. LITTLE INTEREST OR PLEASURE IN DOING THINGS: NOT AT ALL

## 2024-10-15 NOTE — PATIENT INSTRUCTIONS
SURVEY:     You may be receiving a survey from [facility] [employer] regarding your visit today.     Please complete the survey to enable us to provide the highest quality of care to you and your family.     If you cannot score us a very good on any question, please call the office to discuss how we could have made your experience a very good one.     Thank you,    Eusebio Bullock, APRN-CNP  Teresa Henderson, APRN-CNP  Niki, LPN  Jailyn, CMA  Patel, CMA  Alma, CMA  [place], PCA  Norma, CMA  Vilma, PM

## 2024-10-15 NOTE — PROGRESS NOTES
Name: Haily Kowalski  : 1950         Chief Complaint:     Chief Complaint   Patient presents with    Cough     X 1 week.Chills.       History of Present Illness:      Haily Kowalski is a 74 y.o.  female who presents with Cough (X 1 week.Chills.)      Haily is here today for an acute care office visit.    Cough  This is a new problem. The current episode started in the past 7 days. The problem has been gradually worsening. Episode frequency: FREQUENTLY. The cough is Productive of sputum. Associated symptoms include chills, nasal congestion, postnasal drip, a sore throat and wheezing. Pertinent negatives include no chest pain, ear congestion, ear pain, fever, headaches, heartburn, hemoptysis, myalgias, rash, rhinorrhea, shortness of breath, sweats or weight loss. The symptoms are aggravated by lying down. She has tried nothing for the symptoms. The treatment provided no relief. Her past medical history is significant for asthma, bronchitis, environmental allergies and pneumonia. There is no history of bronchiectasis, COPD or emphysema.         Past Medical History:     Past Medical History:   Diagnosis Date    Arthritis     Asthma     Chronic bronchitis (HCC)     Diabetes mellitus (HCC)     GERD (gastroesophageal reflux disease)     H/O echocardiogram 2016    EF >60%. LV wall thickness is mildly increased. Mild mitral and tricuspid regurgitation.  Borderline pulmonary hypertension estimated right ventricular sysolic pressure of 30mmHg. Mild (grade l) diastolic dysfunction.    Herpes zoster without complication 2024    History of PFTs 03/10/2016     Suboptimal effort. Restrictive in nature. clionical correlations is advised.    History of stress test 2016    Abnoraml. Moderate perfusion defect of mild to moderate intensity in the anterolateral and anteroapical regions during stress imaging.  Intermediate risk for CAD.    Hyperlipidemia     Hypertension       Reviewed all health maintenance

## 2024-10-21 ENCOUNTER — TELEPHONE (OUTPATIENT)
Dept: PRIMARY CARE CLINIC | Age: 74
End: 2024-10-21

## 2024-10-21 ENCOUNTER — HOSPITAL ENCOUNTER (OUTPATIENT)
Dept: GENERAL RADIOLOGY | Age: 74
Discharge: HOME | End: 2024-10-23
Payer: COMMERCIAL

## 2024-10-21 ENCOUNTER — HOSPITAL ENCOUNTER (OUTPATIENT)
Age: 74
Discharge: HOME | End: 2024-10-23
Payer: COMMERCIAL

## 2024-10-21 DIAGNOSIS — J20.9 ACUTE BRONCHITIS WITH ASTHMA: Primary | ICD-10-CM

## 2024-10-21 DIAGNOSIS — J45.909 ACUTE BRONCHITIS WITH ASTHMA: Primary | ICD-10-CM

## 2024-10-21 DIAGNOSIS — J20.9 ACUTE BRONCHITIS WITH ASTHMA: ICD-10-CM

## 2024-10-21 DIAGNOSIS — J45.909 ACUTE BRONCHITIS WITH ASTHMA: ICD-10-CM

## 2024-10-21 PROCEDURE — 71046 X-RAY EXAM CHEST 2 VIEWS: CPT

## 2024-10-21 RX ORDER — CODEINE PHOSPHATE/GUAIFENESIN 10-100MG/5
10 LIQUID (ML) ORAL 4 TIMES DAILY PRN
Qty: 120 ML | Refills: 0 | Status: SHIPPED | OUTPATIENT
Start: 2024-10-21 | End: 2024-10-24

## 2024-10-21 NOTE — TELEPHONE ENCOUNTER
----- Message from TARA Elmroe CNP sent at 10/21/2024  3:11 PM EDT -----  Chest x-ray is clear.  If not improving with the cough syrup that we sent in please have her call.  Thank you.

## 2024-10-21 NOTE — TELEPHONE ENCOUNTER
----- Message from TARA Elmore CNP sent at 10/21/2024  3:11 PM EDT -----  Chest x-ray is clear.  If not improving with the cough syrup that we sent in please have her call.  Thank you.

## 2024-10-21 NOTE — TELEPHONE ENCOUNTER
Patient contacted the office and stated she is still having a bad cough and its making her throat very sore. She stated she is having troubles swallowing food from it. She is requesting for something to be sent in to the pharmacy for her cough. She is still taking her azithromycin and prednisone but not getting any better.    Please advise thank you

## 2024-10-21 NOTE — TELEPHONE ENCOUNTER
Attempted to notify patient regarding X-ray results, no answer. Chest X-ray is clear if not improving with cough medication,call office.

## 2024-10-30 ENCOUNTER — TELEPHONE (OUTPATIENT)
Dept: PRIMARY CARE CLINIC | Age: 74
End: 2024-10-30

## 2024-10-30 NOTE — TELEPHONE ENCOUNTER
Blood sugar reading is okay.  Have her come in for blood pressure check with her machine today or tomorrow if possible.  Thank you.

## 2024-10-30 NOTE — TELEPHONE ENCOUNTER
Patient contacted office stating that her blood sugar reading was 181 after eating a banana and that her blood pressure is 101/100 at 8:30 this morning.        Please advise.

## 2024-10-31 ENCOUNTER — NURSE ONLY (OUTPATIENT)
Dept: PRIMARY CARE CLINIC | Age: 74
End: 2024-10-31

## 2024-10-31 VITALS
WEIGHT: 161.1 LBS | SYSTOLIC BLOOD PRESSURE: 140 MMHG | TEMPERATURE: 98 F | BODY MASS INDEX: 28.54 KG/M2 | DIASTOLIC BLOOD PRESSURE: 78 MMHG | HEART RATE: 81 BPM | OXYGEN SATURATION: 98 %

## 2024-10-31 DIAGNOSIS — I10 ESSENTIAL HYPERTENSION: Primary | ICD-10-CM

## 2024-10-31 RX ORDER — CODEINE PHOSPHATE AND GUAIFENESIN 10; 100 MG/5ML; MG/5ML
SOLUTION ORAL
COMMUNITY
Start: 2024-10-24

## 2024-10-31 NOTE — PROGRESS NOTES
Continue current medications.  Have her stop and next month when she sees oncology to recheck blood pressure here as well.  Thank you.

## 2024-11-19 ENCOUNTER — HOSPITAL ENCOUNTER (OUTPATIENT)
Age: 74
Discharge: HOME | End: 2024-11-19
Payer: COMMERCIAL

## 2024-11-19 DIAGNOSIS — D64.9 NORMOCYTIC ANEMIA: ICD-10-CM

## 2024-11-19 DIAGNOSIS — E88.09 HYPOALBUMINEMIA: ICD-10-CM

## 2024-11-19 DIAGNOSIS — D50.9 IRON DEFICIENCY ANEMIA, UNSPECIFIED IRON DEFICIENCY ANEMIA TYPE: ICD-10-CM

## 2024-11-19 DIAGNOSIS — D64.9 NORMOCYTIC ANEMIA: Primary | ICD-10-CM

## 2024-11-19 DIAGNOSIS — D75.89 MACROCYTOSIS: ICD-10-CM

## 2024-11-19 LAB
ALBUMIN SERPL-MCNC: 3.5 G/DL (ref 3.5–5.2)
ALBUMIN/GLOB SERPL: 1.2 {RATIO} (ref 1–2.5)
ALP SERPL-CCNC: 170 U/L (ref 35–104)
ALT SERPL-CCNC: <5 U/L (ref 10–35)
ANION GAP SERPL CALCULATED.3IONS-SCNC: 7 MMOL/L (ref 9–16)
AST SERPL-CCNC: 12 U/L (ref 10–35)
BASOPHILS # BLD: 0.03 K/UL (ref 0–0.2)
BASOPHILS NFR BLD: 0 % (ref 0–2)
BILIRUB SERPL-MCNC: 0.3 MG/DL (ref 0–1.2)
BUN SERPL-MCNC: 18 MG/DL (ref 8–23)
BUN/CREAT SERPL: 16 (ref 9–20)
CALCIUM SERPL-MCNC: 9.4 MG/DL (ref 8.6–10.4)
CHLORIDE SERPL-SCNC: 104 MMOL/L (ref 98–107)
CO2 SERPL-SCNC: 33 MMOL/L (ref 20–31)
CREAT SERPL-MCNC: 1.1 MG/DL (ref 0.5–0.9)
EOSINOPHIL # BLD: 0.15 K/UL (ref 0–0.44)
EOSINOPHILS RELATIVE PERCENT: 2 % (ref 1–4)
ERYTHROCYTE [DISTWIDTH] IN BLOOD BY AUTOMATED COUNT: 12.9 % (ref 11.8–14.4)
FERRITIN SERPL-MCNC: 46 NG/ML
FOLATE SERPL-MCNC: 11.5 NG/ML (ref 4.8–24.2)
GFR, ESTIMATED: 51 ML/MIN/1.73M2
GLUCOSE SERPL-MCNC: 93 MG/DL (ref 74–99)
HCT VFR BLD AUTO: 36.4 % (ref 36.3–47.1)
HGB BLD-MCNC: 11.9 G/DL (ref 11.9–15.1)
IMM GRANULOCYTES # BLD AUTO: <0.03 K/UL (ref 0–0.3)
IMM GRANULOCYTES NFR BLD: 0 %
IRON SATN MFR SERPL: 21 % (ref 20–55)
IRON SERPL-MCNC: 52 UG/DL (ref 37–145)
LYMPHOCYTES NFR BLD: 3.05 K/UL (ref 1.1–3.7)
LYMPHOCYTES RELATIVE PERCENT: 45 % (ref 24–43)
MCH RBC QN AUTO: 30.5 PG (ref 25.2–33.5)
MCHC RBC AUTO-ENTMCNC: 32.7 G/DL (ref 28.4–34.8)
MCV RBC AUTO: 93.3 FL (ref 82.6–102.9)
MONOCYTES NFR BLD: 0.66 K/UL (ref 0.1–1.2)
MONOCYTES NFR BLD: 10 % (ref 3–12)
NEUTROPHILS NFR BLD: 43 % (ref 36–65)
NEUTS SEG NFR BLD: 2.98 K/UL (ref 1.5–8.1)
NRBC BLD-RTO: 0 PER 100 WBC
PLATELET # BLD AUTO: 323 K/UL (ref 138–453)
PMV BLD AUTO: 9.7 FL (ref 8.1–13.5)
POTASSIUM SERPL-SCNC: 3.3 MMOL/L (ref 3.7–5.3)
PROT SERPL-MCNC: 6.5 G/DL (ref 6.6–8.7)
RBC # BLD AUTO: 3.9 M/UL (ref 3.95–5.11)
SODIUM SERPL-SCNC: 144 MMOL/L (ref 136–145)
TIBC SERPL-MCNC: 247 UG/DL (ref 250–450)
UNSATURATED IRON BINDING CAPACITY: 195 UG/DL (ref 112–347)
VIT B12 SERPL-MCNC: 241 PG/ML (ref 232–1245)
WBC OTHER # BLD: 6.9 K/UL (ref 3.5–11.3)

## 2024-11-19 PROCEDURE — 82728 ASSAY OF FERRITIN: CPT

## 2024-11-19 PROCEDURE — 85025 COMPLETE CBC W/AUTO DIFF WBC: CPT

## 2024-11-19 PROCEDURE — 80053 COMPREHEN METABOLIC PANEL: CPT

## 2024-11-19 PROCEDURE — 36415 COLL VENOUS BLD VENIPUNCTURE: CPT

## 2024-11-19 PROCEDURE — 82746 ASSAY OF FOLIC ACID SERUM: CPT

## 2024-11-19 PROCEDURE — 82607 VITAMIN B-12: CPT

## 2024-11-19 PROCEDURE — 83550 IRON BINDING TEST: CPT

## 2024-11-19 PROCEDURE — 83540 ASSAY OF IRON: CPT

## 2024-11-21 ENCOUNTER — OFFICE VISIT (OUTPATIENT)
Dept: ONCOLOGY | Age: 74
End: 2024-11-21
Payer: COMMERCIAL

## 2024-11-21 ENCOUNTER — NURSE ONLY (OUTPATIENT)
Dept: PRIMARY CARE CLINIC | Age: 74
End: 2024-11-21

## 2024-11-21 VITALS
WEIGHT: 165.1 LBS | DIASTOLIC BLOOD PRESSURE: 72 MMHG | TEMPERATURE: 98.1 F | OXYGEN SATURATION: 96 % | BODY MASS INDEX: 29.25 KG/M2 | HEART RATE: 69 BPM | SYSTOLIC BLOOD PRESSURE: 140 MMHG

## 2024-11-21 VITALS
BODY MASS INDEX: 29.23 KG/M2 | WEIGHT: 165 LBS | HEART RATE: 78 BPM | RESPIRATION RATE: 18 BRPM | TEMPERATURE: 96.2 F | SYSTOLIC BLOOD PRESSURE: 142 MMHG | DIASTOLIC BLOOD PRESSURE: 75 MMHG

## 2024-11-21 DIAGNOSIS — D64.9 NORMOCYTIC ANEMIA: Primary | ICD-10-CM

## 2024-11-21 DIAGNOSIS — D50.9 IRON DEFICIENCY ANEMIA, UNSPECIFIED IRON DEFICIENCY ANEMIA TYPE: ICD-10-CM

## 2024-11-21 DIAGNOSIS — I10 ESSENTIAL HYPERTENSION: Primary | ICD-10-CM

## 2024-11-21 DIAGNOSIS — N18.30 STAGE 3 CHRONIC KIDNEY DISEASE, UNSPECIFIED WHETHER STAGE 3A OR 3B CKD (HCC): ICD-10-CM

## 2024-11-21 PROCEDURE — 1123F ACP DISCUSS/DSCN MKR DOCD: CPT | Performed by: INTERNAL MEDICINE

## 2024-11-21 PROCEDURE — 3078F DIAST BP <80 MM HG: CPT | Performed by: INTERNAL MEDICINE

## 2024-11-21 PROCEDURE — 1159F MED LIST DOCD IN RCRD: CPT | Performed by: INTERNAL MEDICINE

## 2024-11-21 PROCEDURE — 99214 OFFICE O/P EST MOD 30 MIN: CPT | Performed by: INTERNAL MEDICINE

## 2024-11-21 PROCEDURE — 1125F AMNT PAIN NOTED PAIN PRSNT: CPT | Performed by: INTERNAL MEDICINE

## 2024-11-21 PROCEDURE — 3077F SYST BP >= 140 MM HG: CPT | Performed by: INTERNAL MEDICINE

## 2024-11-21 NOTE — PROGRESS NOTES
Continue current medications.  Recommend rechecking blood pressure in a few weeks or so.  Thank you.

## 2024-11-21 NOTE — PROGRESS NOTES
sound a like substitutions which may escape proof reading.  It such instances, actual meaning can be extrapolated by contextual diversion.

## 2025-01-08 DIAGNOSIS — E78.2 MIXED HYPERLIPIDEMIA: ICD-10-CM

## 2025-01-08 RX ORDER — ATORVASTATIN CALCIUM 40 MG/1
40 TABLET, FILM COATED ORAL DAILY
Qty: 100 TABLET | Refills: 3 | Status: SHIPPED | OUTPATIENT
Start: 2025-01-08

## 2025-01-27 ENCOUNTER — HOSPITAL ENCOUNTER (OUTPATIENT)
Age: 75
Discharge: HOME | End: 2025-01-27
Payer: MEDICARE

## 2025-01-27 ENCOUNTER — TELEPHONE (OUTPATIENT)
Dept: PRIMARY CARE CLINIC | Age: 75
End: 2025-01-27

## 2025-01-27 DIAGNOSIS — N18.30 STAGE 3 CHRONIC KIDNEY DISEASE, UNSPECIFIED WHETHER STAGE 3A OR 3B CKD (HCC): ICD-10-CM

## 2025-01-27 DIAGNOSIS — D64.9 NORMOCYTIC ANEMIA: ICD-10-CM

## 2025-01-27 DIAGNOSIS — I10 PRIMARY HYPERTENSION: ICD-10-CM

## 2025-01-27 DIAGNOSIS — D50.9 IRON DEFICIENCY ANEMIA, UNSPECIFIED IRON DEFICIENCY ANEMIA TYPE: ICD-10-CM

## 2025-01-27 LAB
ALBUMIN SERPL-MCNC: 3.5 G/DL (ref 3.5–5.2)
ALBUMIN/GLOB SERPL: 1.2 {RATIO} (ref 1–2.5)
ALP SERPL-CCNC: 166 U/L (ref 35–104)
ALT SERPL-CCNC: <5 U/L (ref 10–35)
ANION GAP SERPL CALCULATED.3IONS-SCNC: 10 MMOL/L (ref 9–16)
AST SERPL-CCNC: 12 U/L (ref 10–35)
BASOPHILS # BLD: 0.04 K/UL (ref 0–0.2)
BASOPHILS NFR BLD: 1 % (ref 0–2)
BILIRUB SERPL-MCNC: 0.4 MG/DL (ref 0–1.2)
BUN SERPL-MCNC: 11 MG/DL (ref 8–23)
BUN/CREAT SERPL: 11 (ref 9–20)
CALCIUM SERPL-MCNC: 9 MG/DL (ref 8.6–10.4)
CHLORIDE SERPL-SCNC: 106 MMOL/L (ref 98–107)
CHOLEST SERPL-MCNC: 160 MG/DL (ref 0–199)
CHOLESTEROL/HDL RATIO: 2.7
CO2 SERPL-SCNC: 28 MMOL/L (ref 20–31)
CREAT SERPL-MCNC: 1 MG/DL (ref 0.5–0.9)
EOSINOPHIL # BLD: 0.13 K/UL (ref 0–0.44)
EOSINOPHILS RELATIVE PERCENT: 2 % (ref 1–4)
ERYTHROCYTE [DISTWIDTH] IN BLOOD BY AUTOMATED COUNT: 13 % (ref 11.8–14.4)
FERRITIN SERPL-MCNC: 38 NG/ML
GFR, ESTIMATED: 57 ML/MIN/1.73M2
GLUCOSE SERPL-MCNC: 84 MG/DL (ref 74–99)
HCT VFR BLD AUTO: 35.2 % (ref 36.3–47.1)
HDLC SERPL-MCNC: 59 MG/DL
HGB BLD-MCNC: 11.4 G/DL (ref 11.9–15.1)
IMM GRANULOCYTES # BLD AUTO: <0.03 K/UL (ref 0–0.3)
IMM GRANULOCYTES NFR BLD: 0 %
IRON SATN MFR SERPL: 33 % (ref 20–55)
IRON SERPL-MCNC: 84 UG/DL (ref 37–145)
LDLC SERPL CALC-MCNC: 70 MG/DL (ref 0–100)
LYMPHOCYTES NFR BLD: 3.3 K/UL (ref 1.1–3.7)
LYMPHOCYTES RELATIVE PERCENT: 45 % (ref 24–43)
MCH RBC QN AUTO: 29.8 PG (ref 25.2–33.5)
MCHC RBC AUTO-ENTMCNC: 32.4 G/DL (ref 28.4–34.8)
MCV RBC AUTO: 92.1 FL (ref 82.6–102.9)
MONOCYTES NFR BLD: 0.71 K/UL (ref 0.1–1.2)
MONOCYTES NFR BLD: 10 % (ref 3–12)
NEUTROPHILS NFR BLD: 42 % (ref 36–65)
NEUTS SEG NFR BLD: 3.01 K/UL (ref 1.5–8.1)
NRBC BLD-RTO: 0 PER 100 WBC
PLATELET # BLD AUTO: 316 K/UL (ref 138–453)
PMV BLD AUTO: 9.5 FL (ref 8.1–13.5)
POTASSIUM SERPL-SCNC: 3.5 MMOL/L (ref 3.7–5.3)
PROT SERPL-MCNC: 6.5 G/DL (ref 6.6–8.7)
RBC # BLD AUTO: 3.82 M/UL (ref 3.95–5.11)
SODIUM SERPL-SCNC: 144 MMOL/L (ref 136–145)
TIBC SERPL-MCNC: 256 UG/DL (ref 250–450)
TRIGL SERPL-MCNC: 157 MG/DL
UNSATURATED IRON BINDING CAPACITY: 172 UG/DL (ref 112–347)
VLDLC SERPL CALC-MCNC: 31 MG/DL (ref 1–30)
WBC OTHER # BLD: 7.2 K/UL (ref 3.5–11.3)

## 2025-01-27 PROCEDURE — 80053 COMPREHEN METABOLIC PANEL: CPT

## 2025-01-27 PROCEDURE — 82668 ASSAY OF ERYTHROPOIETIN: CPT

## 2025-01-27 PROCEDURE — 83540 ASSAY OF IRON: CPT

## 2025-01-27 PROCEDURE — 82728 ASSAY OF FERRITIN: CPT

## 2025-01-27 PROCEDURE — 85025 COMPLETE CBC W/AUTO DIFF WBC: CPT

## 2025-01-27 PROCEDURE — 83550 IRON BINDING TEST: CPT

## 2025-01-27 PROCEDURE — 80061 LIPID PANEL: CPT

## 2025-01-27 PROCEDURE — 36415 COLL VENOUS BLD VENIPUNCTURE: CPT

## 2025-01-28 LAB — EPO SERPL-ACNC: 39 MU/ML (ref 4–27)

## 2025-02-04 ENCOUNTER — OFFICE VISIT (OUTPATIENT)
Dept: PRIMARY CARE CLINIC | Age: 75
End: 2025-02-04
Payer: MEDICARE

## 2025-02-04 VITALS
DIASTOLIC BLOOD PRESSURE: 76 MMHG | WEIGHT: 169.8 LBS | HEART RATE: 79 BPM | TEMPERATURE: 99 F | OXYGEN SATURATION: 96 % | SYSTOLIC BLOOD PRESSURE: 132 MMHG | BODY MASS INDEX: 30.08 KG/M2

## 2025-02-04 DIAGNOSIS — N18.30 STAGE 3 CHRONIC KIDNEY DISEASE, UNSPECIFIED WHETHER STAGE 3A OR 3B CKD (HCC): ICD-10-CM

## 2025-02-04 DIAGNOSIS — E11.21 TYPE 2 DIABETES MELLITUS WITH DIABETIC NEPHROPATHY, WITHOUT LONG-TERM CURRENT USE OF INSULIN (HCC): Primary | ICD-10-CM

## 2025-02-04 DIAGNOSIS — I27.20 PULMONARY HYPERTENSION, UNSPECIFIED (HCC): ICD-10-CM

## 2025-02-04 DIAGNOSIS — I10 ESSENTIAL HYPERTENSION: ICD-10-CM

## 2025-02-04 DIAGNOSIS — Z23 NEED FOR SHINGLES VACCINE: ICD-10-CM

## 2025-02-04 LAB — HBA1C MFR BLD: 6.2 %

## 2025-02-04 PROCEDURE — 3075F SYST BP GE 130 - 139MM HG: CPT | Performed by: NURSE PRACTITIONER

## 2025-02-04 PROCEDURE — 2022F DILAT RTA XM EVC RTNOPTHY: CPT | Performed by: NURSE PRACTITIONER

## 2025-02-04 PROCEDURE — 1090F PRES/ABSN URINE INCON ASSESS: CPT | Performed by: NURSE PRACTITIONER

## 2025-02-04 PROCEDURE — 3017F COLORECTAL CA SCREEN DOC REV: CPT | Performed by: NURSE PRACTITIONER

## 2025-02-04 PROCEDURE — 3044F HG A1C LEVEL LT 7.0%: CPT | Performed by: NURSE PRACTITIONER

## 2025-02-04 PROCEDURE — 99214 OFFICE O/P EST MOD 30 MIN: CPT | Performed by: NURSE PRACTITIONER

## 2025-02-04 PROCEDURE — 1159F MED LIST DOCD IN RCRD: CPT | Performed by: NURSE PRACTITIONER

## 2025-02-04 PROCEDURE — G8399 PT W/DXA RESULTS DOCUMENT: HCPCS | Performed by: NURSE PRACTITIONER

## 2025-02-04 PROCEDURE — 3078F DIAST BP <80 MM HG: CPT | Performed by: NURSE PRACTITIONER

## 2025-02-04 PROCEDURE — 1123F ACP DISCUSS/DSCN MKR DOCD: CPT | Performed by: NURSE PRACTITIONER

## 2025-02-04 PROCEDURE — 83036 HEMOGLOBIN GLYCOSYLATED A1C: CPT | Performed by: NURSE PRACTITIONER

## 2025-02-04 PROCEDURE — G8417 CALC BMI ABV UP PARAM F/U: HCPCS | Performed by: NURSE PRACTITIONER

## 2025-02-04 PROCEDURE — G8427 DOCREV CUR MEDS BY ELIG CLIN: HCPCS | Performed by: NURSE PRACTITIONER

## 2025-02-04 PROCEDURE — 1036F TOBACCO NON-USER: CPT | Performed by: NURSE PRACTITIONER

## 2025-02-04 RX ORDER — FUROSEMIDE 20 MG/1
20 TABLET ORAL DAILY
Qty: 90 TABLET | Refills: 3 | Status: SHIPPED | OUTPATIENT
Start: 2025-02-04

## 2025-02-04 RX ORDER — ASPIRIN 81 MG/1
81 TABLET ORAL DAILY
Qty: 100 TABLET | Refills: 3 | Status: SHIPPED | OUTPATIENT
Start: 2025-02-04

## 2025-02-04 RX ORDER — ZOSTER VACCINE RECOMBINANT, ADJUVANTED 50 MCG/0.5
0.5 KIT INTRAMUSCULAR SEE ADMIN INSTRUCTIONS
Qty: 0.5 ML | Refills: 0 | Status: SHIPPED | OUTPATIENT
Start: 2025-02-04 | End: 2025-08-03

## 2025-02-04 RX ORDER — POTASSIUM CHLORIDE 1500 MG/1
20 TABLET, EXTENDED RELEASE ORAL 2 TIMES DAILY
Qty: 180 TABLET | Refills: 3 | Status: SHIPPED | OUTPATIENT
Start: 2025-02-04

## 2025-02-04 SDOH — ECONOMIC STABILITY: FOOD INSECURITY: WITHIN THE PAST 12 MONTHS, YOU WORRIED THAT YOUR FOOD WOULD RUN OUT BEFORE YOU GOT MONEY TO BUY MORE.: NEVER TRUE

## 2025-02-04 SDOH — ECONOMIC STABILITY: FOOD INSECURITY: WITHIN THE PAST 12 MONTHS, THE FOOD YOU BOUGHT JUST DIDN'T LAST AND YOU DIDN'T HAVE MONEY TO GET MORE.: NEVER TRUE

## 2025-02-04 ASSESSMENT — ENCOUNTER SYMPTOMS
DIARRHEA: 0
ABDOMINAL PAIN: 0
CONSTIPATION: 0
VOMITING: 0
SORE THROAT: 0
RHINORRHEA: 0
WHEEZING: 0
SHORTNESS OF BREATH: 0
NAUSEA: 0
COUGH: 0

## 2025-02-04 ASSESSMENT — PATIENT HEALTH QUESTIONNAIRE - PHQ9
SUM OF ALL RESPONSES TO PHQ QUESTIONS 1-9: 0
SUM OF ALL RESPONSES TO PHQ9 QUESTIONS 1 & 2: 0
2. FEELING DOWN, DEPRESSED OR HOPELESS: NOT AT ALL
1. LITTLE INTEREST OR PLEASURE IN DOING THINGS: NOT AT ALL
SUM OF ALL RESPONSES TO PHQ QUESTIONS 1-9: 0

## 2025-02-04 NOTE — PATIENT INSTRUCTIONS
SURVEY:     You may be receiving a survey from [facility] [employer] regarding your visit today.     Please complete the survey to enable us to provide the highest quality of care to you and your family.     If you cannot score us a very good on any question, please call the office to discuss how we could have made your experience a very good one.     Thank you,    Eusebio Bullock, APRN-CNP  Teresa Henderson, APRN-CNP  Niki, LPN  Jailyn, CMA  Patel, CMA  Alma, CMA  [place], PCA  Norma, CMA  Vilma, PM

## 2025-02-04 NOTE — PROGRESS NOTES
Name: Haily Kowalski  : 1950         Chief Complaint:     Chief Complaint   Patient presents with    Diabetes     6 month.    Hypertension       History of Present Illness:      Haily Kowalski is a 75 y.o.  female who presents with Diabetes (6 month.) and Hypertension      Haily is here today for routine office visit.     She states she is feeling well but she is having some trouble with her legs.  She states she is having some discomfort with walking.  Upon further evaluation and questioning she is having some significant swelling.  She states someone told her to stop taking her diuretic when she was discharged from the hospital the last time.  Otherwise she has been compliant with her medications, A1c is stable at 6.2.  See below for further comments.    Pulmonary hypertension-stable, patient does follow with cardiology routinely.  She has been compliant with her medications.    CKD-stable, patient compliant with medications and treatments.  Labs were stable.  Creatinine 1.0.    Diabetes  She presents for her follow-up diabetic visit. She has type 2 diabetes mellitus. MedicAlert identification noted. Onset time: YEARS. Her disease course has been stable. There are no hypoglycemic associated symptoms. Pertinent negatives for hypoglycemia include no dizziness or headaches. There are no diabetic associated symptoms. Pertinent negatives for diabetes include no chest pain, no fatigue, no polydipsia, no polyphagia and no polyuria. There are no hypoglycemic complications. Pertinent negatives for hypoglycemia complications include no blackouts and no hospitalization. Symptoms are stable. Diabetic complications include nephropathy. Pertinent negatives for diabetic complications include no CVA, heart disease or peripheral neuropathy. Risk factors for coronary artery disease include diabetes mellitus, dyslipidemia, family history, hypertension, stress and post-menopausal. Current diabetic treatment includes diet. She

## 2025-04-07 ENCOUNTER — OFFICE VISIT (OUTPATIENT)
Dept: CARDIOLOGY | Age: 75
End: 2025-04-07
Payer: MEDICARE

## 2025-04-07 VITALS
WEIGHT: 177.8 LBS | BODY MASS INDEX: 31.5 KG/M2 | HEART RATE: 62 BPM | DIASTOLIC BLOOD PRESSURE: 72 MMHG | RESPIRATION RATE: 18 BRPM | OXYGEN SATURATION: 99 % | HEIGHT: 63 IN | SYSTOLIC BLOOD PRESSURE: 143 MMHG

## 2025-04-07 DIAGNOSIS — I10 ESSENTIAL HYPERTENSION: ICD-10-CM

## 2025-04-07 DIAGNOSIS — I25.10 MILD CAD: Primary | ICD-10-CM

## 2025-04-07 DIAGNOSIS — I25.10 ATHEROSCLEROSIS OF NATIVE CORONARY ARTERY, UNSPECIFIED WHETHER ANGINA PRESENT, UNSPECIFIED WHETHER NATIVE OR TRANSPLANTED HEART: ICD-10-CM

## 2025-04-07 DIAGNOSIS — E78.2 MIXED HYPERLIPIDEMIA: ICD-10-CM

## 2025-04-07 PROCEDURE — 1090F PRES/ABSN URINE INCON ASSESS: CPT | Performed by: FAMILY MEDICINE

## 2025-04-07 PROCEDURE — 3077F SYST BP >= 140 MM HG: CPT | Performed by: FAMILY MEDICINE

## 2025-04-07 PROCEDURE — 1159F MED LIST DOCD IN RCRD: CPT | Performed by: FAMILY MEDICINE

## 2025-04-07 PROCEDURE — 3017F COLORECTAL CA SCREEN DOC REV: CPT | Performed by: FAMILY MEDICINE

## 2025-04-07 PROCEDURE — G8427 DOCREV CUR MEDS BY ELIG CLIN: HCPCS | Performed by: FAMILY MEDICINE

## 2025-04-07 PROCEDURE — 1123F ACP DISCUSS/DSCN MKR DOCD: CPT | Performed by: FAMILY MEDICINE

## 2025-04-07 PROCEDURE — G8417 CALC BMI ABV UP PARAM F/U: HCPCS | Performed by: FAMILY MEDICINE

## 2025-04-07 PROCEDURE — 1036F TOBACCO NON-USER: CPT | Performed by: FAMILY MEDICINE

## 2025-04-07 PROCEDURE — G8399 PT W/DXA RESULTS DOCUMENT: HCPCS | Performed by: FAMILY MEDICINE

## 2025-04-07 PROCEDURE — 3078F DIAST BP <80 MM HG: CPT | Performed by: FAMILY MEDICINE

## 2025-04-07 PROCEDURE — 99214 OFFICE O/P EST MOD 30 MIN: CPT | Performed by: FAMILY MEDICINE

## 2025-04-07 NOTE — PROGRESS NOTES
individuals in attendance at the appointment consented to the use of AI, including the recording.

## 2025-04-16 ENCOUNTER — OFFICE VISIT (OUTPATIENT)
Dept: PRIMARY CARE CLINIC | Age: 75
End: 2025-04-16
Payer: MEDICARE

## 2025-04-16 VITALS
SYSTOLIC BLOOD PRESSURE: 132 MMHG | HEART RATE: 61 BPM | OXYGEN SATURATION: 97 % | BODY MASS INDEX: 31.64 KG/M2 | DIASTOLIC BLOOD PRESSURE: 82 MMHG | WEIGHT: 178.6 LBS | TEMPERATURE: 97.5 F

## 2025-04-16 DIAGNOSIS — J45.909 ACUTE BRONCHITIS WITH ASTHMA: Primary | ICD-10-CM

## 2025-04-16 DIAGNOSIS — J20.9 ACUTE BRONCHITIS WITH ASTHMA: Primary | ICD-10-CM

## 2025-04-16 PROBLEM — N17.9 ACUTE KIDNEY INJURY SUPERIMPOSED ON CKD: Status: RESOLVED | Noted: 2024-03-28 | Resolved: 2025-04-16

## 2025-04-16 PROBLEM — Z48.01 ENCOUNTER FOR SURGICAL WOUND DRESSING CHANGE: Status: RESOLVED | Noted: 2022-08-16 | Resolved: 2025-04-16

## 2025-04-16 PROBLEM — I24.9 ACS (ACUTE CORONARY SYNDROME) (HCC): Status: RESOLVED | Noted: 2019-04-30 | Resolved: 2025-04-16

## 2025-04-16 PROBLEM — E87.5 HYPERKALEMIA: Status: RESOLVED | Noted: 2021-08-23 | Resolved: 2025-04-16

## 2025-04-16 PROBLEM — N18.9 ACUTE KIDNEY INJURY SUPERIMPOSED ON CKD: Status: RESOLVED | Noted: 2024-03-28 | Resolved: 2025-04-16

## 2025-04-16 PROBLEM — I95.9 HYPOTENSION: Status: RESOLVED | Noted: 2024-03-28 | Resolved: 2025-04-16

## 2025-04-16 PROBLEM — R09.02 HYPOXIA: Status: RESOLVED | Noted: 2024-03-26 | Resolved: 2025-04-16

## 2025-04-16 PROCEDURE — 3079F DIAST BP 80-89 MM HG: CPT | Performed by: NURSE PRACTITIONER

## 2025-04-16 PROCEDURE — G8427 DOCREV CUR MEDS BY ELIG CLIN: HCPCS | Performed by: NURSE PRACTITIONER

## 2025-04-16 PROCEDURE — 1123F ACP DISCUSS/DSCN MKR DOCD: CPT | Performed by: NURSE PRACTITIONER

## 2025-04-16 PROCEDURE — 1159F MED LIST DOCD IN RCRD: CPT | Performed by: NURSE PRACTITIONER

## 2025-04-16 PROCEDURE — G8399 PT W/DXA RESULTS DOCUMENT: HCPCS | Performed by: NURSE PRACTITIONER

## 2025-04-16 PROCEDURE — 1090F PRES/ABSN URINE INCON ASSESS: CPT | Performed by: NURSE PRACTITIONER

## 2025-04-16 PROCEDURE — 99213 OFFICE O/P EST LOW 20 MIN: CPT | Performed by: NURSE PRACTITIONER

## 2025-04-16 PROCEDURE — 1036F TOBACCO NON-USER: CPT | Performed by: NURSE PRACTITIONER

## 2025-04-16 PROCEDURE — 3017F COLORECTAL CA SCREEN DOC REV: CPT | Performed by: NURSE PRACTITIONER

## 2025-04-16 PROCEDURE — G8417 CALC BMI ABV UP PARAM F/U: HCPCS | Performed by: NURSE PRACTITIONER

## 2025-04-16 PROCEDURE — 3075F SYST BP GE 130 - 139MM HG: CPT | Performed by: NURSE PRACTITIONER

## 2025-04-16 RX ORDER — BENZONATATE 200 MG/1
200 CAPSULE ORAL 3 TIMES DAILY PRN
Qty: 30 CAPSULE | Refills: 0 | Status: SHIPPED | OUTPATIENT
Start: 2025-04-16 | End: 2025-04-26

## 2025-04-16 RX ORDER — PREDNISONE 20 MG/1
40 TABLET ORAL
Qty: 10 TABLET | Refills: 0 | Status: SHIPPED | OUTPATIENT
Start: 2025-04-16 | End: 2025-04-21

## 2025-04-16 RX ORDER — DOXYCYCLINE HYCLATE 100 MG
100 TABLET ORAL 2 TIMES DAILY
Qty: 20 TABLET | Refills: 0 | Status: SHIPPED | OUTPATIENT
Start: 2025-04-16 | End: 2025-04-26

## 2025-04-16 SDOH — ECONOMIC STABILITY: FOOD INSECURITY: WITHIN THE PAST 12 MONTHS, THE FOOD YOU BOUGHT JUST DIDN'T LAST AND YOU DIDN'T HAVE MONEY TO GET MORE.: NEVER TRUE

## 2025-04-16 SDOH — ECONOMIC STABILITY: FOOD INSECURITY: WITHIN THE PAST 12 MONTHS, YOU WORRIED THAT YOUR FOOD WOULD RUN OUT BEFORE YOU GOT MONEY TO BUY MORE.: NEVER TRUE

## 2025-04-16 ASSESSMENT — PATIENT HEALTH QUESTIONNAIRE - PHQ9
2. FEELING DOWN, DEPRESSED OR HOPELESS: NOT AT ALL
SUM OF ALL RESPONSES TO PHQ QUESTIONS 1-9: 0
1. LITTLE INTEREST OR PLEASURE IN DOING THINGS: NOT AT ALL
SUM OF ALL RESPONSES TO PHQ QUESTIONS 1-9: 0

## 2025-04-16 ASSESSMENT — ENCOUNTER SYMPTOMS
HEARTBURN: 0
SORE THROAT: 1
HEMOPTYSIS: 0
WHEEZING: 0
RHINORRHEA: 0
SHORTNESS OF BREATH: 0
COUGH: 1

## 2025-04-16 NOTE — PROGRESS NOTES
Name: Haily Kowalski  : 1950         Chief Complaint:     Chief Complaint   Patient presents with    Cough     Cough, x 1 week.        History of Present Illness:      Haily Kowalski is a 75 y.o.  female who presents with Cough (Cough, x 1 week. )      Haily is here today for an acute care office visit.    Cough  This is a new problem. The current episode started in the past 7 days. The problem has been gradually worsening. Episode frequency: FREQUENTLY. The cough is Productive of sputum. Associated symptoms include nasal congestion, postnasal drip and a sore throat. Pertinent negatives include no chest pain, chills, ear congestion, ear pain, fever, headaches, heartburn, hemoptysis, myalgias, rash, rhinorrhea, shortness of breath, sweats, weight loss or wheezing. The symptoms are aggravated by lying down. She has tried OTC cough suppressant for the symptoms. The treatment provided mild relief. Her past medical history is significant for asthma, bronchitis and pneumonia. There is no history of bronchiectasis, COPD, emphysema or environmental allergies.         Past Medical History:     Past Medical History:   Diagnosis Date    Arthritis     Asthma     Chronic bronchitis (HCC)     Diabetes mellitus (HCC)     GERD (gastroesophageal reflux disease)     H/O echocardiogram 2016    EF >60%. LV wall thickness is mildly increased. Mild mitral and tricuspid regurgitation.  Borderline pulmonary hypertension estimated right ventricular sysolic pressure of 30mmHg. Mild (grade l) diastolic dysfunction.    Herpes zoster without complication 2024    History of PFTs 03/10/2016     Suboptimal effort. Restrictive in nature. clionical correlations is advised.    History of stress test 2016    Abnoraml. Moderate perfusion defect of mild to moderate intensity in the anterolateral and anteroapical regions during stress imaging.  Intermediate risk for CAD.    Hyperlipidemia     Hypertension       Reviewed all

## 2025-05-12 ENCOUNTER — TELEPHONE (OUTPATIENT)
Dept: PRIMARY CARE CLINIC | Age: 75
End: 2025-05-12

## 2025-05-12 NOTE — TELEPHONE ENCOUNTER
Haily fell on Saturday and now has bilateral knee pain along with chest pain.  She doesn't know how she fell and did not go to ED.    Do you want to order xrays or see her in office first?

## 2025-05-14 ENCOUNTER — HOSPITAL ENCOUNTER (OUTPATIENT)
Dept: GENERAL RADIOLOGY | Age: 75
Discharge: HOME | End: 2025-05-16
Payer: MEDICARE

## 2025-05-14 ENCOUNTER — OFFICE VISIT (OUTPATIENT)
Dept: PRIMARY CARE CLINIC | Age: 75
End: 2025-05-14
Payer: MEDICARE

## 2025-05-14 VITALS
SYSTOLIC BLOOD PRESSURE: 134 MMHG | DIASTOLIC BLOOD PRESSURE: 78 MMHG | WEIGHT: 176 LBS | TEMPERATURE: 97.9 F | OXYGEN SATURATION: 95 % | BODY MASS INDEX: 31.18 KG/M2 | HEART RATE: 63 BPM

## 2025-05-14 DIAGNOSIS — S80.02XA CONTUSION OF LEFT KNEE, INITIAL ENCOUNTER: ICD-10-CM

## 2025-05-14 DIAGNOSIS — S80.01XA CONTUSION OF RIGHT KNEE, INITIAL ENCOUNTER: Primary | ICD-10-CM

## 2025-05-14 DIAGNOSIS — S20.211A CONTUSION OF RIGHT CHEST WALL, INITIAL ENCOUNTER: ICD-10-CM

## 2025-05-14 DIAGNOSIS — S80.01XA CONTUSION OF RIGHT KNEE, INITIAL ENCOUNTER: ICD-10-CM
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
## 2025-05-14 PROCEDURE — 3078F DIAST BP <80 MM HG: CPT | Performed by: NURSE PRACTITIONER

## 2025-05-14 PROCEDURE — 73562 X-RAY EXAM OF KNEE 3: CPT

## 2025-05-14 PROCEDURE — 71046 X-RAY EXAM CHEST 2 VIEWS: CPT

## 2025-05-14 PROCEDURE — 1036F TOBACCO NON-USER: CPT | Performed by: NURSE PRACTITIONER

## 2025-05-14 PROCEDURE — 99214 OFFICE O/P EST MOD 30 MIN: CPT | Performed by: NURSE PRACTITIONER

## 2025-05-14 PROCEDURE — 1123F ACP DISCUSS/DSCN MKR DOCD: CPT | Performed by: NURSE PRACTITIONER

## 2025-05-14 PROCEDURE — 1090F PRES/ABSN URINE INCON ASSESS: CPT | Performed by: NURSE PRACTITIONER

## 2025-05-14 PROCEDURE — 1159F MED LIST DOCD IN RCRD: CPT | Performed by: NURSE PRACTITIONER

## 2025-05-14 PROCEDURE — 1160F RVW MEDS BY RX/DR IN RCRD: CPT | Performed by: NURSE PRACTITIONER

## 2025-05-14 PROCEDURE — G8399 PT W/DXA RESULTS DOCUMENT: HCPCS | Performed by: NURSE PRACTITIONER

## 2025-05-14 PROCEDURE — G8417 CALC BMI ABV UP PARAM F/U: HCPCS | Performed by: NURSE PRACTITIONER

## 2025-05-14 PROCEDURE — 3075F SYST BP GE 130 - 139MM HG: CPT | Performed by: NURSE PRACTITIONER

## 2025-05-14 PROCEDURE — G8427 DOCREV CUR MEDS BY ELIG CLIN: HCPCS | Performed by: NURSE PRACTITIONER

## 2025-05-14 PROCEDURE — 3017F COLORECTAL CA SCREEN DOC REV: CPT | Performed by: NURSE PRACTITIONER

## 2025-05-14 RX ORDER — METFORMIN HYDROCHLORIDE 500 MG/1
500 TABLET, EXTENDED RELEASE ORAL
Qty: 90 TABLET | Refills: 1 | Status: SHIPPED | OUTPATIENT
Start: 2025-05-14

## 2025-05-14 RX ORDER — HYDROCODONE BITARTRATE AND ACETAMINOPHEN 5; 325 MG/1; MG/1
1 TABLET ORAL EVERY 4 HOURS PRN
Qty: 18 TABLET | Refills: 0 | Status: SHIPPED | OUTPATIENT
Start: 2025-05-14 | End: 2025-05-17

## 2025-05-14 SDOH — ECONOMIC STABILITY: FOOD INSECURITY: WITHIN THE PAST 12 MONTHS, THE FOOD YOU BOUGHT JUST DIDN'T LAST AND YOU DIDN'T HAVE MONEY TO GET MORE.: NEVER TRUE

## 2025-05-14 SDOH — ECONOMIC STABILITY: FOOD INSECURITY: WITHIN THE PAST 12 MONTHS, YOU WORRIED THAT YOUR FOOD WOULD RUN OUT BEFORE YOU GOT MONEY TO BUY MORE.: NEVER TRUE

## 2025-05-14 ASSESSMENT — PATIENT HEALTH QUESTIONNAIRE - PHQ9
SUM OF ALL RESPONSES TO PHQ QUESTIONS 1-9: 0
1. LITTLE INTEREST OR PLEASURE IN DOING THINGS: NOT AT ALL
SUM OF ALL RESPONSES TO PHQ QUESTIONS 1-9: 0
2. FEELING DOWN, DEPRESSED OR HOPELESS: NOT AT ALL

## 2025-05-14 NOTE — PATIENT INSTRUCTIONS
SURVEY:     You may be receiving a survey from [facility] [employer] regarding your visit today.     Please complete the survey to enable us to provide the highest quality of care to you and your family.     If you cannot score us a very good on any question, please call the office to discuss how we could have made your experience a very good one.     Thank you,    Eusebio Bullock, APRN-CNP  Teresa Henderson, APRN-CNP  Niki, LPN  Jailyn, CMA  Patel, CMA  Alma, CMA  [place], PCA  Norma, CMA  Vilma, PM

## 2025-05-14 NOTE — PROGRESS NOTES
Value Date/Time    WBC 7.2 01/27/2025 01:01 PM    RBC 3.82 01/27/2025 01:01 PM    RBC 3.85 03/26/2012 11:13 AM    HGB 11.4 01/27/2025 01:01 PM    HCT 35.2 01/27/2025 01:01 PM    MCV 92.1 01/27/2025 01:01 PM    MCH 29.8 01/27/2025 01:01 PM    MCHC 32.4 01/27/2025 01:01 PM    RDW 13.0 01/27/2025 01:01 PM     01/27/2025 01:01 PM     03/26/2012 11:13 AM    MPV 9.5 01/27/2025 01:01 PM     Lab Results   Component Value Date/Time    TSH 1.36 03/01/2022 09:39 AM     Lab Results   Component Value Date/Time    CHOL 160 01/27/2025 01:01 PM    LDL 70 01/27/2025 01:01 PM    HDL 59 01/27/2025 01:01 PM    LABA1C 6.2 02/04/2025 11:02 AM       Assessment/Plan:      Diagnosis Orders   1. Contusion of right knee, initial encounter  HYDROcodone-acetaminophen (NORCO) 5-325 MG per tablet    XR CHEST STANDARD (2 VW)    XR KNEE RIGHT (3 VIEWS)    XR KNEE LEFT (3 VIEWS)      2. Contusion of left knee, initial encounter  HYDROcodone-acetaminophen (NORCO) 5-325 MG per tablet    XR CHEST STANDARD (2 VW)    XR KNEE RIGHT (3 VIEWS)    XR KNEE LEFT (3 VIEWS)      3. Contusion of right chest wall, initial encounter  HYDROcodone-acetaminophen (NORCO) 5-325 MG per tablet    XR CHEST STANDARD (2 VW)    XR KNEE RIGHT (3 VIEWS)    XR KNEE LEFT (3 VIEWS)      4. Fall, initial encounter  HYDROcodone-acetaminophen (NORCO) 5-325 MG per tablet    XR CHEST STANDARD (2 VW)    XR KNEE RIGHT (3 VIEWS)    XR KNEE LEFT (3 VIEWS)        We will have her obtain radiographs.  We will follow with results.    Continue Tylenol/ibuprofen as needed.  May use hydrocodone for severe pain.      1.  Haily received counseling on the following healthy behaviors: medication adherence  2.  Patient given educational materials - see patient instructions  3.  Was a self-tracking handout given in paper form or via MyChart? No  If yes, see orders or list here.  4.  Discussed use, benefit, and side effects of prescribed medications.  Barriers to medication compliance

## 2025-05-15 ENCOUNTER — RESULTS FOLLOW-UP (OUTPATIENT)
Dept: PRIMARY CARE CLINIC | Age: 75
End: 2025-05-15

## 2025-05-15 ENCOUNTER — OFFICE VISIT (OUTPATIENT)
Dept: ONCOLOGY | Age: 75
End: 2025-05-15
Payer: MEDICARE

## 2025-05-15 VITALS
DIASTOLIC BLOOD PRESSURE: 57 MMHG | SYSTOLIC BLOOD PRESSURE: 145 MMHG | RESPIRATION RATE: 18 BRPM | HEART RATE: 63 BPM | WEIGHT: 176 LBS | TEMPERATURE: 97.4 F | BODY MASS INDEX: 31.18 KG/M2

## 2025-05-15 DIAGNOSIS — N18.9 ANEMIA DUE TO CHRONIC KIDNEY DISEASE, UNSPECIFIED CKD STAGE: ICD-10-CM

## 2025-05-15 DIAGNOSIS — D63.1 ANEMIA DUE TO CHRONIC KIDNEY DISEASE, UNSPECIFIED CKD STAGE: ICD-10-CM

## 2025-05-15 DIAGNOSIS — D64.9 NORMOCYTIC ANEMIA: Primary | ICD-10-CM

## 2025-05-15 PROCEDURE — G8417 CALC BMI ABV UP PARAM F/U: HCPCS | Performed by: INTERNAL MEDICINE

## 2025-05-15 PROCEDURE — G8399 PT W/DXA RESULTS DOCUMENT: HCPCS | Performed by: INTERNAL MEDICINE

## 2025-05-15 PROCEDURE — 3017F COLORECTAL CA SCREEN DOC REV: CPT | Performed by: INTERNAL MEDICINE

## 2025-05-15 PROCEDURE — 3078F DIAST BP <80 MM HG: CPT | Performed by: INTERNAL MEDICINE

## 2025-05-15 PROCEDURE — 1036F TOBACCO NON-USER: CPT | Performed by: INTERNAL MEDICINE

## 2025-05-15 PROCEDURE — 3077F SYST BP >= 140 MM HG: CPT | Performed by: INTERNAL MEDICINE

## 2025-05-15 PROCEDURE — 1126F AMNT PAIN NOTED NONE PRSNT: CPT | Performed by: INTERNAL MEDICINE

## 2025-05-15 PROCEDURE — G8427 DOCREV CUR MEDS BY ELIG CLIN: HCPCS | Performed by: INTERNAL MEDICINE

## 2025-05-15 PROCEDURE — 1090F PRES/ABSN URINE INCON ASSESS: CPT | Performed by: INTERNAL MEDICINE

## 2025-05-15 PROCEDURE — 99214 OFFICE O/P EST MOD 30 MIN: CPT | Performed by: INTERNAL MEDICINE

## 2025-05-15 PROCEDURE — 1159F MED LIST DOCD IN RCRD: CPT | Performed by: INTERNAL MEDICINE

## 2025-05-15 PROCEDURE — 1123F ACP DISCUSS/DSCN MKR DOCD: CPT | Performed by: INTERNAL MEDICINE

## 2025-05-15 ASSESSMENT — ENCOUNTER SYMPTOMS
BOWEL INCONTINENCE: 0
NAUSEA: 0
BACK PAIN: 0
VISUAL CHANGE: 0
ABDOMINAL PAIN: 0
CONSTIPATION: 0
SORE THROAT: 0
COUGH: 0
VOMITING: 0
DIARRHEA: 0
RHINORRHEA: 0
SHORTNESS OF BREATH: 0
WHEEZING: 0

## 2025-05-15 NOTE — TELEPHONE ENCOUNTER
----- Message from TARA Elmore CNP sent at 5/15/2025  7:40 AM EDT -----  Chest x-ray is good, knee x-rays show arthritis but nothing acute.  Continue rest and medication, let us know how she is feeling.  Thank you.

## 2025-05-15 NOTE — PROGRESS NOTES
VIEWS OF THE RIGHT KNEE; THREE XRAY VIEWS OF THE LEFT KNEE    5/14/2025 3:11 pm    COMPARISON:  None.    HISTORY:  ORDERING SYSTEM PROVIDED HISTORY: Fall, initial encounter  TECHNOLOGIST PROVIDED HISTORY:  fall pain    FINDINGS:  Bone: No acute fracture.  Bilateral chondrocalcinosis of the menisci.    Mineralization: Normal bone mineralization.    Joint: No dislocation. Bilateral moderate tricompartmental osteoarthrosis.    Soft tissues: Unremarkable.  Impression: 1. No acute osseous abnormality.  2. Bilateral moderate degenerative changes of the knees.  XR KNEE RIGHT (3 VIEWS)  Narrative: EXAMINATION:  THREE XRAY VIEWS OF THE RIGHT KNEE; THREE XRAY VIEWS OF THE LEFT KNEE    5/14/2025 3:11 pm    COMPARISON:  None.    HISTORY:  ORDERING SYSTEM PROVIDED HISTORY: Fall, initial encounter  TECHNOLOGIST PROVIDED HISTORY:  fall pain    FINDINGS:  Bone: No acute fracture.  Bilateral chondrocalcinosis of the menisci.    Mineralization: Normal bone mineralization.    Joint: No dislocation. Bilateral moderate tricompartmental osteoarthrosis.    Soft tissues: Unremarkable.  Impression: 1. No acute osseous abnormality.  2. Bilateral moderate degenerative changes of the knees.  XR CHEST (2 VW)  Narrative: EXAMINATION:  TWO XRAY VIEWS OF THE CHEST    5/14/2025 3:11 pm    COMPARISON:  March 28, 2024    HISTORY:  ORDERING SYSTEM PROVIDED HISTORY: Fall, initial encounter  TECHNOLOGIST PROVIDED HISTORY:  fall pain    FINDINGS:  No pulmonary consolidation.There is no pleural effusion or pneumothorax.The  cardiomediastinal silhouette is normal.No acute osseous abnormality.  Impression: No evidence of acute cardiopulmonary process.       ASSESSMENT:       Diagnosis Orders   1. Normocytic anemia        2. Anemia due to chronic kidney disease, unspecified CKD stage                     PLAN:     I personally reviewed results of lab work-up imaging studies,outside records and other relevant clinical data. I had a detailed discussion with the

## 2025-06-25 ENCOUNTER — OFFICE VISIT (OUTPATIENT)
Dept: PRIMARY CARE CLINIC | Age: 75
End: 2025-06-25

## 2025-06-25 VITALS
HEART RATE: 81 BPM | WEIGHT: 172.8 LBS | TEMPERATURE: 97.5 F | BODY MASS INDEX: 30.61 KG/M2 | SYSTOLIC BLOOD PRESSURE: 124 MMHG | DIASTOLIC BLOOD PRESSURE: 68 MMHG | OXYGEN SATURATION: 97 % | RESPIRATION RATE: 16 BRPM

## 2025-06-25 DIAGNOSIS — J45.901 ALLERGIC BRONCHITIS WITH ACUTE EXACERBATION: Primary | ICD-10-CM

## 2025-06-25 RX ORDER — TRIAMCINOLONE ACETONIDE 40 MG/ML
20 INJECTION, SUSPENSION INTRA-ARTICULAR; INTRAMUSCULAR ONCE
Status: COMPLETED | OUTPATIENT
Start: 2025-06-25 | End: 2025-06-25

## 2025-06-25 RX ORDER — LEVOCETIRIZINE DIHYDROCHLORIDE 5 MG/1
5 TABLET, FILM COATED ORAL NIGHTLY
Qty: 90 TABLET | Refills: 1 | Status: SHIPPED | OUTPATIENT
Start: 2025-06-25

## 2025-06-25 RX ORDER — TRIAMCINOLONE ACETONIDE 40 MG/ML
40 INJECTION, SUSPENSION INTRA-ARTICULAR; INTRAMUSCULAR ONCE
Status: COMPLETED | OUTPATIENT
Start: 2025-06-25 | End: 2025-06-25

## 2025-06-25 RX ORDER — AZITHROMYCIN 250 MG/1
TABLET, FILM COATED ORAL
Qty: 6 TABLET | Refills: 0 | Status: SHIPPED | OUTPATIENT
Start: 2025-06-25 | End: 2025-07-05

## 2025-06-25 RX ADMIN — TRIAMCINOLONE ACETONIDE 40 MG: 40 INJECTION, SUSPENSION INTRA-ARTICULAR; INTRAMUSCULAR at 12:56

## 2025-06-25 RX ADMIN — TRIAMCINOLONE ACETONIDE 20 MG: 40 INJECTION, SUSPENSION INTRA-ARTICULAR; INTRAMUSCULAR at 12:56

## 2025-06-25 ASSESSMENT — PATIENT HEALTH QUESTIONNAIRE - PHQ9
1. LITTLE INTEREST OR PLEASURE IN DOING THINGS: NOT AT ALL
SUM OF ALL RESPONSES TO PHQ QUESTIONS 1-9: 0
SUM OF ALL RESPONSES TO PHQ QUESTIONS 1-9: 0
2. FEELING DOWN, DEPRESSED OR HOPELESS: NOT AT ALL
SUM OF ALL RESPONSES TO PHQ QUESTIONS 1-9: 0
SUM OF ALL RESPONSES TO PHQ QUESTIONS 1-9: 0

## 2025-06-25 ASSESSMENT — ENCOUNTER SYMPTOMS
SHORTNESS OF BREATH: 0
HEARTBURN: 0
SORE THROAT: 1
WHEEZING: 0
HEMOPTYSIS: 0
RHINORRHEA: 1
COUGH: 1

## 2025-06-25 NOTE — PATIENT INSTRUCTIONS
SURVEY:     You may be receiving a survey from [employer] regarding your visit today.     Please complete the survey to enable us to provide the highest quality of care to you and your family.     If you cannot score us a very good on any question, please call the office to discuss how we could have made your experience a very good one.     Thank you,    Eusebio Bullock, APRN-CNP  Teresa Henderson, APRN-CNP  Niki, LPN  Jailyn, CMA  Patel, CMA  Alma, CMA  [place], PCA  Norma, CMA  Vilma, PM     normal...

## 2025-06-25 NOTE — PROGRESS NOTES
After obtaining consent, and per orders of BRAYDEN Elmore, injection of Kenalog 60 given in Left deltoid by Patel Espino CMA. Patient instructed to remain in clinic for 20 minutes afterwards, and to report any adverse reaction to me immediately.    Documentation of Medication Injection Waste per [place] Guidelines    Was there any medication injection waste during this visit?  YES OR NO: Yes    If Yes:  Medication: Kenalog  Amount Wasted: 20mg  Reason for Waste: Not needed for medication administration    Disposal Method: Sharps bin due to medication in a vial

## 2025-06-25 NOTE — PROGRESS NOTES
Name: Haily Kowalski  : 1950         Chief Complaint:     Chief Complaint   Patient presents with    Cough     Patient c/o coughing all night. X 2 days.     Drainage     Patient c/o white drainage.        History of Present Illness:      Haily Kowalski is a 75 y.o.  female who presents with Cough (Patient c/o coughing all night. X 2 days. ) and Drainage (Patient c/o white drainage. )      Haily is here today for an acute care office visit.    Cough  This is a recurrent problem. The current episode started 1 to 4 weeks ago. The problem has been waxing and waning. Episode frequency: INTERMITTENTLY. The cough is Productive of sputum. Associated symptoms include nasal congestion, postnasal drip, rhinorrhea and a sore throat. Pertinent negatives include no chest pain, chills, ear congestion, ear pain, fever, headaches, heartburn, hemoptysis, myalgias, rash, shortness of breath, sweats, weight loss or wheezing. The symptoms are aggravated by lying down and pollens. She has tried OTC cough suppressant for the symptoms. The treatment provided mild relief. Her past medical history is significant for bronchitis and environmental allergies. There is no history of asthma, bronchiectasis, COPD, emphysema or pneumonia.         Past Medical History:     Past Medical History:   Diagnosis Date    Arthritis     Asthma     Chronic bronchitis (HCC)     Diabetes mellitus (HCC)     GERD (gastroesophageal reflux disease)     H/O echocardiogram 2016    EF >60%. LV wall thickness is mildly increased. Mild mitral and tricuspid regurgitation.  Borderline pulmonary hypertension estimated right ventricular sysolic pressure of 30mmHg. Mild (grade l) diastolic dysfunction.    Herpes zoster without complication 2024    History of PFTs 03/10/2016     Suboptimal effort. Restrictive in nature. clionical correlations is advised.    History of stress test 2016    Abnoraml. Moderate perfusion defect of mild to moderate intensity

## 2025-07-29 ENCOUNTER — TELEPHONE (OUTPATIENT)
Dept: PRIMARY CARE CLINIC | Age: 75
End: 2025-07-29

## 2025-08-01 ENCOUNTER — HOSPITAL ENCOUNTER (OUTPATIENT)
Dept: LAB | Age: 75
Discharge: HOME | End: 2025-08-01
Payer: MEDICARE

## 2025-08-01 DIAGNOSIS — E11.21 TYPE 2 DIABETES MELLITUS WITH DIABETIC NEPHROPATHY, WITHOUT LONG-TERM CURRENT USE OF INSULIN (HCC): ICD-10-CM

## 2025-08-01 DIAGNOSIS — I10 ESSENTIAL HYPERTENSION: ICD-10-CM

## 2025-08-01 DIAGNOSIS — I10 PRIMARY HYPERTENSION: ICD-10-CM

## 2025-08-01 LAB
ALT SERPL-CCNC: <5 U/L (ref 10–35)
ANION GAP SERPL CALCULATED.3IONS-SCNC: 9 MMOL/L (ref 9–16)
AST SERPL-CCNC: 12 U/L (ref 10–35)
BASOPHILS # BLD: 0.04 K/UL (ref 0–0.2)
BASOPHILS NFR BLD: 1 % (ref 0–2)
BUN SERPL-MCNC: 14 MG/DL (ref 8–23)
BUN/CREAT SERPL: 14 (ref 9–20)
CALCIUM SERPL-MCNC: 9.4 MG/DL (ref 8.6–10.4)
CHLORIDE SERPL-SCNC: 108 MMOL/L (ref 98–107)
CHOLEST SERPL-MCNC: 166 MG/DL (ref 0–199)
CHOLESTEROL/HDL RATIO: 2
CO2 SERPL-SCNC: 26 MMOL/L (ref 20–31)
CREAT SERPL-MCNC: 1 MG/DL (ref 0.5–0.9)
CREAT UR-MCNC: 172 MG/DL (ref 28–217)
EOSINOPHIL # BLD: 0.09 K/UL (ref 0–0.44)
EOSINOPHILS RELATIVE PERCENT: 1 % (ref 1–4)
ERYTHROCYTE [DISTWIDTH] IN BLOOD BY AUTOMATED COUNT: 13.8 % (ref 11.8–14.4)
EST. AVERAGE GLUCOSE BLD GHB EST-MCNC: 120 MG/DL
GFR, ESTIMATED: 59 ML/MIN/1.73M2
GLUCOSE SERPL-MCNC: 94 MG/DL (ref 74–99)
HBA1C MFR BLD: 5.8 % (ref 4–6)
HCT VFR BLD AUTO: 38 % (ref 36.3–47.1)
HDLC SERPL-MCNC: 82 MG/DL
HGB BLD-MCNC: 11.9 G/DL (ref 11.9–15.1)
IMM GRANULOCYTES # BLD AUTO: <0.03 K/UL (ref 0–0.3)
IMM GRANULOCYTES NFR BLD: 0 %
LDLC SERPL CALC-MCNC: 74 MG/DL (ref 0–100)
LYMPHOCYTES NFR BLD: 2.86 K/UL (ref 1.1–3.7)
LYMPHOCYTES RELATIVE PERCENT: 40 % (ref 24–43)
MCH RBC QN AUTO: 30.4 PG (ref 25.2–33.5)
MCHC RBC AUTO-ENTMCNC: 31.3 G/DL (ref 28.4–34.8)
MCV RBC AUTO: 96.9 FL (ref 82.6–102.9)
MICROALBUMIN UR-MCNC: 267 MG/L (ref 0–20)
MICROALBUMIN/CREAT UR-RTO: 155 MCG/MG CREAT (ref 0–25)
MONOCYTES NFR BLD: 0.63 K/UL (ref 0.1–1.2)
MONOCYTES NFR BLD: 9 % (ref 3–12)
NEUTROPHILS NFR BLD: 49 % (ref 36–65)
NEUTS SEG NFR BLD: 3.56 K/UL (ref 1.5–8.1)
NRBC BLD-RTO: 0 PER 100 WBC
PLATELET # BLD AUTO: 284 K/UL (ref 138–453)
PMV BLD AUTO: 9.2 FL (ref 8.1–13.5)
POTASSIUM SERPL-SCNC: 4 MMOL/L (ref 3.7–5.3)
RBC # BLD AUTO: 3.92 M/UL (ref 3.95–5.11)
SODIUM SERPL-SCNC: 143 MMOL/L (ref 136–145)
TRIGL SERPL-MCNC: 51 MG/DL
VLDLC SERPL CALC-MCNC: 10 MG/DL (ref 1–30)
WBC OTHER # BLD: 7.2 K/UL (ref 3.5–11.3)

## 2025-08-01 PROCEDURE — 36415 COLL VENOUS BLD VENIPUNCTURE: CPT

## 2025-08-01 PROCEDURE — 83036 HEMOGLOBIN GLYCOSYLATED A1C: CPT

## 2025-08-01 PROCEDURE — 80048 BASIC METABOLIC PNL TOTAL CA: CPT

## 2025-08-01 PROCEDURE — 84460 ALANINE AMINO (ALT) (SGPT): CPT

## 2025-08-01 PROCEDURE — 85025 COMPLETE CBC W/AUTO DIFF WBC: CPT

## 2025-08-01 PROCEDURE — 80061 LIPID PANEL: CPT

## 2025-08-01 PROCEDURE — 82570 ASSAY OF URINE CREATININE: CPT

## 2025-08-01 PROCEDURE — 82043 UR ALBUMIN QUANTITATIVE: CPT

## 2025-08-01 PROCEDURE — 84450 TRANSFERASE (AST) (SGOT): CPT

## 2025-08-05 ENCOUNTER — OFFICE VISIT (OUTPATIENT)
Dept: PRIMARY CARE CLINIC | Age: 75
End: 2025-08-05
Payer: MEDICARE

## 2025-08-05 VITALS
BODY MASS INDEX: 31.08 KG/M2 | WEIGHT: 175.4 LBS | OXYGEN SATURATION: 98 % | TEMPERATURE: 97.5 F | DIASTOLIC BLOOD PRESSURE: 88 MMHG | SYSTOLIC BLOOD PRESSURE: 148 MMHG | HEIGHT: 63 IN | HEART RATE: 78 BPM | RESPIRATION RATE: 20 BRPM

## 2025-08-05 DIAGNOSIS — J45.901 ALLERGIC BRONCHITIS WITH ACUTE EXACERBATION: ICD-10-CM

## 2025-08-05 DIAGNOSIS — Z00.00 MEDICARE ANNUAL WELLNESS VISIT, SUBSEQUENT: Primary | ICD-10-CM

## 2025-08-05 DIAGNOSIS — I10 ESSENTIAL HYPERTENSION: ICD-10-CM

## 2025-08-05 DIAGNOSIS — E11.9 TYPE 2 DIABETES MELLITUS WITHOUT COMPLICATION, WITHOUT LONG-TERM CURRENT USE OF INSULIN (HCC): ICD-10-CM

## 2025-08-05 PROCEDURE — 3044F HG A1C LEVEL LT 7.0%: CPT | Performed by: NURSE PRACTITIONER

## 2025-08-05 PROCEDURE — 1160F RVW MEDS BY RX/DR IN RCRD: CPT | Performed by: NURSE PRACTITIONER

## 2025-08-05 PROCEDURE — 3017F COLORECTAL CA SCREEN DOC REV: CPT | Performed by: NURSE PRACTITIONER

## 2025-08-05 PROCEDURE — 3079F DIAST BP 80-89 MM HG: CPT | Performed by: NURSE PRACTITIONER

## 2025-08-05 PROCEDURE — 1123F ACP DISCUSS/DSCN MKR DOCD: CPT | Performed by: NURSE PRACTITIONER

## 2025-08-05 PROCEDURE — 1159F MED LIST DOCD IN RCRD: CPT | Performed by: NURSE PRACTITIONER

## 2025-08-05 PROCEDURE — G0439 PPPS, SUBSEQ VISIT: HCPCS | Performed by: NURSE PRACTITIONER

## 2025-08-05 PROCEDURE — 3077F SYST BP >= 140 MM HG: CPT | Performed by: NURSE PRACTITIONER

## 2025-08-05 RX ORDER — BENZONATATE 200 MG/1
200 CAPSULE ORAL 3 TIMES DAILY PRN
Qty: 90 CAPSULE | Refills: 0 | Status: SHIPPED | OUTPATIENT
Start: 2025-08-05

## 2025-08-05 RX ORDER — LEVOCETIRIZINE DIHYDROCHLORIDE 5 MG/1
5 TABLET, FILM COATED ORAL NIGHTLY
Qty: 90 TABLET | Refills: 1 | Status: SHIPPED | OUTPATIENT
Start: 2025-08-05

## 2025-08-05 ASSESSMENT — ENCOUNTER SYMPTOMS
HEMOPTYSIS: 0
SHORTNESS OF BREATH: 0
NAUSEA: 0
HEARTBURN: 0
SORE THROAT: 1
CONSTIPATION: 0
WHEEZING: 0
COUGH: 1
RHINORRHEA: 1
ABDOMINAL PAIN: 0
DIARRHEA: 0
VOMITING: 0

## 2025-08-29 DIAGNOSIS — N18.30 STAGE 3 CHRONIC KIDNEY DISEASE, UNSPECIFIED WHETHER STAGE 3A OR 3B CKD (HCC): ICD-10-CM

## 2025-08-29 DIAGNOSIS — D75.89 MACROCYTOSIS: ICD-10-CM

## 2025-08-29 DIAGNOSIS — N18.9 ANEMIA DUE TO CHRONIC KIDNEY DISEASE, UNSPECIFIED CKD STAGE: ICD-10-CM

## 2025-08-29 DIAGNOSIS — D50.9 IRON DEFICIENCY ANEMIA, UNSPECIFIED IRON DEFICIENCY ANEMIA TYPE: ICD-10-CM

## 2025-08-29 DIAGNOSIS — E88.09 HYPOALBUMINEMIA: ICD-10-CM

## 2025-08-29 DIAGNOSIS — D64.9 NORMOCYTIC ANEMIA: Primary | ICD-10-CM

## 2025-08-29 DIAGNOSIS — D63.1 ANEMIA DUE TO CHRONIC KIDNEY DISEASE, UNSPECIFIED CKD STAGE: ICD-10-CM

## 2025-09-04 ENCOUNTER — HOSPITAL ENCOUNTER (OUTPATIENT)
Dept: LAB | Age: 75
Discharge: HOME | End: 2025-09-04
Payer: MEDICARE

## 2025-09-04 DIAGNOSIS — D50.9 IRON DEFICIENCY ANEMIA, UNSPECIFIED IRON DEFICIENCY ANEMIA TYPE: ICD-10-CM

## 2025-09-04 DIAGNOSIS — E88.09 HYPOALBUMINEMIA: ICD-10-CM

## 2025-09-04 DIAGNOSIS — D64.9 NORMOCYTIC ANEMIA: ICD-10-CM

## 2025-09-04 DIAGNOSIS — N18.9 ANEMIA DUE TO CHRONIC KIDNEY DISEASE, UNSPECIFIED CKD STAGE: ICD-10-CM

## 2025-09-04 DIAGNOSIS — D75.89 MACROCYTOSIS: ICD-10-CM

## 2025-09-04 DIAGNOSIS — D63.1 ANEMIA DUE TO CHRONIC KIDNEY DISEASE, UNSPECIFIED CKD STAGE: ICD-10-CM

## 2025-09-04 DIAGNOSIS — N18.30 STAGE 3 CHRONIC KIDNEY DISEASE, UNSPECIFIED WHETHER STAGE 3A OR 3B CKD (HCC): ICD-10-CM

## 2025-09-04 LAB
ALBUMIN SERPL-MCNC: 3.7 G/DL (ref 3.5–5.2)
ALBUMIN/GLOB SERPL: 1.1 {RATIO} (ref 1–2.5)
ALP SERPL-CCNC: 144 U/L (ref 35–104)
ALT SERPL-CCNC: <5 U/L (ref 10–35)
ANION GAP SERPL CALCULATED.3IONS-SCNC: 10 MMOL/L (ref 9–16)
AST SERPL-CCNC: 13 U/L (ref 10–35)
BASOPHILS # BLD: 0.05 K/UL (ref 0–0.2)
BASOPHILS NFR BLD: 1 % (ref 0–2)
BILIRUB SERPL-MCNC: 0.4 MG/DL (ref 0–1.2)
BUN SERPL-MCNC: 16 MG/DL (ref 8–23)
BUN/CREAT SERPL: 15 (ref 9–20)
CALCIUM SERPL-MCNC: 9.4 MG/DL (ref 8.6–10.4)
CHLORIDE SERPL-SCNC: 103 MMOL/L (ref 98–107)
CO2 SERPL-SCNC: 27 MMOL/L (ref 20–31)
CREAT SERPL-MCNC: 1.1 MG/DL (ref 0.5–0.9)
EOSINOPHIL # BLD: 0.13 K/UL (ref 0–0.44)
EOSINOPHILS RELATIVE PERCENT: 2 % (ref 1–4)
ERYTHROCYTE [DISTWIDTH] IN BLOOD BY AUTOMATED COUNT: 13 % (ref 11.8–14.4)
FERRITIN SERPL-MCNC: 46 NG/ML
GFR, ESTIMATED: 52 ML/MIN/1.73M2
GLUCOSE SERPL-MCNC: 83 MG/DL (ref 74–99)
HCT VFR BLD AUTO: 35.5 % (ref 36.3–47.1)
HGB BLD-MCNC: 11.7 G/DL (ref 11.9–15.1)
IMM GRANULOCYTES # BLD AUTO: <0.03 K/UL (ref 0–0.3)
IMM GRANULOCYTES NFR BLD: 0 %
IRON SATN MFR SERPL: 19 % (ref 20–55)
IRON SERPL-MCNC: 53 UG/DL (ref 37–145)
LYMPHOCYTES NFR BLD: 2.94 K/UL (ref 1.1–3.7)
LYMPHOCYTES RELATIVE PERCENT: 39 % (ref 24–43)
MCH RBC QN AUTO: 30.6 PG (ref 25.2–33.5)
MCHC RBC AUTO-ENTMCNC: 33 G/DL (ref 28.4–34.8)
MCV RBC AUTO: 92.9 FL (ref 82.6–102.9)
MONOCYTES NFR BLD: 0.6 K/UL (ref 0.1–1.2)
MONOCYTES NFR BLD: 8 % (ref 3–12)
NEUTROPHILS NFR BLD: 50 % (ref 36–65)
NEUTS SEG NFR BLD: 3.78 K/UL (ref 1.5–8.1)
NRBC BLD-RTO: 0 PER 100 WBC
PLATELET # BLD AUTO: 311 K/UL (ref 138–453)
PMV BLD AUTO: 9.1 FL (ref 8.1–13.5)
POTASSIUM SERPL-SCNC: 3.6 MMOL/L (ref 3.7–5.3)
PROT SERPL-MCNC: 7 G/DL (ref 6.6–8.7)
RBC # BLD AUTO: 3.82 M/UL (ref 3.95–5.11)
SODIUM SERPL-SCNC: 140 MMOL/L (ref 136–145)
TIBC SERPL-MCNC: 273 UG/DL (ref 250–450)
UNSATURATED IRON BINDING CAPACITY: 220 UG/DL (ref 112–347)
WBC OTHER # BLD: 7.5 K/UL (ref 3.5–11.3)

## 2025-09-04 PROCEDURE — 83550 IRON BINDING TEST: CPT

## 2025-09-04 PROCEDURE — 83540 ASSAY OF IRON: CPT

## 2025-09-04 PROCEDURE — 80053 COMPREHEN METABOLIC PANEL: CPT

## 2025-09-04 PROCEDURE — 82728 ASSAY OF FERRITIN: CPT

## 2025-09-04 PROCEDURE — 36415 COLL VENOUS BLD VENIPUNCTURE: CPT

## 2025-09-04 PROCEDURE — 85025 COMPLETE CBC W/AUTO DIFF WBC: CPT

## 2025-09-04 PROCEDURE — 82668 ASSAY OF ERYTHROPOIETIN: CPT

## 2025-09-06 LAB — EPO SERPL-ACNC: 42 MU/ML (ref 4–27)
